# Patient Record
Sex: MALE | Race: WHITE | NOT HISPANIC OR LATINO | Employment: OTHER | URBAN - METROPOLITAN AREA
[De-identification: names, ages, dates, MRNs, and addresses within clinical notes are randomized per-mention and may not be internally consistent; named-entity substitution may affect disease eponyms.]

---

## 2017-01-04 ENCOUNTER — MEDICAL CORRESPONDENCE (OUTPATIENT)
Dept: HEALTH INFORMATION MANAGEMENT | Facility: CLINIC | Age: 62
End: 2017-01-04

## 2017-01-05 ENCOUNTER — TELEPHONE (OUTPATIENT)
Dept: FAMILY MEDICINE | Facility: CLINIC | Age: 62
End: 2017-01-05

## 2017-01-05 NOTE — TELEPHONE ENCOUNTER
Patient has switched insurances for 2017 and his test strips now require a PA for the brand and qty the patient receives.     Ins: [employer] Part D  ID: [id number]  Qty: 900 Day Supply is 90  #: [phone]    Lorna Castellanos CPhT  [facility]  Pharmacy  [phone]

## 2017-01-06 NOTE — TELEPHONE ENCOUNTER
PA for Yomaira Contour Test Strips completed at [employer] and faxed to [employer] - will await response    Questionnaire submitted. PA Case [id number] Status: Pending review.

## 2017-01-10 NOTE — TELEPHONE ENCOUNTER
[facility] approved coverage for diabetic supplies under Pat B benefit for 730 days.  Pharmacy notified.

## 2017-01-17 NOTE — TELEPHONE ENCOUNTER
Eb has problems with hypoglycemia and to help prevent this he is testing his blood glucose values 8-10 times daily.  He does not always feel his lows so he tests his blood sugars more often to assess for this.  Jackeline Carmona RD, CDE

## 2017-01-21 ENCOUNTER — TELEPHONE (OUTPATIENT)
Dept: FAMILY MEDICINE | Facility: CLINIC | Age: 62
End: 2017-01-21

## 2017-01-21 DIAGNOSIS — E10.59 TYPE 1 DIABETES MELLITUS WITH VASCULAR DISEASE (H): Primary | ICD-10-CM

## 2017-01-21 NOTE — Clinical Note
[facility]  [address]  [facility] [address]  Phone: [phone]    January 30, 2017    Eb Eisenberg  [address] [place] [address]      Member ID #[id number]      Case # [id number]      Expedited Appeal Request:      I am writing to expedite an appeal request for Contour Next Strips.  Eb Eisenberg is a brittle diabetic who frequently will have blood sugars less than 70 and has in the past had severe hypoglycemia.  He also has several blood sugars that are over 200 each week and needs to be able to test his blood sugars 8-10 times daily and adjust his insulin pump accordingly.  The Contour Next Strips are the test strips used for the meter that talks to his pump.  If he is unaware of the low blood sugars and is unable to respond accordingly, this could lead to hypoglycemic coma and death.      Please see attached documentation.          Sincerely,          Jackeline Rachel MD

## 2017-01-21 NOTE — TELEPHONE ENCOUNTER
"[employer] requires a prior auth for the karri contour next test strips. Per the previous pa telephone encounter we tried billing the part b but it says it has to go through humana. humana covers accu-chek and nipro products but he has to have the karri contour next test strips because that's the only machine that \"talks\" to his insulin pump. The rejection says that it needs a prior auth    humana part d  ID# [id number]  [phone]    Thanks!    Gayathri Lott, [facility] [place]  [phone]    "

## 2017-01-24 NOTE — TELEPHONE ENCOUNTER
According to [employer] there is already an authorization on file for this patient and this request.  PA not required   (???)

## 2017-01-25 NOTE — TELEPHONE ENCOUNTER
UPDATE: Provider will need to call for a quantity limit override. [phone].  Thanks,   Angelica Appiah  Certified Pharmacy Technician  [facility]  [phone]

## 2017-01-26 NOTE — TELEPHONE ENCOUNTER
I attempted to call but only receive an automated system.  Their office closes at 6 pm as well.    Please call insurance [phone] to request a quantity override for patient Yomaira Contour Next Test Strips.  Patient is a Type 1 diabetic who is testing In Vitro route 8-10 times daily.    ID number [id number]    Thank you.

## 2017-01-26 NOTE — TELEPHONE ENCOUNTER
RN spoke with Mandy.  This requires PA quantity exceeds recommendation.    Will route to Aide.    Vero MTZ RN

## 2017-01-27 NOTE — TELEPHONE ENCOUNTER
So attempting to run test strips through part B, they limit a patient to test only three times a day, unless noted in medical chart as to why patient is testing 8-10 times per day. Please update documentation in epic or call us back at [phone]. We can only dispense up to their limits at this time.  Thanks,   Angelica Appiah  Certified Pharmacy Technician  [facility]  [phone]

## 2017-01-27 NOTE — TELEPHONE ENCOUNTER
I do not see any Humana insurance info for this patient. All I see is BCBS info...    I spoke to patient, he needs Yomaira contour next strips to use with his insulin pump.    He gave me this number for [facility] Provider services: [phone].     Monica Gottlieb RN

## 2017-01-27 NOTE — TELEPHONE ENCOUNTER
Called [facility]h[employer].  Call initiated at 1159am on 1/27/2017    Talked with Diana at 1205.     Requesting 10 boxes for 3 month supply.  She tells me there is already an existing prior authorization in process.    Denied on part D but approved on medicare part B benefits.     Medicare part B should pay.         Referred me to the Medication Intake Team  @1221pm    Still on hold at 1226    Again told this was approved under Medicare Part B benefit and it is approved from 1/7/2017 - 1/7/2019  Reference #[id number]      Will send it again to the pharmacy.    Jackeline Rachel M.D.  1229pm

## 2017-01-27 NOTE — TELEPHONE ENCOUNTER
I have not received the fax to complete.  Care team to look for this and if unable to find -please call and use reference number below to have them fax us a new prior authorization form.     Jackeline Rachel M.D.

## 2017-01-27 NOTE — TELEPHONE ENCOUNTER
Verified coverage with our Medicare office. Patient has part B coverage with [employer]. Person at [employer] this morning mis informed you of the coverage. Spent 35 minutes on the phone with [employer] clarifying and having them fax for a new prior authorization for the quantity of test strips. New reference #[id number]. Please complete form and fax back.  Sorry for the trouble.  Thanks,   Angelica Appiah  Certified Pharmacy Technician  [facility]  [phone]

## 2017-01-27 NOTE — TELEPHONE ENCOUNTER
I updated his 12/5/2016 visit to include reasoning behind his high testing frequency.  Is this adequate?  Let me know if I need to do more.    Jackeline Rachel M.D.

## 2017-01-27 NOTE — TELEPHONE ENCOUNTER
"I'm sorry I have tried to complete a \"maximum dispensing limit\" request form and I received the same response, \"Authorization already on file for this request.\"  "

## 2017-01-30 NOTE — TELEPHONE ENCOUNTER
1/30/2017 956 call initiated    Live person answered 1000.      Standard appeal is 7 days.  Expedited appeal is 72 hours.      Maxx is the [employer] service rep I talked to.    Jackeline Rachel M.D.      1008 initiated a call for an expedite appeal at [phone]    Gracy is who I talked.      1012 placed on hold so she could find the denial information to process the expedited appeal.    1017 back on the phone  Answered all questions.        Will receive call back within 24 hours.    Ended call at 1025    Case #[id number]

## 2017-02-02 NOTE — TELEPHONE ENCOUNTER
I received a fax that the test strips are covered for one year under [employer] part B plan.      Ref#  [id number]    Please make sure pharmacy in [place] is aware.    Jackeline Rachel M.D.

## 2017-03-08 ENCOUNTER — OFFICE VISIT (OUTPATIENT)
Dept: FAMILY MEDICINE | Facility: CLINIC | Age: 62
End: 2017-03-08
Payer: COMMERCIAL

## 2017-03-08 VITALS
WEIGHT: 185 LBS | HEIGHT: 69 IN | RESPIRATION RATE: 18 BRPM | SYSTOLIC BLOOD PRESSURE: 139 MMHG | BODY MASS INDEX: 27.4 KG/M2 | DIASTOLIC BLOOD PRESSURE: 86 MMHG | HEART RATE: 68 BPM

## 2017-03-08 DIAGNOSIS — F32.2 MAJOR DEPRESSIVE DISORDER, SINGLE EPISODE, SEVERE WITHOUT PSYCHOTIC FEATURES (H): ICD-10-CM

## 2017-03-08 DIAGNOSIS — E78.5 HYPERLIPIDEMIA LDL GOAL <70: ICD-10-CM

## 2017-03-08 DIAGNOSIS — F11.90 CHRONIC NARCOTIC USE: ICD-10-CM

## 2017-03-08 DIAGNOSIS — J30.2 SEASONAL ALLERGIC RHINITIS, UNSPECIFIED ALLERGIC RHINITIS TRIGGER: ICD-10-CM

## 2017-03-08 DIAGNOSIS — M48.02 CERVICAL STENOSIS OF SPINE: ICD-10-CM

## 2017-03-08 DIAGNOSIS — E10.42 DIABETIC POLYNEUROPATHY ASSOCIATED WITH TYPE 1 DIABETES MELLITUS (H): ICD-10-CM

## 2017-03-08 DIAGNOSIS — E10.59 TYPE 1 DIABETES MELLITUS WITH VASCULAR DISEASE (H): Primary | ICD-10-CM

## 2017-03-08 DIAGNOSIS — F43.21 GRIEF REACTION: ICD-10-CM

## 2017-03-08 LAB
CREAT UR-MCNC: 80 MG/DL
HBA1C MFR BLD: 7.9 % (ref 4.3–6)
MICROALBUMIN UR-MCNC: 5 MG/L
MICROALBUMIN/CREAT UR: 6.54 MG/G CR (ref 0–17)

## 2017-03-08 PROCEDURE — 82043 UR ALBUMIN QUANTITATIVE: CPT | Performed by: FAMILY MEDICINE

## 2017-03-08 PROCEDURE — 83036 HEMOGLOBIN GLYCOSYLATED A1C: CPT | Performed by: FAMILY MEDICINE

## 2017-03-08 PROCEDURE — 36415 COLL VENOUS BLD VENIPUNCTURE: CPT | Performed by: FAMILY MEDICINE

## 2017-03-08 PROCEDURE — 99214 OFFICE O/P EST MOD 30 MIN: CPT | Performed by: FAMILY MEDICINE

## 2017-03-08 RX ORDER — HYDROCODONE BITARTRATE AND ACETAMINOPHEN 10; 325 MG/1; MG/1
1 TABLET ORAL EVERY 6 HOURS PRN
Qty: 120 TABLET | Refills: 0 | Status: SHIPPED | OUTPATIENT
Start: 2017-05-09 | End: 2017-06-07

## 2017-03-08 RX ORDER — HYDROCODONE BITARTRATE AND ACETAMINOPHEN 10; 325 MG/1; MG/1
1 TABLET ORAL EVERY 6 HOURS PRN
Qty: 120 TABLET | Refills: 0 | Status: SHIPPED | OUTPATIENT
Start: 2017-03-09 | End: 2017-06-12

## 2017-03-08 RX ORDER — HYDROCODONE BITARTRATE AND ACETAMINOPHEN 10; 325 MG/1; MG/1
1 TABLET ORAL EVERY 6 HOURS PRN
Qty: 120 TABLET | Refills: 0 | Status: SHIPPED | OUTPATIENT
Start: 2017-04-09 | End: 2017-06-12

## 2017-03-08 ASSESSMENT — ANXIETY QUESTIONNAIRES
2. NOT BEING ABLE TO STOP OR CONTROL WORRYING: SEVERAL DAYS
6. BECOMING EASILY ANNOYED OR IRRITABLE: NOT AT ALL
5. BEING SO RESTLESS THAT IT IS HARD TO SIT STILL: NOT AT ALL
IF YOU CHECKED OFF ANY PROBLEMS ON THIS QUESTIONNAIRE, HOW DIFFICULT HAVE THESE PROBLEMS MADE IT FOR YOU TO DO YOUR WORK, TAKE CARE OF THINGS AT HOME, OR GET ALONG WITH OTHER PEOPLE: NOT DIFFICULT AT ALL
7. FEELING AFRAID AS IF SOMETHING AWFUL MIGHT HAPPEN: NOT AT ALL
1. FEELING NERVOUS, ANXIOUS, OR ON EDGE: SEVERAL DAYS
3. WORRYING TOO MUCH ABOUT DIFFERENT THINGS: NOT AT ALL
GAD7 TOTAL SCORE: 2

## 2017-03-08 ASSESSMENT — PATIENT HEALTH QUESTIONNAIRE - PHQ9: 5. POOR APPETITE OR OVEREATING: NOT AT ALL

## 2017-03-08 NOTE — NURSING NOTE
"Chief Complaint   Patient presents with     Diabetes     Ent Problem       Initial /86  Pulse 68  Resp 18  Ht 5' 9\" (1.753 m)  Wt 185 lb (83.9 kg)  BMI 27.32 kg/m2 Estimated body mass index is 27.32 kg/(m^2) as calculated from the following:    Height as of this encounter: 5' 9\" (1.753 m).    Weight as of this encounter: 185 lb (83.9 kg).  Medication Reconciliation: complete    "

## 2017-03-08 NOTE — PATIENT INSTRUCTIONS
Thank you for choosing [facility].  You may be receiving a survey in the mail from [facility] regarding your visit today.  Please take a few minutes to complete and return the survey to let us know how we are doing.      Our Clinic hours are:  Mondays    7:20 am - 7 pm  Tues -  Fri  7:20 am - 5 pm    Clinic Phone: [phone]    The clinic lab opens at 7:30 am Mon - Fri and appointments are required.    [facility]. [phone]  Monday-Thursday 8 am - 7pm  Tues/Wed/Fri 8 am - 5:30 pm

## 2017-03-08 NOTE — PROGRESS NOTES
"  SUBJECTIVE:                                                    Eb Eisenberg is a 62 year old male who presents to clinic today for the following health issues:        Diabetes Follow-up      Patient is checking blood sugars: every couple of hours    Diabetic concerns: blood sugar frequently over 200     Symptoms of hypoglycemia (low blood sugar): none     Paresthesias (numbness or burning in feet) or sores: No     Date of last diabetic eye exam: up to date     Hyperlipidemia Follow-Up      Rate your low fat/cholesterol diet?: fair    Taking statin?  No    Other lipid medications/supplements?:  none     Hypertension Follow-up      Outpatient blood pressures are not being checked.    Low Salt Diet: no added salt         Amount of exercise or physical activity: None    Problems taking medications regularly: No    Medication side effects: none  Diet: regular (no restrictions)  Acute Illness   Acute illness concerns: sinus concerns  Onset: 3 months    Fever: no     Chills/Sweats: YES    Headache (location?): no     Sinus Pressure:YES- tender and post-nasal drainage    Conjunctivitis:  YES: watery    Ear Pain: YES: left has pain    Rhinorrhea: YES    Congestion: YES    Sore Throat: no      Cough: YES-productive of clear sputum, productive of yellow sputum    Wheeze: no     Decreased Appetite: YES    Nausea: no     Vomiting: no     Diarrhea:  YES     Dysuria/Freq.: no     Fatigue/Achiness: YES    Sick/Strep Exposure: no      Therapies Tried and outcome: nothing    Ex wife who he still lived with  of ALS.  Moods are okay.  Will have some crying jags.  Was sleeping day and night until this past weekend.   Was exhausted. That seems to have lifted.  Right now feels pretty stable on his medications.        /86  Pulse 68  Resp 18  Ht 5' 9\" (1.753 m)  Wt 185 lb (83.9 kg)  BMI 27.32 kg/m2  EXAM: GENERAL APPEARANCE: Alert, no acute distress  HEENT: unremarkable  RESP: lungs clear to auscultation   CV: normal rate, " regular rhythm, no murmur or gallop  ABDOMEN: soft, no organomegaly, masses or tenderness  PSYCH: mentation appears normal., affect and mood normal    PHQ-9 SCORE 12/5/2016 12/15/2016 3/8/2017   Total Score - - -   Total Score 16 13 7     VIVIANA-7 SCORE 11/19/2015 12/15/2016 3/8/2017   Total Score - - -   Total Score 3 13 2     ASSESSMENT/PLAN:      ICD-10-CM    1. Type 1 diabetes mellitus with vascular disease (H) E10.59 Hemoglobin A1c     Albumin Random Urine Quantitative   2. Major depressive disorder, single episode, severe without psychotic features (H) F32.2    3. Chronic narcotic use F11.90 HYDROcodone-acetaminophen (NORCO)  MG per tablet     HYDROcodone-acetaminophen (NORCO)  MG per tablet     HYDROcodone-acetaminophen (NORCO)  MG per tablet   4. Cervical stenosis of spine M48.02 HYDROcodone-acetaminophen (NORCO)  MG per tablet     HYDROcodone-acetaminophen (NORCO)  MG per tablet     tiZANidine (ZANAFLEX) 4 MG tablet   5. Hyperlipidemia LDL goal <70 E78.5    6. Diabetic polyneuropathy associated with type 1 diabetes mellitus (H) E10.42    7. Grief reaction F43.20    8. Seasonal allergic rhinitis, unspecified allergic rhinitis trigger J30.2      Pain medication refilled.  Overall stable.  Contract on file, sees me every 3 months.     Recommend flonase and over the counter antihistamine for the sinus drainage, I don't feel this is acute sinusitis at this time.     Moods are okay, actually improved on the phq/viviana, encouraged to seek grief group/counseling prn.    Jackeline Rachel M.D.      Patient Instructions         Thank you for choosing [facility].  You may be receiving a survey in the mail from [facility] [employer] regarding your visit today.  Please take a few minutes to complete and return the survey to let us know how we are doing.      Our Clinic hours are:  Mondays    7:20 am - 7 pm  Tues -  Fri  7:20 am - 5 pm    Clinic Phone: [phone]    The clinic lab opens at 7:30 am  Mon - Fri and appointments are required.    [facility]  Ph. [phone]  Monday-Thursday 8 am - 7pm  Tues/Wed/Fri 8 am - 5:30 pm

## 2017-03-08 NOTE — MR AVS SNAPSHOT
After Visit Summary   3/8/2017    Eb Eisenberg    MRN: [id number]           Patient Information     Date Of Birth          1955        Visit Information        Provider Department      3/8/2017 8:20 AM Jackeline Rachel MD [facility]        Today's Diagnoses     Type 1 diabetes mellitus with vascular disease (H)    -  1    Major depressive disorder, single episode, severe without psychotic features (H)        Chronic narcotic use        Cervical stenosis of spine        Hyperlipidemia LDL goal <70        Diabetic polyneuropathy associated with type 1 diabetes mellitus (H)          Care Instructions          Thank you for choosing [facility].  You may be receiving a survey in the mail from [employer] regarding your visit today.  Please take a few minutes to complete and return the survey to let us know how we are doing.      Our Clinic hours are:  Mondays    7:20 am - 7 pm  Tues -  Fri  7:20 am - 5 pm    Clinic Phone: [phone]    The clinic lab opens at 7:30 am Mon - Fri and appointments are required.    [facility] [place]  Ph. [phone]  Monday-Thursday 8 am - 7pm  Tues/Wed/Fri 8 am - 5:30 pm               Follow-ups after your visit        Who to contact     If you have questions or need follow up information about today's clinic visit or your schedule please contact [facility] directly at [phone].  Normal or non-critical lab and imaging results will be communicated to you by MyChart, letter or phone within 4 business days after the clinic has received the results. If you do not hear from us within 7 days, please contact the clinic through MyChart or phone. If you have a critical or abnormal lab result, we will notify you by phone as soon as possible.  Submit refill requests through [employer] or call your pharmacy and they will forward the refill request to us. Please allow 3 business days for your refill to be completed.        "   Additional Information About Your Visit        MyChart Information     [employer] gives you secure access to your electronic health record. If you see a primary care provider, you can also send messages to your care team and make appointments. If you have questions, please call your primary care clinic.  If you do not have a primary care provider, please call [phone] and they will assist you.        Care EveryWhere ID     This is your Care EveryWhere ID. This could be used by other organizations to access your [place] medical records  [id number]        Your Vitals Were     Pulse Respirations Height BMI (Body Mass Index)          68 18 5' 9\" (1.753 m) 27.32 kg/m2         Blood Pressure from Last 3 Encounters:   03/08/17 139/86   12/05/16 132/70   08/29/16 118/67    Weight from Last 3 Encounters:   03/08/17 185 lb (83.9 kg)   12/05/16 188 lb (85.3 kg)   08/29/16 186 lb (84.4 kg)              We Performed the Following     Albumin Random Urine Quantitative     Hemoglobin A1c          Where to get your medicines      [place] [facility] [place] [address]  [address] [facility] [address]     Phone:  [phone]     tiZANidine 4 MG tablet         Some of these will need a paper prescription and others can be bought over the counter.  Ask your nurse if you have questions.     Bring a paper prescription for each of these medications     HYDROcodone-acetaminophen  MG per tablet    HYDROcodone-acetaminophen  MG per tablet    HYDROcodone-acetaminophen  MG per tablet          Primary Care Provider Office Phone # Fax #    Jackeline Rachel [phone]       [facility] [address] [facility] [address]        Thank you!     Thank you for choosing [facility]  for your care. Our goal is always to provide you with excellent care. Hearing back from our patients is one way we " can continue to improve our services. Please take a few minutes to complete the written survey that you may receive in the mail after your visit with us. Thank you!             Your Updated Medication List - Protect others around you: Learn how to safely use, store and throw away your medicines at www.disposemymeds.org.          This list is accurate as of: 3/8/17  8:46 AM.  Always use your most recent med list.                   Brand Name Dispense Instructions for use    aspirin 81 MG chewable tablet     108 tablet    Take 1 tablet (81 mg) by mouth daily       blood glucose monitoring test strip    CEE CONTOUR    10 Box    by In Vitro route 8-10 times daily       buPROPion 150 MG 12 hr tablet    WELLBUTRIN SR    90 tablet    Take 1 tablet (150 mg) by mouth daily       DOCUSATE SODIUM PO      Take 100 mg by mouth every evening       GLUCAGON EMERGENCY KIT 1 MG Kit     1 Kit    use as directed for severe hypoglycemia       * HYDROcodone-acetaminophen  MG per tablet   Start taking on:  3/9/2017    NORCO    120 tablet    Take 1 tablet by mouth every 6 hours as needed for moderate to severe pain       * HYDROcodone-acetaminophen  MG per tablet   Start taking on:  4/9/2017    NORCO    120 tablet    Take 1 tablet by mouth every 6 hours as needed for moderate to severe pain       * HYDROcodone-acetaminophen  MG per tablet   Start taking on:  5/9/2017    NORCO    120 tablet    Take 1 tablet by mouth every 6 hours as needed for moderate to severe pain       insulin lispro 100 UNIT/ML injection    [employer]    3 Month    Take up to 80 uits per day as directed per insulin pump       * insulin pump infusion      Date last updated:  5/11/16 [employer]: BASAL RATES and times: 12   AM (midnight): 1.4 units/hour  5 am: 1.50 units/hour  9   AM: 1.15 units/hour  1 pm: 1.30 units/hour  3   PM: 1.0 units/hour  9    PM: 1.20 units/hour  Basal Pattern A:  12 am: 1.00 10 am: 0.90 12pm:0.50 6 pm: 0.75 9 pm: 1.05   "Pattern B: 12 mid: 0.8 10 am: 0.7 12 noon: 0.3 6 pm: 0.55 9 pm: 0.85 CARB RATIO and times: 12   AM (midnight): 9 12  PM (noon):  9 6    PM:  9 Corection Factor (Sensitivity) and times: 12   AM (midnight): 33 mg/dL BLOOD GLUCOSE TARGET and times: 12   AM (midnight): 110 - 110Active Insulin Time:  4 hours Basal to Bolus Ratio:  Sensor:  No Carelink / Diasend username:   Carelink / [employer] Password:       Insulin Syringe 30G X 1/2\" 0.5 ML Misc     100 each    1 Device as needed.       KETO-DIASTIX Strp     100 strip    1 Stick by In Vitro route See Admin Instructions. Use as needed to monitor for ketones       losartan 50 MG tablet    COZAAR    90 tablet    Take 1 tablet (50 mg) by mouth daily       metoprolol 25 MG 24 hr tablet    TOPROL-XL    90 tablet    Take 1 tablet (25 mg) by mouth daily       multivitamin per tablet     100    1 TABLET ORALLY DAILY       ofloxacin 0.3 % ophthalmic solution    OCUFLOX         OMEGA-3 FISH OIL PO          omeprazole 20 MG tablet     90 tablet    Take 1 tablet (20 mg) by mouth daily Take 30-60 minutes before a meal.       * order for DME      Auto-CPAP: Max 11 cm H2O Min 5 cm H2O Pressure changed in clinic Continuous  Lifetime need and heated humidity.       PARoxetine 20 MG tablet    PAXIL    90 tablet    Take 1 tablet (20 mg) by mouth daily       rosuvastatin 40 MG tablet    CRESTOR    90 tablet    Take 1 tablet (40 mg) by mouth daily       tamsulosin 0.4 MG capsule    FLOMAX    90 capsule    Take 1 capsule (0.4 mg) by mouth daily       tiZANidine 4 MG tablet    ZANAFLEX    90 tablet    Take 1 tablet (4 mg) by mouth 3 times daily as needed       ZINC PO          * Notice:  This list has 5 medication(s) that are the same as other medications prescribed for you. Read the directions carefully, and ask your doctor or other care provider to review them with you.      "

## 2017-03-09 ASSESSMENT — ANXIETY QUESTIONNAIRES: GAD7 TOTAL SCORE: 2

## 2017-03-09 ASSESSMENT — PATIENT HEALTH QUESTIONNAIRE - PHQ9: SUM OF ALL RESPONSES TO PHQ QUESTIONS 1-9: 7

## 2017-06-07 DIAGNOSIS — F11.90 CHRONIC NARCOTIC USE: ICD-10-CM

## 2017-06-07 RX ORDER — HYDROCODONE BITARTRATE AND ACETAMINOPHEN 10; 325 MG/1; MG/1
1 TABLET ORAL EVERY 6 HOURS PRN
Qty: 120 TABLET | Refills: 0 | Status: SHIPPED | OUTPATIENT
Start: 2017-06-07 | End: 2017-06-12

## 2017-06-07 NOTE — TELEPHONE ENCOUNTER
Pharmacy reports:  They do not have a RX  Need order, signed, and also stating ok for early refill    Med cued    Routing to provider.    Sindhu MTZ Rn

## 2017-06-07 NOTE — TELEPHONE ENCOUNTER
Reason for Call:  Other prescription    Detailed comments: Norco - Pt called asking for refill today for June Maddie - He has an appt with Dr. Rachel 06/12, but would like the June refill today, because he is leaving town and also so that he doesn't run out before he sees MD on Monday. Call patient when Rx hard copy has been walked to [facility]  Norco      Last Written Prescription Date:  05/09/17  Last Fill Quantity: 120,   # refills: 0  Last Office Visit with [facility], [facility] or [facility] prescribing provider: 03/08/17  Future Office visit:    Next 5 appointments (look out 90 days)     Jun 12, 2017  6:20 PM CDT   SHORT with Jackeline Rachel MD   [facility] ([facility])    [address] [facility] [address]   [phone]                   Routing refill request to provider for review/approval because:  Drug not on the [facility], [facility] or [facility] refill protocol or controlled substance    Phone Number Patient can be reached at: Home number on file [phone] (home)    Best Time: any    Can we leave a detailed message on this number? YES    Call taken on 6/7/2017 at 11:32 AM by Beverley Randolph

## 2017-06-07 NOTE — TELEPHONE ENCOUNTER
Patient requesting Norco for Myrna to be filled today  He is going out of town 6/9/17coming back on 6/11/17 or 6/12/17  He has enough medication to get him to 6/10/16, he does not want to run out while he is gone  Refill is due to be filled 6/9/17, but he will be out of town  He has appt evening of 6/12/17 with provider  Is it ok for him to have an early refill  Please advise    Routing to provider.    Sindhu MTZ Rn

## 2017-06-12 ENCOUNTER — OFFICE VISIT (OUTPATIENT)
Dept: FAMILY MEDICINE | Facility: CLINIC | Age: 62
End: 2017-06-12
Payer: COMMERCIAL

## 2017-06-12 VITALS
DIASTOLIC BLOOD PRESSURE: 70 MMHG | TEMPERATURE: 98.5 F | WEIGHT: 185 LBS | SYSTOLIC BLOOD PRESSURE: 139 MMHG | HEART RATE: 64 BPM | BODY MASS INDEX: 27.4 KG/M2 | HEIGHT: 69 IN

## 2017-06-12 DIAGNOSIS — M48.02 CERVICAL STENOSIS OF SPINE: ICD-10-CM

## 2017-06-12 DIAGNOSIS — F11.90 CHRONIC NARCOTIC USE: Primary | ICD-10-CM

## 2017-06-12 PROCEDURE — 99214 OFFICE O/P EST MOD 30 MIN: CPT | Performed by: FAMILY MEDICINE

## 2017-06-12 RX ORDER — HYDROCODONE BITARTRATE AND ACETAMINOPHEN 10; 325 MG/1; MG/1
1 TABLET ORAL EVERY 6 HOURS PRN
Qty: 120 TABLET | Refills: 0 | Status: SHIPPED | OUTPATIENT
Start: 2017-08-07 | End: 2017-10-05

## 2017-06-12 RX ORDER — HYDROCODONE BITARTRATE AND ACETAMINOPHEN 10; 325 MG/1; MG/1
1 TABLET ORAL EVERY 6 HOURS PRN
Qty: 120 TABLET | Refills: 0 | Status: SHIPPED | OUTPATIENT
Start: 2017-09-07 | End: 2017-10-05

## 2017-06-12 RX ORDER — HYDROCODONE BITARTRATE AND ACETAMINOPHEN 10; 325 MG/1; MG/1
1 TABLET ORAL EVERY 6 HOURS PRN
Qty: 120 TABLET | Refills: 0 | Status: SHIPPED | OUTPATIENT
Start: 2017-07-07 | End: 2017-10-05

## 2017-06-12 NOTE — PROGRESS NOTES
"  SUBJECTIVE:                                                    Eb Eisenberg is a 62 year old male who presents to clinic today for the following health issues:        Chronic Pain Follow-Up       Type / Location of Pain: right shoulder   Analgesia/pain control:       Recent changes:  same      Overall control: Comfortably manageable  Activity level/function:      Daily activities:  Able to do all daily activities    Work:  Retired   Adverse effects:  No  Adherance    Taking medication as directed?  Yes \"four times a day\"    Participating in other treatments: no -   Risk Factors:    Sleep:  Good    Mood/anxiety:  controlled    Recent family or social stressors:  death in family:  Mother recently      Other aggravating factors: none  PHQ-9 SCORE 2016 12/15/2016 3/8/2017   Total Score - - -   Total Score 16 13 7     VIVIANA-7 SCORE 2015 12/15/2016 3/8/2017   Total Score - - -   Total Score 3 13 2     Encounter-Level CSA:     There are no encounter-level csa.             Amount of exercise or physical activity: None- \"lift motors\"    Problems taking medications regularly: No    Medication side effects: none    Diet: regular (no restrictions)      /70  Pulse 64  Temp 98.5  F (36.9  C) (Tympanic)  Ht 5' 9\" (1.753 m)  Wt 185 lb (83.9 kg)  BMI 27.32 kg/m2  EXAM: GENERAL APPEARANCE: Alert, no acute distress  RESP: lungs clear to auscultation   CV: normal rate, regular rhythm, no murmur or gallop  ABDOMEN: soft, no organomegaly, masses or tenderness    ASSESSMENT/PLAN:      ICD-10-CM    1. Chronic narcotic use F11.90 HYDROcodone-acetaminophen (NORCO)  MG per tablet     HYDROcodone-acetaminophen (NORCO)  MG per tablet     HYDROcodone-acetaminophen (NORCO)  MG per tablet   2. Cervical stenosis of spine M48.02 HYDROcodone-acetaminophen (NORCO)  MG per tablet     HYDROcodone-acetaminophen (NORCO)  MG per tablet     No overuse of medications.  Has a narcotic contract in " place.     Recheck in 3 months.    Jackeline Rachel M.D.

## 2017-06-12 NOTE — NURSING NOTE
"Chief Complaint   Patient presents with     Medication Request       Initial /77  Pulse 64  Temp 98.5  F (36.9  C) (Tympanic)  Ht 5' 9\" (1.753 m)  Wt 185 lb (83.9 kg)  BMI 27.32 kg/m2 Estimated body mass index is 27.32 kg/(m^2) as calculated from the following:    Height as of this encounter: 5' 9\" (1.753 m).    Weight as of this encounter: 185 lb (83.9 kg).  Medication Reconciliation: complete    "

## 2017-06-16 ENCOUNTER — TELEPHONE (OUTPATIENT)
Dept: FAMILY MEDICINE | Facility: CLINIC | Age: 62
End: 2017-06-16

## 2017-06-16 NOTE — TELEPHONE ENCOUNTER
Panel Management Review      Patient has the following on his problem list:     Depression / Dysthymia review  PHQ-9 SCORE 12/5/2016 12/15/2016 3/8/2017   Total Score - - -   Total Score 16 13 7      Patient is due for:  PHQ9    Diabetes    ASA: Passed    Last A1C  Lab Results   Component Value Date    A1C 7.9 03/08/2017    A1C 8.1 12/05/2016    A1C 8.3 08/29/2016    A1C 8.7 03/02/2016    A1C 7.6 10/07/2015     A1C tested: Passed    Last LDL:    Lab Results   Component Value Date    CHOL 152 12/05/2016     Lab Results   Component Value Date    HDL 58 12/05/2016     Lab Results   Component Value Date    LDL 78 12/05/2016     Lab Results   Component Value Date    TRIG 79 12/05/2016     Lab Results   Component Value Date    CHOLHDLRATIO 3.0 07/22/2014     Lab Results   Component Value Date    NHDL 94 12/05/2016       Is the patient on a Statin? YES             Is the patient on Aspirin? YES    Medications     HMG CoA Reductase Inhibitors    rosuvastatin (CRESTOR) 40 MG tablet    Salicylates    aspirin 81 MG chewable tablet          Last three blood pressure readings:  BP Readings from Last 3 Encounters:   06/12/17 139/70   03/08/17 139/86   12/05/16 132/70       Date of last diabetes office visit: 3/8/17     Tobacco History:     History   Smoking Status     Former Smoker     Quit date: 1/1/2001   Smokeless Tobacco     Never Used           Composite cancer screening  Chart review shows that this patient is due/due soon for the following None  Summary:    Patient is due/failing the following:   PHQ9    Action needed:   Patient needs to do PHQ9.    Type of outreach:    Phone, left message for patient to call back.     Questions for provider review:    None                                                                                                                                    Lorna Carlton MA       Chart routed to panel management .

## 2017-06-17 ASSESSMENT — PATIENT HEALTH QUESTIONNAIRE - PHQ9: SUM OF ALL RESPONSES TO PHQ QUESTIONS 1-9: 6

## 2017-07-12 DIAGNOSIS — E10.42 DIABETIC POLYNEUROPATHY ASSOCIATED WITH TYPE 1 DIABETES MELLITUS (H): ICD-10-CM

## 2017-07-12 NOTE — TELEPHONE ENCOUNTER
Pt requests a change to Novolog    Humalog        Last Written Prescription Date: 12/05/16  Last Fill Quantity: 3 mth, # refills: 1  Last Office Visit with G, P or [facility] prescribing provider:  6/12/17        BP Readings from Last 3 Encounters:   06/12/17 139/70   03/08/17 139/86   12/05/16 132/70     Lab Results   Component Value Date    MICROL 5 03/08/2017     Lab Results   Component Value Date    UMALCR 6.54 03/08/2017     Creatinine   Date Value Ref Range Status   12/05/2016 0.90 0.66 - 1.25 mg/dL Final   ]  GFR Estimate   Date Value Ref Range Status   12/05/2016 85 >60 mL/min/1.7m2 Final     Comment:     Non  GFR Calc   11/19/2015 89 >60 mL/min/1.7m2 Final     Comment:     Non  GFR Calc   01/30/2015 >90  Non  GFR Calc   >60 mL/min/1.7m2 Final     GFR Estimate If Black   Date Value Ref Range Status   12/05/2016 >90   GFR Calc   >60 mL/min/1.7m2 Final   11/19/2015 >90   GFR Calc   >60 mL/min/1.7m2 Final   01/30/2015 >90   GFR Calc   >60 mL/min/1.7m2 Final     Lab Results   Component Value Date    CHOL 152 12/05/2016     Lab Results   Component Value Date    HDL 58 12/05/2016     Lab Results   Component Value Date    LDL 78 12/05/2016     Lab Results   Component Value Date    TRIG 79 12/05/2016     Lab Results   Component Value Date    CHOLHDLRATIO 3.0 07/22/2014     Lab Results   Component Value Date    AST 22 08/28/2014     Lab Results   Component Value Date    ALT 37 08/28/2014     Lab Results   Component Value Date    A1C 7.9 03/08/2017    A1C 8.1 12/05/2016    A1C 8.3 08/29/2016    A1C 8.7 03/02/2016    A1C 7.6 10/07/2015     Potassium   Date Value Ref Range Status   12/05/2016 4.3 3.4 - 5.3 mmol/L Final

## 2017-09-25 NOTE — MR AVS SNAPSHOT
"              After Visit Summary   6/12/2017    Eb Eisenberg    MRN: [id number]           Patient Information     Date Of Birth          1955        Visit Information        Provider Department      6/12/2017 6:20 PM Jackeline Rachel MD [facility]        Today's Diagnoses     Chronic narcotic use    -  1    Cervical stenosis of spine           Follow-ups after your visit        Who to contact     If you have questions or need follow up information about today's clinic visit or your schedule please contact [facility] directly at [phone].  Normal or non-critical lab and imaging results will be communicated to you by Semadichart, letter or phone within 4 business days after the clinic has received the results. If you do not hear from us within 7 days, please contact the clinic through BizBragt or phone. If you have a critical or abnormal lab result, we will notify you by phone as soon as possible.  Submit refill requests through Valensum or call your pharmacy and they will forward the refill request to us. Please allow 3 business days for your refill to be completed.          Additional Information About Your Visit        MyChart Information     [employer] gives you secure access to your electronic health record. If you see a primary care provider, you can also send messages to your care team and make appointments. If you have questions, please call your primary care clinic.  If you do not have a primary care provider, please call [phone] and they will assist you.        Care EveryWhere ID     This is your Care EveryWhere ID. This could be used by other organizations to access your [facility] medical records  [id number]        Your Vitals Were     Pulse Temperature Height BMI (Body Mass Index)          64 98.5  F (36.9  C) (Tympanic) 5' 9\" (1.753 m) 27.32 kg/m2         Blood Pressure from Last 3 Encounters:   06/12/17 139/70   03/08/17 139/86   12/05/16 132/70    Weight " from Last 3 Encounters:   06/12/17 185 lb (83.9 kg)   03/08/17 185 lb (83.9 kg)   12/05/16 188 lb (85.3 kg)              We Performed the Following     DEPRESSION ACTION PLAN (DAP)          Where to get your medicines      Some of these will need a paper prescription and others can be bought over the counter.  Ask your nurse if you have questions.     Bring a paper prescription for each of these medications     HYDROcodone-acetaminophen  MG per tablet    HYDROcodone-acetaminophen  MG per tablet    HYDROcodone-acetaminophen  MG per tablet          Primary Care Provider Office Phone # Fax #    Jackeline Rachel [phone]       [facility] [address] [facility] [address]        Thank you!     Thank you for choosing [facility]  for your care. Our goal is always to provide you with excellent care. Hearing back from our patients is one way we can continue to improve our services. Please take a few minutes to complete the written survey that you may receive in the mail after your visit with us. Thank you!             Your Updated Medication List - Protect others around you: Learn how to safely use, store and throw away your medicines at www.disposemymeds.org.          This list is accurate as of: 6/12/17  6:48 PM.  Always use your most recent med list.                   Brand Name Dispense Instructions for use    aspirin 81 MG chewable tablet     108 tablet    Take 1 tablet (81 mg) by mouth daily       blood glucose monitoring test strip    CEE CONTOUR    10 Box    by In Vitro route 8-10 times daily       buPROPion 150 MG 12 hr tablet    WELLBUTRIN SR    90 tablet    Take 1 tablet (150 mg) by mouth daily       DOCUSATE SODIUM PO      Take 100 mg by mouth every evening       GLUCAGON EMERGENCY KIT 1 MG Kit     1 Kit    use as directed for severe hypoglycemia       * HYDROcodone-acetaminophen  MG per tablet   Start taking on:  7/7/2017    NORCO     "120 tablet    Take 1 tablet by mouth every 6 hours as needed for moderate to severe pain       * HYDROcodone-acetaminophen  MG per tablet   Start taking on:  8/7/2017    NORCO    120 tablet    Take 1 tablet by mouth every 6 hours as needed for moderate to severe pain       * HYDROcodone-acetaminophen  MG per tablet   Start taking on:  9/7/2017    NORCO    120 tablet    Take 1 tablet by mouth every 6 hours as needed for moderate to severe pain       insulin lispro 100 UNIT/ML injection    HumaLOG    3 Month    Take up to 80 uits per day as directed per insulin pump       * insulin pump infusion      Date last updated:  5/11/16 QBE: BASAL RATES and times: 12   AM (midnight): 1.4 units/hour  5 am: 1.50 units/hour  9   AM: 1.15 units/hour  1 pm: 1.30 units/hour  3   PM: 1.0 units/hour  9    PM: 1.20 units/hour  Basal Pattern A:  12 am: 1.00 10 am: 0.90 12pm:0.50 6 pm: 0.75 9 pm: 1.05  Pattern B: 12 mid: 0.8 10 am: 0.7 12 noon: 0.3 6 pm: 0.55 9 pm: 0.85 CARB RATIO and times: 12   AM (midnight): 9 12  PM (noon):  9 6    PM:  9 Corection Factor (Sensitivity) and times: 12   AM (midnight): 33 mg/dL BLOOD GLUCOSE TARGET and times: 12   AM (midnight): 110 - 110Active Insulin Time:  4 hours Basal to Bolus Ratio:  Sensor:  No Carelink / Diasend username:   Carelink / Diasend Password:       Insulin Syringe 30G X 1/2\" 0.5 ML Misc     100 each    1 Device as needed.       KETO-DIASTIX Strp     100 strip    1 Stick by In Vitro route See Admin Instructions. Use as needed to monitor for ketones       losartan 50 MG tablet    COZAAR    90 tablet    Take 1 tablet (50 mg) by mouth daily       metoprolol 25 MG 24 hr tablet    TOPROL-XL    90 tablet    Take 1 tablet (25 mg) by mouth daily       multivitamin per tablet     100    1 TABLET ORALLY DAILY       ofloxacin 0.3 % ophthalmic solution    OCUFLOX         OMEGA-3 FISH OIL PO          omeprazole 20 MG tablet     90 tablet    Take 1 tablet (20 mg) by mouth " daily Take 30-60 minutes before a meal.       * order for DME      Auto-CPAP: Max 11 cm H2O Min 5 cm H2O Pressure changed in clinic Continuous  Lifetime need and heated humidity.       PARoxetine 20 MG tablet    PAXIL    90 tablet    Take 1 tablet (20 mg) by mouth daily       rosuvastatin 40 MG tablet    CRESTOR    90 tablet    Take 1 tablet (40 mg) by mouth daily       tamsulosin 0.4 MG capsule    FLOMAX    90 capsule    Take 1 capsule (0.4 mg) by mouth daily       tiZANidine 4 MG tablet    ZANAFLEX    90 tablet    Take 1 tablet (4 mg) by mouth 3 times daily as needed       ZINC PO          * Notice:  This list has 5 medication(s) that are the same as other medications prescribed for you. Read the directions carefully, and ask your doctor or other care provider to review them with you.       AROLDO Paniagua in holding

## 2017-10-05 ENCOUNTER — OFFICE VISIT (OUTPATIENT)
Dept: FAMILY MEDICINE | Facility: CLINIC | Age: 62
End: 2017-10-05
Payer: COMMERCIAL

## 2017-10-05 VITALS
WEIGHT: 182 LBS | HEART RATE: 68 BPM | SYSTOLIC BLOOD PRESSURE: 136 MMHG | RESPIRATION RATE: 18 BRPM | BODY MASS INDEX: 26.96 KG/M2 | DIASTOLIC BLOOD PRESSURE: 74 MMHG | HEIGHT: 69 IN

## 2017-10-05 DIAGNOSIS — F11.90 CHRONIC NARCOTIC USE: ICD-10-CM

## 2017-10-05 DIAGNOSIS — E10.59 TYPE 1 DIABETES MELLITUS WITH VASCULAR DISEASE (H): Primary | ICD-10-CM

## 2017-10-05 DIAGNOSIS — Z23 NEED FOR PROPHYLACTIC VACCINATION AND INOCULATION AGAINST INFLUENZA: ICD-10-CM

## 2017-10-05 DIAGNOSIS — M48.02 CERVICAL STENOSIS OF SPINE: ICD-10-CM

## 2017-10-05 DIAGNOSIS — N40.1 BENIGN NON-NODULAR PROSTATIC HYPERPLASIA WITH LOWER URINARY TRACT SYMPTOMS: ICD-10-CM

## 2017-10-05 DIAGNOSIS — F32.2 MAJOR DEPRESSIVE DISORDER, SINGLE EPISODE, SEVERE WITHOUT PSYCHOTIC FEATURES (H): ICD-10-CM

## 2017-10-05 DIAGNOSIS — I51.9 LEFT VENTRICULAR DYSFUNCTION: ICD-10-CM

## 2017-10-05 LAB
BASOPHILS # BLD AUTO: 0.1 10E9/L (ref 0–0.2)
BASOPHILS NFR BLD AUTO: 0.8 %
DIFFERENTIAL METHOD BLD: NORMAL
EOSINOPHIL # BLD AUTO: 0.2 10E9/L (ref 0–0.7)
EOSINOPHIL NFR BLD AUTO: 1.9 %
ERYTHROCYTE [DISTWIDTH] IN BLOOD BY AUTOMATED COUNT: 12.7 % (ref 10–15)
HBA1C MFR BLD: 8.4 % (ref 4.3–6)
HCT VFR BLD AUTO: 41.6 % (ref 40–53)
HGB BLD-MCNC: 14.1 G/DL (ref 13.3–17.7)
LYMPHOCYTES # BLD AUTO: 2.2 10E9/L (ref 0.8–5.3)
LYMPHOCYTES NFR BLD AUTO: 28 %
MCH RBC QN AUTO: 30.4 PG (ref 26.5–33)
MCHC RBC AUTO-ENTMCNC: 33.9 G/DL (ref 31.5–36.5)
MCV RBC AUTO: 90 FL (ref 78–100)
MONOCYTES # BLD AUTO: 0.9 10E9/L (ref 0–1.3)
MONOCYTES NFR BLD AUTO: 11.1 %
NEUTROPHILS # BLD AUTO: 4.5 10E9/L (ref 1.6–8.3)
NEUTROPHILS NFR BLD AUTO: 58.2 %
PLATELET # BLD AUTO: 265 10E9/L (ref 150–450)
RBC # BLD AUTO: 4.64 10E12/L (ref 4.4–5.9)
WBC # BLD AUTO: 7.8 10E9/L (ref 4–11)

## 2017-10-05 PROCEDURE — 90471 IMMUNIZATION ADMIN: CPT | Performed by: FAMILY MEDICINE

## 2017-10-05 PROCEDURE — 36415 COLL VENOUS BLD VENIPUNCTURE: CPT | Performed by: FAMILY MEDICINE

## 2017-10-05 PROCEDURE — 90686 IIV4 VACC NO PRSV 0.5 ML IM: CPT | Performed by: FAMILY MEDICINE

## 2017-10-05 PROCEDURE — 85025 COMPLETE CBC W/AUTO DIFF WBC: CPT | Performed by: FAMILY MEDICINE

## 2017-10-05 PROCEDURE — 80048 BASIC METABOLIC PNL TOTAL CA: CPT | Performed by: FAMILY MEDICINE

## 2017-10-05 PROCEDURE — 99214 OFFICE O/P EST MOD 30 MIN: CPT | Mod: 25 | Performed by: FAMILY MEDICINE

## 2017-10-05 PROCEDURE — 83036 HEMOGLOBIN GLYCOSYLATED A1C: CPT | Performed by: FAMILY MEDICINE

## 2017-10-05 PROCEDURE — 80061 LIPID PANEL: CPT | Performed by: FAMILY MEDICINE

## 2017-10-05 PROCEDURE — 84460 ALANINE AMINO (ALT) (SGPT): CPT | Performed by: FAMILY MEDICINE

## 2017-10-05 RX ORDER — LOSARTAN POTASSIUM 50 MG/1
50 TABLET ORAL DAILY
Qty: 90 TABLET | Refills: 3 | Status: SHIPPED | OUTPATIENT
Start: 2017-10-05 | End: 2018-10-10

## 2017-10-05 RX ORDER — HYDROCODONE BITARTRATE AND ACETAMINOPHEN 10; 325 MG/1; MG/1
1 TABLET ORAL EVERY 6 HOURS PRN
Qty: 120 TABLET | Refills: 0 | Status: SHIPPED | OUTPATIENT
Start: 2017-11-06 | End: 2017-12-15

## 2017-10-05 RX ORDER — TAMSULOSIN HYDROCHLORIDE 0.4 MG/1
0.4 CAPSULE ORAL DAILY
Qty: 90 CAPSULE | Refills: 3 | Status: SHIPPED | OUTPATIENT
Start: 2017-10-05 | End: 2018-07-25

## 2017-10-05 RX ORDER — HYDROCODONE BITARTRATE AND ACETAMINOPHEN 10; 325 MG/1; MG/1
1 TABLET ORAL EVERY 6 HOURS PRN
Qty: 120 TABLET | Refills: 0 | Status: SHIPPED | OUTPATIENT
Start: 2017-12-06 | End: 2017-12-15

## 2017-10-05 RX ORDER — BUPROPION HYDROCHLORIDE 150 MG/1
150 TABLET, EXTENDED RELEASE ORAL DAILY
Qty: 90 TABLET | Refills: 3 | Status: SHIPPED | OUTPATIENT
Start: 2017-10-05 | End: 2018-10-10

## 2017-10-05 RX ORDER — METOPROLOL SUCCINATE 25 MG/1
25 TABLET, EXTENDED RELEASE ORAL DAILY
Qty: 90 TABLET | Refills: 3 | Status: SHIPPED | OUTPATIENT
Start: 2017-10-05 | End: 2018-10-10

## 2017-10-05 RX ORDER — HYDROCODONE BITARTRATE AND ACETAMINOPHEN 10; 325 MG/1; MG/1
1 TABLET ORAL EVERY 6 HOURS PRN
Qty: 120 TABLET | Refills: 0 | Status: SHIPPED | OUTPATIENT
Start: 2017-10-06 | End: 2017-12-15

## 2017-10-05 RX ORDER — PAROXETINE 20 MG/1
20 TABLET, FILM COATED ORAL DAILY
Qty: 90 TABLET | Refills: 3 | Status: SHIPPED | OUTPATIENT
Start: 2017-10-05 | End: 2018-07-25

## 2017-10-05 ASSESSMENT — ANXIETY QUESTIONNAIRES
6. BECOMING EASILY ANNOYED OR IRRITABLE: NOT AT ALL
2. NOT BEING ABLE TO STOP OR CONTROL WORRYING: NOT AT ALL
IF YOU CHECKED OFF ANY PROBLEMS ON THIS QUESTIONNAIRE, HOW DIFFICULT HAVE THESE PROBLEMS MADE IT FOR YOU TO DO YOUR WORK, TAKE CARE OF THINGS AT HOME, OR GET ALONG WITH OTHER PEOPLE: NOT DIFFICULT AT ALL
7. FEELING AFRAID AS IF SOMETHING AWFUL MIGHT HAPPEN: NOT AT ALL
5. BEING SO RESTLESS THAT IT IS HARD TO SIT STILL: NOT AT ALL
1. FEELING NERVOUS, ANXIOUS, OR ON EDGE: NOT AT ALL
3. WORRYING TOO MUCH ABOUT DIFFERENT THINGS: NOT AT ALL
GAD7 TOTAL SCORE: 0

## 2017-10-05 ASSESSMENT — PATIENT HEALTH QUESTIONNAIRE - PHQ9
5. POOR APPETITE OR OVEREATING: NOT AT ALL
SUM OF ALL RESPONSES TO PHQ QUESTIONS 1-9: 4

## 2017-10-05 NOTE — PROGRESS NOTES
Injectable Influenza Immunization Documentation    1.  Is the person to be vaccinated sick today?   No    2. Does the person to be vaccinated have an allergy to a component   of the vaccine?   No    3. Has the person to be vaccinated ever had a serious reaction   to influenza vaccine in the past?   No    4. Has the person to be vaccinated ever had Guillain-Barré syndrome?   No    Form completed by Lorna Carlton MA      SUBJECTIVE:   Eb Eisenberg is a 62 year old male who presents to clinic today for the following health issues:      Diabetes Follow-up    Patient is checking blood sugars: 8 times daily.    Blood sugar testing frequency justification: Frequent hypoglycemia and Risk of hypoglycemia with medication(s)          Diabetic concerns:   blood sugar frequently under 200, very seldom gets hypoglycemic     Symptoms of hypoglycemia (low blood sugar): shaky, weak     Paresthesias (numbness or burning in feet) or sores: No     Date of last diabetic eye exam: next Thursday has appt    Hyperlipidemia Follow-Up      Rate your low fat/cholesterol diet?: good    Taking statin?  Yes, no muscle aches from statin    Other lipid medications/supplements?:  Fish oil/Omega 3,  without side effects    Hypertension Follow-up      Outpatient blood pressures are not being checked.  Low Salt Diet: no added salt      Amount of exercise or physical activity: None    Problems taking medications regularly: No    Medication side effects: none  Diet: regular (no restrictions)      Chronic Pain Follow-Up       Type / Location of Pain: cervical spine stenosis  Analgesia/pain control:       Recent changes:  same      Overall control: Tolerable with discomfort  Activity level/function:      Daily activities:  Able to do light housework, cooking    Work:  not applicable  Adverse effects:  No  Adherance    Taking medication as directed?  Yes    Participating in other treatments: not applicable  Risk Factors:    Sleep:  Fair     "Mood/anxiety:  controlled    Recent family or social stressors:  none noted    Other aggravating factors: none  PHQ-9 SCORE 12/15/2016 3/8/2017 6/16/2017   Total Score - - -   Total Score 13 7 6     VIVIANA-7 SCORE 11/19/2015 12/15/2016 3/8/2017   Total Score - - -   Total Score 3 13 2     Encounter-Level CSA:     There are no encounter-level csa.          /74  Pulse 68  Resp 18  Ht 5' 9\" (1.753 m)  Wt 182 lb (82.6 kg)  BMI 26.88 kg/m2  EXAM: GENERAL APPEARANCE: Alert, no acute distress  RESP: lungs clear to auscultation   CV: normal rate, regular rhythm, no murmur or gallop  ABDOMEN: soft, no organomegaly, masses or tenderness    Results for orders placed or performed in visit on 10/05/17   Hemoglobin A1c   Result Value Ref Range    Hemoglobin A1C 8.4 (H) 4.3 - 6.0 %   CBC with platelets differential   Result Value Ref Range    WBC 7.8 4.0 - 11.0 10e9/L    RBC Count 4.64 4.4 - 5.9 10e12/L    Hemoglobin 14.1 13.3 - 17.7 g/dL    Hematocrit 41.6 40.0 - 53.0 %    MCV 90 78 - 100 fl    MCH 30.4 26.5 - 33.0 pg    MCHC 33.9 31.5 - 36.5 g/dL    RDW 12.7 10.0 - 15.0 %    Platelet Count 265 150 - 450 10e9/L    Diff Method Automated Method     % Neutrophils 58.2 %    % Lymphocytes 28.0 %    % Monocytes 11.1 %    % Eosinophils 1.9 %    % Basophils 0.8 %    Absolute Neutrophil 4.5 1.6 - 8.3 10e9/L    Absolute Lymphocytes 2.2 0.8 - 5.3 10e9/L    Absolute Monocytes 0.9 0.0 - 1.3 10e9/L    Absolute Eosinophils 0.2 0.0 - 0.7 10e9/L    Absolute Basophils 0.1 0.0 - 0.2 10e9/L     ASSESSMENT/PLAN:      ICD-10-CM    1. Type 1 diabetes mellitus with vascular disease E10.59 Hemoglobin A1c     metoprolol (TOPROL-XL) 25 MG 24 hr tablet     ALT     CBC with platelets differential     Lipid panel reflex to direct LDL     Basic metabolic panel     insulin aspart (NOVOLOG PEN) 100 UNIT/ML injection   2. Chronic narcotic use F11.90 HYDROcodone-acetaminophen (NORCO)  MG per tablet     HYDROcodone-acetaminophen (NORCO)  MG per " tablet     HYDROcodone-acetaminophen (NORCO)  MG per tablet   3. Cervical stenosis of spine M48.02 HYDROcodone-acetaminophen (NORCO)  MG per tablet     HYDROcodone-acetaminophen (NORCO)  MG per tablet     tiZANidine (ZANAFLEX) 4 MG tablet   4. Left ventricular dysfunction I51.9 losartan (COZAAR) 50 MG tablet   5. Benign non-nodular prostatic hyperplasia with lower urinary tract symptoms N40.1 tamsulosin (FLOMAX) 0.4 MG capsule   6. Major depressive disorder, single episode, severe without psychotic features (H) F32.2 buPROPion (WELLBUTRIN SR) 150 MG 12 hr tablet     PARoxetine (PAXIL) 20 MG tablet   7. Need for prophylactic vaccination and inoculation against influenza Z23 FLU VAC, SPLIT VIRUS IM > 3 YO (QUADRIVALENT) [99683]     Vaccine Administration, Initial [29468]     Needed to change to Novolog due to cost and reimbursement for humalog. This was done.    Medications refilled.     Patients diabetes control is fair for his type I diabetes that has been longstanding.  We find that if his HgbA1c runs in the 7s, he tends to have a lot of hypoglycemia.    Jackeline Rachel M.D.      Patient Instructions         Thank you for choosing [facility].  You may be receiving a survey in the mail from [facility] regarding your visit today.  Please take a few minutes to complete and return the survey to let us know how we are doing.      Our Clinic hours are:  Mondays    7:20 am - 7 pm  Tues -  Fri  7:20 am - 5 pm    Clinic Phone: [phone]    The clinic lab opens at 7:30 am Mon - Fri and appointments are required.    [facility]. [phone]  Monday-Thursday 8 am - 7pm  Tues/Wed/Fri 8 am - 5:30 pm

## 2017-10-05 NOTE — NURSING NOTE
"Chief Complaint   Patient presents with     Diabetes       Initial /79  Pulse 68  Resp 18  Ht 5' 9\" (1.753 m)  Wt 182 lb (82.6 kg)  BMI 26.88 kg/m2 Estimated body mass index is 26.88 kg/(m^2) as calculated from the following:    Height as of this encounter: 5' 9\" (1.753 m).    Weight as of this encounter: 182 lb (82.6 kg).  Medication Reconciliation: complete    "

## 2017-10-05 NOTE — PATIENT INSTRUCTIONS
Thank you for choosing [facility].  You may be receiving a survey in the mail from [facility] regarding your visit today.  Please take a few minutes to complete and return the survey to let us know how we are doing.      Our Clinic hours are:  Mondays    7:20 am - 7 pm  Tues -  Fri  7:20 am - 5 pm    Clinic Phone: [phone]    The clinic lab opens at 7:30 am Mon - Fri and appointments are required.    [facility]. [phone]  Monday-Thursday 8 am - 7pm  Tues/Wed/Fri 8 am - 5:30 pm

## 2017-10-05 NOTE — MR AVS SNAPSHOT
After Visit Summary   10/5/2017    Eb Eisenberg    MRN: [id number]           Patient Information     Date Of Birth          1955        Visit Information        Provider Department      10/5/2017 2:00 PM Jackeline Rachel MD [facility] [place] [facility]        Today's Diagnoses     Type 1 diabetes mellitus with vascular disease    -  1    Chronic narcotic use        Cervical stenosis of spine        Left ventricular dysfunction        Benign non-nodular prostatic hyperplasia with lower urinary tract symptoms        Major depressive disorder, single episode, severe without psychotic features (H)        Need for prophylactic vaccination and inoculation against influenza          Care Instructions          Thank you for choosing [facility].  You may be receiving a survey in the mail from [employer] regarding your visit today.  Please take a few minutes to complete and return the survey to let us know how we are doing.      Our Clinic hours are:  Mondays    7:20 am - 7 pm  Tues -  Fri  7:20 am - 5 pm    Clinic Phone: [phone]    The clinic lab opens at 7:30 am Mon - Fri and appointments are required.    [facility]  Ph. [phone]  Monday-Thursday 8 am - 7pm  Tues/Wed/Fri 8 am - 5:30 pm                 Follow-ups after your visit        Who to contact     If you have questions or need follow up information about today's clinic visit or your schedule please contact [facility] directly at [phone].  Normal or non-critical lab and imaging results will be communicated to you by MyChart, letter or phone within 4 business days after the clinic has received the results. If you do not hear from us within 7 days, please contact the clinic through MyChart or phone. If you have a critical or abnormal lab result, we will notify you by phone as soon as possible.  Submit refill requests through [employer] or call your pharmacy and they will forward the refill  "request to us. Please allow 3 business days for your refill to be completed.          Additional Information About Your Visit        [facility] Information     [employer] gives you secure access to your electronic health record. If you see a primary care provider, you can also send messages to your care team and make appointments. If you have questions, please call your primary care clinic.  If you do not have a primary care provider, please call [phone] and they will assist you.        Care EveryWhere ID     This is your Care EveryWhere ID. This could be used by other organizations to access your [place] medical records  [id number]        Your Vitals Were     Pulse Respirations Height BMI (Body Mass Index)          68 18 5' 9\" (1.753 m) 26.88 kg/m2         Blood Pressure from Last 3 Encounters:   10/05/17 136/74   06/12/17 139/70   03/08/17 139/86    Weight from Last 3 Encounters:   10/05/17 182 lb (82.6 kg)   06/12/17 185 lb (83.9 kg)   03/08/17 185 lb (83.9 kg)              We Performed the Following     ALT     Basic metabolic panel     CBC with platelets differential     FLU VAC, SPLIT VIRUS IM > 3 YO (QUADRIVALENT) [77731]     Hemoglobin A1c     Lipid panel reflex to direct LDL     Vaccine Administration, Initial [34490]          Today's Medication Changes          These changes are accurate as of: 10/5/17  2:29 PM.  If you have any questions, ask your nurse or doctor.               Start taking these medicines.        Dose/Directions    insulin aspart 100 UNIT/ML injection   Commonly known as:  NovoLOG PEN   Used for:  Type 1 diabetes mellitus with vascular disease (H)   Started by:  Jackeline Rachel MD        Up to 80 units/day with insulin pump   Quantity:  9 mL   Refills:  11         Stop taking these medicines if you haven't already. Please contact your care team if you have questions.     insulin lispro 100 UNIT/ML injection   Commonly known as:  HumaLOG PEN   Stopped by:  Jackeline Rachel MD           "      Where to get your medicines      These medications were sent to [facility] - [place], [place] - [address]  [address] [place] [facility] [address]     Phone:  [phone]     buPROPion 150 MG 12 hr tablet    insulin aspart 100 UNIT/ML injection    losartan 50 MG tablet    metoprolol 25 MG 24 hr tablet    PARoxetine 20 MG tablet    tamsulosin 0.4 MG capsule    tiZANidine 4 MG tablet         Some of these will need a paper prescription and others can be bought over the counter.  Ask your nurse if you have questions.     Bring a paper prescription for each of these medications     HYDROcodone-acetaminophen  MG per tablet    HYDROcodone-acetaminophen  MG per tablet    HYDROcodone-acetaminophen  MG per tablet                Primary Care Provider Office Phone # Fax #    Jackeline Rachel [phone] [address] [facility] [address]        Equal Access to Services     [facility]: Hadii kole kent hadasho Soomaali, waaxda luqadaha, qaybta kaalmada adeegyada, geremias mesa . So [facility] [phone].    ATENCIÓN: Si habla español, tiene a white disposición servicios gratuitos de asistencia lingüística. Candace al [phone].    We comply with applicable federal civil rights laws and [place] laws. We do not discriminate on the basis of race, color, national origin, age, disability, sex, sexual orientation, or gender identity.            Thank you!     Thank you for choosing [facility]  for your care. Our goal is always to provide you with excellent care. Hearing back from our patients is one way we can continue to improve our services. Please take a few minutes to complete the written survey that you may receive in the mail after your visit with us. Thank you!             Your Updated Medication List - Protect others around you: Learn how to safely use, store and throw away your medicines at  www.disposemymeds.org.          This list is accurate as of: 10/5/17  2:29 PM.  Always use your most recent med list.                   Brand Name Dispense Instructions for use Diagnosis    aspirin 81 MG chewable tablet     108 tablet    Take 1 tablet (81 mg) by mouth daily    Syncope       blood glucose monitoring test strip    CEE CONTOUR    10 Box    by In Vitro route 8-10 times daily    Type 1 diabetes mellitus with vascular disease (H)       buPROPion 150 MG 12 hr tablet    WELLBUTRIN SR    90 tablet    Take 1 tablet (150 mg) by mouth daily    Major depressive disorder, single episode, severe without psychotic features (H)       DOCUSATE SODIUM PO      Take 100 mg by mouth every evening        GLUCAGON EMERGENCY KIT 1 MG Kit     1 Kit    use as directed for severe hypoglycemia    Type I (juvenile type) diabetes mellitus without mention of complication, not stated as uncontrolled       * HYDROcodone-acetaminophen  MG per tablet   Start taking on:  10/6/2017    NORCO    120 tablet    Take 1 tablet by mouth every 6 hours as needed for moderate to severe pain    Chronic narcotic use, Cervical stenosis of spine       * HYDROcodone-acetaminophen  MG per tablet   Start taking on:  11/6/2017    NORCO    120 tablet    Take 1 tablet by mouth every 6 hours as needed for moderate to severe pain    Chronic narcotic use, Cervical stenosis of spine       * HYDROcodone-acetaminophen  MG per tablet   Start taking on:  12/6/2017    NORCO    120 tablet    Take 1 tablet by mouth every 6 hours as needed for moderate to severe pain    Chronic narcotic use       insulin aspart 100 UNIT/ML injection    NovoLOG PEN    9 mL    Up to 80 units/day with insulin pump    Type 1 diabetes mellitus with vascular disease (H)       * insulin pump infusion      Date last updated:  5/11/16 Sellaround: BASAL RATES and times: 12   AM (midnight): 1.4 units/hour  5 am: 1.50 units/hour  9   AM: 1.15 units/hour  1 pm: 1.30  "units/hour  3   PM: 1.0 units/hour  9    PM: 1.20 units/hour  Basal Pattern A:  12 am: 1.00 10 am: 0.90 12pm:0.50 6 pm: 0.75 9 pm: 1.05  Pattern B: 12 mid: 0.8 10 am: 0.7 12 noon: 0.3 6 pm: 0.55 9 pm: 0.85 CARB RATIO and times: 12   AM (midnight): 9 12  PM (noon):  9 6    PM:  9 Corection Factor (Sensitivity) and times: 12   AM (midnight): 33 mg/dL BLOOD GLUCOSE TARGET and times: 12   AM (midnight): 110 - 110Active Insulin Time:  4 hours Basal to Bolus Ratio:  Sensor:  No Carelink / Diasend username:   Carelink / Diasend Password:    Type 1 diabetes, HbA1c goal < 8% (H)       Insulin Syringe 30G X 1/2\" 0.5 ML Misc     100 each    1 Device as needed.    Type I (juvenile type) diabetes mellitus without mention of complication, not stated as uncontrolled       KETO-DIASTIX Strp     100 strip    1 Stick by In Vitro route See Admin Instructions. Use as needed to monitor for ketones    Type I (juvenile type) diabetes mellitus without mention of complication, not stated as uncontrolled, Type 1 diabetes, HbA1c goal < 8% (H)       losartan 50 MG tablet    COZAAR    90 tablet    Take 1 tablet (50 mg) by mouth daily    Left ventricular dysfunction       metoprolol 25 MG 24 hr tablet    TOPROL-XL    90 tablet    Take 1 tablet (25 mg) by mouth daily    Type 1 diabetes mellitus with vascular disease (H)       multivitamin per tablet     100    1 TABLET ORALLY DAILY        OMEGA-3 FISH OIL PO           omeprazole 20 MG tablet     90 tablet    Take 1 tablet (20 mg) by mouth daily Take 30-60 minutes before a meal.    Gastroesophageal reflux disease without esophagitis       * order for DME      Auto-CPAP: Max 11 cm H2O Min 5 cm H2O Pressure changed in clinic Continuous  Lifetime need and heated humidity.    LYNNETTE (obstructive sleep apnea)       PARoxetine 20 MG tablet    PAXIL    90 tablet    Take 1 tablet (20 mg) by mouth daily    Major depressive disorder, single episode, severe without psychotic features (H)       rosuvastatin 40 " MG tablet    CRESTOR    90 tablet    Take 1 tablet (40 mg) by mouth daily    Hyperlipidemia LDL goal <70       tamsulosin 0.4 MG capsule    FLOMAX    90 capsule    Take 1 capsule (0.4 mg) by mouth daily    Benign non-nodular prostatic hyperplasia with lower urinary tract symptoms       tiZANidine 4 MG tablet    ZANAFLEX    90 tablet    Take 1 tablet (4 mg) by mouth 3 times daily as needed    Cervical stenosis of spine       ZINC PO           * Notice:  This list has 5 medication(s) that are the same as other medications prescribed for you. Read the directions carefully, and ask your doctor or other care provider to review them with you.

## 2017-10-06 LAB
ALT SERPL W P-5'-P-CCNC: 44 U/L (ref 0–70)
ANION GAP SERPL CALCULATED.3IONS-SCNC: 8 MMOL/L (ref 3–14)
BUN SERPL-MCNC: 12 MG/DL (ref 7–30)
CALCIUM SERPL-MCNC: 8.7 MG/DL (ref 8.5–10.1)
CHLORIDE SERPL-SCNC: 108 MMOL/L (ref 94–109)
CHOLEST SERPL-MCNC: 142 MG/DL
CO2 SERPL-SCNC: 25 MMOL/L (ref 20–32)
CREAT SERPL-MCNC: 0.91 MG/DL (ref 0.66–1.25)
GFR SERPL CREATININE-BSD FRML MDRD: 84 ML/MIN/1.7M2
GLUCOSE SERPL-MCNC: 204 MG/DL (ref 70–99)
HDLC SERPL-MCNC: 53 MG/DL
LDLC SERPL CALC-MCNC: 49 MG/DL
NONHDLC SERPL-MCNC: 89 MG/DL
POTASSIUM SERPL-SCNC: 4.1 MMOL/L (ref 3.4–5.3)
SODIUM SERPL-SCNC: 141 MMOL/L (ref 133–144)
TRIGL SERPL-MCNC: 201 MG/DL

## 2017-10-06 ASSESSMENT — ANXIETY QUESTIONNAIRES: GAD7 TOTAL SCORE: 0

## 2017-10-06 NOTE — PROGRESS NOTES
Please notify pt of normal/acceptable results.  We already discussed the HgbA1c.         Jackeline Rachel M.D.

## 2017-10-09 ENCOUNTER — TELEPHONE (OUTPATIENT)
Dept: FAMILY MEDICINE | Facility: CLINIC | Age: 62
End: 2017-10-09

## 2017-10-09 DIAGNOSIS — E10.59 TYPE 1 DIABETES MELLITUS WITH VASCULAR DISEASE (H): ICD-10-CM

## 2017-10-09 NOTE — TELEPHONE ENCOUNTER
Reason for Call:  Medication or medication refill:    Do you use a [facility]?  Name of the pharmacy and phone number for the current request:  [facility]    Name of the medication requested: Novolog    Last Written Prescription Date: 10/5/2017  Last Fill Quantity: 9 ml, # refills: 11  Last Office Visit with FMMICK, JONATHON or [facility] prescribing provider:  10/5/2017        BP Readings from Last 3 Encounters:   10/05/17 136/74   06/12/17 139/70   03/08/17 139/86     Lab Results   Component Value Date    MICROL 5 03/08/2017     Lab Results   Component Value Date    UMALCR 6.54 03/08/2017     Creatinine   Date Value Ref Range Status   10/05/2017 0.91 0.66 - 1.25 mg/dL Final   ]  GFR Estimate   Date Value Ref Range Status   10/05/2017 84 >60 mL/min/1.7m2 Final     Comment:     Non  GFR Calc   12/05/2016 85 >60 mL/min/1.7m2 Final     Comment:     Non  GFR Calc   11/19/2015 89 >60 mL/min/1.7m2 Final     Comment:     Non  GFR Calc     GFR Estimate If Black   Date Value Ref Range Status   10/05/2017 >90 >60 mL/min/1.7m2 Final     Comment:      GFR Calc   12/05/2016 >90   GFR Calc   >60 mL/min/1.7m2 Final   11/19/2015 >90   GFR Calc   >60 mL/min/1.7m2 Final     Lab Results   Component Value Date    CHOL 142 10/05/2017     Lab Results   Component Value Date    HDL 53 10/05/2017     Lab Results   Component Value Date    LDL 49 10/05/2017     Lab Results   Component Value Date    TRIG 201 10/05/2017     Lab Results   Component Value Date    CHOLHDLRATIO 3.0 07/22/2014     Lab Results   Component Value Date    AST 22 08/28/2014     Lab Results   Component Value Date    ALT 44 10/05/2017     Lab Results   Component Value Date    A1C 8.4 10/05/2017    A1C 7.9 03/08/2017    A1C 8.1 12/05/2016    A1C 8.3 08/29/2016    A1C 8.7 03/02/2016     Potassium   Date Value Ref Range Status   10/05/2017 4.1 3.4 - 5.3 mmol/L Final     Per pharmacy: Rx  written for Novolog flexpen.  Please clarify dosage form and quantitiy.  Typically vials are used in insulin pumps.    Can we leave a detailed message on this number? YES    Phone number patient can be reached at: Home number on file [phone] (home)    Best Time: any    Call taken on 10/9/2017 at 11:13 AM by Zena Henriquez

## 2017-10-10 ENCOUNTER — TELEPHONE (OUTPATIENT)
Dept: FAMILY MEDICINE | Facility: CLINIC | Age: 62
End: 2017-10-10

## 2017-10-10 DIAGNOSIS — E10.59 TYPE 1 DIABETES MELLITUS WITH VASCULAR DISEASE (H): Primary | ICD-10-CM

## 2017-10-10 NOTE — TELEPHONE ENCOUNTER
Patient does use the insulin pump.  Patient reports the pump takes vials.  Medication pended.  Is this order ok?    Thank you  Zoie MARVIN RN

## 2017-10-10 NOTE — TELEPHONE ENCOUNTER
Reason for Call:  Medication or medication refill:    Do you use a [facility]?  Name of the pharmacy and phone number for the current request:  [facility]    Name of the medication requested: Novolog    Other request: Rx for Novolog. Please clarify if we should dispense vials or flexpens or pen fills, and we will dispence a 90 day supply, unless otherwise specified.  Written for flexpen, directions for use with insulin pump.    Can we leave a detailed message on this number? Not Applicable    Phone number patient can be reached at:     Best Time: any    Call taken on 10/10/2017 at 2:29 PM by Zena Henriquez

## 2017-10-12 ENCOUNTER — TRANSFERRED RECORDS (OUTPATIENT)
Dept: HEALTH INFORMATION MANAGEMENT | Facility: CLINIC | Age: 62
End: 2017-10-12

## 2017-12-15 ENCOUNTER — OFFICE VISIT (OUTPATIENT)
Dept: FAMILY MEDICINE | Facility: CLINIC | Age: 62
End: 2017-12-15
Payer: COMMERCIAL

## 2017-12-15 VITALS
RESPIRATION RATE: 18 BRPM | SYSTOLIC BLOOD PRESSURE: 148 MMHG | HEIGHT: 69 IN | DIASTOLIC BLOOD PRESSURE: 91 MMHG | HEART RATE: 77 BPM | BODY MASS INDEX: 27.55 KG/M2 | WEIGHT: 186 LBS

## 2017-12-15 DIAGNOSIS — F11.90 CHRONIC NARCOTIC USE: Primary | ICD-10-CM

## 2017-12-15 DIAGNOSIS — M48.02 CERVICAL STENOSIS OF SPINE: ICD-10-CM

## 2017-12-15 DIAGNOSIS — E10.59 TYPE 1 DIABETES MELLITUS WITH VASCULAR DISEASE (H): ICD-10-CM

## 2017-12-15 PROCEDURE — 99214 OFFICE O/P EST MOD 30 MIN: CPT | Performed by: FAMILY MEDICINE

## 2017-12-15 RX ORDER — HYDROCODONE BITARTRATE AND ACETAMINOPHEN 10; 325 MG/1; MG/1
1 TABLET ORAL EVERY 6 HOURS PRN
Qty: 120 TABLET | Refills: 0 | Status: SHIPPED | OUTPATIENT
Start: 2018-02-06 | End: 2018-04-05

## 2017-12-15 RX ORDER — HYDROCODONE BITARTRATE AND ACETAMINOPHEN 10; 325 MG/1; MG/1
1 TABLET ORAL EVERY 6 HOURS PRN
Qty: 120 TABLET | Refills: 0 | Status: SHIPPED | OUTPATIENT
Start: 2018-03-06 | End: 2018-04-05

## 2017-12-15 RX ORDER — HYDROCODONE BITARTRATE AND ACETAMINOPHEN 10; 325 MG/1; MG/1
1 TABLET ORAL EVERY 6 HOURS PRN
Qty: 120 TABLET | Refills: 0 | Status: SHIPPED | OUTPATIENT
Start: 2018-02-06 | End: 2017-12-15

## 2017-12-15 RX ORDER — HYDROCODONE BITARTRATE AND ACETAMINOPHEN 10; 325 MG/1; MG/1
1 TABLET ORAL EVERY 6 HOURS PRN
Qty: 120 TABLET | Refills: 0 | Status: SHIPPED | OUTPATIENT
Start: 2018-03-06 | End: 2017-12-15

## 2017-12-15 RX ORDER — HYDROCODONE BITARTRATE AND ACETAMINOPHEN 10; 325 MG/1; MG/1
1 TABLET ORAL EVERY 6 HOURS PRN
Qty: 120 TABLET | Refills: 0 | Status: SHIPPED | OUTPATIENT
Start: 2018-01-06 | End: 2018-04-05

## 2017-12-15 RX ORDER — HYDROCODONE BITARTRATE AND ACETAMINOPHEN 10; 325 MG/1; MG/1
1 TABLET ORAL EVERY 6 HOURS PRN
Qty: 120 TABLET | Refills: 0 | Status: SHIPPED | OUTPATIENT
Start: 2018-01-06 | End: 2017-12-15

## 2017-12-15 NOTE — PATIENT INSTRUCTIONS
Thank you for choosing [facility].  You may be receiving a survey in the mail from [facility] regarding your visit today.  Please take a few minutes to complete and return the survey to let us know how we are doing.      Our Clinic hours are:  Mondays    7:20 am - 7 pm  Tues -  Fri  7:20 am - 5 pm    Clinic Phone: [phone]    The clinic lab opens at 7:30 am Mon - Fri and appointments are required.    [facility]. [phone]  Monday-Thursday 8 am - 7pm  Tues/Wed/Fri 8 am - 5:30 pm

## 2017-12-15 NOTE — PROGRESS NOTES
"  SUBJECTIVE:   Eb Eisenberg is a 62 year old male who presents to clinic today for the following health issues:      Chief Complaint   Patient presents with     Forms     [place] dept of public safety for driving.     Refill Request     Pain medication. Patient rates pain 8/10 in the morning and gets better through out the day.        SUBJECTIVE:  Eb Eisenberg, a 62 year old male scheduled an appointment to discuss the following issues:     Chronic narcotic use  Cervical stenosis of spine  Type 1 diabetes mellitus with vascular disease (H)    Chronic Pain Follow-Up       Type / Location of Pain: neck/back  Analgesia/pain control:       Recent changes:  same      Overall control: Tolerable with discomfort  Activity level/function:      Daily activities:  Unable to perform most daily activities - chores, hobbies, social activities, driving    Work:  not applicable  Adverse effects:  No  Adherance    Taking medication as directed?  Yes    Participating in other treatments: not applicable  Risk Factors:    Sleep:  Fair    Mood/anxiety:  controlled    Recent family or social stressors:  none noted    Other aggravating factors: none  PHQ-9 SCORE 3/8/2017 6/16/2017 10/5/2017   Total Score - - -   Total Score 7 6 4     VIVIANA-7 SCORE 12/15/2016 3/8/2017 10/5/2017   Total Score - - -   Total Score 13 2 0     Encounter-Level CSA:     There are no encounter-level csa.            Medical, social, surgical, and family histories reviewed.    ROS:  5 point ROS negative except as noted above in HPI, including Gen., Resp., CV, GI &  system review.    OBJECTIVE:  BP (!) 148/91  Pulse 77  Resp 18  Ht 5' 9\" (1.753 m)  Wt 186 lb (84.4 kg)  BMI 27.47 kg/m2  EXAM:  GENERAL APPEARANCE: Alert, no acute distress  RESP: lungs clear to auscultation   CV: normal rate, regular rhythm, no murmur or gallop  ABDOMEN: soft, no organomegaly, masses or tenderness  Psych: stable    ASSESSMENT/PLAN:    (E10.59) Type 1 diabetes mellitus with vascular " disease (H)  (primary encounter diagnosis)  Comment:    Plan: driving form completed    (F11.90) Chronic narcotic use  Comment:    Plan: HYDROcodone-acetaminophen (NORCO)  MG per        tablet, HYDROcodone-acetaminophen (NORCO)          MG per tablet, HYDROcodone-acetaminophen        (NORCO)  MG per tablet             (M48.02) Cervical stenosis of spine  Comment:    Plan: HYDROcodone-acetaminophen (NORCO)  MG per        tablet, HYDROcodone-acetaminophen (NORCO)          MG per tablet                 Patient Instructions         Thank you for choosing [facility].  You may be receiving a survey in the mail from [employer] regarding your visit today.  Please take a few minutes to complete and return the survey to let us know how we are doing.      Our Clinic hours are:  Mondays    7:20 am - 7 pm  Tues -  Fri  7:20 am - 5 pm    Clinic Phone: [phone]    The clinic lab opens at 7:30 am Mon - Fri and appointments are required.    [facility]. [phone]  Monday-Thursday 8 am - 7pm  Tues/Wed/Fri 8 am - 5:30 pm

## 2017-12-15 NOTE — MR AVS SNAPSHOT
After Visit Summary   12/15/2017    Eb Eisenberg    MRN: [id number]           Patient Information     Date Of Birth          1955        Visit Information        Provider Department      12/15/2017 9:40 AM Jackeline Rachel MD [facility]        Today's Diagnoses     Chronic narcotic use    -  1    Cervical stenosis of spine        Type 1 diabetes mellitus with vascular disease (H)          Care Instructions          Thank you for choosing [facility].  You may be receiving a survey in the mail from [facility] regarding your visit today.  Please take a few minutes to complete and return the survey to let us know how we are doing.      Our Clinic hours are:  Mondays    7:20 am - 7 pm  Tues -  Fri  7:20 am - 5 pm    Clinic Phone: [phone]    The clinic lab opens at 7:30 am Mon - Fri and appointments are required.    [facility]  Ph. [phone]  Monday-Thursday 8 am - 7pm  Tues/Wed/Fri 8 am - 5:30 pm                 Follow-ups after your visit        Who to contact     If you have questions or need follow up information about today's clinic visit or your schedule please contact [facility] directly at [phone].  Normal or non-critical lab and imaging results will be communicated to you by MyChart, letter or phone within 4 business days after the clinic has received the results. If you do not hear from us within 7 days, please contact the clinic through [employer] or phone. If you have a critical or abnormal lab result, we will notify you by phone as soon as possible.  Submit refill requests through [employer] or call your pharmacy and they will forward the refill request to us. Please allow 3 business days for your refill to be completed.          Additional Information About Your Visit        [facility] Information     [employer] gives you secure access to your electronic health record. If you see a primary care provider, you can also send  "messages to your care team and make appointments. If you have questions, please call your primary care clinic.  If you do not have a primary care provider, please call [phone] and they will assist you.        Care EveryWhere ID     This is your Care EveryWhere ID. This could be used by other organizations to access your [place] medical records  [id number]        Your Vitals Were     Pulse Respirations Height BMI (Body Mass Index)          77 18 5' 9\" (1.753 m) 27.47 kg/m2         Blood Pressure from Last 3 Encounters:   12/15/17 (!) 148/91   10/05/17 136/74   06/12/17 139/70    Weight from Last 3 Encounters:   12/15/17 186 lb (84.4 kg)   10/05/17 182 lb (82.6 kg)   06/12/17 185 lb (83.9 kg)              Today, you had the following     No orders found for display         Today's Medication Changes          These changes are accurate as of: 12/15/17 11:08 AM.  If you have any questions, ask your nurse or doctor.               Start taking these medicines.        Dose/Directions    * HYDROcodone-acetaminophen  MG per tablet   Commonly known as:  NORCO   Used for:  Chronic narcotic use   Started by:  Jackeline Rachel MD        Dose:  1 tablet   Start taking on:  1/6/2018   Take 1 tablet by mouth every 6 hours as needed for moderate to severe pain   Quantity:  120 tablet   Refills:  0       * HYDROcodone-acetaminophen  MG per tablet   Commonly known as:  NORCO   Used for:  Chronic narcotic use, Cervical stenosis of spine   Started by:  Jackeline Rachel MD        Dose:  1 tablet   Start taking on:  2/6/2018   Take 1 tablet by mouth every 6 hours as needed for moderate to severe pain   Quantity:  120 tablet   Refills:  0       * HYDROcodone-acetaminophen  MG per tablet   Commonly known as:  NORCO   Used for:  Chronic narcotic use, Cervical stenosis of spine   Started by:  Jackeline Rachel MD        Dose:  1 tablet   Start taking on:  3/6/2018   Take 1 tablet by mouth every 6 hours as needed for " moderate to severe pain   Quantity:  120 tablet   Refills:  0       * Notice:  This list has 3 medication(s) that are the same as other medications prescribed for you. Read the directions carefully, and ask your doctor or other care provider to review them with you.         Where to get your medicines      Some of these will need a paper prescription and others can be bought over the counter.  Ask your nurse if you have questions.     Bring a paper prescription for each of these medications     HYDROcodone-acetaminophen  MG per tablet    HYDROcodone-acetaminophen  MG per tablet    HYDROcodone-acetaminophen  MG per tablet                Primary Care Provider Office Phone # Fax #    Jackeline Rachel [phone] [address] [facility] [address]        Equal Access to Services     AILEEN FITZPATRICK : Hadii kole cardonao Soobey, waaxda luqadaha, qaybta kaalmada adeegyada, geremias leija. So [facility] [phone].    ATENCIÓN: Si habla español, tiene a white disposición servicios gratuitos de asistencia lingüística. Llame al [phone].    We comply with applicable federal civil rights laws and [place] laws. We do not discriminate on the basis of race, color, national origin, age, disability, sex, sexual orientation, or gender identity.            Thank you!     Thank you for choosing [facility]  for your care. Our goal is always to provide you with excellent care. Hearing back from our patients is one way we can continue to improve our services. Please take a few minutes to complete the written survey that you may receive in the mail after your visit with us. Thank you!             Your Updated Medication List - Protect others around you: Learn how to safely use, store and throw away your medicines at www.disposemymeds.org.          This list is accurate as of: 12/15/17 11:08 AM.  Always use your most recent med list.                    Brand Name Dispense Instructions for use Diagnosis    aspirin 81 MG chewable tablet     108 tablet    Take 1 tablet (81 mg) by mouth daily    Syncope       blood glucose monitoring test strip    CEE CONTOUR    10 Box    by In Vitro route 8-10 times daily    Type 1 diabetes mellitus with vascular disease (H)       buPROPion 150 MG 12 hr tablet    WELLBUTRIN SR    90 tablet    Take 1 tablet (150 mg) by mouth daily    Major depressive disorder, single episode, severe without psychotic features (H)       DOCUSATE SODIUM PO      Take 100 mg by mouth every evening        GLUCAGON EMERGENCY KIT 1 MG Kit     1 Kit    use as directed for severe hypoglycemia    Type I (juvenile type) diabetes mellitus without mention of complication, not stated as uncontrolled       * HYDROcodone-acetaminophen  MG per tablet   Start taking on:  1/6/2018    NORCO    120 tablet    Take 1 tablet by mouth every 6 hours as needed for moderate to severe pain    Chronic narcotic use       * HYDROcodone-acetaminophen  MG per tablet   Start taking on:  2/6/2018    NORCO    120 tablet    Take 1 tablet by mouth every 6 hours as needed for moderate to severe pain    Chronic narcotic use, Cervical stenosis of spine       * HYDROcodone-acetaminophen  MG per tablet   Start taking on:  3/6/2018    NORCO    120 tablet    Take 1 tablet by mouth every 6 hours as needed for moderate to severe pain    Chronic narcotic use, Cervical stenosis of spine       insulin aspart 100 UNITS/ML injection    NovoLOG    30 mL    As directed per insulin pump    Type 1 diabetes mellitus with vascular disease (H)       * insulin pump infusion      Date last updated:  5/11/16 [employer]: BASAL RATES and times: 12   AM (midnight): 1.4 units/hour  5 am: 1.50 units/hour  9   AM: 1.15 units/hour  1 pm: 1.30 units/hour  3   PM: 1.0 units/hour  9    PM: 1.20 units/hour  Basal Pattern A:  12 am: 1.00 10 am: 0.90 12pm:0.50 6 pm: 0.75 9 pm: 1.05  Pattern B: 12  "mid: 0.8 10 am: 0.7 12 noon: 0.3 6 pm: 0.55 9 pm: 0.85 CARB RATIO and times: 12   AM (midnight): 9 12  PM (noon):  9 6    PM:  9 Corection Factor (Sensitivity) and times: 12   AM (midnight): 33 mg/dL BLOOD GLUCOSE TARGET and times: 12   AM (midnight): 110 - 110Active Insulin Time:  4 hours Basal to Bolus Ratio:  Sensor:  No Carelink / Diasend username:   Carelink / Diasend Password:    Type 1 diabetes, HbA1c goal < 8% (H)       Insulin Syringe 30G X 1/2\" 0.5 ML Misc     100 each    1 Device as needed.    Type I (juvenile type) diabetes mellitus without mention of complication, not stated as uncontrolled       KETO-DIASTIX Strp     100 strip    1 Stick by In Vitro route See Admin Instructions. Use as needed to monitor for ketones    Type I (juvenile type) diabetes mellitus without mention of complication, not stated as uncontrolled, Type 1 diabetes, HbA1c goal < 8% (H)       losartan 50 MG tablet    COZAAR    90 tablet    Take 1 tablet (50 mg) by mouth daily    Left ventricular dysfunction       metoprolol 25 MG 24 hr tablet    TOPROL-XL    90 tablet    Take 1 tablet (25 mg) by mouth daily    Type 1 diabetes mellitus with vascular disease (H)       multivitamin per tablet     100    1 TABLET ORALLY DAILY        OMEGA-3 FISH OIL PO           omeprazole 20 MG tablet     90 tablet    Take 1 tablet (20 mg) by mouth daily Take 30-60 minutes before a meal.    Gastroesophageal reflux disease without esophagitis       * order for DME      Auto-CPAP: Max 11 cm H2O Min 5 cm H2O Pressure changed in clinic Continuous  Lifetime need and heated humidity.    LYNNETTE (obstructive sleep apnea)       PARoxetine 20 MG tablet    PAXIL    90 tablet    Take 1 tablet (20 mg) by mouth daily    Major depressive disorder, single episode, severe without psychotic features (H)       rosuvastatin 40 MG tablet    CRESTOR    90 tablet    Take 1 tablet (40 mg) by mouth daily    Hyperlipidemia LDL goal <70       tamsulosin 0.4 MG capsule    FLOMAX    90 " capsule    Take 1 capsule (0.4 mg) by mouth daily    Benign non-nodular prostatic hyperplasia with lower urinary tract symptoms       tiZANidine 4 MG tablet    ZANAFLEX    90 tablet    Take 1 tablet (4 mg) by mouth 3 times daily as needed    Cervical stenosis of spine       ZINC PO           * Notice:  This list has 5 medication(s) that are the same as other medications prescribed for you. Read the directions carefully, and ask your doctor or other care provider to review them with you.

## 2017-12-15 NOTE — NURSING NOTE
"Chief Complaint   Patient presents with     Forms     [place] dept of public safety for driving.     Refill Request     Pain medication. Patient rates pain 8/10 in the morning and gets better through out the day.        Initial BP (!) 148/92  Pulse 77  Resp 18  Ht 5' 9\" (1.753 m)  Wt 186 lb (84.4 kg)  BMI 27.47 kg/m2 Estimated body mass index is 27.47 kg/(m^2) as calculated from the following:    Height as of this encounter: 5' 9\" (1.753 m).    Weight as of this encounter: 186 lb (84.4 kg).  Medication Reconciliation: complete    "

## 2017-12-22 DIAGNOSIS — E78.5 HYPERLIPIDEMIA LDL GOAL <70: ICD-10-CM

## 2017-12-22 RX ORDER — ROSUVASTATIN CALCIUM 40 MG/1
TABLET, COATED ORAL
Qty: 90 TABLET | Refills: 2 | Status: SHIPPED | OUTPATIENT
Start: 2017-12-22 | End: 2018-10-10

## 2018-01-05 ENCOUNTER — MEDICAL CORRESPONDENCE (OUTPATIENT)
Dept: HEALTH INFORMATION MANAGEMENT | Facility: CLINIC | Age: 63
End: 2018-01-05

## 2018-04-05 ENCOUNTER — OFFICE VISIT (OUTPATIENT)
Dept: FAMILY MEDICINE | Facility: CLINIC | Age: 63
End: 2018-04-05
Payer: COMMERCIAL

## 2018-04-05 VITALS
RESPIRATION RATE: 18 BRPM | TEMPERATURE: 97.8 F | BODY MASS INDEX: 28.44 KG/M2 | DIASTOLIC BLOOD PRESSURE: 81 MMHG | HEART RATE: 75 BPM | SYSTOLIC BLOOD PRESSURE: 138 MMHG | WEIGHT: 192 LBS | HEIGHT: 69 IN

## 2018-04-05 DIAGNOSIS — E10.59 TYPE 1 DIABETES MELLITUS WITH VASCULAR DISEASE (H): ICD-10-CM

## 2018-04-05 DIAGNOSIS — F11.90 CHRONIC NARCOTIC USE: Primary | ICD-10-CM

## 2018-04-05 DIAGNOSIS — M48.02 CERVICAL STENOSIS OF SPINE: ICD-10-CM

## 2018-04-05 DIAGNOSIS — L82.1 SEBORRHEIC KERATOSES: ICD-10-CM

## 2018-04-05 LAB
CREAT UR-MCNC: 39 MG/DL
HBA1C MFR BLD: 7.7 % (ref 0–6.4)
MICROALBUMIN UR-MCNC: 9 MG/L
MICROALBUMIN/CREAT UR: 21.79 MG/G CR (ref 0–17)

## 2018-04-05 PROCEDURE — 83036 HEMOGLOBIN GLYCOSYLATED A1C: CPT | Performed by: FAMILY MEDICINE

## 2018-04-05 PROCEDURE — 82043 UR ALBUMIN QUANTITATIVE: CPT | Performed by: FAMILY MEDICINE

## 2018-04-05 PROCEDURE — 36415 COLL VENOUS BLD VENIPUNCTURE: CPT | Performed by: FAMILY MEDICINE

## 2018-04-05 PROCEDURE — 99214 OFFICE O/P EST MOD 30 MIN: CPT | Performed by: FAMILY MEDICINE

## 2018-04-05 RX ORDER — HYDROCODONE BITARTRATE AND ACETAMINOPHEN 10; 325 MG/1; MG/1
1 TABLET ORAL EVERY 6 HOURS PRN
Qty: 120 TABLET | Refills: 0 | Status: SHIPPED | OUTPATIENT
Start: 2018-04-06 | End: 2018-06-22

## 2018-04-05 RX ORDER — HYDROCODONE BITARTRATE AND ACETAMINOPHEN 10; 325 MG/1; MG/1
1 TABLET ORAL EVERY 6 HOURS PRN
Qty: 120 TABLET | Refills: 0 | Status: SHIPPED | OUTPATIENT
Start: 2018-05-07 | End: 2018-06-22

## 2018-04-05 RX ORDER — HYDROCODONE BITARTRATE AND ACETAMINOPHEN 10; 325 MG/1; MG/1
1 TABLET ORAL EVERY 6 HOURS PRN
Qty: 120 TABLET | Refills: 0 | Status: SHIPPED | OUTPATIENT
Start: 2018-06-06 | End: 2018-06-22

## 2018-04-05 ASSESSMENT — PAIN SCALES - GENERAL: PAINLEVEL: MODERATE PAIN (5)

## 2018-04-05 NOTE — NURSING NOTE
"Chief Complaint   Patient presents with     Medication Request     vicodin X 3 months     Derm Problem     Spot on left arm and back. Patient has noticed the one on back about the size of pinky tip. Patient also notices it becoming dry.        Initial /81  Pulse 75  Temp 97.8  F (36.6  C) (Tympanic)  Resp 18  Ht 5' 9\" (1.753 m)  Wt 192 lb (87.1 kg)  BMI 28.35 kg/m2 Estimated body mass index is 28.35 kg/(m^2) as calculated from the following:    Height as of this encounter: 5' 9\" (1.753 m).    Weight as of this encounter: 192 lb (87.1 kg).  Medication Reconciliation: complete    "

## 2018-04-05 NOTE — PATIENT INSTRUCTIONS
Thank you for choosing [facility].  You may be receiving a survey in the mail from [facility] regarding your visit today.  Please take a few minutes to complete and return the survey to let us know how we are doing.      Our Clinic hours are:  Mondays    7:20 am - 7 pm  Tues -  Fri  7:20 am - 5 pm    Clinic Phone: [phone]    The clinic lab opens at 7:30 am Mon - Fri and appointments are required.    [facility]. [phone]  Monday-Thursday 8 am - 7pm  Tues/Wed/Fri 8 am - 5:30 pm

## 2018-04-05 NOTE — PROGRESS NOTES
"  SUBJECTIVE:   Eb Eisenberg is a 63 year old male who presents to clinic today for the following health issues:      Chief Complaint   Patient presents with     Medication Request     vicodin X 3 months     Derm Problem     Spot on left arm and back. Patient has noticed the one on back about the size of pinky tip. Patient also notices it becoming dry.          Chronic Pain Follow-Up       Type / Location of Pain: back, shoulders  Analgesia/pain control:       Recent changes:  worse      Overall control: Tolerable with discomfort  Activity level/function:      Daily activities:  Unable to perform most daily activities - chores, hobbies, social activities, driving    Work:  not applicable  Adverse effects:  No  Adherance    Taking medication as directed?  Yes    Participating in other treatments: not applicable  Risk Factors:    Sleep:  Fair    Mood/anxiety:  controlled    Recent family or social stressors:  none noted    Other aggravating factors: none  PHQ-9 SCORE 6/16/2017 10/5/2017 4/5/2018   Total Score - - -   Total Score 6 4 3     VIVIANA-7 SCORE 12/15/2016 3/8/2017 10/5/2017   Total Score - - -   Total Score 13 2 0     Encounter-Level CSA:     There are no encounter-level csa.          Problem list and histories reviewed & adjusted, as indicated.  Additional history: as documented     /81  Pulse 75  Temp 97.8  F (36.6  C) (Tympanic)  Resp 18  Ht 5' 9\" (1.753 m)  Wt 192 lb (87.1 kg)  BMI 28.35 kg/m2  EXAM: GENERAL APPEARANCE: Alert, no acute distress  RESP: lungs clear to auscultation   CV: normal rate, regular rhythm, no murmur or gallop  ABDOMEN: soft, no organomegaly, masses or tenderness  SKIN: keratoses - seborrheic    Results for orders placed or performed in visit on 04/05/18   Hemoglobin A1c   Result Value Ref Range    Hemoglobin A1C 7.7 (H) 0 - 6.4 %         ASSESSMENT/PLAN:      ICD-10-CM    1. Cervical stenosis of spine M48.02 HYDROcodone-acetaminophen (NORCO)  MG per tablet     " HYDROcodone-acetaminophen (NORCO)  MG per tablet   2. Chronic narcotic use F11.90 HYDROcodone-acetaminophen (NORCO)  MG per tablet     HYDROcodone-acetaminophen (NORCO)  MG per tablet     HYDROcodone-acetaminophen (NORCO)  MG per tablet   3. Type 1 diabetes mellitus with vascular disease (H) E10.59 Albumin Random Urine Quantitative with Creat Ratio     Hemoglobin A1c       Patient Instructions         Thank you for choosing [facility].  You may be receiving a survey in the mail from [employer] regarding your visit today.  Please take a few minutes to complete and return the survey to let us know how we are doing.      Our Clinic hours are:  Mondays    7:20 am - 7 pm  Tues -  Fri  7:20 am - 5 pm    Clinic Phone: [phone]    The clinic lab opens at 7:30 am Mon - Fri and appointments are required.    [facility]  Ph. [phone]  Monday-Thursday 8 am - 7pm  Tues/Wed/Fri 8 am - 5:30 pm

## 2018-04-06 ASSESSMENT — PATIENT HEALTH QUESTIONNAIRE - PHQ9: SUM OF ALL RESPONSES TO PHQ QUESTIONS 1-9: 3

## 2018-04-06 NOTE — PROGRESS NOTES
Notified via MyChart: Mildly elevated urinary protein. Maintaining optimal blood sugar levels will help improve this.

## 2018-04-17 ENCOUNTER — TRANSFERRED RECORDS (OUTPATIENT)
Dept: HEALTH INFORMATION MANAGEMENT | Facility: CLINIC | Age: 63
End: 2018-04-17

## 2018-06-22 ENCOUNTER — OFFICE VISIT (OUTPATIENT)
Dept: FAMILY MEDICINE | Facility: CLINIC | Age: 63
End: 2018-06-22
Payer: COMMERCIAL

## 2018-06-22 VITALS
HEIGHT: 69 IN | RESPIRATION RATE: 18 BRPM | WEIGHT: 186 LBS | SYSTOLIC BLOOD PRESSURE: 122 MMHG | DIASTOLIC BLOOD PRESSURE: 78 MMHG | BODY MASS INDEX: 27.55 KG/M2 | TEMPERATURE: 97.8 F | HEART RATE: 99 BPM

## 2018-06-22 DIAGNOSIS — F11.90 CHRONIC NARCOTIC USE: ICD-10-CM

## 2018-06-22 DIAGNOSIS — R53.83 FATIGUE, UNSPECIFIED TYPE: Primary | ICD-10-CM

## 2018-06-22 DIAGNOSIS — R40.0 DAYTIME SLEEPINESS: ICD-10-CM

## 2018-06-22 DIAGNOSIS — M48.02 CERVICAL STENOSIS OF SPINE: ICD-10-CM

## 2018-06-22 DIAGNOSIS — G47.33 OSA (OBSTRUCTIVE SLEEP APNEA): ICD-10-CM

## 2018-06-22 LAB
ANION GAP SERPL CALCULATED.3IONS-SCNC: 7 MMOL/L (ref 3–14)
BASOPHILS # BLD AUTO: 0.1 10E9/L (ref 0–0.2)
BASOPHILS NFR BLD AUTO: 0.9 %
BUN SERPL-MCNC: 10 MG/DL (ref 7–30)
CALCIUM SERPL-MCNC: 8.4 MG/DL (ref 8.5–10.1)
CHLORIDE SERPL-SCNC: 104 MMOL/L (ref 94–109)
CO2 SERPL-SCNC: 27 MMOL/L (ref 20–32)
CREAT SERPL-MCNC: 1.01 MG/DL (ref 0.66–1.25)
DIFFERENTIAL METHOD BLD: NORMAL
EOSINOPHIL # BLD AUTO: 0.1 10E9/L (ref 0–0.7)
EOSINOPHIL NFR BLD AUTO: 1.4 %
ERYTHROCYTE [DISTWIDTH] IN BLOOD BY AUTOMATED COUNT: 12.8 % (ref 10–15)
GFR SERPL CREATININE-BSD FRML MDRD: 74 ML/MIN/1.7M2
GLUCOSE SERPL-MCNC: 266 MG/DL (ref 70–99)
HCT VFR BLD AUTO: 43.6 % (ref 40–53)
HGB BLD-MCNC: 14.7 G/DL (ref 13.3–17.7)
LYMPHOCYTES # BLD AUTO: 1.6 10E9/L (ref 0.8–5.3)
LYMPHOCYTES NFR BLD AUTO: 19.9 %
MCH RBC QN AUTO: 30.2 PG (ref 26.5–33)
MCHC RBC AUTO-ENTMCNC: 33.7 G/DL (ref 31.5–36.5)
MCV RBC AUTO: 90 FL (ref 78–100)
MONOCYTES # BLD AUTO: 0.6 10E9/L (ref 0–1.3)
MONOCYTES NFR BLD AUTO: 7 %
NEUTROPHILS # BLD AUTO: 5.7 10E9/L (ref 1.6–8.3)
NEUTROPHILS NFR BLD AUTO: 70.8 %
PLATELET # BLD AUTO: 268 10E9/L (ref 150–450)
POTASSIUM SERPL-SCNC: 4 MMOL/L (ref 3.4–5.3)
RBC # BLD AUTO: 4.87 10E12/L (ref 4.4–5.9)
SODIUM SERPL-SCNC: 138 MMOL/L (ref 133–144)
TSH SERPL DL<=0.005 MIU/L-ACNC: 1.19 MU/L (ref 0.4–4)
WBC # BLD AUTO: 8 10E9/L (ref 4–11)

## 2018-06-22 PROCEDURE — 99214 OFFICE O/P EST MOD 30 MIN: CPT | Performed by: FAMILY MEDICINE

## 2018-06-22 PROCEDURE — 36415 COLL VENOUS BLD VENIPUNCTURE: CPT | Performed by: FAMILY MEDICINE

## 2018-06-22 PROCEDURE — 86618 LYME DISEASE ANTIBODY: CPT | Performed by: FAMILY MEDICINE

## 2018-06-22 PROCEDURE — 85025 COMPLETE CBC W/AUTO DIFF WBC: CPT | Performed by: FAMILY MEDICINE

## 2018-06-22 PROCEDURE — 84443 ASSAY THYROID STIM HORMONE: CPT | Performed by: FAMILY MEDICINE

## 2018-06-22 PROCEDURE — 82306 VITAMIN D 25 HYDROXY: CPT | Performed by: FAMILY MEDICINE

## 2018-06-22 PROCEDURE — 80048 BASIC METABOLIC PNL TOTAL CA: CPT | Performed by: FAMILY MEDICINE

## 2018-06-22 RX ORDER — HYDROCODONE BITARTRATE AND ACETAMINOPHEN 10; 325 MG/1; MG/1
1 TABLET ORAL EVERY 6 HOURS PRN
Qty: 120 TABLET | Refills: 0 | Status: SHIPPED | OUTPATIENT
Start: 2018-08-06 | End: 2018-08-28

## 2018-06-22 RX ORDER — HYDROCODONE BITARTRATE AND ACETAMINOPHEN 10; 325 MG/1; MG/1
1 TABLET ORAL EVERY 6 HOURS PRN
Qty: 120 TABLET | Refills: 0 | Status: SHIPPED | OUTPATIENT
Start: 2018-07-06 | End: 2018-09-21

## 2018-06-22 RX ORDER — HYDROCODONE BITARTRATE AND ACETAMINOPHEN 10; 325 MG/1; MG/1
1 TABLET ORAL EVERY 6 HOURS PRN
Qty: 120 TABLET | Refills: 0 | Status: SHIPPED | OUTPATIENT
Start: 2018-09-06 | End: 2018-10-10

## 2018-06-22 ASSESSMENT — PAIN SCALES - GENERAL: PAINLEVEL: MODERATE PAIN (5)

## 2018-06-22 NOTE — MR AVS SNAPSHOT
After Visit Summary   6/22/2018    Eb Eisenberg    MRN: [id number]           Patient Information     Date Of Birth          1955        Visit Information        Provider Department      6/22/2018 1:40 PM Jackeline Rachel MD [facility]        Today's Diagnoses     Fatigue, unspecified type    -  1    Chronic narcotic use        Cervical stenosis of spine        LYNNETTE (obstructive sleep apnea)        Daytime sleepiness          Care Instructions          Thank you for choosing [facility].  You may be receiving a survey in the mail from [facility] regarding your visit today.  Please take a few minutes to complete and return the survey to let us know how we are doing.      Our Clinic hours are:  Mondays    7:20 am - 7 pm  Tues -  Fri  7:20 am - 5 pm    Clinic Phone: [phone]    The clinic lab opens at 7:30 am Mon - Fri and appointments are required.    [facility]. [phone]  Monday  8 am - 7pm  Tues - Fri 8 am - 5:30 pm                 Follow-ups after your visit        Additional Services     SLEEP EVALUATION & MANAGEMENT REFERRAL - [facility]  [phone] (Age 2 and up)       Please be aware that coverage of these services is subject to the terms and limitations of your health insurance plan.  Call member services at your health plan with any benefit or coverage questions.      Please bring the following to your appointment:    >>   List of current medications   >>   This referral request   >>   Any documents/labs given to you for this referral                      Future tests that were ordered for you today     Open Future Orders        Priority Expected Expires Ordered    SLEEP EVALUATION & MANAGEMENT REFERRAL - [facility]  [phone] (Age 2 and up) Routine  6/22/2019 6/22/2018            Who to contact     If you have questions or need follow up information about today's clinic  "visit or your schedule please contact [facility] directly at [phone].  Normal or non-critical lab and imaging results will be communicated to you by MyChart, letter or phone within 4 business days after the clinic has received the results. If you do not hear from us within 7 days, please contact the clinic through [employer] or phone. If you have a critical or abnormal lab result, we will notify you by phone as soon as possible.  Submit refill requests through "[employer] or call your pharmacy and they will forward the refill request to us. Please allow 3 business days for your refill to be completed.          Additional Information About Your Visit        [employer] Information     "[employer] gives you secure access to your electronic health record. If you see a primary care provider, you can also send messages to your care team and make appointments. If you have questions, please call your primary care clinic.  If you do not have a primary care provider, please call [phone] and they will assist you.        Care EveryWhere ID     This is your Care EveryWhere ID. This could be used by other organizations to access your [place] medical records  [id number]        Your Vitals Were     Pulse Temperature Respirations Height BMI (Body Mass Index)       99 97.8  F (36.6  C) (Tympanic) 18 5' 9\" (1.753 m) 27.47 kg/m2        Blood Pressure from Last 3 Encounters:   06/22/18 122/78   04/05/18 138/81   12/15/17 (!) 148/91    Weight from Last 3 Encounters:   06/22/18 186 lb (84.4 kg)   04/05/18 192 lb (87.1 kg)   12/15/17 186 lb (84.4 kg)              We Performed the Following     Basic metabolic panel     CBC with platelets differential     DEPRESSION ACTION PLAN (DAP)     Lyme Disease Merary with reflex to WB Serum     TSH with free T4 reflex     Vitamin D Deficiency          Today's Medication Changes          These changes are accurate as of 6/22/18  2:06 PM.  If you have any questions, ask your nurse or " doctor.               These medicines have changed or have updated prescriptions.        Dose/Directions    * HYDROcodone-acetaminophen  MG per tablet   Commonly known as:  NORCO   This may have changed:  These instructions start on 7/6/2018. If you are unsure what to do until then, ask your doctor or other care provider.   Used for:  Chronic narcotic use   Changed by:  Jackeline Rachel MD        Dose:  1 tablet   Start taking on:  7/6/2018   Take 1 tablet by mouth every 6 hours as needed for moderate to severe pain   Quantity:  120 tablet   Refills:  0       * HYDROcodone-acetaminophen  MG per tablet   Commonly known as:  NORCO   This may have changed:  These instructions start on 8/6/2018. If you are unsure what to do until then, ask your doctor or other care provider.   Used for:  Chronic narcotic use, Cervical stenosis of spine   Changed by:  Jackeline Rachel MD        Dose:  1 tablet   Start taking on:  8/6/2018   Take 1 tablet by mouth every 6 hours as needed for moderate to severe pain   Quantity:  120 tablet   Refills:  0       * HYDROcodone-acetaminophen  MG per tablet   Commonly known as:  NORCO   This may have changed:  These instructions start on 9/6/2018. If you are unsure what to do until then, ask your doctor or other care provider.   Used for:  Chronic narcotic use, Cervical stenosis of spine   Changed by:  Jackeline Rachel MD        Dose:  1 tablet   Start taking on:  9/6/2018   Take 1 tablet by mouth every 6 hours as needed for moderate to severe pain   Quantity:  120 tablet   Refills:  0       * Notice:  This list has 3 medication(s) that are the same as other medications prescribed for you. Read the directions carefully, and ask your doctor or other care provider to review them with you.         Where to get your medicines      Some of these will need a paper prescription and others can be bought over the counter.  Ask your nurse if you have questions.     Bring a paper  prescription for each of these medications     HYDROcodone-acetaminophen  MG per tablet    HYDROcodone-acetaminophen  MG per tablet    HYDROcodone-acetaminophen  MG per tablet               Information about OPIOIDS     PRESCRIPTION OPIOIDS: WHAT YOU NEED TO KNOW   We gave you an opioid (narcotic) pain medicine. It is important to manage your pain, but opioids are not always the best choice. You should first try all the other options your care team gave you. Take this medicine for as short a time (and as few doses) as possible.     These medicines have risks:    DO NOT drive when on new or higher doses of pain medicine. These medicines can affect your alertness and reaction times, and you could be arrested for driving under the influence (DUI). If you need to use opioids long-term, talk to your care team about driving.    DO NOT operate heave machinery    DO NOT do any other dangerous activities while taking these medicines.     DO NOT drink any alcohol while taking these medicines.      If the opioid prescribed includes acetaminophen, DO NOT take with any other medicines that contain acetaminophen. Read all labels carefully. Look for the word  acetaminophen  or  Tylenol.  Ask your pharmacist if you have questions or are unsure.    You can get addicted to pain medicines, especially if you have a history of addiction (chemical, alcohol or substance dependence). Talk to your care team about ways to reduce this risk.    Store your pills in a secure place, locked if possible. We will not replace any lost or stolen medicine. If you don t finish your medicine, please throw away (dispose) as directed by your pharmacist. The [place] Pollution Control Agency has more information about safe disposal: https://www.pca.state.[place]/living-green/managing-unwanted-medications.     All opioids tend to cause constipation. Drink plenty of water and eat foods that have a lot of fiber, such as fruits, vegetables,  prune juice, apple juice and high-fiber cereal. Take a laxative (Miralax, milk of magnesia, Colace, Senna) if you don t move your bowels at least every other day.          Primary Care Provider Office Phone # Fax #    Jackeline Rachel [phone] [id number]  [facility] [address]        Equal Access to Services     [facility] AMARI : Hadii aad ku hadasho Soomaali, waaxda luqadaha, qaybta kaalmada adeegyada, waxay idiin hayaan adeeg kharash la'aan ah. So [facility] [phone].    ATENCIÓN: Si habla español, tiene a white disposición servicios gratuitos de asistencia lingüística. Llame al [phone].    We comply with applicable federal civil rights laws and [place] laws. We do not discriminate on the basis of race, color, national origin, age, disability, sex, sexual orientation, or gender identity.            Thank you!     Thank you for choosing [facility]  for your care. Our goal is always to provide you with excellent care. Hearing back from our patients is one way we can continue to improve our services. Please take a few minutes to complete the written survey that you may receive in the mail after your visit with us. Thank you!             Your Updated Medication List - Protect others around you: Learn how to safely use, store and throw away your medicines at www.disposemymeds.org.          This list is accurate as of 6/22/18  2:06 PM.  Always use your most recent med list.                   Brand Name Dispense Instructions for use Diagnosis    aspirin 81 MG chewable tablet     108 tablet    Take 1 tablet (81 mg) by mouth daily    Syncope       blood glucose monitoring test strip    CEE CONTOUR    10 Box    by In Vitro route 8-10 times daily    Type 1 diabetes mellitus with vascular disease (H)       buPROPion 150 MG 12 hr tablet    WELLBUTRIN SR    90 tablet    Take 1 tablet (150 mg) by mouth daily    Major depressive disorder, single episode, severe without psychotic  "features (H)       DOCUSATE SODIUM PO      Take 100 mg by mouth every evening        GLUCAGON EMERGENCY KIT 1 MG Kit     1 Kit    use as directed for severe hypoglycemia    Type I (juvenile type) diabetes mellitus without mention of complication, not stated as uncontrolled       * HYDROcodone-acetaminophen  MG per tablet   Start taking on:  7/6/2018    NORCO    120 tablet    Take 1 tablet by mouth every 6 hours as needed for moderate to severe pain    Chronic narcotic use       * HYDROcodone-acetaminophen  MG per tablet   Start taking on:  8/6/2018    NORCO    120 tablet    Take 1 tablet by mouth every 6 hours as needed for moderate to severe pain    Chronic narcotic use, Cervical stenosis of spine       * HYDROcodone-acetaminophen  MG per tablet   Start taking on:  9/6/2018    NORCO    120 tablet    Take 1 tablet by mouth every 6 hours as needed for moderate to severe pain    Chronic narcotic use, Cervical stenosis of spine       insulin aspart 100 UNITS/ML injection    NovoLOG    30 mL    As directed per insulin pump    Type 1 diabetes mellitus with vascular disease (H)       * insulin pump infusion      Date last updated:  5/11/16 MyFit: BASAL RATES and times: 12   AM (midnight): 1.4 units/hour  5 am: 1.50 units/hour  9   AM: 1.15 units/hour  1 pm: 1.30 units/hour  3   PM: 1.0 units/hour  9    PM: 1.20 units/hour  Basal Pattern A:  12 am: 1.00 10 am: 0.90 12pm:0.50 6 pm: 0.75 9 pm: 1.05  Pattern B: 12 mid: 0.8 10 am: 0.7 12 noon: 0.3 6 pm: 0.55 9 pm: 0.85 CARB RATIO and times: 12   AM (midnight): 9 12  PM (noon):  9 6    PM:  9 Corection Factor (Sensitivity) and times: 12   AM (midnight): 33 mg/dL BLOOD GLUCOSE TARGET and times: 12   AM (midnight): 110 - 110Active Insulin Time:  4 hours Basal to Bolus Ratio:  Sensor:  No Carelink / Diasend username:   Carelink / Diasend Password:    Type 1 diabetes, HbA1c goal < 8% (H)       Insulin Syringe 30G X 1/2\" 0.5 ML Misc     100 each    1 " Device as needed.    Type I (juvenile type) diabetes mellitus without mention of complication, not stated as uncontrolled       KETO-DIASTIX Strp     100 strip    1 Stick by In Vitro route See Admin Instructions. Use as needed to monitor for ketones    Type I (juvenile type) diabetes mellitus without mention of complication, not stated as uncontrolled, Type 1 diabetes, HbA1c goal < 8% (H)       losartan 50 MG tablet    COZAAR    90 tablet    Take 1 tablet (50 mg) by mouth daily    Left ventricular dysfunction       metoprolol succinate 25 MG 24 hr tablet    TOPROL-XL    90 tablet    Take 1 tablet (25 mg) by mouth daily    Type 1 diabetes mellitus with vascular disease (H)       multivitamin per tablet     100    1 TABLET ORALLY DAILY        OMEGA-3 FISH OIL PO           omeprazole 20 MG tablet     90 tablet    Take 1 tablet (20 mg) by mouth daily Take 30-60 minutes before a meal.    Gastroesophageal reflux disease without esophagitis       * order for DME      Auto-CPAP: Max 11 cm H2O Min 5 cm H2O Pressure changed in clinic Continuous  Lifetime need and heated humidity.    LYNNETTE (obstructive sleep apnea)       PARoxetine 20 MG tablet    PAXIL    90 tablet    Take 1 tablet (20 mg) by mouth daily    Major depressive disorder, single episode, severe without psychotic features (H)       rosuvastatin 40 MG tablet    CRESTOR    90 tablet    TAKE 1 TABLET EVERY DAY    Hyperlipidemia LDL goal <70       tamsulosin 0.4 MG capsule    FLOMAX    90 capsule    Take 1 capsule (0.4 mg) by mouth daily    Benign non-nodular prostatic hyperplasia with lower urinary tract symptoms       tiZANidine 4 MG tablet    ZANAFLEX    90 tablet    Take 1 tablet (4 mg) by mouth 3 times daily as needed    Cervical stenosis of spine       ZINC PO           * Notice:  This list has 5 medication(s) that are the same as other medications prescribed for you. Read the directions carefully, and ask your doctor or other care provider to review them with you.

## 2018-06-22 NOTE — PROGRESS NOTES
"  SUBJECTIVE:   Eb Eisenberg is a 63 year old male who presents to clinic today for the following health issues:      Medication Followup of Vicodin    Taking Medication as prescribed: yes    Side Effects:  None    Medication Helping Symptoms:  yes     Also reports feeling very tired.  He's no longer using his CPAP, and doesn't really know why.  Stopped when he was sleeping on the couch a lot during the end of his late wife's illness.     Not sleeping well overnight, tired all day, sleeping on the couch at times shortly after he's gotten up for the day.    /78  Pulse 99  Temp 97.8  F (36.6  C) (Tympanic)  Resp 18  Ht 5' 9\" (1.753 m)  Wt 186 lb (84.4 kg)  BMI 27.47 kg/m2  EXAM: GENERAL APPEARANCE: Alert, no acute distress  RESP: lungs clear to auscultation   CV: normal rate, regular rhythm, no murmur or gallop  ABDOMEN: soft, no organomegaly, masses or tenderness  LYMPHATICS: No cervical, supraclavicular or inguinal adenopathy  SKIN: no suspicious lesions or rashes  PSYCH: mentation appears normal., affect and mood normal    Results for orders placed or performed in visit on 04/05/18   Albumin Random Urine Quantitative with Creat Ratio   Result Value Ref Range    Creatinine Urine 39 mg/dL    Albumin Urine mg/L 9 mg/L    Albumin Urine mg/g Cr 21.79 (H) 0 - 17 mg/g Cr   Hemoglobin A1c   Result Value Ref Range    Hemoglobin A1C 7.7 (H) 0 - 6.4 %     ASSESSMENT/PLAN:      ICD-10-CM    1. Fatigue, unspecified type R53.83 CBC with platelets differential     Lyme Disease Merary with reflex to WB Serum     TSH with free T4 reflex     Basic metabolic panel     Vitamin D Deficiency   2. Chronic narcotic use F11.90 HYDROcodone-acetaminophen (NORCO)  MG per tablet     HYDROcodone-acetaminophen (NORCO)  MG per tablet     HYDROcodone-acetaminophen (NORCO)  MG per tablet   3. Cervical stenosis of spine M48.02 HYDROcodone-acetaminophen (NORCO)  MG per tablet     HYDROcodone-acetaminophen (NORCO) "  MG per tablet   4. LYNNETTE (obstructive sleep apnea) G47.33 SLEEP EVALUATION & MANAGEMENT REFERRAL - [facility]  [phone] (Age 2 and up)   5. Daytime sleepiness R40.0 SLEEP EVALUATION & MANAGEMENT REFERRAL - [facility]  [phone] (Age 2 and up)     Restart using the CPAP, if not improving daytime fatigue, would get him back into sleep clinic.    Refilled narcotics.    Jackeline Rachel M.D.      Patient Instructions         Thank you for choosing [facility].  You may be receiving a survey in the mail from [employer] regarding your visit today.  Please take a few minutes to complete and return the survey to let us know how we are doing.      Our Clinic hours are:  Mondays    7:20 am - 7 pm  Tues -  Fri  7:20 am - 5 pm    Clinic Phone: [phone]    The clinic lab opens at 7:30 am Mon - Fri and appointments are required.    [facility] [place]  Ph. [phone]  Monday  8 am - 7pm  Tues - Fri 8 am - 5:30 pm

## 2018-06-22 NOTE — PATIENT INSTRUCTIONS
Thank you for choosing [facility].  You may be receiving a survey in the mail from [facility] regarding your visit today.  Please take a few minutes to complete and return the survey to let us know how we are doing.      Our Clinic hours are:  Mondays    7:20 am - 7 pm  Tues - Fri  7:20 am - 5 pm    Clinic Phone: [phone]    The clinic lab opens at 7:30 am Mon - Fri and appointments are required.    [facility]. [phone]  Monday  8 am - 7pm  Tues - Fri 8 am - 5:30 pm

## 2018-06-23 LAB — DEPRECATED CALCIDIOL+CALCIFEROL SERPL-MC: 26 UG/L (ref 20–75)

## 2018-06-25 LAB — B BURGDOR IGG+IGM SER QL: 0.03 (ref 0–0.89)

## 2018-06-26 NOTE — PROGRESS NOTES
[place],  The Lyme disease screen was negative vitamin D level was adequate.  The only abnormality on the rest of your labs was an elevated blood sugar, consistent with your diabetes      Gil Perez MD

## 2018-07-25 ENCOUNTER — OFFICE VISIT (OUTPATIENT)
Dept: FAMILY MEDICINE | Facility: CLINIC | Age: 63
End: 2018-07-25
Payer: COMMERCIAL

## 2018-07-25 VITALS
RESPIRATION RATE: 18 BRPM | HEART RATE: 87 BPM | DIASTOLIC BLOOD PRESSURE: 62 MMHG | HEIGHT: 69 IN | TEMPERATURE: 97.5 F | WEIGHT: 183 LBS | BODY MASS INDEX: 27.11 KG/M2 | SYSTOLIC BLOOD PRESSURE: 124 MMHG | OXYGEN SATURATION: 96 %

## 2018-07-25 DIAGNOSIS — N40.1 BENIGN NON-NODULAR PROSTATIC HYPERPLASIA WITH LOWER URINARY TRACT SYMPTOMS: ICD-10-CM

## 2018-07-25 DIAGNOSIS — F32.2 MAJOR DEPRESSIVE DISORDER, SINGLE EPISODE, SEVERE WITHOUT PSYCHOTIC FEATURES (H): ICD-10-CM

## 2018-07-25 DIAGNOSIS — E10.59 TYPE 1 DIABETES MELLITUS WITH VASCULAR DISEASE (H): ICD-10-CM

## 2018-07-25 LAB — HBA1C MFR BLD: 7.2 % (ref 0–5.6)

## 2018-07-25 PROCEDURE — 99214 OFFICE O/P EST MOD 30 MIN: CPT | Performed by: FAMILY MEDICINE

## 2018-07-25 PROCEDURE — 36415 COLL VENOUS BLD VENIPUNCTURE: CPT | Performed by: FAMILY MEDICINE

## 2018-07-25 PROCEDURE — 83036 HEMOGLOBIN GLYCOSYLATED A1C: CPT | Performed by: FAMILY MEDICINE

## 2018-07-25 RX ORDER — PAROXETINE 20 MG/1
20 TABLET, FILM COATED ORAL DAILY
Qty: 90 TABLET | Refills: 3 | Status: SHIPPED | OUTPATIENT
Start: 2018-07-25 | End: 2019-01-25 | Stop reason: ALTCHOICE

## 2018-07-25 RX ORDER — TAMSULOSIN HYDROCHLORIDE 0.4 MG/1
0.4 CAPSULE ORAL DAILY
Qty: 90 CAPSULE | Refills: 3 | Status: SHIPPED | OUTPATIENT
Start: 2018-07-25 | End: 2019-09-19

## 2018-07-25 ASSESSMENT — PAIN SCALES - GENERAL: PAINLEVEL: MILD PAIN (2)

## 2018-07-25 NOTE — PROGRESS NOTES
"  SUBJECTIVE:   Eb Eisenberg is a 63 year old male who presents to clinic today for the following health issues:      Diabetes Follow-up    Patient is checking blood sugars: 8-10 times daily.     Blood sugar testing frequency justification: Risk of hypoglycemia with medication(s)  Results are as follows:       am - 88       noon - varies       supper - varies       bedtime - varies         postprandial - varies    Diabetic concerns: blood sugar frequently over 200 and low blood sugar several less than 70 in the past few weeks     Symptoms of hypoglycemia (low blood sugar): confusion     Paresthesias (numbness or burning in feet) or sores: No     Date of last diabetic eye exam: up to date    Patient brings with him blood sugars over the past 30 days - checks on average 6-8 times daily but occasionally more if he's having extreme highs or lows.  He was told he needed to send in those values to the insurance company along with the order for diabetic test strips for the contour next test strips.     [employer]    Hyperlipidemia Follow-Up      Rate your low fat/cholesterol diet?: good    Taking statin?  Yes, no muscle aches from statin    Other lipid medications/supplements?:  none    Hypertension Follow-up      Outpatient blood pressures are not being checked.    Low Salt Diet: no added salt    BP Readings from Last 2 Encounters:   06/22/18 122/78   04/05/18 138/81     Hemoglobin A1C (%)   Date Value   04/05/2018 7.7 (H)   10/05/2017 8.4 (H)     LDL Cholesterol Calculated (mg/dL)   Date Value   10/05/2017 49   12/05/2016 78       Amount of exercise or physical activity: starting to be more active    Problems taking medications regularly: No    Medication side effects: none    Diet: regular (no restrictions)    /62  Pulse 87  Temp 97.5  F (36.4  C) (Tympanic)  Resp 18  Ht 5' 9\" (1.753 m)  Wt 183 lb (83 kg)  SpO2 96%  BMI 27.02 kg/m2  EXAM: GENERAL APPEARANCE: Alert, no acute " distress  RESP: lungs clear to auscultation   CV: normal rate, regular rhythm, no murmur or gallop  ABDOMEN: soft, no organomegaly, masses or tenderness  PSYCH: mentation appears normal., affect and mood normal    ASSESSMENT/PLAN:      ICD-10-CM    1. Type 1 diabetes mellitus with vascular disease (H) E10.59 blood glucose monitoring (CEE CONTOUR) test strip     Hemoglobin A1c     blood glucose monitoring (CONTOUR NEXT TEST) test strip   2. Major depressive disorder, single episode, severe without psychotic features (H) F32.2 PARoxetine (PAXIL) 20 MG tablet   3. Benign non-nodular prostatic hyperplasia with lower urinary tract symptoms N40.1 tamsulosin (FLOMAX) 0.4 MG capsule     HgbA1c is good at 7.2%.    Continue all current treatments.    Jackeline Rachel M.D.      Patient Instructions         Thank you for choosing [facility].  You may be receiving a survey in the mail from [employer] regarding your visit today.  Please take a few minutes to complete and return the survey to let us know how we are doing.      Our Clinic hours are:  Mondays    7:20 am - 7 pm  Tues -  Fri  7:20 am - 5 pm    Clinic Phone: [phone]    The clinic lab opens at 7:30 am Mon - Fri and appointments are required.    [facility]. [phone]  Monday  8 am - 7pm  Tues - Fri 8 am - 5:30 pm

## 2018-07-25 NOTE — MR AVS SNAPSHOT
After Visit Summary   7/25/2018    Eb Eisenberg    MRN: [id number]           Patient Information     Date Of Birth          1955        Visit Information        Provider Department      7/25/2018 3:20 PM Jackeline Rachel MD [facility]        Today's Diagnoses     Type 1 diabetes mellitus with vascular disease (H)        Major depressive disorder, single episode, severe without psychotic features (H)        Benign non-nodular prostatic hyperplasia with lower urinary tract symptoms          Care Instructions          Thank you for choosing [facility].  You may be receiving a survey in the mail from [employer] regarding your visit today.  Please take a few minutes to complete and return the survey to let us know how we are doing.      Our Clinic hours are:  Mondays    7:20 am - 7 pm  Tues -  Fri  7:20 am - 5 pm    Clinic Phone: [phone]    The clinic lab opens at 7:30 am Mon - Fri and appointments are required.    [facility] [place]  Ph. [phone]  Monday  8 am - 7pm  Tues - Fri 8 am - 5:30 pm                 Follow-ups after your visit        Who to contact     If you have questions or need follow up information about today's clinic visit or your schedule please contact [facility] directly at [phone].  Normal or non-critical lab and imaging results will be communicated to you by Zenos[employer], letter or phone within 4 business days after the clinic has received the results. If you do not hear from us within 7 days, please contact the clinic through Zenosshart or phone. If you have a critical or abnormal lab result, we will notify you by phone as soon as possible.  Submit refill requests through [employer] or call your pharmacy and they will forward the refill request to us. Please allow 3 business days for your refill to be completed.          Additional Information About Your Visit        [employer] Information     [employer] gives you secure  "access to your electronic health record. If you see a primary care provider, you can also send messages to your care team and make appointments. If you have questions, please call your primary care clinic.  If you do not have a primary care provider, please call [phone] and they will assist you.        Care EveryWhere ID     This is your Care EveryWhere ID. This could be used by other organizations to access your [place] medical records  [id number]        Your Vitals Were     Pulse Temperature Respirations Height Pulse Oximetry BMI (Body Mass Index)    87 97.5  F (36.4  C) (Tympanic) 18 5' 9\" (1.753 m) 96% 27.02 kg/m2       Blood Pressure from Last 3 Encounters:   07/25/18 124/62   06/22/18 122/78   04/05/18 138/81    Weight from Last 3 Encounters:   07/25/18 183 lb (83 kg)   06/22/18 186 lb (84.4 kg)   04/05/18 192 lb (87.1 kg)              We Performed the Following     Hemoglobin A1c          Today's Medication Changes          These changes are accurate as of 7/25/18  3:45 PM.  If you have any questions, ask your nurse or doctor.               These medicines have changed or have updated prescriptions.        Dose/Directions    * blood glucose monitoring test strip   Commonly known as:  CEE CONTOUR   This may have changed:  Another medication with the same name was added. Make sure you understand how and when to take each.   Used for:  Type 1 diabetes mellitus with vascular disease (H)   Changed by:  Jackeline Rachel MD        by In Vitro route 8-10 times daily   Quantity:  10 Box   Refills:  3       * blood glucose monitoring test strip   Commonly known as:  CONTOUR NEXT TEST   This may have changed:  You were already taking a medication with the same name, and this prescription was added. Make sure you understand how and when to take each.   Used for:  Type 1 diabetes mellitus with vascular disease (H)   Changed by:  Jackeline Rachel MD        Use to test blood sugar 8 times daily.  Patient tests " frequently due to insulin pump and marked excursions from normal blood sugars.   Quantity:  800 strip   Refills:  6       * Notice:  This list has 2 medication(s) that are the same as other medications prescribed for you. Read the directions carefully, and ask your doctor or other care provider to review them with you.         Where to get your medicines      These medications were sent to [facility] - [place], [place] - [address]  [address], [place] [facility] [address]     Phone:  [phone]     blood glucose monitoring test strip    PARoxetine 20 MG tablet    tamsulosin 0.4 MG capsule         Some of these will need a paper prescription and others can be bought over the counter.  Ask your nurse if you have questions.     Bring a paper prescription for each of these medications     blood glucose monitoring test strip                Primary Care Provider Office Phone # Fax #    Jackeline Rachel [phone] [address] [address] [address]        Equal Access to Services     AILEEN FITZPATRICK AH: Hadii kole kent hadasho Soomaali, waaxda luqadaha, qaybta kaalmada adeegyada, waxay kasiin haychristophern ally mesa . So [facility] [phone].    ATENCIÓN: Si habla español, tiene a white disposición servicios gratuitos de asistencia lingüística. Llame al [phone].    We comply with applicable federal civil rights laws and [place] laws. We do not discriminate on the basis of race, color, national origin, age, disability, sex, sexual orientation, or gender identity.            Thank you!     Thank you for choosing [facility]  for your care. Our goal is always to provide you with excellent care. Hearing back from our patients is one way we can continue to improve our services. Please take a few minutes to complete the written survey that you may receive in the mail after your visit with us. Thank you!             Your Updated Medication List - Protect  others around you: Learn how to safely use, store and throw away your medicines at www.disposemymeds.org.          This list is accurate as of 7/25/18  3:45 PM.  Always use your most recent med list.                   Brand Name Dispense Instructions for use Diagnosis    aspirin 81 MG chewable tablet     108 tablet    Take 1 tablet (81 mg) by mouth daily    Syncope       * blood glucose monitoring test strip    CEE CONTOUR    10 Box    by In Vitro route 8-10 times daily    Type 1 diabetes mellitus with vascular disease (H)       * blood glucose monitoring test strip    CONTOUR NEXT TEST    800 strip    Use to test blood sugar 8 times daily.  Patient tests frequently due to insulin pump and marked excursions from normal blood sugars.    Type 1 diabetes mellitus with vascular disease (H)       buPROPion 150 MG 12 hr tablet    WELLBUTRIN SR    90 tablet    Take 1 tablet (150 mg) by mouth daily    Major depressive disorder, single episode, severe without psychotic features (H)       DOCUSATE SODIUM PO      Take 100 mg by mouth every evening        GLUCAGON EMERGENCY KIT 1 MG Kit     1 Kit    use as directed for severe hypoglycemia    Type I (juvenile type) diabetes mellitus without mention of complication, not stated as uncontrolled       * HYDROcodone-acetaminophen  MG per tablet    NORCO    120 tablet    Take 1 tablet by mouth every 6 hours as needed for moderate to severe pain    Chronic narcotic use       * HYDROcodone-acetaminophen  MG per tablet   Start taking on:  8/6/2018    NORCO    120 tablet    Take 1 tablet by mouth every 6 hours as needed for moderate to severe pain    Chronic narcotic use, Cervical stenosis of spine       * HYDROcodone-acetaminophen  MG per tablet   Start taking on:  9/6/2018    NORCO    120 tablet    Take 1 tablet by mouth every 6 hours as needed for moderate to severe pain    Chronic narcotic use, Cervical stenosis of spine       insulin aspart 100 UNITS/ML injection  "   NovoLOG    30 mL    As directed per insulin pump    Type 1 diabetes mellitus with vascular disease (H)       * insulin pump infusion      Date last updated:  5/11/16 Medtronic Minimed: BASAL RATES and times: 12   AM (midnight): 1.4 units/hour  5 am: 1.50 units/hour  9   AM: 1.15 units/hour  1 pm: 1.30 units/hour  3   PM: 1.0 units/hour  9    PM: 1.20 units/hour  Basal Pattern A:  12 am: 1.00 10 am: 0.90 12pm:0.50 6 pm: 0.75 9 pm: 1.05  Pattern B: 12 mid: 0.8 10 am: 0.7 12 noon: 0.3 6 pm: 0.55 9 pm: 0.85 CARB RATIO and times: 12   AM (midnight): 9 12  PM (noon):  9 6    PM:  9 Corection Factor (Sensitivity) and times: 12   AM (midnight): 33 mg/dL BLOOD GLUCOSE TARGET and times: 12   AM (midnight): 110 - 110Active Insulin Time:  4 hours Basal to Bolus Ratio:  Sensor:  No Carelink / Diasend username:   Carelink / Diasend Password:    Type 1 diabetes, HbA1c goal < 8% (H)       Insulin Syringe 30G X 1/2\" 0.5 ML Misc     100 each    1 Device as needed.    Type I (juvenile type) diabetes mellitus without mention of complication, not stated as uncontrolled       KETO-DIASTIX Strp     100 strip    1 Stick by In Vitro route See Admin Instructions. Use as needed to monitor for ketones    Type I (juvenile type) diabetes mellitus without mention of complication, not stated as uncontrolled, Type 1 diabetes, HbA1c goal < 8% (H)       losartan 50 MG tablet    COZAAR    90 tablet    Take 1 tablet (50 mg) by mouth daily    Left ventricular dysfunction       metoprolol succinate 25 MG 24 hr tablet    TOPROL-XL    90 tablet    Take 1 tablet (25 mg) by mouth daily    Type 1 diabetes mellitus with vascular disease (H)       multivitamin per tablet     100    1 TABLET ORALLY DAILY        OMEGA-3 FISH OIL PO           omeprazole 20 MG tablet     90 tablet    Take 1 tablet (20 mg) by mouth daily Take 30-60 minutes before a meal.    Gastroesophageal reflux disease without esophagitis       * order for DME      Auto-CPAP: Max 11 cm H2O Min " 5 cm H2O Pressure changed in clinic Continuous  Lifetime need and heated humidity.    LYNNETTE (obstructive sleep apnea)       PARoxetine 20 MG tablet    PAXIL    90 tablet    Take 1 tablet (20 mg) by mouth daily    Major depressive disorder, single episode, severe without psychotic features (H)       rosuvastatin 40 MG tablet    CRESTOR    90 tablet    TAKE 1 TABLET EVERY DAY    Hyperlipidemia LDL goal <70       tamsulosin 0.4 MG capsule    FLOMAX    90 capsule    Take 1 capsule (0.4 mg) by mouth daily    Benign non-nodular prostatic hyperplasia with lower urinary tract symptoms       ZINC PO           * Notice:  This list has 7 medication(s) that are the same as other medications prescribed for you. Read the directions carefully, and ask your doctor or other care provider to review them with you.

## 2018-07-25 NOTE — PATIENT INSTRUCTIONS
Thank you for choosing [facility].  You may be receiving a survey in the mail from [facility] regarding your visit today.  Please take a few minutes to complete and return the survey to let us know how we are doing.      Our Clinic hours are:  Mondays    7:20 am - 7 pm  Tues - Fri  7:20 am - 5 pm    Clinic Phone: [phone]    The clinic lab opens at 7:30 am Mon - Fri and appointments are required.    [facility]. [phone]  Monday  8 am - 7pm  Tues - Fri 8 am - 5:30 pm

## 2018-08-14 ENCOUNTER — OFFICE VISIT (OUTPATIENT)
Dept: FAMILY MEDICINE | Facility: CLINIC | Age: 63
End: 2018-08-14
Payer: COMMERCIAL

## 2018-08-14 ENCOUNTER — RADIANT APPOINTMENT (OUTPATIENT)
Dept: GENERAL RADIOLOGY | Facility: CLINIC | Age: 63
End: 2018-08-14
Attending: NURSE PRACTITIONER
Payer: COMMERCIAL

## 2018-08-14 VITALS
WEIGHT: 182 LBS | OXYGEN SATURATION: 97 % | HEIGHT: 69 IN | TEMPERATURE: 97.7 F | RESPIRATION RATE: 16 BRPM | HEART RATE: 79 BPM | BODY MASS INDEX: 26.96 KG/M2 | DIASTOLIC BLOOD PRESSURE: 62 MMHG | SYSTOLIC BLOOD PRESSURE: 130 MMHG

## 2018-08-14 DIAGNOSIS — S69.92XA WRIST INJURY, LEFT, INITIAL ENCOUNTER: ICD-10-CM

## 2018-08-14 DIAGNOSIS — S62.102A FRACTURE OF WRIST, LEFT, CLOSED, INITIAL ENCOUNTER: Primary | ICD-10-CM

## 2018-08-14 PROCEDURE — 73110 X-RAY EXAM OF WRIST: CPT | Mod: LT

## 2018-08-14 PROCEDURE — 99214 OFFICE O/P EST MOD 30 MIN: CPT | Performed by: NURSE PRACTITIONER

## 2018-08-14 ASSESSMENT — PAIN SCALES - GENERAL: PAINLEVEL: WORST PAIN (10)

## 2018-08-14 NOTE — MR AVS SNAPSHOT
After Visit Summary   8/14/2018    Eb Eisenberg    MRN: [id number]           Patient Information     Date Of Birth          1955        Visit Information        Provider Department      8/14/2018 1:40 PM Deborah Spears APRN CNP [facility]        Today's Diagnoses     Fracture of wrist, left, closed, initial encounter    -  1    Wrist injury, left, initial encounter          Care Instructions    Wear splint at all times, orthopedics should be calling you within 24 hours.   Elevate, ice and take your available pain medications.  Follow up if symptoms persist or worsen and as needed.        Thank you for choosing [facility].  You may be receiving a survey in the mail from [employer] regarding your visit today.  Please take a few minutes to complete and return the survey to let us know how we are doing.      Our Clinic hours are:  Mondays    7:20 am - 7 pm  Tues - Fri  7:20 am - 5 pm    Clinic Phone: [phone]    The clinic lab opens at 7:30 am Mon - Fri and appointments are required.    [facility] [place]  Ph. [phone]  Monday  8 am - 7pm  Tues - Fri 8 am - 5:30 pm                 Follow-ups after your visit        Additional Services     ORTHO  REFERRAL       [facility] is referring you to the Orthopedic  Services at [facility].       The  Representative will assist you in the coordination of your Orthopedic and Musculoskeletal Care as prescribed by your physician.    The  Representative will call you within 1 business day to help schedule your appointment, or you may contact the  Representative at:    All areas ~ [phone]     Type of Referral : Non Surgical       Timeframe requested: 1 - 2 days    Coverage of these services is subject to the terms and limitations of your health insurance plan.  Please call member services at your health plan with any  "benefit or coverage questions.      If X-rays, CT or MRI's have been performed, please contact the facility where they were done to arrange for , prior to your scheduled appointment.  Please bring this referral request to your appointment and present it to your specialist.                  Follow-up notes from your care team     Return if symptoms worsen or fail to improve.      Who to contact     If you have questions or need follow up information about today's clinic visit or your schedule please contact [facility] directly at [phone].  Normal or non-critical lab and imaging results will be communicated to you by [employer], letter or phone within 4 business days after the clinic has received the results. If you do not hear from us within 7 days, please contact the clinic through [employer] or phone. If you have a critical or abnormal lab result, we will notify you by phone as soon as possible.  Submit refill requests through [employer] or call your pharmacy and they will forward the refill request to us. Please allow 3 business days for your refill to be completed.          Additional Information About Your Visit        [employer] Information     [employer] gives you secure access to your electronic health record. If you see a primary care provider, you can also send messages to your care team and make appointments. If you have questions, please call your primary care clinic.  If you do not have a primary care provider, please call [phone] and they will assist you.        Care EveryWhere ID     This is your Care EveryWhere ID. This could be used by other organizations to access your [place] medical records  [id number]        Your Vitals Were     Pulse Temperature Respirations Height Pulse Oximetry BMI (Body Mass Index)    79 97.7  F (36.5  C) (Oral) 16 5' 9\" (1.753 m) 97% 26.88 kg/m2       Blood Pressure from Last 3 Encounters:   08/14/18 130/62   07/25/18 124/62   06/22/18 122/78    " Weight from Last 3 Encounters:   08/14/18 182 lb (82.6 kg)   07/25/18 183 lb (83 kg)   06/22/18 186 lb (84.4 kg)              We Performed the Following     ORTHO  REFERRAL          Today's Medication Changes          These changes are accurate as of 8/14/18  2:12 PM.  If you have any questions, ask your nurse or doctor.               These medicines have changed or have updated prescriptions.        Dose/Directions    omeprazole 20 MG tablet   This may have changed:    - when to take this  - reasons to take this  - additional instructions   Used for:  Gastroesophageal reflux disease without esophagitis        Dose:  20 mg   Take 1 tablet (20 mg) by mouth daily Take 30-60 minutes before a meal.   Quantity:  90 tablet   Refills:  3                Primary Care Provider Office Phone # Fax #    Jackeline Rachel [phone] [address] [address] [address]        Equal Access to Services     [facility]: Hadii kole pederson Soobey, waaxda luqchadd, qaybta kaalmada ron, geremias mesa . So [facility] [phone].    ATENCIÓN: Si habla español, tiene a white disposición servicios gratuitos de asistencia lingüística. Javierame al [phone].    We comply with applicable federal civil rights laws and [place] laws. We do not discriminate on the basis of race, color, national origin, age, disability, sex, sexual orientation, or gender identity.            Thank you!     Thank you for choosing [facility]  for your care. Our goal is always to provide you with excellent care. Hearing back from our patients is one way we can continue to improve our services. Please take a few minutes to complete the written survey that you may receive in the mail after your visit with us. Thank you!             Your Updated Medication List - Protect others around you: Learn how to safely use, store and throw away your medicines at www.disposemymeds.org.           This list is accurate as of 8/14/18  2:12 PM.  Always use your most recent med list.                   Brand Name Dispense Instructions for use Diagnosis    aspirin 81 MG chewable tablet     108 tablet    Take 1 tablet (81 mg) by mouth daily    Syncope       * blood glucose monitoring test strip    CEE CONTOUR    10 Box    by In Vitro route 8-10 times daily    Type 1 diabetes mellitus with vascular disease (H)       * blood glucose monitoring test strip    CONTOUR NEXT TEST    800 strip    Use to test blood sugar 8 times daily.  Patient tests frequently due to insulin pump and marked excursions from normal blood sugars.    Type 1 diabetes mellitus with vascular disease (H)       buPROPion 150 MG 12 hr tablet    WELLBUTRIN SR    90 tablet    Take 1 tablet (150 mg) by mouth daily    Major depressive disorder, single episode, severe without psychotic features (H)       DOCUSATE SODIUM PO      Take 100 mg by mouth every evening        GLUCAGON EMERGENCY KIT 1 MG Kit     1 Kit    use as directed for severe hypoglycemia    Type I (juvenile type) diabetes mellitus without mention of complication, not stated as uncontrolled       * HYDROcodone-acetaminophen  MG per tablet    NORCO    120 tablet    Take 1 tablet by mouth every 6 hours as needed for moderate to severe pain    Chronic narcotic use       * HYDROcodone-acetaminophen  MG per tablet    NORCO    120 tablet    Take 1 tablet by mouth every 6 hours as needed for moderate to severe pain    Chronic narcotic use, Cervical stenosis of spine       * HYDROcodone-acetaminophen  MG per tablet   Start taking on:  9/6/2018    NORCO    120 tablet    Take 1 tablet by mouth every 6 hours as needed for moderate to severe pain    Chronic narcotic use, Cervical stenosis of spine       insulin aspart 100 UNITS/ML injection    NovoLOG    30 mL    As directed per insulin pump    Type 1 diabetes mellitus with vascular disease (H)       * insulin pump infusion       "Date last updated:  5/11/16 Medtronic Minimed: BASAL RATES and times: 12   AM (midnight): 1.4 units/hour  5 am: 1.50 units/hour  9   AM: 1.15 units/hour  1 pm: 1.30 units/hour  3   PM: 1.0 units/hour  9    PM: 1.20 units/hour  Basal Pattern A:  12 am: 1.00 10 am: 0.90 12pm:0.50 6 pm: 0.75 9 pm: 1.05  Pattern B: 12 mid: 0.8 10 am: 0.7 12 noon: 0.3 6 pm: 0.55 9 pm: 0.85 CARB RATIO and times: 12   AM (midnight): 9 12  PM (noon):  9 6    PM:  9 Corection Factor (Sensitivity) and times: 12   AM (midnight): 33 mg/dL BLOOD GLUCOSE TARGET and times: 12   AM (midnight): 110 - 110Active Insulin Time:  4 hours Basal to Bolus Ratio:  Sensor:  No Carelink / Diasend username:   Carelink / Diasend Password:    Type 1 diabetes, HbA1c goal < 8% (H)       Insulin Syringe 30G X 1/2\" 0.5 ML Misc     100 each    1 Device as needed.    Type I (juvenile type) diabetes mellitus without mention of complication, not stated as uncontrolled       KETO-DIASTIX Strp     100 strip    1 Stick by In Vitro route See Admin Instructions. Use as needed to monitor for ketones    Type I (juvenile type) diabetes mellitus without mention of complication, not stated as uncontrolled, Type 1 diabetes, HbA1c goal < 8% (H)       losartan 50 MG tablet    COZAAR    90 tablet    Take 1 tablet (50 mg) by mouth daily    Left ventricular dysfunction       metoprolol succinate 25 MG 24 hr tablet    TOPROL-XL    90 tablet    Take 1 tablet (25 mg) by mouth daily    Type 1 diabetes mellitus with vascular disease (H)       multivitamin per tablet     100    1 TABLET ORALLY DAILY        OMEGA-3 FISH OIL PO           omeprazole 20 MG tablet     90 tablet    Take 1 tablet (20 mg) by mouth daily Take 30-60 minutes before a meal.    Gastroesophageal reflux disease without esophagitis       * order for DME      Auto-CPAP: Max 11 cm H2O Min 5 cm H2O Pressure changed in clinic Continuous  Lifetime need and heated humidity.    LYNNETTE (obstructive sleep apnea)       PARoxetine 20 MG " tablet    PAXIL    90 tablet    Take 1 tablet (20 mg) by mouth daily    Major depressive disorder, single episode, severe without psychotic features (H)       rosuvastatin 40 MG tablet    CRESTOR    90 tablet    TAKE 1 TABLET EVERY DAY    Hyperlipidemia LDL goal <70       tamsulosin 0.4 MG capsule    FLOMAX    90 capsule    Take 1 capsule (0.4 mg) by mouth daily    Benign non-nodular prostatic hyperplasia with lower urinary tract symptoms       ZINC PO           * Notice:  This list has 7 medication(s) that are the same as other medications prescribed for you. Read the directions carefully, and ask your doctor or other care provider to review them with you.

## 2018-08-14 NOTE — PATIENT INSTRUCTIONS
Wear splint at all times, orthopedics should be calling you within 24 hours.   Elevate, ice and take your available pain medications.  Follow up if symptoms persist or worsen and as needed.        Thank you for choosing [facility].  You may be receiving a survey in the mail from Desirae Guaman regarding your visit today.  Please take a few minutes to complete and return the survey to let us know how we are doing.      Our Clinic hours are:  Mondays    7:20 am - 7 pm  Tues -  Fri  7:20 am - 5 pm    Clinic Phone: [phone]    The clinic lab opens at 7:30 am Mon - Fri and appointments are required.    [facility] [place]  Ph. [phone]  Monday  8 am - 7pm  Tues - Fri 8 am - 5:30 pm

## 2018-08-14 NOTE — PROGRESS NOTES
SUBJECTIVE:   Eb Eisenberg is a 63 year old male who presents to clinic today for the following health issues:      Pt is here for a left wrist injury that he got on Sunday from a a fall.        Problem list and histories reviewed & adjusted, as indicated.  Additional history: states he fell onto parking lot after mis step while putting pontoon on trailer 2 days ago.   He has a bruise on right posterior leg. His worst complaint is pain, swelling in left forearm/wrist.  He has pain pills available for chronic pain issues, states they aren't working very well.     Patient Active Problem List   Diagnosis     Type 1 diabetes mellitus with vascular disease (H)     Peyronie's disease     Accident due to exposure to weather conditions     Impotence of organic origin     Major depressive disorder, single episode, severe (H)     Mild cognitive impairment     CAD (coronary artery disease)     Diabetic polyneuropathy associated with type 1 diabetes mellitus (H)     Biceps tendon tear     Cervical stenosis of spine     Left ventricular dysfunction     Hyperlipidemia LDL goal <70     Type 1 diabetes mellitus with diabetic retinopathy, macular edema presence unspecified, with unspecified retinopathy severity     Restless legs syndrome     LYNNETTE (obstructive sleep apnea)     Advance Care Planning     Unspecified hyperplasia of prostate with urinary obstruction and other lower urinary tract symptoms (LUTS)     Erectile dysfunction     Chronic narcotic use     GI bleed     Anemia due to blood loss, acute     Health Care Home     Esophageal reflux     Senile nuclear cataract     Non-ischemic cardiomyopathy (H)     Latex sensitivity     Past Surgical History:   Procedure Laterality Date     BACK SURGERY       COLONOSCOPY  4/8/2014    Procedure: COLONOSCOPY;  Colonoscopy  ;  Surgeon: Jesus Beltrán MD;  Location: [facility]     ESOPHAGOSCOPY, GASTROSCOPY, DUODENOSCOPY (EGD), COMBINED  4/7/2014    Procedure: COMBINED ESOPHAGOSCOPY,  GASTROSCOPY, DUODENOSCOPY (EGD);  Gastroscopy  ;  Surgeon: Jesus Beltrán MD;  Location: [facility]     ORTHOPEDIC SURGERY       PHACOEMULSIFICATION WITH STANDARD INTRAOCULAR LENS IMPLANT Right 5/26/2016    Procedure: PHACOEMULSIFICATION WITH STANDARD INTRAOCULAR LENS IMPLANT;  Surgeon: Anthony Pugh MD;  Location: [facility]     PHACOEMULSIFICATION WITH STANDARD INTRAOCULAR LENS IMPLANT Left 7/14/2016    Procedure: PHACOEMULSIFICATION WITH STANDARD INTRAOCULAR LENS IMPLANT;  Surgeon: Anthony Pugh MD;  Location: [facility]     SURGICAL HISTORY OF -   2004    carpal tunnel surgery, bilateral     SURGICAL HISTORY OF -   1/5/2010    Laminectomy/Discectomy Cervical/fusion     VASECTOMY  1990       Social History   Substance Use Topics     Smoking status: Former Smoker     Quit date: 1/1/2001     Smokeless tobacco: Never Used     Alcohol use No      Comment: quit in early '80s     Family History   Problem Relation Age of Onset     Diabetes Father      Cancer Brother      ear and face     Diabetes Brother      Parkinsonism Brother          Current Outpatient Prescriptions   Medication Sig Dispense Refill     aspirin 81 MG chewable tablet Take 1 tablet (81 mg) by mouth daily 108 tablet 3     blood glucose monitoring (CEE CONTOUR) test strip by In Vitro route 8-10 times daily 10 Box 3     blood glucose monitoring (CONTOUR NEXT TEST) test strip Use to test blood sugar 8 times daily.  Patient tests frequently due to insulin pump and marked excursions from normal blood sugars. 800 strip 6     buPROPion (WELLBUTRIN SR) 150 MG 12 hr tablet Take 1 tablet (150 mg) by mouth daily 90 tablet 3     GLUCAGON EMERGENCY KIT 1 MG IJ KIT use as directed for severe hypoglycemia 1 Kit 3     [START ON 9/6/2018] HYDROcodone-acetaminophen (NORCO)  MG per tablet Take 1 tablet by mouth every 6 hours as needed for moderate to severe pain 120 tablet 0     HYDROcodone-acetaminophen (NORCO)  MG per tablet Take 1 tablet by mouth every  "6 hours as needed for moderate to severe pain 120 tablet 0     HYDROcodone-acetaminophen (NORCO)  MG per tablet Take 1 tablet by mouth every 6 hours as needed for moderate to severe pain 120 tablet 0     insulin aspart (NOVOLOG) 100 UNITS/ML injection As directed per insulin pump 30 mL 11     INSULIN PUMP - OUTPATIENT Date last updated:  5/11/16  Medtronic Minimed:  BASAL RATES and times:  12   AM (midnight): 1.4 units/hour   5 am: 1.50 units/hour   9   AM: 1.15 units/hour   1 pm: 1.30 units/hour   3   PM: 1.0 units/hour   9    PM: 1.20 units/hour   Basal Pattern A:   12 am: 1.00  10 am: 0.90  12pm:0.50  6 pm: 0.75  9 pm: 1.05   Pattern B:  12 mid: 0.8  10 am: 0.7  12 noon: 0.3  6 pm: 0.55  9 pm: 0.85  CARB RATIO and times:  12   AM (midnight): 9  12  PM (noon):  9  6    PM:  9  Corection Factor (Sensitivity) and times:  12   AM (midnight): 33 mg/dL  BLOOD GLUCOSE TARGET and times:  12   AM (midnight): 110 - 110Active Insulin Time:  4 hours  Basal to Bolus Ratio:   Sensor:  No  Carelink / Diasend username:    Carelink / Diasend Password:       Insulin Syringe 30G X 1/2\" 0.5 ML MISC 1 Device as needed. 100 each 12     losartan (COZAAR) 50 MG tablet Take 1 tablet (50 mg) by mouth daily 90 tablet 3     metoprolol (TOPROL-XL) 25 MG 24 hr tablet Take 1 tablet (25 mg) by mouth daily 90 tablet 3     Multiple Vitamins-Minerals (ZINC PO)        MULTIVITAMINS OR TABS 1 TABLET ORALLY DAILY 100 3     Omega-3 Fatty Acids (OMEGA-3 FISH OIL PO)        omeprazole 20 MG tablet Take 1 tablet (20 mg) by mouth daily Take 30-60 minutes before a meal. (Patient taking differently: Take 20 mg by mouth daily as needed Take 30-60 minutes before a meal.) 90 tablet 3     PARoxetine (PAXIL) 20 MG tablet Take 1 tablet (20 mg) by mouth daily 90 tablet 3     rosuvastatin (CRESTOR) 40 MG tablet TAKE 1 TABLET EVERY DAY 90 tablet 2     tamsulosin (FLOMAX) 0.4 MG capsule Take 1 capsule (0.4 mg) by mouth daily 90 capsule 3     Urine " "Glucose-Ketones Test (KETO-DIASTIX) STRP 1 Stick by In Vitro route See Admin Instructions. Use as needed to monitor for ketones 100 strip prn     DOCUSATE SODIUM PO Take 100 mg by mouth every evening       ORDER FOR DME Auto-CPAP:  Max 11 cm H2O  Min 5 cm H2O  Pressure changed in clinic  Continuous    Lifetime need and heated humidity.    (Patient not taking: Reported on 8/14/2018)       Allergies   Allergen Reactions     Latex Difficulty breathing     Difficulty breathing when using a latex welding mask     BP Readings from Last 3 Encounters:   08/14/18 130/62   07/25/18 124/62   06/22/18 122/78    Wt Readings from Last 3 Encounters:   08/14/18 182 lb (82.6 kg)   07/25/18 183 lb (83 kg)   06/22/18 186 lb (84.4 kg)                    Reviewed and updated as needed this visit by clinical staff  Allergies  Meds       Reviewed and updated as needed this visit by Provider          ROS: 10 point ROS neg other than the symptoms noted above in the HPI.    OBJECTIVE:     /62 (BP Location: Right arm, Patient Position: Chair, Cuff Size: Adult Regular)  Pulse 79  Temp 97.7  F (36.5  C) (Oral)  Resp 16  Ht 5' 9\" (1.753 m)  Wt 182 lb (82.6 kg)  SpO2 97%  BMI 26.88 kg/m2  Body mass index is 26.88 kg/(m^2).  GENERAL: healthy, alert and no distress  NECK: no asymmetry  RESP: lungs clear   CV: regular rate and rhythm  MS: left forearm with global swelling, mild bruising, no erythema, unable to rotate wrist due to pain, good distal CMS to all fingers      IMPRESSION: Comminuted intra-articular distal radius fracture. No  dislocation. Mild osteoarthritic changes. Moderate vascular  calcifications.     VAUGHN AHUMADA MD    ASSESSMENT/PLAN:             1. Fracture of wrist, left, closed, initial encounter    - ORTHO  REFERRAL    2. Wrist injury, left, initial encounter    - XR Wrist Left G/E 3 Views; Future    See Patient Instructions  Patient Instructions   Wear splint at all times, orthopedics should be calling " you within 24 hours.   Elevate, ice and take your available pain medications.  Follow up if symptoms persist or worsen and as needed.        Thank you for choosing [facility].  You may be receiving a survey in the mail from [employer] regarding your visit today.  Please take a few minutes to complete and return the survey to let us know how we are doing.      Our Clinic hours are:  Mondays    7:20 am - 7 pm  Tues -  Fri  7:20 am - 5 pm    Clinic Phone: [phone]    The clinic lab opens at 7:30 am Mon - Fri and appointments are required.    [facility]  Ph. [phone]  Monday  8 am - 7pm  Tues - Fri 8 am - 5:30 pm             PRERNA España CNP  [facility] [place]

## 2018-08-20 ENCOUNTER — TRANSFERRED RECORDS (OUTPATIENT)
Dept: HEALTH INFORMATION MANAGEMENT | Facility: CLINIC | Age: 63
End: 2018-08-20

## 2018-08-28 ENCOUNTER — TELEPHONE (OUTPATIENT)
Dept: FAMILY MEDICINE | Facility: CLINIC | Age: 63
End: 2018-08-28

## 2018-08-28 DIAGNOSIS — F11.90 CHRONIC NARCOTIC USE: ICD-10-CM

## 2018-08-28 DIAGNOSIS — M48.02 CERVICAL STENOSIS OF SPINE: ICD-10-CM

## 2018-08-29 RX ORDER — HYDROCODONE BITARTRATE AND ACETAMINOPHEN 10; 325 MG/1; MG/1
1 TABLET ORAL EVERY 6 HOURS PRN
Qty: 20 TABLET | Refills: 0 | Status: SHIPPED | OUTPATIENT
Start: 2018-08-29 | End: 2018-10-10

## 2018-08-29 NOTE — TELEPHONE ENCOUNTER
Hand-delivered Rx hard copy to the [facility] - Patient notified.

## 2018-09-18 ENCOUNTER — TRANSFERRED RECORDS (OUTPATIENT)
Dept: HEALTH INFORMATION MANAGEMENT | Facility: CLINIC | Age: 63
End: 2018-09-18

## 2018-09-21 ENCOUNTER — TELEPHONE (OUTPATIENT)
Dept: FAMILY MEDICINE | Facility: CLINIC | Age: 63
End: 2018-09-21

## 2018-09-21 DIAGNOSIS — F11.90 CHRONIC NARCOTIC USE: ICD-10-CM

## 2018-09-21 DIAGNOSIS — M48.02 CERVICAL STENOSIS OF SPINE: ICD-10-CM

## 2018-09-21 RX ORDER — HYDROCODONE BITARTRATE AND ACETAMINOPHEN 10; 325 MG/1; MG/1
1 TABLET ORAL EVERY 6 HOURS PRN
Qty: 120 TABLET | Refills: 0 | Status: SHIPPED | OUTPATIENT
Start: 2018-10-05 | End: 2018-10-10

## 2018-09-21 RX ORDER — HYDROCODONE BITARTRATE AND ACETAMINOPHEN 10; 325 MG/1; MG/1
1 TABLET ORAL EVERY 6 HOURS PRN
Qty: 20 TABLET | Refills: 0 | Status: CANCELLED | OUTPATIENT
Start: 2018-09-21

## 2018-09-21 NOTE — TELEPHONE ENCOUNTER
Does need the appointment, but I did refill x 1 month, partial fill not necessary.    Jackeline Rachel M.D.

## 2018-09-21 NOTE — TELEPHONE ENCOUNTER
Pt has appt 10/10/18 however needs a refill. Can he get enough to get through until appointment or a month. He needs it dated for the 5th because pharmacy will be closed.

## 2018-10-10 ENCOUNTER — OFFICE VISIT (OUTPATIENT)
Dept: FAMILY MEDICINE | Facility: CLINIC | Age: 63
End: 2018-10-10
Payer: COMMERCIAL

## 2018-10-10 VITALS
BODY MASS INDEX: 28.14 KG/M2 | TEMPERATURE: 98.3 F | HEIGHT: 69 IN | SYSTOLIC BLOOD PRESSURE: 126 MMHG | DIASTOLIC BLOOD PRESSURE: 70 MMHG | RESPIRATION RATE: 18 BRPM | OXYGEN SATURATION: 95 % | HEART RATE: 83 BPM | WEIGHT: 190 LBS

## 2018-10-10 DIAGNOSIS — I51.9 LEFT VENTRICULAR DYSFUNCTION: ICD-10-CM

## 2018-10-10 DIAGNOSIS — E10.59 TYPE 1 DIABETES MELLITUS WITH VASCULAR DISEASE (H): Primary | ICD-10-CM

## 2018-10-10 DIAGNOSIS — F11.90 CHRONIC NARCOTIC USE: ICD-10-CM

## 2018-10-10 DIAGNOSIS — M48.02 CERVICAL STENOSIS OF SPINE: ICD-10-CM

## 2018-10-10 DIAGNOSIS — E78.5 HYPERLIPIDEMIA LDL GOAL <70: ICD-10-CM

## 2018-10-10 DIAGNOSIS — F32.2 MAJOR DEPRESSIVE DISORDER, SINGLE EPISODE, SEVERE WITHOUT PSYCHOTIC FEATURES (H): ICD-10-CM

## 2018-10-10 DIAGNOSIS — Z23 NEED FOR PROPHYLACTIC VACCINATION AND INOCULATION AGAINST INFLUENZA: ICD-10-CM

## 2018-10-10 DIAGNOSIS — K21.9 GASTROESOPHAGEAL REFLUX DISEASE WITHOUT ESOPHAGITIS: ICD-10-CM

## 2018-10-10 LAB
ALT SERPL W P-5'-P-CCNC: 36 U/L (ref 0–70)
CHOLEST SERPL-MCNC: 162 MG/DL
HDLC SERPL-MCNC: 51 MG/DL
LDLC SERPL CALC-MCNC: 89 MG/DL
NONHDLC SERPL-MCNC: 111 MG/DL
TRIGL SERPL-MCNC: 109 MG/DL

## 2018-10-10 PROCEDURE — 80061 LIPID PANEL: CPT | Performed by: FAMILY MEDICINE

## 2018-10-10 PROCEDURE — 99214 OFFICE O/P EST MOD 30 MIN: CPT | Mod: 25 | Performed by: FAMILY MEDICINE

## 2018-10-10 PROCEDURE — G0008 ADMIN INFLUENZA VIRUS VAC: HCPCS | Performed by: FAMILY MEDICINE

## 2018-10-10 PROCEDURE — 36415 COLL VENOUS BLD VENIPUNCTURE: CPT | Performed by: FAMILY MEDICINE

## 2018-10-10 PROCEDURE — 90686 IIV4 VACC NO PRSV 0.5 ML IM: CPT | Performed by: FAMILY MEDICINE

## 2018-10-10 PROCEDURE — 84460 ALANINE AMINO (ALT) (SGPT): CPT | Performed by: FAMILY MEDICINE

## 2018-10-10 RX ORDER — BUPROPION HYDROCHLORIDE 150 MG/1
150 TABLET, EXTENDED RELEASE ORAL DAILY
Qty: 90 TABLET | Refills: 3 | Status: SHIPPED | OUTPATIENT
Start: 2018-10-10 | End: 2019-05-15

## 2018-10-10 RX ORDER — HYDROCODONE BITARTRATE AND ACETAMINOPHEN 10; 325 MG/1; MG/1
1 TABLET ORAL EVERY 6 HOURS PRN
Qty: 120 TABLET | Refills: 0 | Status: SHIPPED | OUTPATIENT
Start: 2018-11-05 | End: 2019-01-25

## 2018-10-10 RX ORDER — HYDROCODONE BITARTRATE AND ACETAMINOPHEN 10; 325 MG/1; MG/1
1 TABLET ORAL EVERY 6 HOURS PRN
Qty: 120 TABLET | Refills: 0 | Status: SHIPPED | OUTPATIENT
Start: 2019-01-04 | End: 2019-01-25

## 2018-10-10 RX ORDER — LOSARTAN POTASSIUM 50 MG/1
50 TABLET ORAL DAILY
Qty: 90 TABLET | Refills: 3 | Status: SHIPPED | OUTPATIENT
Start: 2018-10-10 | End: 2019-09-19

## 2018-10-10 RX ORDER — HYDROCODONE BITARTRATE AND ACETAMINOPHEN 10; 325 MG/1; MG/1
1 TABLET ORAL EVERY 6 HOURS PRN
Qty: 20 TABLET | Refills: 0 | Status: SHIPPED | OUTPATIENT
Start: 2018-12-05 | End: 2018-12-05

## 2018-10-10 RX ORDER — METOPROLOL SUCCINATE 25 MG/1
25 TABLET, EXTENDED RELEASE ORAL DAILY
Qty: 90 TABLET | Refills: 3 | Status: SHIPPED | OUTPATIENT
Start: 2018-10-10 | End: 2019-09-19

## 2018-10-10 RX ORDER — NICOTINE POLACRILEX 4 MG/1
20 GUM, CHEWING ORAL DAILY
Qty: 90 TABLET | Refills: 3 | Status: SHIPPED | OUTPATIENT
Start: 2018-10-10 | End: 2020-06-18

## 2018-10-10 RX ORDER — ROSUVASTATIN CALCIUM 40 MG/1
TABLET, COATED ORAL
Qty: 90 TABLET | Refills: 3 | Status: SHIPPED | OUTPATIENT
Start: 2018-10-10 | End: 2019-09-19

## 2018-10-10 ASSESSMENT — ANXIETY QUESTIONNAIRES
3. WORRYING TOO MUCH ABOUT DIFFERENT THINGS: NOT AT ALL
5. BEING SO RESTLESS THAT IT IS HARD TO SIT STILL: NOT AT ALL
6. BECOMING EASILY ANNOYED OR IRRITABLE: NOT AT ALL
7. FEELING AFRAID AS IF SOMETHING AWFUL MIGHT HAPPEN: NOT AT ALL
IF YOU CHECKED OFF ANY PROBLEMS ON THIS QUESTIONNAIRE, HOW DIFFICULT HAVE THESE PROBLEMS MADE IT FOR YOU TO DO YOUR WORK, TAKE CARE OF THINGS AT HOME, OR GET ALONG WITH OTHER PEOPLE: NOT DIFFICULT AT ALL
1. FEELING NERVOUS, ANXIOUS, OR ON EDGE: NOT AT ALL
2. NOT BEING ABLE TO STOP OR CONTROL WORRYING: NOT AT ALL
GAD7 TOTAL SCORE: 0

## 2018-10-10 ASSESSMENT — PATIENT HEALTH QUESTIONNAIRE - PHQ9: 5. POOR APPETITE OR OVEREATING: NOT AT ALL

## 2018-10-10 ASSESSMENT — PAIN SCALES - GENERAL: PAINLEVEL: SEVERE PAIN (6)

## 2018-10-10 NOTE — PROGRESS NOTES
SUBJECTIVE:   Eb Eisenberg is a 63 year old male who presents to clinic today for the following health issues:    Chief Complaint   Patient presents with     Flu Shot     Medication Request     vicodin     Diabetes         Diabetes Follow-up    Patient is checking blood sugars: 8-10 times daily.     Blood sugar testing frequency justification: Risk of hypoglycemia with medication(s)  Results are as follows:     varies Blood sugar    Diabetic concerns: blood sugar frequently over 200 and low blood sugar several less than 70 in the past few weeks     Symptoms of hypoglycemia (low blood sugar): none     Paresthesias (numbness or burning in feet) or sores: No     Date of last diabetic eye exam: up to date    Diabetes Management Resources    Hyperlipidemia Follow-Up      Rate your low fat/cholesterol diet?: fair    Taking statin?  Yes, no muscle aches from statin    Other lipid medications/supplements?:  Fish oil/Omega 3,  without side effects    Hypertension Follow-up      Outpatient blood pressures are not being checked.    Low Salt Diet: no added salt    BP Readings from Last 2 Encounters:   10/10/18 126/70   08/14/18 130/62     Hemoglobin A1C (%)   Date Value   07/25/2018 7.2 (H)   04/05/2018 7.7 (H)     LDL Cholesterol Calculated (mg/dL)   Date Value   10/05/2017 49   12/05/2016 78       Amount of exercise or physical activity: yardwork    Problems taking medications regularly: No    Medication side effects: none    Diet: regular (no restrictions)        Chronic Pain Follow-Up       Type / Location of Pain: spine - cervical stenosis  Analgesia/pain control:       Recent changes:  same      Overall control: Tolerable with discomfort  Activity level/function:      Daily activities:  Can do most things most days, with some rest    Work:  not applicable  Adverse effects:  No  Adherance    Taking medication as directed?  Yes    Participating in other treatments: yes  Risk Factors:    Sleep:  Fair    Mood/anxiety:   "controlled    Recent family or social stressors:  Family  Just moved into his house - see above    Other aggravating factors: none       PHQ-9 SCORE 10/5/2017 4/5/2018 10/10/2018   Total Score - - -   Total Score 4 3 3     VIVIANA-7 SCORE 3/8/2017 10/5/2017 10/10/2018   Total Score - - -   Total Score 2 0 0     Encounter-Level CSA:     There are no encounter-level csa.          Trying to modify his house to accommodate his son/wife and two kids  Fixing the attic to move his bed up there  This is supposed to be a short term plan      /70  Pulse 83  Temp 98.3  F (36.8  C) (Tympanic)  Resp 18  Ht 5' 9\" (1.753 m)  Wt 190 lb (86.2 kg)  SpO2 95%  BMI 28.06 kg/m2  EXAM: GENERAL APPEARANCE: Alert, no acute distress  HENT: Ears and TMs normal, oral mucosa and posterior oropharynx normal  RESP: lungs clear to auscultation   CV: normal rate, regular rhythm, no murmur or gallop  ABDOMEN: soft, no organomegaly, masses or tenderness  PSYCH: mentation appears normal., affect and mood normal    ASSESSMENT/PLAN:      ICD-10-CM    1. Type 1 diabetes mellitus with vascular disease (H) E10.59 insulin aspart (NOVOLOG) 100 UNITS/ML injection     DIABETES EDUCATOR REFERRAL   2. Chronic narcotic use F11.90 HYDROcodone-acetaminophen (NORCO)  MG per tablet     HYDROcodone-acetaminophen (NORCO)  MG per tablet     HYDROcodone-acetaminophen (NORCO)  MG per tablet   3. Cervical stenosis of spine M48.02 HYDROcodone-acetaminophen (NORCO)  MG per tablet     HYDROcodone-acetaminophen (NORCO)  MG per tablet   4. Major depressive disorder, single episode, severe without psychotic features (H) F32.2 buPROPion (WELLBUTRIN SR) 150 MG 12 hr tablet   5. Left ventricular dysfunction I51.9 losartan (COZAAR) 50 MG tablet   6. Hyperlipidemia LDL goal <70 E78.5 rosuvastatin (CRESTOR) 40 MG tablet     Lipid panel reflex to direct LDL Non-fasting     ALT   7. Gastroesophageal reflux disease without esophagitis K21.9 " omeprazole 20 MG tablet   8. Need for prophylactic vaccination and inoculation against influenza Z23 FLU VACCINE, SPLIT VIRUS, IM (QUADRIVALENT) [14807]- >3 YRS     Vaccine Administration, Initial [75425]       Patient Instructions         Thank you for choosing [facility].  You may be receiving a survey in the mail from [facility] regarding your visit today.  Please take a few minutes to complete and return the survey to let us know how we are doing.      Our Clinic hours are:  Mondays    7:20 am - 7 pm  Tues -  Fri  7:20 am - 5 pm    Clinic Phone: [phone]    The clinic lab opens at 7:30 am Mon - Fri and appointments are required.    [facility]. [phone]  Monday  8 am - 7pm  Tues - Fri 8 am - 5:30 pm                     Injectable Influenza Immunization Documentation    1.  Is the person to be vaccinated sick today?   No    2. Does the person to be vaccinated have an allergy to a component   of the vaccine?   No  Egg Allergy Algorithm Link    3. Has the person to be vaccinated ever had a serious reaction   to influenza vaccine in the past?   No    4. Has the person to be vaccinated ever had Guillain-Barré syndrome?   No    Form completed by Lorna Carlton MA

## 2018-10-10 NOTE — MR AVS SNAPSHOT
After Visit Summary   10/10/2018    Eb Eisenberg    MRN: [id number]           Patient Information     Date Of Birth          1955        Visit Information        Provider Department      10/10/2018 8:20 AM Jackeline Rachel MD [facility]        Today's Diagnoses     Need for prophylactic vaccination and inoculation against influenza    -  1    Chronic narcotic use        Cervical stenosis of spine        Major depressive disorder, single episode, severe without psychotic features (H)        Type 1 diabetes mellitus with vascular disease (H)        Left ventricular dysfunction        Type 1 diabetes mellitus with vascular disease        Hyperlipidemia LDL goal <70        Gastroesophageal reflux disease without esophagitis          Care Instructions          Thank you for choosing [facility].  You may be receiving a survey in the mail from [employer] regarding your visit today.  Please take a few minutes to complete and return the survey to let us know how we are doing.      Our Clinic hours are:  Mondays    7:20 am - 7 pm  Tues - Fri  7:20 am - 5 pm    Clinic Phone: [phone]    The clinic lab opens at 7:30 am Mon - Fri and appointments are required.    [facility]. [phone]  Monday  8 am - 7pm  Tues - Fri 8 am - 5:30 pm                 Follow-ups after your visit        Additional Services     DIABETES EDUCATOR REFERRAL       DIABETES SELF MANAGEMENT TRAINING (DSMT)      Your provider has referred you to Diabetes Education: FMG: Diabetes Education - All [facility] [phone]   [email or web address]     If an urgent visit is needed or A1C is above 12, Care Team to call the Diabetes  Education Team at [phone] or send an In Basket message to the Diabetes Education Pool (P DIAB ED-PATIENT CARE).    A  will call you to make your appointment. If it has been more than 3 business  days since your referral was placed, please call the above phone number to schedule.    Type of training and number of hours: Previous Diagnosis: Follow-up DSMT - 2 hours.    Diabetes Type: Type 1   Medicare covers: 10 hours of initial DSMT in 12 month period from the time of first visit, plus 2 hours of follow-up DSMT annually, and additional hours as requested for insulin training.         Diabetes Co-Morbidities: dyslipidemia, hypertension and peripheral vascular disease               A1C Goal:  <8.0       A1C is: Lab Results       Component                Value               Date                       A1C                      7.2                 07/25/2018              MEDICAL NUTRITION THERAPY (MNT) for Diabetes    Medical Nutrition Therapy with a Registered Dietitian can be provided in coordination with Diabetes Self-Management Training to assist in achieving optimal diabetes management.    MNT Type and Hours: Do not initiate MNT at this time.                       Medicare will cover: 3 hours initial MNT in 12 month period after first visit, plus 2 hours of follow-up MNT annually        Diabetes Education Topics: Insulin Pump Therapy: Current Pump User and Continuous Glucose Monitor: Personal CGM?    Special Educational Needs Requiring Individual DSMT: None      Please be aware that coverage of these services is subject to the terms and limitations of your health insurance plan.  Call member services at your health plan to determine Diabetes Self-Management Training (Codes  and ) and Medical Nutrition Therapy (Codes 81046 and 95778) benefits and ask which blood glucose monitor brands are covered by your plan.  Please bring the following with you to your appointment:    (1)  List of current medications   (2)  List of Blood Glucose Monitor brands that are covered by your insurance plan  (3)  Blood Glucose Monitor and log book  (4)   Food records for the 3 days prior to your visit    The Certified  "Diabetes Educator may make diabetes medication adjustments per the CDE Protocol and Collaborative Practice Agreement.                  Follow-up notes from your care team     Return in about 3 months (around 1/10/2019) for pain and diabetes recheck.      Who to contact     If you have questions or need follow up information about today's clinic visit or your schedule please contact [facility] directly at [phone].  Normal or non-critical lab and imaging results will be communicated to you by [employer], letter or phone within 4 business days after the clinic has received the results. If you do not hear from us within 7 days, please contact the clinic through [employer] or phone. If you have a critical or abnormal lab result, we will notify you by phone as soon as possible.  Submit refill requests through [employer] or call your pharmacy and they will forward the refill request to us. Please allow 3 business days for your refill to be completed.          Additional Information About Your Visit        [employer] Thumb Information     [employer] gives you secure access to your electronic health record. If you see a primary care provider, you can also send messages to your care team and make appointments. If you have questions, please call your primary care clinic.  If you do not have a primary care provider, please call [phone] and they will assist you.        Care EveryWhere ID     This is your Care EveryWhere ID. This could be used by other organizations to access your [place] medical records  [id number]        Your Vitals Were     Pulse Temperature Respirations Height Pulse Oximetry BMI (Body Mass Index)    83 98.3  F (36.8  C) (Tympanic) 18 5' 9\" (1.753 m) 95% 28.06 kg/m2       Blood Pressure from Last 3 Encounters:   10/10/18 126/70   08/14/18 130/62   07/25/18 124/62    Weight from Last 3 Encounters:   10/10/18 190 lb (86.2 kg)   08/14/18 182 lb (82.6 kg)   07/25/18 183 lb (83 kg)              We " Performed the Following     ALT     DIABETES EDUCATOR REFERRAL     FLU VACCINE, SPLIT VIRUS, IM (QUADRIVALENT) [77589]- >3 YRS     Lipid panel reflex to direct LDL Non-fasting     Vaccine Administration, Initial [31633]          Today's Medication Changes          These changes are accurate as of 10/10/18  8:48 AM.  If you have any questions, ask your nurse or doctor.               These medicines have changed or have updated prescriptions.        Dose/Directions    * GLUCAGON EMERGENCY KIT 1 MG Kit   This may have changed:  Another medication with the same name was added. Make sure you understand how and when to take each.   Used for:  Type I (juvenile type) diabetes mellitus without mention of complication, not stated as uncontrolled   Changed by:  Jackeline Rachel MD        use as directed for severe hypoglycemia   Quantity:  1 Kit   Refills:  3       * glucagon 1 MG kit   Commonly known as:  GLUCAGON EMERGENCY   This may have changed:  You were already taking a medication with the same name, and this prescription was added. Make sure you understand how and when to take each.   Used for:  Type 1 diabetes mellitus with vascular disease (H)   Changed by:  Jackeline Rachel MD        Dose:  1 mg   Inject 1 mg into the muscle once for 1 dose   Quantity:  1 mg   Refills:  1       * HYDROcodone-acetaminophen  MG per tablet   Commonly known as:  NORCO   This may have changed:  These instructions start on 11/5/2018. If you are unsure what to do until then, ask your doctor or other care provider.   Used for:  Chronic narcotic use, Cervical stenosis of spine   Changed by:  Jackeline Rachel MD        Dose:  1 tablet   Start taking on:  11/5/2018   Take 1 tablet by mouth every 6 hours as needed for moderate to severe pain   Quantity:  120 tablet   Refills:  0       * HYDROcodone-acetaminophen  MG per tablet   Commonly known as:  NORCO   This may have changed:  These instructions start on 12/5/2018. If you are  unsure what to do until then, ask your doctor or other care provider.   Used for:  Chronic narcotic use, Cervical stenosis of spine   Changed by:  Jackeline Rachel MD        Dose:  1 tablet   Start taking on:  12/5/2018   Take 1 tablet by mouth every 6 hours as needed for moderate to severe pain   Quantity:  20 tablet   Refills:  0       * HYDROcodone-acetaminophen  MG per tablet   Commonly known as:  NORCO   This may have changed:  These instructions start on 1/4/2019. If you are unsure what to do until then, ask your doctor or other care provider.   Used for:  Chronic narcotic use   Changed by:  Jackeline Rachel MD        Dose:  1 tablet   Start taking on:  1/4/2019   Take 1 tablet by mouth every 6 hours as needed for moderate to severe pain   Quantity:  120 tablet   Refills:  0       omeprazole 20 MG tablet   This may have changed:    - when to take this  - reasons to take this  - additional instructions   Used for:  Gastroesophageal reflux disease without esophagitis        Dose:  20 mg   Take 1 tablet (20 mg) by mouth daily Take 30-60 minutes before a meal.   Quantity:  90 tablet   Refills:  3       * Notice:  This list has 5 medication(s) that are the same as other medications prescribed for you. Read the directions carefully, and ask your doctor or other care provider to review them with you.         Where to get your medicines      These medications were sent to [facility] - [place], [place] - [address]  [address] [address]     Phone:  [phone]     buPROPion 150 MG 12 hr tablet    glucagon 1 MG kit    insulin aspart 100 UNITS/ML injection    losartan 50 MG tablet    metoprolol succinate 25 MG 24 hr tablet    omeprazole 20 MG tablet    rosuvastatin 40 MG tablet         Some of these will need a paper prescription and others can be bought over the counter.  Ask your nurse if you have questions.     Bring a paper prescription for each of these medications      HYDROcodone-acetaminophen  MG per tablet    HYDROcodone-acetaminophen  MG per tablet    HYDROcodone-acetaminophen  MG per tablet               Information about OPIOIDS     PRESCRIPTION OPIOIDS: WHAT YOU NEED TO KNOW   We gave you an opioid (narcotic) pain medicine. It is important to manage your pain, but opioids are not always the best choice. You should first try all the other options your care team gave you. Take this medicine for as short a time (and as few doses) as possible.    Some activities can increase your pain, such as bandage changes or therapy sessions. It may help to take your pain medicine 30 to 60 minutes before these activities. Reduce your stress by getting enough sleep, working on hobbies you enjoy and practicing relaxation or meditation. Talk to your care team about ways to manage your pain beyond prescription opioids.    These medicines have risks:    DO NOT drive when on new or higher doses of pain medicine. These medicines can affect your alertness and reaction times, and you could be arrested for driving under the influence (DUI). If you need to use opioids long-term, talk to your care team about driving.    DO NOT operate heavy machinery    DO NOT do any other dangerous activities while taking these medicines.    DO NOT drink any alcohol while taking these medicines.     If the opioid prescribed includes acetaminophen, DO NOT take with any other medicines that contain acetaminophen. Read all labels carefully. Look for the word  acetaminophen  or  Tylenol.  Ask your pharmacist if you have questions or are unsure.    You can get addicted to pain medicines, especially if you have a history of addiction (chemical, alcohol or substance dependence). Talk to your care team about ways to reduce this risk.    All opioids tend to cause constipation. Drink plenty of water and eat foods that have a lot of fiber, such as fruits, vegetables, prune juice, apple juice and high-fiber  cereal. Take a laxative (Miralax, milk of magnesia, Colace, Senna) if you don t move your bowels at least every other day. Other side effects include upset stomach, sleepiness, dizziness, throwing up, tolerance (needing more of the medicine to have the same effect), physical dependence and slowed breathing.    Store your pills in a secure place, locked if possible. We will not replace any lost or stolen medicine. If you don t finish your medicine, please throw away (dispose) as directed by your pharmacist. The [place] Pollution Control Agency has more information about safe disposal: [email or web address]         Primary Care Provider Office Phone # Fax #    Jackeline Rachel [phone] [address] [facility] [address]        Equal Access to Services     AILEEN FITZPATRICK : Hadii aad ku hadasho Soobey, waaxda luqadaha, qaybta kaalmada allyyakyleigh, geremias mesa . So [facility] [phone].    ATENCIÓN: Si habla español, tiene a white disposición servicios gratuitos de asistencia lingüística. Llame al [phone].    We comply with applicable federal civil rights laws and [place] laws. We do not discriminate on the basis of race, color, national origin, age, disability, sex, sexual orientation, or gender identity.            Thank you!     Thank you for choosing [facility]  for your care. Our goal is always to provide you with excellent care. Hearing back from our patients is one way we can continue to improve our services. Please take a few minutes to complete the written survey that you may receive in the mail after your visit with us. Thank you!             Your Updated Medication List - Protect others around you: Learn how to safely use, store and throw away your medicines at www.disposemymeds.org.          This list is accurate as of 10/10/18  8:48 AM.  Always use your most recent med list.                    Brand Name Dispense Instructions for use Diagnosis    aspirin 81 MG chewable tablet     108 tablet    Take 1 tablet (81 mg) by mouth daily    Syncope       * blood glucose monitoring test strip    CEE CONTOUR    10 Box    by In Vitro route 8-10 times daily    Type 1 diabetes mellitus with vascular disease (H)       * blood glucose monitoring test strip    CONTOUR NEXT TEST    800 strip    Use to test blood sugar 8 times daily.  Patient tests frequently due to insulin pump and marked excursions from normal blood sugars.    Type 1 diabetes mellitus with vascular disease (H)       buPROPion 150 MG 12 hr tablet    WELLBUTRIN SR    90 tablet    Take 1 tablet (150 mg) by mouth daily    Major depressive disorder, single episode, severe without psychotic features (H)       DOCUSATE SODIUM PO      Take 100 mg by mouth every evening        * GLUCAGON EMERGENCY KIT 1 MG Kit     1 Kit    use as directed for severe hypoglycemia    Type I (juvenile type) diabetes mellitus without mention of complication, not stated as uncontrolled       * glucagon 1 MG kit    GLUCAGON EMERGENCY    1 mg    Inject 1 mg into the muscle once for 1 dose    Type 1 diabetes mellitus with vascular disease (H)       * HYDROcodone-acetaminophen  MG per tablet   Start taking on:  11/5/2018    NORCO    120 tablet    Take 1 tablet by mouth every 6 hours as needed for moderate to severe pain    Chronic narcotic use, Cervical stenosis of spine       * HYDROcodone-acetaminophen  MG per tablet   Start taking on:  12/5/2018    NORCO    20 tablet    Take 1 tablet by mouth every 6 hours as needed for moderate to severe pain    Chronic narcotic use, Cervical stenosis of spine       * HYDROcodone-acetaminophen  MG per tablet   Start taking on:  1/4/2019    NORCO    120 tablet    Take 1 tablet by mouth every 6 hours as needed for moderate to severe pain    Chronic narcotic use       insulin aspart 100 UNITS/ML injection    NovoLOG    30  "mL    As directed per insulin pump    Type 1 diabetes mellitus with vascular disease (H)       * insulin pump infusion      Date last updated:  5/11/16 [employer] Minimed: BASAL RATES and times: 12   AM (midnight): 1.4 units/hour  5 am: 1.50 units/hour  9   AM: 1.15 units/hour  1 pm: 1.30 units/hour  3   PM: 1.0 units/hour  9    PM: 1.20 units/hour  Basal Pattern A:  12 am: 1.00 10 am: 0.90 12pm:0.50 6 pm: 0.75 9 pm: 1.05  Pattern B: 12 mid: 0.8 10 am: 0.7 12 noon: 0.3 6 pm: 0.55 9 pm: 0.85 CARB RATIO and times: 12   AM (midnight): 9 12  PM (noon):  9 6    PM:  9 Corection Factor (Sensitivity) and times: 12   AM (midnight): 33 mg/dL BLOOD GLUCOSE TARGET and times: 12   AM (midnight): 110 - 110Active Insulin Time:  4 hours Basal to Bolus Ratio:  Sensor:  No Carelink / Diasend username:   Carelink / Diasend Password:    Type 1 diabetes, HbA1c goal < 8% (H)       Insulin Syringe 30G X 1/2\" 0.5 ML Misc     100 each    1 Device as needed.    Type I (juvenile type) diabetes mellitus without mention of complication, not stated as uncontrolled       KETO-DIASTIX Strp     100 strip    1 Stick by In Vitro route See Admin Instructions. Use as needed to monitor for ketones    Type I (juvenile type) diabetes mellitus without mention of complication, not stated as uncontrolled, Type 1 diabetes, HbA1c goal < 8% (H)       losartan 50 MG tablet    COZAAR    90 tablet    Take 1 tablet (50 mg) by mouth daily    Left ventricular dysfunction       metoprolol succinate 25 MG 24 hr tablet    TOPROL-XL    90 tablet    Take 1 tablet (25 mg) by mouth daily    Type 1 diabetes mellitus with vascular disease (H)       multivitamin per tablet     100    1 TABLET ORALLY DAILY        OMEGA-3 FISH OIL PO           omeprazole 20 MG tablet     90 tablet    Take 1 tablet (20 mg) by mouth daily Take 30-60 minutes before a meal.    Gastroesophageal reflux disease without esophagitis       * order for DME      Auto-CPAP: Max 11 cm H2O Min 5 cm H2O " Pressure changed in clinic Continuous  Lifetime need and heated humidity.    LYNNETTE (obstructive sleep apnea)       PARoxetine 20 MG tablet    PAXIL    90 tablet    Take 1 tablet (20 mg) by mouth daily    Major depressive disorder, single episode, severe without psychotic features (H)       rosuvastatin 40 MG tablet    CRESTOR    90 tablet    TAKE 1 TABLET EVERY DAY    Hyperlipidemia LDL goal <70       tamsulosin 0.4 MG capsule    FLOMAX    90 capsule    Take 1 capsule (0.4 mg) by mouth daily    Benign non-nodular prostatic hyperplasia with lower urinary tract symptoms       ZINC PO           * Notice:  This list has 9 medication(s) that are the same as other medications prescribed for you. Read the directions carefully, and ask your doctor or other care provider to review them with you.

## 2018-10-10 NOTE — PROGRESS NOTES
Eb,    All of the labs were normal or acceptable.    Please contact my office if you have questions.    Jackeline Rachel M.D.

## 2018-10-10 NOTE — PATIENT INSTRUCTIONS
Thank you for choosing [facility].  You may be receiving a survey in the mail from [facility] regarding your visit today.  Please take a few minutes to complete and return the survey to let us know how we are doing.      Our Clinic hours are:  Mondays    7:20 am - 7 pm  Tues - Fri  7:20 am - 5 pm    Clinic Phone: [phone]    The clinic lab opens at 7:30 am Mon - Fri and appointments are required.    [facility]. [phone]  Monday  8 am - 7pm  Tues - Fri 8 am - 5:30 pm

## 2018-10-11 ASSESSMENT — ANXIETY QUESTIONNAIRES: GAD7 TOTAL SCORE: 0

## 2018-10-11 ASSESSMENT — PATIENT HEALTH QUESTIONNAIRE - PHQ9: SUM OF ALL RESPONSES TO PHQ QUESTIONS 1-9: 3

## 2018-11-20 ENCOUNTER — OFFICE VISIT (OUTPATIENT)
Dept: FAMILY MEDICINE | Facility: CLINIC | Age: 63
End: 2018-11-20
Payer: COMMERCIAL

## 2018-11-20 VITALS
HEART RATE: 83 BPM | DIASTOLIC BLOOD PRESSURE: 63 MMHG | HEIGHT: 69 IN | SYSTOLIC BLOOD PRESSURE: 107 MMHG | TEMPERATURE: 97.2 F | OXYGEN SATURATION: 97 % | RESPIRATION RATE: 14 BRPM | WEIGHT: 183 LBS | BODY MASS INDEX: 27.11 KG/M2

## 2018-11-20 DIAGNOSIS — J20.9 ACUTE BRONCHITIS, UNSPECIFIED ORGANISM: Primary | ICD-10-CM

## 2018-11-20 PROCEDURE — 99213 OFFICE O/P EST LOW 20 MIN: CPT | Performed by: NURSE PRACTITIONER

## 2018-11-20 RX ORDER — AZITHROMYCIN 250 MG/1
TABLET, FILM COATED ORAL
Qty: 6 TABLET | Refills: 0 | Status: SHIPPED | OUTPATIENT
Start: 2018-11-20 | End: 2019-01-25

## 2018-11-20 ASSESSMENT — PAIN SCALES - GENERAL: PAINLEVEL: NO PAIN (0)

## 2018-11-20 NOTE — PATIENT INSTRUCTIONS
Complete full course of antibiotics even if you start to feel better.    Increase your fluids and rest and eat a well balanced diet.    Tylenol or Ibuprofen if able to take for fevers and discomfort. Do not exceed 4 grams of Tylenol in a 24 hour period. Take Ibuprofen with food.   Follow up in 3-5 days if not improving or return sooner if worsening or fail to improve as anticipated.

## 2018-11-20 NOTE — PROGRESS NOTES
SUBJECTIVE:   Eb Eisenberg is a 63 year old male who presents to clinic today for the following health issues:      Acute Illness   Acute illness concerns: cough and wheezing  Onset: 2-3 weeks    Fever: no    Chills/Sweats: no    Headache (location?): no    Sinus Pressure:no    Conjunctivitis:  no    Ear Pain: no    Rhinorrhea: no     Congestion: no     Sore Throat: no     Cough: YES-productive of yellow sputum, with shortness of breath    Wheeze: YES    Decreased Appetite: YES    Nausea: no    Vomiting: no    Diarrhea:  no    Dysuria/Freq.: no    Fatigue/Achiness: YES    Sick/Strep Exposure: YES- daughter in law and grand kids had coughs.     Therapies Tried and outcome: vitamin C, immune C, airborne not helping      Problem list and histories reviewed & adjusted, as indicated.  Further history obtained, clarified or corrected by provider:  Ongoing over the past 2-3 weeks, patient has been having cough which is productive of yellow secretions.  Reports that the cough is worse in the morning.  No fever.  Occasional shortness of breath.  He attributes this to a low ejection fraction.  Patient's son, daughter-in-law and grandchildren live with him and they have been ill.      Patient Active Problem List   Diagnosis     Type 1 diabetes mellitus with vascular disease (H)     Peyronie's disease     Accident due to exposure to weather conditions     Impotence of organic origin     Major depressive disorder, single episode, severe (H)     Mild cognitive impairment     CAD (coronary artery disease)     Diabetic polyneuropathy associated with type 1 diabetes mellitus (H)     Biceps tendon tear     Cervical stenosis of spine     Left ventricular dysfunction     Hyperlipidemia LDL goal <70     Type 1 diabetes mellitus with diabetic retinopathy, macular edema presence unspecified, with unspecified retinopathy severity     Restless legs syndrome     LYNNETTE (obstructive sleep apnea)     Advance Care Planning     Unspecified  hyperplasia of prostate with urinary obstruction and other lower urinary tract symptoms (LUTS)     Erectile dysfunction     Chronic narcotic use     GI bleed     Anemia due to blood loss, acute     Health Care Home     Esophageal reflux     Senile nuclear cataract     Non-ischemic cardiomyopathy (H)     Latex sensitivity     Past Surgical History:   Procedure Laterality Date     BACK SURGERY       COLONOSCOPY  4/8/2014    Procedure: COLONOSCOPY;  Colonoscopy  ;  Surgeon: Jesus Beltrán MD;  Location: [facility]     ESOPHAGOSCOPY, GASTROSCOPY, DUODENOSCOPY (EGD), COMBINED  4/7/2014    Procedure: COMBINED ESOPHAGOSCOPY, GASTROSCOPY, DUODENOSCOPY (EGD);  Gastroscopy  ;  Surgeon: Jesus Beltrán MD;  Location: [facility]     ORTHOPEDIC SURGERY       PHACOEMULSIFICATION WITH STANDARD INTRAOCULAR LENS IMPLANT Right 5/26/2016    Procedure: PHACOEMULSIFICATION WITH STANDARD INTRAOCULAR LENS IMPLANT;  Surgeon: Anthony Pugh MD;  Location: [facility]     PHACOEMULSIFICATION WITH STANDARD INTRAOCULAR LENS IMPLANT Left 7/14/2016    Procedure: PHACOEMULSIFICATION WITH STANDARD INTRAOCULAR LENS IMPLANT;  Surgeon: Anthony Pugh MD;  Location: [facility]     SURGICAL HISTORY OF -   2004    carpal tunnel surgery, bilateral     SURGICAL HISTORY OF -   1/5/2010    Laminectomy/Discectomy Cervical/fusion     VASECTOMY  1990       Social History   Substance Use Topics     Smoking status: Former Smoker     Quit date: 1/1/2001     Smokeless tobacco: Never Used     Alcohol use No      Comment: quit in early '80s     Family History   Problem Relation Age of Onset     Diabetes Father      Cancer Brother      ear and face     Diabetes Brother      Parkinsonism Brother            Reviewed and updated as needed this visit by clinical staff  Tobacco  Allergies  Meds  Problems  Med Hx  Surg Hx  Fam Hx  Soc Hx        Reviewed and updated as needed this visit by Provider  Allergies  Meds  Problems         ROS:  Constitutional, HEENT,  "cardiovascular, pulmonary, gi and gu systems are negative, except as otherwise noted.    OBJECTIVE:     /63 (BP Location: Right arm, Patient Position: Chair, Cuff Size: Adult Large)  Pulse 83  Temp 97.2  F (36.2  C) (Tympanic)  Resp 14  Ht 5' 9\" (1.753 m)  Wt 183 lb (83 kg)  SpO2 97%  BMI 27.02 kg/m2  Body mass index is 27.02 kg/(m^2).  GENERAL: healthy, alert and no distress  EYES: Eyes grossly normal to inspection and conjunctivae and sclerae normal  NECK: no adenopathy, no asymmetry, masses, or scars and thyroid normal to palpation  RESP: lungs clear to auscultation - no rales, rhonchi or wheezes  CV: regular rate and rhythm, normal S1 S2, no S3 or S4, no murmur, click or rub, no peripheral edema and peripheral pulses strong  MS: no gross musculoskeletal defects noted, no edema    Diagnostic Test Results:  none     ASSESSMENT/PLAN:     ASSESSMENT:  1. Acute bronchitis, unspecified organism        PLAN:  Orders Placed This Encounter     azithromycin (ZITHROMAX) 250 MG tablet       Patient Instructions   Complete full course of antibiotics even if you start to feel better.    Increase your fluids and rest and eat a well balanced diet.    Tylenol or Ibuprofen if able to take for fevers and discomfort. Do not exceed 4 grams of Tylenol in a 24 hour period. Take Ibuprofen with food.   Follow up in 3-5 days if not improving or return sooner if worsening or fail to improve as anticipated.      Patient agrees with plan of care as outlined. Call or return to the clinic with any worsening of symptoms or no resolution. Medication side effects reviewed.      Christina Rodney, NP, APRN [facility]  "

## 2018-11-20 NOTE — MR AVS SNAPSHOT
After Visit Summary   11/20/2018    Eb Eisenberg    MRN: [id number]           Patient Information     Date Of Birth          1955        Visit Information        Provider Department      11/20/2018 9:00 AM Christina Rodney APRN CNP [facility]        Today's Diagnoses     Acute bronchitis, unspecified organism    -  1      Care Instructions    Complete full course of antibiotics even if you start to feel better.    Increase your fluids and rest and eat a well balanced diet.    Tylenol or Ibuprofen if able to take for fevers and discomfort. Do not exceed 4 grams of Tylenol in a 24 hour period. Take Ibuprofen with food.   Follow up in 3-5 days if not improving or return sooner if worsening or fail to improve as anticipated.              Follow-ups after your visit        Follow-up notes from your care team     Return in about 5 days (around 11/25/2018), or if symptoms worsen or fail to improve.      Who to contact     If you have questions or need follow up information about today's clinic visit or your schedule please contact [facility] directly at [phone].  Normal or non-critical lab and imaging results will be communicated to you by GateGuruhart, letter or phone within 4 business days after the clinic has received the results. If you do not hear from us within 7 days, please contact the clinic through Trendslidet or phone. If you have a critical or abnormal lab result, we will notify you by phone as soon as possible.  Submit refill requests through [employer] User [employer]faces or call your pharmacy and they will forward the refill request to us. Please allow 3 business days for your refill to be completed.          Additional Information About Your Visit        MyChart Information     [employer] gives you secure access to your electronic health record. If you see a primary care provider, you can also send messages to your care team and make appointments. If you have questions,  "please call your primary care clinic.  If you do not have a primary care provider, please call [phone] and they will assist you.        Care EveryWhere ID     This is your Care EveryWhere ID. This could be used by other organizations to access your [facility] medical records  [id number]        Your Vitals Were     Pulse Temperature Respirations Height Pulse Oximetry BMI (Body Mass Index)    83 97.2  F (36.2  C) (Tympanic) 14 5' 9\" (1.753 m) 97% 27.02 kg/m2       Blood Pressure from Last 3 Encounters:   11/20/18 107/63   10/10/18 126/70   08/14/18 130/62    Weight from Last 3 Encounters:   11/20/18 183 lb (83 kg)   10/10/18 190 lb (86.2 kg)   08/14/18 182 lb (82.6 kg)              Today, you had the following     No orders found for display         Today's Medication Changes          These changes are accurate as of 11/20/18  9:42 AM.  If you have any questions, ask your nurse or doctor.               Start taking these medicines.        Dose/Directions    azithromycin 250 MG tablet   Commonly known as:  ZITHROMAX   Used for:  Acute bronchitis, unspecified organism   Started by:  Christina Rodney APRN CNP        Take 500 mg on the first day, then 250 mg daily for 4 days   Quantity:  6 tablet   Refills:  0            Where to get your medicines      These medications were sent to [facility] [address]  [address] [facility] [address]     Phone:  [phone]     azithromycin 250 MG tablet                Primary Care Provider Office Phone # Fax #    Jackeline Rachel [phone] [address] [facility] [address]        Equal Access to Services     NAYE FITZPATRICK AH: Jimmy Carson, sven olivas, latesha kaalgeremias fuentes. So [facility] [phone].    ATENCIÓN: Si habla español, tiene a white disposición servicios gratuitos de asistencia lingüística. Llame al " [phone].    We comply with applicable federal civil rights laws and [place] laws. We do not discriminate on the basis of race, color, national origin, age, disability, sex, sexual orientation, or gender identity.            Thank you!     Thank you for choosing [facility]  for your care. Our goal is always to provide you with excellent care. Hearing back from our patients is one way we can continue to improve our services. Please take a few minutes to complete the written survey that you may receive in the mail after your visit with us. Thank you!             Your Updated Medication List - Protect others around you: Learn how to safely use, store and throw away your medicines at www.disposemymeds.org.          This list is accurate as of 11/20/18  9:42 AM.  Always use your most recent med list.                   Brand Name Dispense Instructions for use Diagnosis    aspirin 81 MG chewable tablet     108 tablet    Take 1 tablet (81 mg) by mouth daily    Syncope       azithromycin 250 MG tablet    ZITHROMAX    6 tablet    Take 500 mg on the first day, then 250 mg daily for 4 days    Acute bronchitis, unspecified organism       * blood glucose monitoring test strip    CEE CONTOUR    10 Box    by In Vitro route 8-10 times daily    Type 1 diabetes mellitus with vascular disease (H)       * blood glucose monitoring test strip    CONTOUR NEXT TEST    800 strip    Use to test blood sugar 8 times daily.  Patient tests frequently due to insulin pump and marked excursions from normal blood sugars.    Type 1 diabetes mellitus with vascular disease (H)       buPROPion 150 MG 12 hr tablet    WELLBUTRIN SR    90 tablet    Take 1 tablet (150 mg) by mouth daily    Major depressive disorder, single episode, severe without psychotic features (H)       DOCUSATE SODIUM PO      Take 100 mg by mouth every evening        GLUCAGON EMERGENCY KIT 1 MG Kit     1 Kit    use as directed for severe hypoglycemia    Type  "I (juvenile type) diabetes mellitus without mention of complication, not stated as uncontrolled       * HYDROcodone-acetaminophen  MG per tablet    NORCO    120 tablet    Take 1 tablet by mouth every 6 hours as needed for moderate to severe pain    Chronic narcotic use, Cervical stenosis of spine       * HYDROcodone-acetaminophen  MG per tablet   Start taking on:  12/5/2018    NORCO    20 tablet    Take 1 tablet by mouth every 6 hours as needed for moderate to severe pain    Chronic narcotic use, Cervical stenosis of spine       * HYDROcodone-acetaminophen  MG per tablet   Start taking on:  1/4/2019    NORCO    120 tablet    Take 1 tablet by mouth every 6 hours as needed for moderate to severe pain    Chronic narcotic use       insulin aspart 100 UNITS/ML injection    NovoLOG    30 mL    As directed per insulin pump    Type 1 diabetes mellitus with vascular disease (H)       * insulin pump infusion      Date last updated:  5/11/16 Med[employer] Minimed: BASAL RATES and times: 12   AM (midnight): 1.4 units/hour  5 am: 1.50 units/hour  9   AM: 1.15 units/hour  1 pm: 1.30 units/hour  3   PM: 1.0 units/hour  9    PM: 1.20 units/hour  Basal Pattern A:  12 am: 1.00 10 am: 0.90 12pm:0.50 6 pm: 0.75 9 pm: 1.05  Pattern B: 12 mid: 0.8 10 am: 0.7 12 noon: 0.3 6 pm: 0.55 9 pm: 0.85 CARB RATIO and times: 12   AM (midnight): 9 12  PM (noon):  9 6    PM:  9 Corection Factor (Sensitivity) and times: 12   AM (midnight): 33 mg/dL BLOOD GLUCOSE TARGET and times: 12   AM (midnight): 110 - 110Active Insulin Time:  4 hours Basal to Bolus Ratio:  Sensor:  No Carelink / Diasend username:   Carelink / Diasend Password:    Type 1 diabetes, HbA1c goal < 8% (H)       Insulin Syringe 30G X 1/2\" 0.5 ML Misc     100 each    1 Device as needed.    Type I (juvenile type) diabetes mellitus without mention of complication, not stated as uncontrolled       KETO-DIASTIX Strp     100 strip    1 Stick by In Vitro route See Admin " Instructions. Use as needed to monitor for ketones    Type I (juvenile type) diabetes mellitus without mention of complication, not stated as uncontrolled, Type 1 diabetes, HbA1c goal < 8% (H)       losartan 50 MG tablet    COZAAR    90 tablet    Take 1 tablet (50 mg) by mouth daily    Left ventricular dysfunction       metoprolol succinate 25 MG 24 hr tablet    TOPROL-XL    90 tablet    Take 1 tablet (25 mg) by mouth daily        multivitamin per tablet     100    1 TABLET ORALLY DAILY        OMEGA-3 FISH OIL PO           omeprazole 20 MG tablet     90 tablet    Take 1 tablet (20 mg) by mouth daily Take 30-60 minutes before a meal.    Gastroesophageal reflux disease without esophagitis       * order for DME      Auto-CPAP: Max 11 cm H2O Min 5 cm H2O Pressure changed in clinic Continuous  Lifetime need and heated humidity.    LYNNETTE (obstructive sleep apnea)       PARoxetine 20 MG tablet    PAXIL    90 tablet    Take 1 tablet (20 mg) by mouth daily    Major depressive disorder, single episode, severe without psychotic features (H)       rosuvastatin 40 MG tablet    CRESTOR    90 tablet    TAKE 1 TABLET EVERY DAY    Hyperlipidemia LDL goal <70       tamsulosin 0.4 MG capsule    FLOMAX    90 capsule    Take 1 capsule (0.4 mg) by mouth daily    Benign non-nodular prostatic hyperplasia with lower urinary tract symptoms       ZINC PO           * Notice:  This list has 7 medication(s) that are the same as other medications prescribed for you. Read the directions carefully, and ask your doctor or other care provider to review them with you.

## 2018-12-05 ENCOUNTER — TELEPHONE (OUTPATIENT)
Dept: PEDIATRICS | Facility: CLINIC | Age: 63
End: 2018-12-05

## 2018-12-05 ENCOUNTER — TELEPHONE (OUTPATIENT)
Dept: FAMILY MEDICINE | Facility: CLINIC | Age: 63
End: 2018-12-05

## 2018-12-05 DIAGNOSIS — F11.90 CHRONIC NARCOTIC USE: ICD-10-CM

## 2018-12-05 DIAGNOSIS — M48.02 CERVICAL STENOSIS OF SPINE: ICD-10-CM

## 2018-12-05 RX ORDER — HYDROCODONE BITARTRATE AND ACETAMINOPHEN 10; 325 MG/1; MG/1
1 TABLET ORAL EVERY 6 HOURS PRN
Qty: 100 TABLET | Refills: 0 | Status: SHIPPED | OUTPATIENT
Start: 2018-12-05 | End: 2019-01-25

## 2018-12-05 NOTE — TELEPHONE ENCOUNTER
Hard copy Rx for #100 tabs of Norco walked to [facility] - Pt advised.

## 2018-12-05 NOTE — TELEPHONE ENCOUNTER
Reason for Call:  Prescription Issue    Detailed comments: Pt called stating that he went to [facility] today and he's asking why his [place] Rx was only for #20 tabs?  Looks like November and January Norco Rx were for #120 tabs and December was for just #20?  Queued up an extra Rx for December for #100 tabs, to make up the difference and routed to MD to advise on please.      Phone Number Patient can be reached at: Home number on file [phone] (home)    Best Time: any    Can we leave a detailed message on this number? YES    Call taken on 12/5/2018 at 1:05 PM by Beverley Randolph

## 2018-12-05 NOTE — TELEPHONE ENCOUNTER
This was likely just an error, I apologize that we didn't catch it before.    Jackeline Rachel M.D.

## 2018-12-19 ENCOUNTER — MEDICAL CORRESPONDENCE (OUTPATIENT)
Dept: HEALTH INFORMATION MANAGEMENT | Facility: CLINIC | Age: 63
End: 2018-12-19

## 2019-01-04 ENCOUNTER — TELEPHONE (OUTPATIENT)
Dept: FAMILY MEDICINE | Facility: CLINIC | Age: 64
End: 2019-01-04

## 2019-01-04 NOTE — TELEPHONE ENCOUNTER
Per fax received from [facility] [employer] is not covered by patient's Insurance Company  Dr. Rachel - Please choose:  1.  Change medication that is not covered to a different medication and send new prescription to patient's pharmacy?  2.  Patient will need to pay for the non-covered medication out-of-pocket?   3.  Try for Prior Authorization with Insurance Company to get medication covered?        P.A. Phone #:[phone]       P.A.  ID#:  [id number]    [facility]

## 2019-01-25 ENCOUNTER — OFFICE VISIT (OUTPATIENT)
Dept: FAMILY MEDICINE | Facility: CLINIC | Age: 64
End: 2019-01-25
Payer: COMMERCIAL

## 2019-01-25 VITALS
WEIGHT: 186.2 LBS | SYSTOLIC BLOOD PRESSURE: 116 MMHG | HEART RATE: 72 BPM | TEMPERATURE: 96 F | HEIGHT: 69 IN | DIASTOLIC BLOOD PRESSURE: 62 MMHG | RESPIRATION RATE: 16 BRPM | BODY MASS INDEX: 27.58 KG/M2

## 2019-01-25 DIAGNOSIS — E10.59 TYPE 1 DIABETES MELLITUS WITH VASCULAR DISEASE (H): ICD-10-CM

## 2019-01-25 DIAGNOSIS — E10.319 TYPE 1 DIABETES MELLITUS WITH RETINOPATHY, MACULAR EDEMA PRESENCE UNSPECIFIED, UNSPECIFIED LATERALITY, UNSPECIFIED RETINOPATHY SEVERITY (H): ICD-10-CM

## 2019-01-25 DIAGNOSIS — F11.90 CHRONIC NARCOTIC USE: ICD-10-CM

## 2019-01-25 DIAGNOSIS — M48.02 CERVICAL STENOSIS OF SPINE: ICD-10-CM

## 2019-01-25 DIAGNOSIS — E10.42 DIABETIC POLYNEUROPATHY ASSOCIATED WITH TYPE 1 DIABETES MELLITUS (H): Primary | ICD-10-CM

## 2019-01-25 DIAGNOSIS — E78.5 HYPERLIPIDEMIA LDL GOAL <70: ICD-10-CM

## 2019-01-25 DIAGNOSIS — F32.2 MAJOR DEPRESSIVE DISORDER, SINGLE EPISODE, SEVERE (H): ICD-10-CM

## 2019-01-25 LAB
ANION GAP SERPL CALCULATED.3IONS-SCNC: 4 MMOL/L (ref 3–14)
BUN SERPL-MCNC: 18 MG/DL (ref 7–30)
CALCIUM SERPL-MCNC: 8.8 MG/DL (ref 8.5–10.1)
CHLORIDE SERPL-SCNC: 103 MMOL/L (ref 94–109)
CO2 SERPL-SCNC: 31 MMOL/L (ref 20–32)
CREAT SERPL-MCNC: 0.97 MG/DL (ref 0.66–1.25)
GFR SERPL CREATININE-BSD FRML MDRD: 82 ML/MIN/{1.73_M2}
GLUCOSE SERPL-MCNC: 104 MG/DL (ref 70–99)
HBA1C MFR BLD: 7.9 % (ref 0–5.6)
POTASSIUM SERPL-SCNC: 3.7 MMOL/L (ref 3.4–5.3)
SODIUM SERPL-SCNC: 138 MMOL/L (ref 133–144)

## 2019-01-25 PROCEDURE — 99214 OFFICE O/P EST MOD 30 MIN: CPT | Performed by: FAMILY MEDICINE

## 2019-01-25 PROCEDURE — 80048 BASIC METABOLIC PNL TOTAL CA: CPT | Performed by: FAMILY MEDICINE

## 2019-01-25 PROCEDURE — 83036 HEMOGLOBIN GLYCOSYLATED A1C: CPT | Performed by: FAMILY MEDICINE

## 2019-01-25 PROCEDURE — 36415 COLL VENOUS BLD VENIPUNCTURE: CPT | Performed by: FAMILY MEDICINE

## 2019-01-25 RX ORDER — FLUOXETINE 40 MG/1
40 CAPSULE ORAL DAILY
Qty: 90 CAPSULE | Refills: 1 | Status: SHIPPED | OUTPATIENT
Start: 2019-01-25 | End: 2019-09-19

## 2019-01-25 RX ORDER — HYDROCODONE BITARTRATE AND ACETAMINOPHEN 10; 325 MG/1; MG/1
1 TABLET ORAL EVERY 6 HOURS PRN
Qty: 120 TABLET | Refills: 0 | Status: SHIPPED | OUTPATIENT
Start: 2019-03-04 | End: 2019-04-29

## 2019-01-25 RX ORDER — CYCLOBENZAPRINE HCL 10 MG
10 TABLET ORAL 3 TIMES DAILY PRN
Qty: 90 TABLET | Refills: 1 | Status: SHIPPED | OUTPATIENT
Start: 2019-01-25 | End: 2019-02-04

## 2019-01-25 RX ORDER — HYDROCODONE BITARTRATE AND ACETAMINOPHEN 10; 325 MG/1; MG/1
1 TABLET ORAL EVERY 6 HOURS PRN
Qty: 120 TABLET | Refills: 0 | Status: SHIPPED | OUTPATIENT
Start: 2019-04-02 | End: 2019-05-15

## 2019-01-25 RX ORDER — HYDROCODONE BITARTRATE AND ACETAMINOPHEN 10; 325 MG/1; MG/1
1 TABLET ORAL EVERY 6 HOURS PRN
Qty: 120 TABLET | Refills: 0 | Status: SHIPPED | OUTPATIENT
Start: 2019-02-01 | End: 2019-05-15

## 2019-01-25 ASSESSMENT — PATIENT HEALTH QUESTIONNAIRE - PHQ9: SUM OF ALL RESPONSES TO PHQ QUESTIONS 1-9: 9

## 2019-01-25 ASSESSMENT — MIFFLIN-ST. JEOR: SCORE: 1619.98

## 2019-01-25 NOTE — PATIENT INSTRUCTIONS
Stop the paxil    Start fluoxetine 40 mg daily    Our Clinic hours are:  Mondays    7:20 am - 7 pm  Tues -  Fri  7:20 am - 5 pm    Clinic Phone: [phone]    The clinic lab opens at 7:30 am Mon - Fri and appointments are required.    [facility]. [phone]  Monday  8 am - 7pm  Tues - Fri 8 am - 5:30 pm

## 2019-01-25 NOTE — PROGRESS NOTES
"  SUBJECTIVE:   Eb Eisenberg is a 64 year old male who presents to clinic today for the following health issues:      Diabetes Follow-up    Patient is checking blood sugars: 8-10 times daily.    Blood sugar testing frequency justification: Adjustment of medication(s)  Results are as follows:         DEPENDS ON HOW HE IS DOING OR FEELING, ALL OVER THE BOARD    Diabetic concerns: None     Symptoms of hypoglycemia (low blood sugar): shaky, dizzy, weak, lethargy     Paresthesias (numbness or burning in feet) or sores: No     Date of last diabetic eye exam: feb 7th getting it done    Diabetes Management Resources    Hyperlipidemia Follow-Up      Rate your low fat/cholesterol diet?: good    Taking statin?  Yes, no muscle aches from statin    Other lipid medications/supplements?:  Fish oil/Omega 3, dose  without side effects    Hypertension Follow-up      Outpatient blood pressures are not being checked.    Low Salt Diet: low salt    BP Readings from Last 2 Encounters:   01/25/19 116/62   11/20/18 107/63     Hemoglobin A1C (%)   Date Value   07/25/2018 7.2 (H)   04/05/2018 7.7 (H)     LDL Cholesterol Calculated (mg/dL)   Date Value   10/10/2018 89   10/05/2017 49       Amount of exercise or physical activity: 6-7 days/week for an average of 15-30 minutes    Problems taking medications regularly: No    Medication side effects: none    Diet: regular (no restrictions)        Depression and Anxiety Follow-Up    Status since last visit: Worsened - feels like he can't get off the couch some days, no motivation.  Has been on paxil for many, many years, and the wellbutrin.  At one point someone tried to adjust a dose and it \"didn't go well\" but he's had no change in prescriptions for quite some time.     Other associated symptoms:None    Complicating factors:     Significant life event: No     Current substance abuse: None    PHQ 4/5/2018 10/10/2018 1/25/2019   PHQ-9 Total Score 3 3 9   Q9: Suicide Ideation Not at all Not at all " "Not at all     VIVIANA-7 SCORE 3/8/2017 10/5/2017 10/10/2018   Total Score - - -   Total Score 2 0 0     In the past two weeks have you had thoughts of suicide or self-harm?  No.    Do you have concerns about your personal safety or the safety of others?   No  PHQ-9  English  PHQ-9   Any Language  VIVIANA-7  Suicide Assessment Five-step Evaluation and Treatment (SAFE-T)         /62 (Cuff Size: Adult Large)   Pulse 72   Temp 96  F (35.6  C) (Tympanic)   Resp 16   Ht 1.753 m (5' 9\")   Wt 84.5 kg (186 lb 3.2 oz)   BMI 27.50 kg/m    EXAM: GENERAL APPEARANCE: Alert, no acute distress  RESP: lungs clear to auscultation   CV: normal rate, regular rhythm, no murmur or gallop  ABDOMEN: soft, no organomegaly, masses or tenderness  PSYCH: mentation appears normal., affect flat    Results for orders placed or performed in visit on 01/25/19   Hemoglobin A1c   Result Value Ref Range    Hemoglobin A1C 7.9 (H) 0 - 5.6 %         ASSESSMENT/PLAN:      ICD-10-CM    1. Diabetic polyneuropathy associated with type 1 diabetes mellitus (H) E10.42 HYDROcodone-acetaminophen (NORCO)  MG per tablet     HYDROcodone-acetaminophen (NORCO)  MG per tablet     HYDROcodone-acetaminophen (NORCO)  MG per tablet     Hemoglobin A1c     Basic metabolic panel   2. Chronic narcotic use F11.90 HYDROcodone-acetaminophen (NORCO)  MG per tablet     HYDROcodone-acetaminophen (NORCO)  MG per tablet     HYDROcodone-acetaminophen (NORCO)  MG per tablet   3. Cervical stenosis of spine M48.02 cyclobenzaprine (FLEXERIL) 10 MG tablet     HYDROcodone-acetaminophen (NORCO)  MG per tablet     HYDROcodone-acetaminophen (NORCO)  MG per tablet     HYDROcodone-acetaminophen (NORCO)  MG per tablet   4. Major depressive disorder, single episode, severe (H) F32.2 FLUoxetine (PROZAC) 40 MG capsule   5. Type 1 diabetes mellitus with retinopathy, macular edema presence unspecified, unspecified laterality, unspecified " retinopathy severity (H) E10.319    6. Type 1 diabetes mellitus with vascular disease (H) E10.59    7. Hyperlipidemia LDL goal <70 E78.5      As a brittle type 1 diabetic, his control is fairly good.  Continue using the pump as he has been.    I suspect that his body needs a change from the paxil.  Will stop the paxil and start fluoxetine which may be a bit more activating for him.      His chronic norco was refilled. He has never had a problem with overuse or early fills.  He finds it helps with the chronic arthritis pain, pain from the diabetic neuropathy, etc.    Three month prescription given.    Jackeline Rachel M.D.      Patient Instructions   Stop the paxil    Start fluoxetine 40 mg daily    Our Clinic hours are:  Mondays    7:20 am - 7 pm  Tues -  Fri  7:20 am - 5 pm    Clinic Phone: [phone]    The clinic lab opens at 7:30 am Mon - Fri and appointments are required.    [facility]. [phone]  Monday  8 am - 7pm  Tues - Fri 8 am - 5:30 pm

## 2019-02-07 ENCOUNTER — TRANSFERRED RECORDS (OUTPATIENT)
Dept: HEALTH INFORMATION MANAGEMENT | Facility: CLINIC | Age: 64
End: 2019-02-07

## 2019-04-16 ENCOUNTER — TELEPHONE (OUTPATIENT)
Dept: FAMILY MEDICINE | Facility: CLINIC | Age: 64
End: 2019-04-16

## 2019-04-16 DIAGNOSIS — M48.02 CERVICAL STENOSIS OF SPINE: ICD-10-CM

## 2019-04-16 DIAGNOSIS — F11.90 CHRONIC NARCOTIC USE: ICD-10-CM

## 2019-04-16 DIAGNOSIS — E10.42 DIABETIC POLYNEUROPATHY ASSOCIATED WITH TYPE 1 DIABETES MELLITUS (H): ICD-10-CM

## 2019-04-16 RX ORDER — HYDROCODONE BITARTRATE AND ACETAMINOPHEN 10; 325 MG/1; MG/1
1 TABLET ORAL EVERY 6 HOURS PRN
Qty: 120 TABLET | Refills: 0 | Status: CANCELLED | OUTPATIENT
Start: 2019-04-16

## 2019-04-16 NOTE — TELEPHONE ENCOUNTER
Patient states he will run out before his OV he has scheduled. Was advised to call closer to when he is needing the fill. Davina SON RN

## 2019-04-16 NOTE — TELEPHONE ENCOUNTER
Left message for patient to call back at [phone]    Patient picked up April Norco already, confirmed with pharmacy.   Davina SON RN

## 2019-04-16 NOTE — TELEPHONE ENCOUNTER
Reason for Call:  Medication or medication refill:    Do you use a [facility]?  Name of the pharmacy and phone number for the current request:  [facility] [phone]    Name of the medication requested:   Norco  Last Written Prescription Date:  3/4/19  Last Fill Quantity: 120,  # refills: 0   Last office visit: 1/25/2019 with prescribing provider:  Wilson   Future Office Visit:   Next 5 appointments (look out 90 days)    May 15, 2019  1:40 PM CDT  SHORT with Jackeline Rachel MD  [facility] ([facility]) [address] [facility] [address]  [phone]           Can we leave a detailed message on this number? YES    Phone number patient can be reached at: Home number on file [phone] (home)    Best Time: any    Call taken on 4/16/2019 at 10:19 AM by Yasmin Parmar

## 2019-04-29 DIAGNOSIS — E10.42 DIABETIC POLYNEUROPATHY ASSOCIATED WITH TYPE 1 DIABETES MELLITUS (H): ICD-10-CM

## 2019-04-29 DIAGNOSIS — M48.02 CERVICAL STENOSIS OF SPINE: ICD-10-CM

## 2019-04-29 DIAGNOSIS — F11.90 CHRONIC NARCOTIC USE: ICD-10-CM

## 2019-04-29 RX ORDER — HYDROCODONE BITARTRATE AND ACETAMINOPHEN 10; 325 MG/1; MG/1
1 TABLET ORAL EVERY 6 HOURS PRN
Qty: 120 TABLET | Refills: 0 | Status: SHIPPED | OUTPATIENT
Start: 2019-04-29 | End: 2019-05-15

## 2019-04-29 NOTE — TELEPHONE ENCOUNTER
Routing refill request to provider for review/approval because:  Drug not on the [facility] refill protocol     Thank you    Zoie MARVIN RN

## 2019-04-29 NOTE — TELEPHONE ENCOUNTER
Rx signed and walked to our pharmacy at [facility]. Patient notified.  Leticia Prasad  Clinic Station  [facility]

## 2019-04-29 NOTE — TELEPHONE ENCOUNTER
Last Written Prescription Date:  Norco 4/2/2019  Last Fill Quantity: 120,  # refills: 0   Last office visit: 1/25/2019 with prescribing provider:  BRIAN Rachel   Future Office Visit:   Next 5 appointments (look out 90 days)    May 15, 2019  1:40 PM CDT  SHORT with Jackeline Rachel MD  [facility] ([facility] [address] [facility] [id number] [phone]         Patient will run out, prior to Visit.  Leticia Prasad  Clinic Station [place] Flex

## 2019-05-15 ENCOUNTER — OFFICE VISIT (OUTPATIENT)
Dept: FAMILY MEDICINE | Facility: CLINIC | Age: 64
End: 2019-05-15
Payer: COMMERCIAL

## 2019-05-15 VITALS
RESPIRATION RATE: 20 BRPM | WEIGHT: 193.4 LBS | SYSTOLIC BLOOD PRESSURE: 112 MMHG | HEART RATE: 102 BPM | OXYGEN SATURATION: 93 % | DIASTOLIC BLOOD PRESSURE: 54 MMHG | BODY MASS INDEX: 28.56 KG/M2 | TEMPERATURE: 98.2 F

## 2019-05-15 DIAGNOSIS — F11.90 CHRONIC NARCOTIC USE: ICD-10-CM

## 2019-05-15 DIAGNOSIS — F32.2 MAJOR DEPRESSIVE DISORDER, SINGLE EPISODE, SEVERE (H): Primary | ICD-10-CM

## 2019-05-15 DIAGNOSIS — E10.42 DIABETIC POLYNEUROPATHY ASSOCIATED WITH TYPE 1 DIABETES MELLITUS (H): ICD-10-CM

## 2019-05-15 DIAGNOSIS — M48.02 CERVICAL STENOSIS OF SPINE: ICD-10-CM

## 2019-05-15 DIAGNOSIS — E10.59 TYPE 1 DIABETES MELLITUS WITH VASCULAR DISEASE (H): ICD-10-CM

## 2019-05-15 PROCEDURE — 99214 OFFICE O/P EST MOD 30 MIN: CPT | Performed by: FAMILY MEDICINE

## 2019-05-15 RX ORDER — HYDROCODONE BITARTRATE AND ACETAMINOPHEN 10; 325 MG/1; MG/1
1 TABLET ORAL EVERY 6 HOURS PRN
Qty: 120 TABLET | Refills: 0 | Status: SHIPPED | OUTPATIENT
Start: 2019-05-29 | End: 2019-09-19

## 2019-05-15 RX ORDER — HYDROCODONE BITARTRATE AND ACETAMINOPHEN 10; 325 MG/1; MG/1
1 TABLET ORAL EVERY 6 HOURS PRN
Qty: 120 TABLET | Refills: 0 | Status: SHIPPED | OUTPATIENT
Start: 2019-07-28 | End: 2019-07-03

## 2019-05-15 RX ORDER — HYDROCODONE BITARTRATE AND ACETAMINOPHEN 10; 325 MG/1; MG/1
1 TABLET ORAL EVERY 6 HOURS PRN
Qty: 120 TABLET | Refills: 0 | Status: SHIPPED | OUTPATIENT
Start: 2019-06-28 | End: 2019-09-19

## 2019-05-15 ASSESSMENT — PAIN SCALES - GENERAL: PAINLEVEL: SEVERE PAIN (6)

## 2019-05-15 NOTE — PROGRESS NOTES
"  SUBJECTIVE:   Eb Eisenberg is a 64 year old male who presents to clinic today for the following health issues:      HPI  Diabetes Follow-up      Patient is checking blood sugars: 8-10 times a day    Diabetic concerns: None and other - pump     Symptoms of hypoglycemia (low blood sugar): shaky, dizzy, weak, confusion     Paresthesias (numbness or burning in feet) or sores: No     Date of last diabetic eye exam: last spring    Very brittle diabetic with wide fluctuations.  We talked previously about him doing a CGM or a new pump and having to \"test\" less frequently.  He is more frustrated by high blood sugars than by lows because he has a hard time getting the highs down to a normal range.     Diabetes Management Resources    Hyperlipidemia Follow-Up      Rate your low fat/cholesterol diet?: good    Taking statin?  Yes, no muscle aches from statin    Other lipid medications/supplements?:  none    Hypertension Follow-up      Outpatient blood pressures are not being checked.    Low Salt Diet: no added salt    BP Readings from Last 2 Encounters:   01/25/19 116/62   11/20/18 107/63     Hemoglobin A1C (%)   Date Value   01/25/2019 7.9 (H)   07/25/2018 7.2 (H)     LDL Cholesterol Calculated (mg/dL)   Date Value   10/10/2018 89   10/05/2017 49     Additional history: as documented    Reviewed and updated as needed this visit by clinical staff         Reviewed and updated as needed this visit by Provider          /54 (Cuff Size: Adult Large)   Pulse 102   Temp 98.2  F (36.8  C) (Tympanic)   Resp 20   Wt 87.7 kg (193 lb 6.4 oz)   SpO2 93%   BMI 28.56 kg/m    EXAM: GENERAL APPEARANCE: Alert, no acute distress  RESP: lungs clear to auscultation   CV: normal rate, regular rhythm, no murmur or gallop  ABDOMEN: soft, no organomegaly, masses or tenderness    ASSESSMENT/PLAN:      ICD-10-CM    1. Major depressive disorder, single episode, severe (H) F32.2 FLUoxetine (PROZAC) 20 MG capsule   2. Chronic narcotic use " F11.90 HYDROcodone-acetaminophen (NORCO)  MG per tablet     HYDROcodone-acetaminophen (NORCO)  MG per tablet     HYDROcodone-acetaminophen (NORCO)  MG per tablet   3. Cervical stenosis of spine M48.02 HYDROcodone-acetaminophen (NORCO)  MG per tablet     HYDROcodone-acetaminophen (NORCO)  MG per tablet     HYDROcodone-acetaminophen (NORCO)  MG per tablet   4. Diabetic polyneuropathy associated with type 1 diabetes mellitus (H) E10.42 HYDROcodone-acetaminophen (NORCO)  MG per tablet     HYDROcodone-acetaminophen (NORCO)  MG per tablet     HYDROcodone-acetaminophen (NORCO)  MG per tablet   5. Type 1 diabetes mellitus with vascular disease (H) E10.59 insulin aspart (NOVOLOG VIAL) 100 UNITS/ML vial     Recommend he see diabetic ed to consider CGM or a pump that talks to a meter.  This would improve his overall control and avoid the 8-10 tests a day he's doing currently.    Patient Instructions     Increase fluoxetine to 60 mg    Jackeline Rachel M.D.      Our Clinic hours are:  Mondays    7:20 am - 7 pm  Tues -  Fri  7:20 am - 5 pm    Clinic Phone: [phone]    The clinic lab opens at 7:30 am Mon - Fri and appointments are required.    [facility]. [phone]  Monday  8 am - 7pm  Tues - Fri 8 am - 5:30 pm

## 2019-05-15 NOTE — PATIENT INSTRUCTIONS
Increase fluoxetine to 60 mg      Our Clinic hours are:  Mondays    7:20 am - 7 pm  Tues -  Fri  7:20 am - 5 pm    Clinic Phone: [phone]    The clinic lab opens at 7:30 am Mon - Fri and appointments are required.    [facility]. [phone]  Monday  8 am - 7pm  Tues - Fri 8 am - 5:30 pm

## 2019-05-22 ENCOUNTER — ALLIED HEALTH/NURSE VISIT (OUTPATIENT)
Dept: EDUCATION SERVICES | Facility: CLINIC | Age: 64
End: 2019-05-22
Payer: COMMERCIAL

## 2019-05-22 DIAGNOSIS — E10.59 TYPE 1 DIABETES MELLITUS WITH VASCULAR DISEASE (H): Primary | ICD-10-CM

## 2019-05-22 DIAGNOSIS — E11.9 DIABETES MELLITUS WITHOUT COMPLICATION (H): ICD-10-CM

## 2019-05-22 PROCEDURE — G0108 DIAB MANAGE TRN  PER INDIV: HCPCS

## 2019-05-22 RX ORDER — PROCHLORPERAZINE 25 MG/1
1 SUPPOSITORY RECTAL CONTINUOUS
Qty: 1 EACH | Refills: 3 | Status: SHIPPED | OUTPATIENT
Start: 2019-05-22 | End: 2019-05-22

## 2019-05-22 RX ORDER — PROCHLORPERAZINE 25 MG/1
1 SUPPOSITORY RECTAL CONTINUOUS
Qty: 1 DEVICE | Refills: 0 | Status: SHIPPED | OUTPATIENT
Start: 2019-05-22 | End: 2019-05-22

## 2019-05-22 RX ORDER — PROCHLORPERAZINE 25 MG/1
1 SUPPOSITORY RECTAL CONTINUOUS
Qty: 1 DEVICE | Refills: 0 | Status: SHIPPED | OUTPATIENT
Start: 2019-05-22 | End: 2019-12-31

## 2019-05-22 RX ORDER — PROCHLORPERAZINE 25 MG/1
1 SUPPOSITORY RECTAL CONTINUOUS
Qty: 1 EACH | Refills: 3 | Status: SHIPPED | OUTPATIENT
Start: 2019-05-22 | End: 2019-12-31

## 2019-05-22 RX ORDER — PROCHLORPERAZINE 25 MG/1
1 SUPPOSITORY RECTAL CONTINUOUS
Qty: 3 EACH | Refills: 11 | Status: SHIPPED | OUTPATIENT
Start: 2019-05-22 | End: 2019-12-31

## 2019-05-22 RX ORDER — PROCHLORPERAZINE 25 MG/1
1 SUPPOSITORY RECTAL CONTINUOUS
Qty: 3 EACH | Refills: 11 | Status: SHIPPED | OUTPATIENT
Start: 2019-05-22 | End: 2019-05-22

## 2019-05-22 NOTE — PROGRESS NOTES
"Diabetes Self-Management Education & Support    Diabetes Education Self Management & Training    SUBJECTIVE/OBJECTIVE:  Presents for: Individual review  Accompanied by: Self  Diabetes education in the past 24mo: No  Focus of Visit: Monitoring, Taking Medication, Problem Solving  Diabetes type: Type 1  Date of diagnosis: 1980  Disease course: Worsening  Diabetes management related comments/concerns: difficult management of diabetes with pump  Transportation concerns: No  Other concerns:: None  Cultural Influences/Ethnic Background:  American      Diabetes Symptoms & Complications  Blurred vision: No  Fatigue: Yes  Neuropathy: No  Foot ulcerations: No  Polydipsia: No  Polyphagia: No  Polyuria: No  Visual change: No  Weakness: No  Weight loss: No  Recent Infection(s): No  Symptom course: Stable  Weight trend: Stable  Autonomic neuropathy: No  CVA: No  Heart disease: Yes  Nephropathy: No  Peripheral neuropathy: No  Peripheral Vascular Disease: Yes  Retinopathy: No    Patient Problem List and Family Medical History reviewed for relevant medical history, current medical status, and diabetes risk factors.    Vitals:  There were no vitals taken for this visit.  Estimated body mass index is 28.56 kg/m  as calculated from the following:    Height as of 1/25/19: 1.753 m (5' 9\").    Weight as of 5/15/19: 87.7 kg (193 lb 6.4 oz).   Last 3 BP:   BP Readings from Last 3 Encounters:   05/15/19 112/54   01/25/19 116/62   11/20/18 107/63       History   Smoking Status     Former Smoker     Quit date: 1/1/2001   Smokeless Tobacco     Never Used       Labs:  Lab Results   Component Value Date    A1C 7.9 01/25/2019     Lab Results   Component Value Date     01/25/2019     Lab Results   Component Value Date    LDL 89 10/10/2018     HDL Cholesterol   Date Value Ref Range Status   10/10/2018 51 >39 mg/dL Final   ]  GFR Estimate   Date Value Ref Range Status   01/25/2019 82 >60 mL/min/[1.73_m2] Final     Comment:     Non  " American GFR Calc  Starting 2018, serum creatinine based estimated GFR (eGFR) will be   calculated using the Chronic Kidney Disease Epidemiology Collaboration   (CKD-EPI) equation.       GFR Estimate If Black   Date Value Ref Range Status   2019 >90 >60 mL/min/[1.73_m2] Final     Comment:      GFR Calc  Starting 2018, serum creatinine based estimated GFR (eGFR) will be   calculated using the Chronic Kidney Disease Epidemiology Collaboration   (CKD-EPI) equation.       Lab Results   Component Value Date    CR 0.97 2019     No results found for: MICROALBUMIN    Healthy Eating  Healthy Eating Assessed Today: Yes  Cultural/Spiritism diet restrictions?: No  Patient on a regular basis: Eats 3 meals a day, Counts carbohydrates  Meal planning: Carbohydrate counting  Meals include: Breakfast, Lunch, Dinner, Snacks  Breakfast: cheerios,   Lunch: pizza,   Dinner: egg bake or sandwich  Snacks: snack bar, pop tart  Beverages: Water, Diet soda, Other  Has patient met with a dietitian in the past?: No    Being Active  Being Active Assessed Today: Yes  Exercise:: Currently not exercising  Barrier to exercise: Other    Monitoring  Monitoring Assessed Today: Yes  Home Glucose (Sugar) Monitorin+ times per day  Low Glucose Range (mg/dL): <70  High Glucose Range (mg/dL): >200  Overall Range (mg/dL): 140-180    UNABLE TO DOWNLOAD IN THIS CLINIC    Taking Medications  Diabetes Medication(s)     Diabetic Other       glucagon (GLUCAGON EMERGENCY) 1 MG kit    Inject 1 mg into the muscle once for 1 dose     GLUCAGON EMERGENCY KIT 1 MG IJ KIT    use as directed for severe hypoglycemia     Patient not taking:  No sig reported    Insulin       insulin lispro (HUMALOG) 100 UNIT/ML vial    To use in insulin pump, TTD 75 units     insulin aspart (NOVOLOG VIAL) 100 UNITS/ML vial    Uses about 70 units/day.  As directed per insulin pump     INSULIN PUMP - OUTPATIENT    Date last updated:  16  [employer]  Minimed:  BASAL RATES and times:  12   AM (midnight): 1.4 units/hour   5 am: 1.50 units/hour   9   AM: 1.15 units/hour   1 pm: 1.30 units/hour   3   PM: 1.0 units/hour   9    PM: 1.20 units/hour   Basal Pattern A:   12 am: 1.00  10 am: 0.90  12pm:0.50  6 pm: 0.75  9 pm: 1.05   Pattern B:  12 mid: 0.8  10 am: 0.7  12 noon: 0.3  6 pm: 0.55  9 pm: 0.85  CARB RATIO and times:  12   AM (midnight): 9  12  PM (noon):  9  6    PM:  9  Corection Factor (Sensitivity) and times:  12   AM (midnight): 33 mg/dL  BLOOD GLUCOSE TARGET and times:  12   AM (midnight): 110 - 110Active Insulin Time:  4 hours  Basal to Bolus Ratio:   Sensor:  No  Carelink / Diasend username:    Carelink / Diasend Password:          Taking Medication Assessed Today: Yes  Current Treatments: Insulin Pump  Dose schedule: pre-breakfast, pre-lunch, pre-dinner  Given by: Patient  Problems taking diabetes medications regularly?: No  Diabetes medication side effects?: No  Treatment Compliance: All of the time    Problem Solving  Problem Solving Assessed Today: Yes  Hypoglycemia Frequency: Rarely  Hypoglycemia Treatment: Other food(has been using ensure, will teach rule of 15/15)  Patient carries a carbohydrate source: Yes  Medical alert: Yes    Hypoglycemia symptoms  Confusion: Yes         Reducing Risks  Reducing Risks Assessed Today: Yes  Diabetes Risks: Age over 45 years, Family History  CAD Risks: Diabetes Mellitus, Family history, Male sex, Sedentary lifestyle  Has dilated eye exam at least once a year?: Yes  Sees dentist every 6 months?: No  Sees podiatrist (foot doctor)?: No    Healthy Coping  Healthy Coping Assessed Today: Yes  Emotional response to diabetes: Ready to learn  Stage of change: PREPARATION (Decided to change - considering how)  Difficulty affording diabetes management supplies?: No  Patient Activation Measure Survey Score:  BENEDICTO Score (Last Two) 2/17/2011   BENEDICTO Raw Score 42   Activation Score 66   BENEDICTO Level 3       ASSESSMENT:  Eb darling  today for application for Dexcom . Awaiting to see if covered.  He is due for an upgrade 8/22/2019 for new pump.  His pump settings are set and seem to be doing well.  He is quite depressed and does not have any want to get up and do something.  Will continue to help with diabetes    Goals Addressed as of 5/22/2019 at 10:57 AM       Being Active (pt-stated)     Added 5/22/19 by Christina Espinosa, RN     My Goal: I will come up with a activity goal    What I need to meet my goal: 1. I will get out and walk 10 minutes twice per day.   2. Have the [employer] invite you to a walk    I plan to meet my goal by this date: 1 month          Medication 1 (pt-stated)     Added 5/22/19 by Christina Espinosa, RN     My Goal: I will give my bolus 15 minutes before I eat.      What I need to meet my goal: 1. Before every meal bolus 15 minutes before eating    I plan to meet my goal by this date: 1 month            Patient's most recent   Lab Results   Component Value Date    A1C 7.9 01/25/2019    is not meeting goal of <7.0    INTERVENTION:   Diabetes knowledge and skills assessment:     Patient is knowledgeable in diabetes management concepts related to: Healthy Eating, Being Active, Monitoring and Taking Medication    Patient needs further education on the following diabetes management concepts: Healthy Eating, Being Active, Monitoring and Taking Medication    Based on learning assessment above, most appropriate setting for further diabetes education would be: Individual setting.    Education provided today on:  AADE Self-Care Behaviors:  Diabetes Pathophysiology  Healthy Eating: carbohydrate counting, consistency in amount, composition, and timing of food intake, weight reduction, heart healthy diet, eating out, portion control, plate planning method and label reading  Being Active: relationship to blood glucose and describe appropriate activity program  Monitoring: purpose, proper technique, log and interpret results, individual blood  glucose targets and frequency of monitoring  Taking Medication: action of prescribed medication, side effects of prescribed medications and when to take medications  Healthy Coping: recognize feelings about diagnosis, benefits of making appropriate lifestyle changes, utilize support systems, methods for coping with stress and when to seek professional counseling    Opportunities for ongoing education and support in diabetes-self management were discussed.    Pt verbalized understanding of concepts discussed and recommendations provided today.       Education Materials Provided:  Kat Understanding Diabetes Booklet, Carbohydrate Counting and My Plate Planner    PLAN:  See Patient Instructions for co-developed, patient-stated behavior change goals.  AVS printed and provided to patient today. See Follow-Up section for recommended follow-up.    Nessa Espinosa RN/FLOR [place] Diabetes Educator    Time Spent: 60 minutes  Encounter Type: Individual    Any diabetes medication dose changes were made via the CDE Protocol and Collaborative Practice Agreement with the patient's primary care provider. A copy of this encounter was shared with the provider.

## 2019-05-22 NOTE — PATIENT INSTRUCTIONS
Diabetes Support Resources:       Bring blood glucose meter and logbook with you to all doctor and follow-up appointments.    Diabetes Education Telephone Visit Follow-up:    We realize your time is valuable and your health is important! We offer a convenient Telephone Visit follow up! It s a quick way to check in for a medication dose adjustment without having to come back to clinic as soon.    Telephone Visits are often covered by insurance. Please check with your insurance plan to see if this type of visit is covered. If not, the cost is less expensive than an office visit:      Up to 10 minutes (Code 80661): $30    11-20 minutes (Code 83002): $59    More than 20 minutes (Code 51811): $85    Talk with your Diabetes Educator if you want to learn more.      [facility]:  For Your Diabetes Education and Nutrition Appointments Call:  [phone]   For Diabetes Education or Nutrition Related Questions:   Phone: [phone]  E-mail: [email or web address]  Fax: [phone]   If you need a medication refill please contact your pharmacy. Please allow 3 business days for your refills to be completed.

## 2019-05-24 DIAGNOSIS — E10.59 TYPE 1 DIABETES MELLITUS WITH VASCULAR DISEASE (H): Primary | ICD-10-CM

## 2019-06-05 ENCOUNTER — ALLIED HEALTH/NURSE VISIT (OUTPATIENT)
Dept: EDUCATION SERVICES | Facility: CLINIC | Age: 64
End: 2019-06-05
Payer: COMMERCIAL

## 2019-06-05 DIAGNOSIS — E11.9 DIABETES MELLITUS WITHOUT COMPLICATION (H): Primary | ICD-10-CM

## 2019-06-05 PROCEDURE — G0108 DIAB MANAGE TRN  PER INDIV: HCPCS

## 2019-06-05 NOTE — PROGRESS NOTES
LibrePro Continuous Glucose Monitor Interpretation     Patient History:   1. Type of Diabetes: Type 1 diabetes  2. Duration of diabetes or year of diagnosis:   3. Current treatment regimen (include all diabetes medications, dose & dosing frequency/timing): no, Injectable Medications: NovoLog Insulin pump Problems taking diabetes medications regularly? No   *Abbreviated insulin dose documentation key: Insulin Trade Name (vgbjvrtfv-jocgg-zvjuuf-bedtime) - i.e. Humalog 5-5-5-0 (Humalog 5 units at breakfast, 5 units at lunch, and 5 units at dinner).  4. Most Recent A1c Result:    Lab Results   Component Value Date    A1C 7.9 2019     5. Indication/s for LibrePro study: Difficult to manage hypoglycemia and/or hyperglycemia, despite multiple adjustments in self-monitoring of blood glucose and diabetes medication administration, Suspected nocturnal hypoglycemia, Hypoglycemia unawareness and Unexplained fluctuations in glucose values.    Statistics:   1. Sensor worn for 14 days.  2. Glucose excursions:   Percent in target is: 62%  Percent above target is: 22%  Percent below target is: 16%  3. Estimated average glucose: 135 mg/dL  Noted that Eb was in the wrong Pattern for his activity and had many lows, he is now in Standard low and needs some adjustment for basal rates and ICR and sensitivity.                        Data evaluation:   1. Sensor modal day evaluation shows the followin. Consistent day-to-day patterns noted: pattern of daytime hypoglycemia  2. pattern of daytime hyperglycemia  3. pattern of nocturnal hypoglycemia.  4. Low glucose events: 25 for total of 139  5.   6.             From May 22- was in the wrong Pattern set into his pump, for mild exercise, getting too much insulin  Patient's Logbook shows the following:   Carbohydrate counting is: accurate  Medication and/or insulin dosing is: inaccurate       Assessment and Plan:  Insulin to Carb Ratio recommended: 1 unit: 7.5 grams carb was  9.0  Insulin Correction Factor recommended: 1 unit(s) insulin lowers BG 30  Mg/dL was 33  Basal rates in Standard,   MN=1.20           1000=0.9  Noon=0.30  1800=0.55  2100=1.05  Changes:  MN=1.20  0600=0.95  Noon=0.50  1800=0.75  2100=1.05  Changes were made into his pump. Follow up with changes for July 3 @ 9:00 am    Will make another follow up appointment to see when he gets his Dexcom       Nessa Espinosa RN/FLOR [facility] Diabetes Educator    Time Spent: 30 minutes  Any diabetes medication dose changes were made via the CDE Protocol and Collaborative Practice Agreement with the patient's primary care provider. A copy of this encounter was shared with the provider.

## 2019-06-05 NOTE — LETTER
6/5/2019         RE: Eb Eisenberg  [address] [place] [address]        Dear Dr. Rachel,    Please see scanned continuous glucose monitoring (CGM) reports, interpretation and recommendations. As a provider, you can bill for a non face-to-face interpretation of the sensor report. If you feel it is appropriate, please create a 'Documentation Only' encounter noting the interpretation and your recommended plan and bill for this glucose sensor interpretation using code [id number].      Thank you for referring your patient, Eb Eisenberg, to the [facility]. Please see a copy of my visit note below.    LibrePro Continuous Glucose Monitor Interpretation     Patient History:   1. Type of Diabetes: Type 1 diabetes  2. Duration of diabetes or year of diagnosis:   3. Current treatment regimen (include all diabetes medications, dose & dosing frequency/timing): no, Injectable Medications: NovoLog Insulin pump Problems taking diabetes medications regularly? No   *Abbreviated insulin dose documentation key: Insulin Trade Name (nfxufoogi-yaifp-nqfxar-bedtime) - i.e. Humalog 5-5-5-0 (Humalog 5 units at breakfast, 5 units at lunch, and 5 units at dinner).  4. Most Recent A1c Result:    Lab Results   Component Value Date    A1C 7.9 01/25/2019     5. Indication/s for LibrePro study: Difficult to manage hypoglycemia and/or hyperglycemia, despite multiple adjustments in self-monitoring of blood glucose and diabetes medication administration, Suspected nocturnal hypoglycemia, Hypoglycemia unawareness and Unexplained fluctuations in glucose values.    Statistics:   1. Sensor worn for 14 days.  2. Glucose excursions:   Percent in target is: 62%  Percent above target is: 22%  Percent below target is: 16%  3. Estimated average glucose: 135 mg/dL  Noted that Eb was in the wrong Pattern for his activity and had many lows, he is now in Standard low and needs some adjustment for basal rates and ICR and  sensitivity.                        Data evaluation:   1. Sensor modal day evaluation shows the followin. Consistent day-to-day patterns noted: pattern of daytime hypoglycemia  2. pattern of daytime hyperglycemia  3. pattern of nocturnal hypoglycemia.  4. Low glucose events: 25 for total of 139  5.   6.             From May 22- was in the wrong Pattern set into his pump, for mild exercise, getting too much insulin  Patient's Logbook shows the following:   Carbohydrate counting is: accurate  Medication and/or insulin dosing is: inaccurate       Assessment and Plan:  Insulin to Carb Ratio recommended: 1 unit: 7.5 grams carb was 9.0  Insulin Correction Factor recommended: 1 unit(s) insulin lowers BG 30  Mg/dL was 33  Basal rates in Standard,   MN=1.20           1000=0.9  Noon=0.30  1800=0.55  2100=1.05  Changes:  MN=1.20  0600=0.95  Noon=0.50  1800=0.75  2100=1.05  Changes were made into his pump.  Will make another follow up appointment to see when he gets his Dexcom       Nessa Espinosa RN/FLOR  [place] Diabetes Educator    Time Spent: 30 minutes  Any diabetes medication dose changes were made via the CDE Protocol and Collaborative Practice Agreement with the patient's primary care provider. A copy of this encounter was shared with the provider.

## 2019-07-03 ENCOUNTER — ALLIED HEALTH/NURSE VISIT (OUTPATIENT)
Dept: EDUCATION SERVICES | Facility: CLINIC | Age: 64
End: 2019-07-03
Payer: COMMERCIAL

## 2019-07-03 DIAGNOSIS — F11.90 CHRONIC NARCOTIC USE: ICD-10-CM

## 2019-07-03 DIAGNOSIS — E10.42 DIABETIC POLYNEUROPATHY ASSOCIATED WITH TYPE 1 DIABETES MELLITUS (H): ICD-10-CM

## 2019-07-03 DIAGNOSIS — E11.9 DIABETES MELLITUS WITHOUT COMPLICATION (H): Primary | ICD-10-CM

## 2019-07-03 DIAGNOSIS — M48.02 CERVICAL STENOSIS OF SPINE: ICD-10-CM

## 2019-07-03 PROCEDURE — G0108 DIAB MANAGE TRN  PER INDIV: HCPCS

## 2019-07-03 RX ORDER — HYDROCODONE BITARTRATE AND ACETAMINOPHEN 10; 325 MG/1; MG/1
1 TABLET ORAL EVERY 6 HOURS PRN
Qty: 120 TABLET | Refills: 0 | Status: SHIPPED | OUTPATIENT
Start: 2019-07-26 | End: 2019-09-19

## 2019-07-03 NOTE — TELEPHONE ENCOUNTER
RX was picked up from providers box and walked to [facility] by staff.     Pharmacy will notify patient.       Lorna Carlton MA

## 2019-07-03 NOTE — PATIENT INSTRUCTIONS
Diabetes Support Resources:       Bring blood glucose meter and logbook with you to all doctor and follow-up appointments.    Diabetes Education Telephone Visit Follow-up:    We realize your time is valuable and your health is important! We offer a convenient Telephone Visit follow up! It s a quick way to check in for a medication dose adjustment without having to come back to clinic as soon.    Telephone Visits are often covered by insurance. Please check with your insurance plan to see if this type of visit is covered. If not, the cost is less expensive than an office visit:      Up to 10 minutes (Code 50548): $30    11-20 minutes (Code 54732): $59    More than 20 minutes (Code 96265): $85    Talk with your Diabetes Educator if you want to learn more.      [facility]:  For Your Diabetes Education and Nutrition Appointments Call:  [phone]   For Diabetes Education or Nutrition Related Questions:   Phone: [phone]  E-mail: [email or web address]  Fax: [phone]   If you need a medication refill please contact your pharmacy. Please allow 3 business days for your refills to be completed.

## 2019-07-03 NOTE — PROGRESS NOTES
Diabetes Self-Management Education & Support      Diabetes Self-Management Education & Support - Insulin Pump Review    SUBJECTIVE/OBJECTIVE     Cultural Influences/Ethnic Background:  American        Patient seen today for Insulin Pump Review:    Insulin Pump Information                                           Insulin Pump Review  Patient is in Pattern A most of the times  Changes to be made  Basal rate @ 1800 from 0.850 to 0.950  ICR change from 7.5 to 6.5  Sensitivity change from 30 to 27                Taking Medications  Diabetes Medication(s)     Diabetic Other       glucagon (GLUCAGON EMERGENCY) 1 MG kit    Inject 1 mg into the muscle once for 1 dose     GLUCAGON EMERGENCY KIT 1 MG IJ KIT    use as directed for severe hypoglycemia     Patient not taking:  No sig reported    Insulin       insulin aspart (NOVOLOG VIAL) 100 UNITS/ML vial    Uses about 70 units/day.  As directed per insulin pump     insulin lispro (HUMALOG) 100 UNIT/ML vial    To use in insulin pump, TTD 75 units     INSULIN PUMP - OUTPATIENT    Date last updated:  5/11/16  [employer] Minimed:  BASAL RATES and times:  12   AM (midnight): 1.4 units/hour   5 am: 1.50 units/hour   9   AM: 1.15 units/hour   1 pm: 1.30 units/hour   3   PM: 1.0 units/hour   9    PM: 1.20 units/hour   Basal Pattern A:   12 am: 1.00  10 am: 0.90  12pm:0.50  6 pm: 0.75  9 pm: 1.05   Pattern B:  12 mid: 0.8  10 am: 0.7  12 noon: 0.3  6 pm: 0.55  9 pm: 0.85  CARB RATIO and times:  12   AM (midnight): 9  12  PM (noon):  9  6    PM:  9  Corection Factor (Sensitivity) and times:  12   AM (midnight): 33 mg/dL  BLOOD GLUCOSE TARGET and times:  12   AM (midnight): 110 - 110Active Insulin Time:  4 hours  Basal to Bolus Ratio:   Sensor:  No  Carelink / Diasend username:    Carelink / Diasend Password:                  Patient Activation Measure Survey Score:  BENEDICTO Score (Last Two) 2/17/2011   BENEDICTO Raw Score 42   Activation Score 66   BENEDICTO Level 3         ASSESSMENT  [place] is here to  set up Dexcom G5.  He inserted and is now running, in 2 hrs will receive data , setting have been placed. He is trying to get the vasyl on his Android phone, in the meantime, encouraged him to wear his reader at ALL times.          Opportunities for ongoing education and support in diabetes-self management were discussed.    Pt verbalized understanding of concepts discussed and recommendations provided today.       Education Materials Provided:  Kat Understanding Diabetes Booklet, Safe Disposal Options for Needles & Syringes, BG Log Sheet and No new materials provided today  Due for upgrade in Aug.  Will await until Sept to get him upgraded to the 630G    PLAN  See Patient Instructions for co-developed, patient-stated behavior change goals.  AVS printed and provided to patient today. See Follow-Up section for recommended follow-up.    Nessa Espinosa RN/FL[facility] Diabetes Educator    Time Spent: 60 minutes  Encounter Type: Individual    Any diabetes medication dose changes were made via the CDE Protocol and Collaborative Practice Agreement with the patient's primary care provider. A copy of this encounter was shared with the provider.

## 2019-07-24 ENCOUNTER — ALLIED HEALTH/NURSE VISIT (OUTPATIENT)
Dept: EDUCATION SERVICES | Facility: CLINIC | Age: 64
End: 2019-07-24
Payer: COMMERCIAL

## 2019-07-24 DIAGNOSIS — E11.9 DIABETES MELLITUS WITHOUT COMPLICATION (H): Primary | ICD-10-CM

## 2019-07-24 PROCEDURE — G0108 DIAB MANAGE TRN  PER INDIV: HCPCS

## 2019-07-24 NOTE — PROGRESS NOTES
"Diabetes Self-Management Education & Support    Diabetes Education Self Management & Training    SUBJECTIVE/OBJECTIVE:  Presents for: Individual review  Accompanied by: Self  Diabetes education in the past 24mo: No  Focus of Visit: Monitoring, Taking Medication, Problem Solving  Diabetes type: Type 1  Date of diagnosis: 1980  Disease course: Improving  Diabetes management related comments/concerns: difficult management of diabetes with pump  Transportation concerns: No  Other concerns:: None  Cultural Influences/Ethnic Background:  American      Diabetes Symptoms & Complications  Blurred vision: No  Fatigue: Yes  Neuropathy: No  Foot ulcerations: No  Polydipsia: No  Polyphagia: No  Polyuria: No  Visual change: No  Weakness: No  Weight loss: No  Recent Infection(s): No  Symptom course: Stable  Weight trend: Stable  Autonomic neuropathy: No  CVA: No  Heart disease: Yes  Nephropathy: No  Peripheral neuropathy: No  Peripheral Vascular Disease: Yes  Retinopathy: No    Patient Problem List and Family Medical History reviewed for relevant medical history, current medical status, and diabetes risk factors.    Vitals:  There were no vitals taken for this visit.  Estimated body mass index is 28.56 kg/m  as calculated from the following:    Height as of 1/25/19: 1.753 m (5' 9\").    Weight as of 5/15/19: 87.7 kg (193 lb 6.4 oz).   Last 3 BP:   BP Readings from Last 3 Encounters:   05/15/19 112/54   01/25/19 116/62   11/20/18 107/63       History   Smoking Status     Former Smoker     Quit date: 1/1/2001   Smokeless Tobacco     Never Used       Labs:  Lab Results   Component Value Date    A1C 7.9 01/25/2019     Lab Results   Component Value Date     01/25/2019     Lab Results   Component Value Date    LDL 89 10/10/2018     HDL Cholesterol   Date Value Ref Range Status   10/10/2018 51 >39 mg/dL Final   ]  GFR Estimate   Date Value Ref Range Status   01/25/2019 82 >60 mL/min/[1.73_m2] Final     Comment:     Non  " American GFR Calc  Starting 2018, serum creatinine based estimated GFR (eGFR) will be   calculated using the Chronic Kidney Disease Epidemiology Collaboration   (CKD-EPI) equation.       GFR Estimate If Black   Date Value Ref Range Status   2019 >90 >60 mL/min/[1.73_m2] Final     Comment:      GFR Calc  Starting 2018, serum creatinine based estimated GFR (eGFR) will be   calculated using the Chronic Kidney Disease Epidemiology Collaboration   (CKD-EPI) equation.       Lab Results   Component Value Date    CR 0.97 2019     No results found for: MICROALBUMIN    Healthy Eating  Healthy Eating Assessed Today: Yes  Cultural/[place] diet restrictions?: No  Patient on a regular basis: Skips meals regularly  Breakfast: bowl of cheerios or PB sandwich  Lunch: sloppy joes or goolash  Dinner: same or pizza  Beverages: Water  Has patient met with a dietitian in the past?: No    Being Active  Being Active Assessed Today: Yes  Exercise:: Currently not exercising    Monitoring  Monitoring Assessed Today: Yes  Did patient bring glucose meter to appointment? : Yes  Blood Glucose Meter: [employer]  Home Glucose (Sugar) Monitorin+ times per day(uses Dexcom now)  Blood glucose trend: Fluctuating dramtically  Low Glucose Range (mg/dL): <70  High Glucose Range (mg/dL): >200  Overall Range (mg/dL): 180-200                                                  Taking Medications  Diabetes Medication(s)     Diabetic Other       glucagon (GLUCAGON EMERGENCY) 1 MG kit    Inject 1 mg into the muscle once for 1 dose     GLUCAGON EMERGENCY KIT 1 MG IJ KIT    use as directed for severe hypoglycemia     Patient not taking:  No sig reported    Insulin       insulin aspart (NOVOLOG VIAL) 100 UNITS/ML vial    Uses about 70 units/day.  As directed per insulin pump     insulin lispro (HUMALOG) 100 UNIT/ML vial    To use in insulin pump, TTD 75 units     INSULIN PUMP - OUTPATIENT    Date last updated:   5/11/16  [employer] Minimed:  BASAL RATES and times:  12   AM (midnight): 1.4 units/hour   5 am: 1.50 units/hour   9   AM: 1.15 units/hour   1 pm: 1.30 units/hour   3   PM: 1.0 units/hour   9    PM: 1.20 units/hour   Basal Pattern A:   12 am: 1.00  10 am: 0.90  12pm:0.50  6 pm: 0.75  9 pm: 1.05   Pattern B:  12 mid: 0.8  10 am: 0.7  12 noon: 0.3  6 pm: 0.55  9 pm: 0.85  CARB RATIO and times:  12   AM (midnight): 9  12  PM (noon):  9  6    PM:  9  Corection Factor (Sensitivity) and times:  12   AM (midnight): 33 mg/dL  BLOOD GLUCOSE TARGET and times:  12   AM (midnight): 110 - 110Active Insulin Time:  4 hours  Basal to Bolus Ratio:   Sensor:  No  Carelink / Diasend username:    Carelink / Diasend Password:        Made changes on his Pattern only     Basal rate:  MN =0.9  1000 =0.950  12:00 =0.725  6:00 pm = 0.950  9:00 pm = 0.750    He is using this setting most of the times    Taking Medication Assessed Today: Yes  Current Treatments: Insulin Pump  Given by: Patient  Injection/Infusion sites: Abdomen  Problems taking diabetes medications regularly?: No  Diabetes medication side effects?: No  Treatment Compliance: All of the time    Problem Solving  Problem Solving Assessed Today: Yes  Hypoglycemia Frequency: Daily  Hypoglycemia Treatment: Juice, Other food  Patient carries a carbohydrate source: Yes    Hypoglycemia symptoms  Sweats: Yes         Reducing Risks       Healthy Coping  Stage of change: ACTION (Actively working towards change)  Difficulty affording diabetes management supplies?: No  Patient Activation Measure Survey Score:  BENEDICTO Score (Last Two) 2/17/2011   BENEDICTO Raw Score 42   Activation Score 66   BENEDICTO Level 3       ASSESSMENT:  Eb is due to upgrade his pump 8/23 and working with [employer] to get this going, he will then follow up with me to learn how to use the new pump        Patient's most recent   Lab Results   Component Value Date    A1C 7.9 01/25/2019    is not meeting goal of <7.0    INTERVENTION:    Diabetes knowledge and skills assessment:     Patient is knowledgeable in diabetes management concepts related to: Healthy Eating, Being Active, Monitoring and Taking Medication    Patient needs further education on the following diabetes management concepts: Monitoring and Taking Medication    Based on learning assessment above, most appropriate setting for further diabetes education would be: Individual setting.    Education provided today on:  AADE Self-Care Behaviors:  Monitoring: purpose, proper technique, log and interpret results, individual blood glucose targets and frequency of monitoring  Taking Medication: action of prescribed medication, drawing up, administering and storing injectable diabetes medications, proper site selection and rotation for injections, side effects of prescribed medications and when to take medications    Opportunities for ongoing education and support in diabetes-self management were discussed.    Pt verbalized understanding of concepts discussed and recommendations provided today.       Education Materials Provided:  No new materials provided today    PLAN:  See Patient Instructions for co-developed, patient-stated behavior change goals.  AVS printed and provided to patient today. See Follow-Up section for recommended follow-up.    Nessa Espinosa RN/FL[facility] Diabetes Educator    Time Spent: 60 minutes  Encounter Type: Individual    Any diabetes medication dose changes were made via the [facility] Protocol and Collaborative Practice Agreement with the patient's primary care provider. A copy of this encounter was shared with the provider.

## 2019-07-24 NOTE — PATIENT INSTRUCTIONS
Diabetes Support Resources:  Will call once I hear from [employer] for upgrade, and we will set appointment to get you started.       Bring blood glucose meter and logbook with you to all doctor and follow-up appointments.    Diabetes Education Telephone Visit Follow-up:    We realize your time is valuable and your health is important! We offer a convenient Telephone Visit follow up! It s a quick way to check in for a medication dose adjustment without having to come back to clinic as soon.    Telephone Visits are often covered by insurance. Please check with your insurance plan to see if this type of visit is covered. If not, the cost is less expensive than an office visit:      Up to 10 minutes (Code 25260): $30    11-20 minutes (Code 76361): $59    More than 20 minutes (Code 23601): $85    Talk with your Diabetes Educator if you want to learn more.      [facility]:  For Your Diabetes Education and Nutrition Appointments Call:  [phone]   For Diabetes Education or Nutrition Related Questions:   Phone: [phone]  E-mail: [email or web address]  Fax: [phone]   If you need a medication refill please contact your pharmacy. Please allow 3 business days for your refills to be completed.

## 2019-09-05 ENCOUNTER — TELEPHONE (OUTPATIENT)
Dept: FAMILY MEDICINE | Facility: CLINIC | Age: 64
End: 2019-09-05

## 2019-09-05 NOTE — TELEPHONE ENCOUNTER
[facility] medical, [employer] Doc Insulin Pump sent to PCP.     Asha Poole  CSS Float     Should have been sent to Forms Bin. Placing it there now.  Leticia Prasad  Clinic Station  Flex

## 2019-09-06 ENCOUNTER — MEDICAL CORRESPONDENCE (OUTPATIENT)
Dept: HEALTH INFORMATION MANAGEMENT | Facility: CLINIC | Age: 64
End: 2019-09-06

## 2019-09-09 NOTE — TELEPHONE ENCOUNTER
Document and notes faxed to [facility] [phone].  Leticia Santiagope  Clinic Station  Flex

## 2019-09-19 ENCOUNTER — OFFICE VISIT (OUTPATIENT)
Dept: FAMILY MEDICINE | Facility: CLINIC | Age: 64
End: 2019-09-19
Payer: COMMERCIAL

## 2019-09-19 VITALS
WEIGHT: 178 LBS | BODY MASS INDEX: 26.36 KG/M2 | RESPIRATION RATE: 18 BRPM | DIASTOLIC BLOOD PRESSURE: 64 MMHG | OXYGEN SATURATION: 95 % | HEIGHT: 69 IN | HEART RATE: 102 BPM | SYSTOLIC BLOOD PRESSURE: 118 MMHG | TEMPERATURE: 98.6 F

## 2019-09-19 DIAGNOSIS — I51.9 LEFT VENTRICULAR DYSFUNCTION: ICD-10-CM

## 2019-09-19 DIAGNOSIS — R68.89 IMPAIRED EXERCISE TOLERANCE: ICD-10-CM

## 2019-09-19 DIAGNOSIS — F11.90 CHRONIC NARCOTIC USE: ICD-10-CM

## 2019-09-19 DIAGNOSIS — E10.59 TYPE 1 DIABETES MELLITUS WITH VASCULAR DISEASE (H): Primary | ICD-10-CM

## 2019-09-19 DIAGNOSIS — F32.2 MAJOR DEPRESSIVE DISORDER, SINGLE EPISODE, SEVERE (H): ICD-10-CM

## 2019-09-19 DIAGNOSIS — Z13.6 SCREENING FOR AAA (ABDOMINAL AORTIC ANEURYSM): ICD-10-CM

## 2019-09-19 DIAGNOSIS — Z23 NEED FOR PROPHYLACTIC VACCINATION AND INOCULATION AGAINST INFLUENZA: ICD-10-CM

## 2019-09-19 DIAGNOSIS — I25.10 CORONARY ARTERY DISEASE INVOLVING NATIVE HEART WITHOUT ANGINA PECTORIS, UNSPECIFIED VESSEL OR LESION TYPE: ICD-10-CM

## 2019-09-19 DIAGNOSIS — M48.02 CERVICAL STENOSIS OF SPINE: ICD-10-CM

## 2019-09-19 DIAGNOSIS — N40.1 BENIGN NON-NODULAR PROSTATIC HYPERPLASIA WITH LOWER URINARY TRACT SYMPTOMS: ICD-10-CM

## 2019-09-19 DIAGNOSIS — E10.42 DIABETIC POLYNEUROPATHY ASSOCIATED WITH TYPE 1 DIABETES MELLITUS (H): ICD-10-CM

## 2019-09-19 DIAGNOSIS — E78.5 HYPERLIPIDEMIA LDL GOAL <70: ICD-10-CM

## 2019-09-19 LAB
ALT SERPL W P-5'-P-CCNC: 50 U/L (ref 0–70)
ANION GAP SERPL CALCULATED.3IONS-SCNC: 6 MMOL/L (ref 3–14)
BUN SERPL-MCNC: 14 MG/DL (ref 7–30)
CALCIUM SERPL-MCNC: 9 MG/DL (ref 8.5–10.1)
CHLORIDE SERPL-SCNC: 103 MMOL/L (ref 94–109)
CHOLEST SERPL-MCNC: 124 MG/DL
CO2 SERPL-SCNC: 25 MMOL/L (ref 20–32)
CREAT SERPL-MCNC: 0.75 MG/DL (ref 0.66–1.25)
GFR SERPL CREATININE-BSD FRML MDRD: >90 ML/MIN/{1.73_M2}
GLUCOSE SERPL-MCNC: 253 MG/DL (ref 70–99)
HBA1C MFR BLD: 8 % (ref 0–5.6)
HDLC SERPL-MCNC: 45 MG/DL
LDLC SERPL CALC-MCNC: 57 MG/DL
NONHDLC SERPL-MCNC: 79 MG/DL
POTASSIUM SERPL-SCNC: 4 MMOL/L (ref 3.4–5.3)
SODIUM SERPL-SCNC: 134 MMOL/L (ref 133–144)
TRIGL SERPL-MCNC: 112 MG/DL

## 2019-09-19 PROCEDURE — 83036 HEMOGLOBIN GLYCOSYLATED A1C: CPT | Performed by: FAMILY MEDICINE

## 2019-09-19 PROCEDURE — 99214 OFFICE O/P EST MOD 30 MIN: CPT | Mod: 25 | Performed by: FAMILY MEDICINE

## 2019-09-19 PROCEDURE — 99207 C FOOT EXAM  NO CHARGE: CPT | Mod: 25 | Performed by: FAMILY MEDICINE

## 2019-09-19 PROCEDURE — 36415 COLL VENOUS BLD VENIPUNCTURE: CPT | Performed by: FAMILY MEDICINE

## 2019-09-19 PROCEDURE — G0008 ADMIN INFLUENZA VIRUS VAC: HCPCS | Performed by: FAMILY MEDICINE

## 2019-09-19 PROCEDURE — 90670 PCV13 VACCINE IM: CPT | Performed by: FAMILY MEDICINE

## 2019-09-19 PROCEDURE — 80061 LIPID PANEL: CPT | Performed by: FAMILY MEDICINE

## 2019-09-19 PROCEDURE — 84460 ALANINE AMINO (ALT) (SGPT): CPT | Performed by: FAMILY MEDICINE

## 2019-09-19 PROCEDURE — G0009 ADMIN PNEUMOCOCCAL VACCINE: HCPCS | Performed by: FAMILY MEDICINE

## 2019-09-19 PROCEDURE — 80048 BASIC METABOLIC PNL TOTAL CA: CPT | Performed by: FAMILY MEDICINE

## 2019-09-19 PROCEDURE — 90662 IIV NO PRSV INCREASED AG IM: CPT | Performed by: FAMILY MEDICINE

## 2019-09-19 PROCEDURE — 99397 PER PM REEVAL EST PAT 65+ YR: CPT | Mod: 25 | Performed by: FAMILY MEDICINE

## 2019-09-19 RX ORDER — LOSARTAN POTASSIUM 50 MG/1
50 TABLET ORAL DAILY
Qty: 90 TABLET | Refills: 3 | Status: SHIPPED | OUTPATIENT
Start: 2019-09-19 | End: 2020-09-10

## 2019-09-19 RX ORDER — HYDROCODONE BITARTRATE AND ACETAMINOPHEN 10; 325 MG/1; MG/1
1 TABLET ORAL EVERY 6 HOURS PRN
Qty: 120 TABLET | Refills: 0 | Status: SHIPPED | OUTPATIENT
Start: 2019-10-19 | End: 2019-11-18

## 2019-09-19 RX ORDER — METOPROLOL SUCCINATE 25 MG/1
25 TABLET, EXTENDED RELEASE ORAL DAILY
Qty: 90 TABLET | Refills: 3 | Status: SHIPPED | OUTPATIENT
Start: 2019-09-19 | End: 2020-09-10

## 2019-09-19 RX ORDER — TAMSULOSIN HYDROCHLORIDE 0.4 MG/1
0.4 CAPSULE ORAL DAILY
Qty: 90 CAPSULE | Refills: 3 | Status: SHIPPED | OUTPATIENT
Start: 2019-09-19 | End: 2020-01-02

## 2019-09-19 RX ORDER — ROSUVASTATIN CALCIUM 40 MG/1
TABLET, COATED ORAL
Qty: 90 TABLET | Refills: 3 | Status: SHIPPED | OUTPATIENT
Start: 2019-09-19 | End: 2020-09-16

## 2019-09-19 RX ORDER — HYDROCODONE BITARTRATE AND ACETAMINOPHEN 10; 325 MG/1; MG/1
1 TABLET ORAL EVERY 6 HOURS PRN
Qty: 120 TABLET | Refills: 0 | Status: SHIPPED | OUTPATIENT
Start: 2019-09-19 | End: 2019-12-19

## 2019-09-19 ASSESSMENT — PATIENT HEALTH QUESTIONNAIRE - PHQ9
5. POOR APPETITE OR OVEREATING: NOT AT ALL
SUM OF ALL RESPONSES TO PHQ QUESTIONS 1-9: 6

## 2019-09-19 ASSESSMENT — ANXIETY QUESTIONNAIRES
1. FEELING NERVOUS, ANXIOUS, OR ON EDGE: NOT AT ALL
5. BEING SO RESTLESS THAT IT IS HARD TO SIT STILL: SEVERAL DAYS
3. WORRYING TOO MUCH ABOUT DIFFERENT THINGS: NOT AT ALL
IF YOU CHECKED OFF ANY PROBLEMS ON THIS QUESTIONNAIRE, HOW DIFFICULT HAVE THESE PROBLEMS MADE IT FOR YOU TO DO YOUR WORK, TAKE CARE OF THINGS AT HOME, OR GET ALONG WITH OTHER PEOPLE: NOT DIFFICULT AT ALL
7. FEELING AFRAID AS IF SOMETHING AWFUL MIGHT HAPPEN: NOT AT ALL
2. NOT BEING ABLE TO STOP OR CONTROL WORRYING: NOT AT ALL
GAD7 TOTAL SCORE: 1
6. BECOMING EASILY ANNOYED OR IRRITABLE: NOT AT ALL

## 2019-09-19 ASSESSMENT — MIFFLIN-ST. JEOR: SCORE: 1582.78

## 2019-09-19 ASSESSMENT — PAIN SCALES - GENERAL: PAINLEVEL: SEVERE PAIN (7)

## 2019-09-19 ASSESSMENT — ACTIVITIES OF DAILY LIVING (ADL): CURRENT_FUNCTION: NO ASSISTANCE NEEDED

## 2019-09-19 NOTE — PATIENT INSTRUCTIONS
Prescription sent for your pain medication.  Fill one today and one in 30 days.  You can call in 2 months for another refill.  See me in 3 months.    Mental Health referral to [facility] in [place] - Psychiatric nurse practitioner    Have Stress testing done in [place] [phone]    Schedule ultrasound of abdominal aorta [phone]    The new Shingrix is supposed to be more effective at preventing shingles.  Even if you had Zostavax, this is recommended. I encourage people to check with their pharmacy (or ours) and have them run it through to check the cost.  It's often better covered through your pharmacy benefit.  It is a two part vaccine series - one now and one in 2-6 months.      Our Clinic hours are:  Mondays    7:20 am - 7 pm  Tues -  Fri  7:20 am - 5 pm    Clinic Phone: [phone]    The clinic lab opens at 7:30 am Mon - Fri and appointments are required.    [facility]  Ph. [phone]  Monday  8 am - 7pm  Tues - Fri 8 am - 5:30 pm

## 2019-09-19 NOTE — PROGRESS NOTES
"SUBJECTIVE:   Eb Eisenberg is a 64 year old male who presents for Preventive Visit.    Chief Complaint   Patient presents with     Physical     Diabetes     Flu Shot     Medication Reconciliation     increased prozac to 4 a day       Are you in the first 12 months of your Medicare coverage?  No    Healthy Habits:    In general, how would you rate your overall health?  Fair    Frequency of exercise:  None    Duration of exercise:  Less than 15 minutes    Do you usually eat at least 4 servings of fruit and vegetables a day, include whole grains    & fiber and avoid regularly eating high fat or \"junk\" foods?  Yes    Taking medications regularly:  Yes    Barriers to taking medications:  None    Medication side effects:  None    Ability to successfully perform activities of daily living:  No assistance needed    Home Safety:  No safety concerns identified    Hearing Impairment:  No hearing concerns    In the past 6 months, have you been bothered by leaking of urine?  No    In general, how would you rate your overall mental or emotional health?  Fair      PHQ-2 Total Score:    Additional concerns today:  No    Do you feel safe in your environment? Yes    Do you have a Health Care Directive? Yes: Advance Directive has been received and scanned.      Fall risk  Fallen 2 or more times in the past year?: No  Any fall with injury in the past year?: No    Cognitive Screening   1) Repeat 3 items (Leader, Season, Table)    2) Clock draw: NORMAL  3) 3 item recall: Recalls 2 objects   Results: NORMAL clock, 1-2 items recalled: COGNITIVE IMPAIRMENT LESS LIKELY    Mini-CogTM Copyright ARSENIO Meza. Licensed by the author for use in [facility]; reprinted with permission (johnny@.[email or web address]). All rights reserved.      Do you have sleep apnea, excessive snoring or daytime drowsiness?: yes    Reviewed and updated as needed this visit by clinical staff  Tobacco  Allergies  Meds  Problems  Med Hx  Surg Hx  Fam Hx  Soc Hx      "     Reviewed and updated as needed this visit by Provider        Social History     Tobacco Use     Smoking status: Former Smoker     Last attempt to quit: 2001     Years since quittin.7     Smokeless tobacco: Never Used   Substance Use Topics     Alcohol use: No     Comment: quit in early '80s     If you drink alcohol do you typically have >3 drinks per day or >7 drinks per week? No    Alcohol Use 2012   Prescreen: >3 drinks/day or >7 drinks/week? The patient does not drink >3 drinks per day nor >7 drinks per week.   No flowsheet data found.        Diabetes Follow-up      How often are you checking your blood sugar? Two times daily    What time of day are you checking your blood sugars (select all that apply)?  Before and after meals    Have you had any blood sugars above 200?  Yes occ    Have you had any blood sugars below 70?  No    What symptoms do you notice when your blood sugar is low?  Shaky, Dizzy and Weak    What concerns do you have today about your diabetes? None     Do you have any of these symptoms? (Select all that apply)  No numbness or tingling in feet.  No redness, sores or blisters on feet.  No complaints of excessive thirst.  No reports of blurry vision.  No significant changes to weight.     Have you had a diabetic eye exam in the last 12 months? Yes- Date of last eye exam: 2019 [facility]    Diabetes Management Resources    Hyperlipidemia Follow-Up      Are you having any of the following symptoms? (Select all that apply)  No complaints of shortness of breath, chest pain or pressure.  No increased sweating or nausea with activity.  No left-sided neck or arm pain.  No complaints of pain in calves when walking 1-2 blocks.    Are you regularly taking any medication or supplement to lower your cholesterol?   Yes- Crestor    Are you having muscle aches or other side effects that you think could be caused by your cholesterol lowering medication?  No    Hypertension Follow-up      Do you  check your blood pressure regularly outside of the clinic? No     Are you following a low salt diet? Yes    Are your blood pressures ever more than 140 on the top number (systolic) OR more   than 90 on the bottom number (diastolic), for example 140/90? No    BP Readings from Last 2 Encounters:   09/19/19 118/64   05/15/19 112/54     Hemoglobin A1C (%)   Date Value   09/19/2019 8.0 (H)   01/25/2019 7.9 (H)     LDL Cholesterol Calculated (mg/dL)   Date Value   10/10/2018 89   10/05/2017 49       Current providers sharing in care for this patient include:   Patient Care Team:  Jackeline Rachel MD as PCP - General  Jackeline Rachel MD as Assigned PCP  Christina Espinosa RN as Diabetes Educator (Diabetes Education)    The following health maintenance items are reviewed in Epic and correct as of today:  Health Maintenance   Topic Date Due     HF ACTION PLAN  1955     URINE DRUG SCREEN  1955     ZOSTER IMMUNIZATION (1 of 2) 01/20/2004     DIABETIC FOOT EXAM  07/08/2017     MEDICARE ANNUAL WELLNESS VISIT  01/20/2019     FALL RISK ASSESSMENT  01/20/2019     PNEUMOCOCCAL IMMUNIZATION 65+ LOW/MEDIUM RISK (1 of 2 - PCV13) 01/20/2019     AORTIC ANEURYSM SCREENING (SYSTEM ASSIGNED)  01/20/2019     MICROALBUMIN  04/05/2019     CBC  06/22/2019     A1C  07/25/2019     BMP  07/25/2019     PHQ-9  07/25/2019     INFLUENZA VACCINE (1) 09/01/2019     ALT  10/10/2019     LIPID  10/10/2019     VIVIANA ASSESSMENT  10/10/2019     EYE EXAM  02/07/2020     TSH W/FREE T4 REFLEX  06/22/2020     DTAP/TDAP/TD IMMUNIZATION (3 - Td) 08/21/2020     ADVANCE CARE PLANNING  07/12/2021     COLONOSCOPY  04/08/2024     HEPATITIS C SCREENING  Completed     HIV SCREENING  Completed     DEPRESSION ACTION PLAN  Completed     IPV IMMUNIZATION  Aged Out     MENINGITIS IMMUNIZATION  Aged Out     Lab work is in process  Labs reviewed in EPIC  Pneumonia Vaccine:Adults age 65+ who received their first dose of Pneumovax (PPSV23) prior to age 65 years: Should be  "given PCV 13 > 1 year after their most recent PPSV23 AND should be given a another dose of PPSV23 > 5 years after their most recent dose of PPSV23    Review of Systems  CONSTITUTIONAL: NEGATIVE for fever, chills, change in weight  INTEGUMENTARY/SKIN: NEGATIVE for worrisome rashes, moles or lesions  EYES: NEGATIVE for vision changes or irritation  ENT/MOUTH: NEGATIVE for ear, mouth and throat problems  RESP:POSITIVE for dyspnea on exertion - feels that this could be due to lack of activity/deconditioning, but does have heart disease  BREAST: NEGATIVE for masses, tenderness or discharge  CV: NEGATIVE for chest pain, palpitations or peripheral edema  GI: NEGATIVE for nausea, abdominal pain, heartburn, or change in bowel habits  : NEGATIVE for frequency, dysuria, or hematuria  MUSCULOSKELETAL:more aches/pain than usual.  Was unable to get his norco refilled due to new laws, did wean himself off over a few weeks and has been completely out for a few weeks  NEURO: NEGATIVE for weakness, dizziness or paresthesias  ENDOCRINE: NEGATIVE for temperature intolerance, skin/hair changes  HEME: NEGATIVE for bleeding problems  PSYCHIATRIC: anhedonia, no motivation to do anything, feels moods are \"blah\" but no real change. On his own went from 60 mg fluoxetine to taking 80mg - I did tell him to back down to 60 mg daily.   He's interested in seeing Psych NP.    OBJECTIVE:   /64   Pulse 102   Temp 98.6  F (37  C) (Tympanic)   Resp 18   Ht 1.753 m (5' 9\")   Wt 80.7 kg (178 lb)   SpO2 95%   BMI 26.29 kg/m   Estimated body mass index is 26.29 kg/m  as calculated from the following:    Height as of this encounter: 1.753 m (5' 9\").    Weight as of this encounter: 80.7 kg (178 lb).  Physical Exam  GENERAL: healthy, alert and no distress  EYES: Eyes grossly normal to inspection, PERRL and conjunctivae and sclerae normal  HENT: ear canals and TM's normal, nose and mouth without ulcers or lesions  NECK: no adenopathy, no " asymmetry, masses, or scars and thyroid normal to palpation  RESP: lungs clear to auscultation - no rales, rhonchi or wheezes  CV: regular rate and rhythm, normal S1 S2, no S3 or S4, no murmur, click or rub, no peripheral edema and peripheral pulses strong  ABDOMEN: soft, nontender, no hepatosplenomegaly, no masses and bowel sounds normal  MS: no gross musculoskeletal defects noted, no edema  SKIN: no suspicious lesions or rashes  NEURO: Normal strength and tone, mentation intact and speech normal  PSYCH: mentation appears normal and affect flat    Diabetic Foot Screen:  Any complaints of increased pain or numbness ? No  Is there a foot ulcer now or a history of foot ulcer? No  Does the foot have an abnormal shape? No  Are the nails thick, too long or ingrown? No  Are there any redness or open areas? No         Sensation Testing done at all points on the diagram with monofilament     Right Foot: Sensation Absent at the following points , 1, 4 and 9  Left Foot: Sensation Absent at the following points , 4, 5 and 9     Risk Category: 1- Loss of protective sensation with normal appearing foot (no weakness, deformity, callous, pre-ulcer or h/o ulceration)  Performed by Jackeline Rachel MD                  Diagnostic Test Results:  Labs reviewed in Epic    ASSESSMENT / PLAN:   1. Type 1 diabetes mellitus with vascular disease (H)  Control is fair at 8%, as a type 1 diabetic, he's fluctuated over the years, but this is pretty average.   Doing well with the dexcom sensor as he has hypoglycemia unawareness   - Hemoglobin A1c  - ALT  - Basic metabolic panel  - Lipid panel reflex to direct LDL Fasting  - blood glucose (CONTOUR NEXT TEST) test strip; Use to test blood sugar 8 times daily.  Patient tests frequently due to insulin pump and marked excursions from normal blood sugars.  Dispense: 800 strip; Refill: 6  - insulin aspart (NOVOLOG VIAL) 100 UNITS/ML vial; Uses about 70 units/day.  As directed per insulin pump   Dispense: 90 mL; Refill: 3  - Albumin Random Urine Quantitative with Creat Ratio; Future  - FOOT EXAM  - Albumin Random Urine Quantitative with Creat Ratio; Future  - OFFICE/OUTPT VISIT,EST,LEVL IV    2. Major depressive disorder, single episode, severe (H)  Continue fluoxetine for now and see Psych NP    - FLUoxetine (PROZAC) 20 MG capsule; Take 3 capsules (60 mg) by mouth daily  Dispense: 360 capsule; Refill: 1  - MENTAL HEALTH REFERRAL  - Adult; Psychiatry and Medication Management; Psychiatry; [facility]: [facility] Psychiatry Service [phone].  Medication management & future refills will be returned to [facility] PCP upon completion of evaluation; We daniela...  - OFFICE/OUTPT VISIT,EST,LEVL IV    3. Chronic narcotic use  Told to call in 2 months for refill and see me in 3 months.  - HYDROcodone-acetaminophen (NORCO)  MG per tablet; Take 1 tablet by mouth every 6 hours as needed for moderate to severe pain  Dispense: 120 tablet; Refill: 0  - HYDROcodone-acetaminophen (NORCO)  MG per tablet; Take 1 tablet by mouth every 6 hours as needed for moderate to severe pain  Dispense: 120 tablet; Refill: 0  - OFFICE/OUTPT VISIT,EST,LEVL IV    4. Diabetic polyneuropathy associated with type 1 diabetes mellitus (H)     - HYDROcodone-acetaminophen (NORCO)  MG per tablet; Take 1 tablet by mouth every 6 hours as needed for moderate to severe pain  Dispense: 120 tablet; Refill: 0  - HYDROcodone-acetaminophen (NORCO)  MG per tablet; Take 1 tablet by mouth every 6 hours as needed for moderate to severe pain  Dispense: 120 tablet; Refill: 0  - metoprolol succinate ER (TOPROL-XL) 25 MG 24 hr tablet; Take 1 tablet (25 mg) by mouth daily  Dispense: 90 tablet; Refill: 3  - OFFICE/OUTPT VISIT,EST,LEVL IV    5. Cervical stenosis of spine     - HYDROcodone-acetaminophen (NORCO)  MG per tablet; Take 1 tablet by mouth every 6 hours as needed for moderate to severe pain  Dispense: 120 tablet; Refill: 0  -  HYDROcodone-acetaminophen (NORCO)  MG per tablet; Take 1 tablet by mouth every 6 hours as needed for moderate to severe pain  Dispense: 120 tablet; Refill: 0    6. Left ventricular dysfunction     - losartan (COZAAR) 50 MG tablet; Take 1 tablet (50 mg) by mouth daily  Dispense: 90 tablet; Refill: 3  - metoprolol succinate ER (TOPROL-XL) 25 MG 24 hr tablet; Take 1 tablet (25 mg) by mouth daily  Dispense: 90 tablet; Refill: 3    7. Hyperlipidemia LDL goal <70     - rosuvastatin (CRESTOR) 40 MG tablet; TAKE 1 TABLET EVERY DAY  Dispense: 90 tablet; Refill: 3    8. Benign non-nodular prostatic hyperplasia with lower urinary tract symptoms     - tamsulosin (FLOMAX) 0.4 MG capsule; Take 1 capsule (0.4 mg) by mouth daily  Dispense: 90 capsule; Refill: 3    9. Screening for AAA (abdominal aortic aneurysm)     - US Abdominal Aorta Imaging; Future    10. Impaired exercise tolerance     - NM Lexiscan stress test; Future  - OFFICE/OUTPT VISIT,EST,LEVL IV    11. Coronary artery disease involving native heart without angina pectoris, unspecified vessel or lesion type   Given abnormal CT angiogram 2009, his type I diabetes, dyspnea on exertion and decreased exercise tolerance, he is at high risk for non-classic anginal symptoms and this being his anginal equivalent.  He needs further testing to rule out occlusive CAD/ischemia.      EKG:  NSR and unchanged from previous, no acute ST/T changes  - NM Lexiscan stress test; Future  - OFFICE/OUTPT VISIT,EST,LEVL IV    12. Need for prophylactic vaccination and inoculation against influenza     - INFLUENZA (HIGH DOSE) 3 VALENT VACCINE [83434]  - Pneumococcal vaccine 13 valent PCV13 IM (Prevnar) [82302]  - ADMIN PNEUMOVAX VACCINE (For MEDICARE Patients ONLY) []    End of Life Planning:  Patient currently has an advanced directive: No.  I have verified the patient's ablity to prepare an advanced directive/make health care decisions.  Literature was provided to assist patient in  "preparing an advanced directive.    COUNSELING:  Reviewed preventive health counseling, as reflected in patient instructions       Regular exercise       Healthy diet/nutrition    Estimated body mass index is 26.29 kg/m  as calculated from the following:    Height as of this encounter: 1.753 m (5' 9\").    Weight as of this encounter: 80.7 kg (178 lb).         reports that he quit smoking about 18 years ago. He has never used smokeless tobacco.      Appropriate preventive services were discussed with this patient, including applicable screening as appropriate for cardiovascular disease, diabetes, osteopenia/osteoporosis, and glaucoma.  As appropriate for age/gender, discussed screening for colorectal cancer, prostate cancer, breast cancer, and cervical cancer. Checklist reviewing preventive services available has been given to the patient.    Reviewed patients plan of care and provided an AVS. The Complex Care Plan (for patients with higher acuity and needing more deliberate coordination of services) for Eb meets the Care Plan requirement. This Care Plan has been established and reviewed with the Patient.      Patient Instructions   Prescription sent for your pain medication.  Fill one today and one in 30 days.  You can call in 2 months for another refill.  See me in 3 months.    Mental Health referral to [facility] in [place] - Psychiatric nurse practitioner    Have Stress testing done in [place] [phone]    Schedule ultrasound of abdominal aorta [phone]    The new Shingrix is supposed to be more effective at preventing shingles.  Even if you had Zostavax, this is recommended. I encourage people to check with their pharmacy (or ours) and have them run it through to check the cost.  It's often better covered through your pharmacy benefit.  It is a two part vaccine series - one now and one in 2-6 months.      Our Clinic hours are:  Mondays    7:20 am - 7 pm  Tues -  Fri  7:20 am - 5 pm    Clinic " Phone: [phone]    The clinic lab opens at 7:30 am Mon - Fri and appointments are required.    [facility] [place]  Ph. [phone]  Monday  8 am - 7pm  Tues - Fri 8 am - 5:30 pm             Counseling Resources:  ATP IV Guidelines  Pooled Cohorts Equation Calculator  Breast Cancer Risk Calculator  FRAX Risk Assessment  ICSI Preventive Guidelines  Dietary Guidelines for Americans, 2010  USDA's MyPlate  ASA Prophylaxis  Lung CA Screening    Jackeline Rachel MD  [facility]    Identified Health Risks:

## 2019-09-20 ENCOUNTER — TELEPHONE (OUTPATIENT)
Dept: FAMILY MEDICINE | Facility: CLINIC | Age: 64
End: 2019-09-20

## 2019-09-20 DIAGNOSIS — R68.89 EXERCISE INTOLERANCE: Primary | ICD-10-CM

## 2019-09-20 DIAGNOSIS — E10.59 TYPE 1 DIABETES MELLITUS WITH VASCULAR DISEASE (H): ICD-10-CM

## 2019-09-20 DIAGNOSIS — I25.10 CORONARY ARTERY DISEASE INVOLVING NATIVE HEART WITHOUT ANGINA PECTORIS, UNSPECIFIED VESSEL OR LESION TYPE: ICD-10-CM

## 2019-09-20 LAB
CREAT UR-MCNC: 57 MG/DL
MICROALBUMIN UR-MCNC: 9 MG/L
MICROALBUMIN/CREAT UR: 15.35 MG/G CR (ref 0–17)

## 2019-09-20 PROCEDURE — 82043 UR ALBUMIN QUANTITATIVE: CPT | Performed by: FAMILY MEDICINE

## 2019-09-20 ASSESSMENT — ANXIETY QUESTIONNAIRES: GAD7 TOTAL SCORE: 1

## 2019-09-20 NOTE — TELEPHONE ENCOUNTER
Needs to have an EKG done before [employer] will okay the stress testing.    EKG has been ordered.    Jackeline Rachel M.D.

## 2019-09-20 NOTE — TELEPHONE ENCOUNTER
Reason for Call:  Other-Prior Authorization    Detailed comments: Want us to call them back. will fax request    Phone Number Patient can be reached at: Other phone number:      Best Time: Any    Can we leave a detailed message on this number? YES    Call taken on 9/20/2019 at 3:01 PM by Leticia Blue

## 2019-09-20 NOTE — TELEPHONE ENCOUNTER
Form on  desk.  After review, does pt need an EKG prior to having the NM Lexiscan stress test?  Advise.  Karina

## 2019-09-23 ENCOUNTER — ALLIED HEALTH/NURSE VISIT (OUTPATIENT)
Dept: FAMILY MEDICINE | Facility: CLINIC | Age: 64
End: 2019-09-23
Payer: COMMERCIAL

## 2019-09-23 DIAGNOSIS — I25.10 CORONARY ARTERY DISEASE INVOLVING NATIVE HEART WITHOUT ANGINA PECTORIS, UNSPECIFIED VESSEL OR LESION TYPE: ICD-10-CM

## 2019-09-23 DIAGNOSIS — R68.89 EXERCISE INTOLERANCE: ICD-10-CM

## 2019-09-23 PROCEDURE — 99207 ZZC NO CHARGE LOS: CPT

## 2019-09-23 PROCEDURE — 93000 ELECTROCARDIOGRAM COMPLETE: CPT

## 2019-09-23 NOTE — PROGRESS NOTES
Chief Complaint   Patient presents with     Heart Problem     here today to have and EKG done for further testing     Had Dr Rachel look at it before I unhooked him.

## 2019-09-24 ENCOUNTER — TELEPHONE (OUTPATIENT)
Dept: FAMILY MEDICINE | Facility: CLINIC | Age: 64
End: 2019-09-24

## 2019-09-24 NOTE — TELEPHONE ENCOUNTER
Pt not needing strips now.  Having new diabetic pump delivered tomorrow.  Ptwill call back with new Pharmacy name and request tomorrow. KpaNikia

## 2019-09-24 NOTE — TELEPHONE ENCOUNTER
Reason for Call:  Prescription Issue    Detailed comments: Per fax from [facility] - Contour Test Strip Rx has been rejected.  Maybe they need a new Rx for generic test strip so that it will be covered by pt's insurance?      Phone Number Patient can be reached at: Home number on file [phone] (home)    Best Time: ?    Can we leave a detailed message on this number? UKN    Call taken on 9/24/2019 at 12:38 PM by Beverley Randolph

## 2019-09-25 ENCOUNTER — HOSPITAL ENCOUNTER (OUTPATIENT)
Dept: ULTRASOUND IMAGING | Facility: CLINIC | Age: 64
Discharge: HOME | End: 2019-09-25
Attending: FAMILY MEDICINE | Admitting: FAMILY MEDICINE
Payer: COMMERCIAL

## 2019-09-25 DIAGNOSIS — Z13.6 SCREENING FOR AAA (ABDOMINAL AORTIC ANEURYSM): ICD-10-CM

## 2019-09-25 PROCEDURE — 76775 US EXAM ABDO BACK WALL LIM: CPT

## 2019-09-30 ENCOUNTER — HOSPITAL ENCOUNTER (OUTPATIENT)
Dept: NUCLEAR MEDICINE | Facility: CLINIC | Age: 64
Setting detail: NUCLEAR MEDICINE
Discharge: HOME | End: 2019-09-30
Attending: FAMILY MEDICINE | Admitting: FAMILY MEDICINE
Payer: COMMERCIAL

## 2019-09-30 DIAGNOSIS — I25.10 CORONARY ARTERY DISEASE INVOLVING NATIVE HEART WITHOUT ANGINA PECTORIS, UNSPECIFIED VESSEL OR LESION TYPE: ICD-10-CM

## 2019-09-30 DIAGNOSIS — R68.89 IMPAIRED EXERCISE TOLERANCE: ICD-10-CM

## 2019-09-30 PROCEDURE — A9502 TC99M TETROFOSMIN: HCPCS | Performed by: INTERNAL MEDICINE

## 2019-09-30 PROCEDURE — 93017 CV STRESS TEST TRACING ONLY: CPT

## 2019-09-30 PROCEDURE — 93018 CV STRESS TEST I&R ONLY: CPT | Performed by: INTERNAL MEDICINE

## 2019-09-30 PROCEDURE — 78452 HT MUSCLE IMAGE SPECT MULT: CPT | Mod: 26 | Performed by: INTERNAL MEDICINE

## 2019-09-30 PROCEDURE — 78452 HT MUSCLE IMAGE SPECT MULT: CPT

## 2019-09-30 PROCEDURE — 25000128 H RX IP 250 OP 636: Performed by: FAMILY MEDICINE

## 2019-09-30 PROCEDURE — 93016 CV STRESS TEST SUPVJ ONLY: CPT | Performed by: INTERNAL MEDICINE

## 2019-09-30 PROCEDURE — 34300033 ZZH RX 343: Performed by: INTERNAL MEDICINE

## 2019-09-30 RX ORDER — REGADENOSON 0.08 MG/ML
0.4 INJECTION, SOLUTION INTRAVENOUS ONCE
Status: COMPLETED | OUTPATIENT
Start: 2019-09-30 | End: 2019-09-30

## 2019-09-30 RX ADMIN — TETROFOSMIN 10.3 MCI.: 1.38 INJECTION, POWDER, LYOPHILIZED, FOR SOLUTION INTRAVENOUS at 13:15

## 2019-09-30 RX ADMIN — REGADENOSON 0.4 MG: 0.08 INJECTION, SOLUTION INTRAVENOUS at 14:05

## 2019-09-30 RX ADMIN — TETROFOSMIN 30.9 MCI.: 1.38 INJECTION, POWDER, LYOPHILIZED, FOR SOLUTION INTRAVENOUS at 14:09

## 2019-10-02 ENCOUNTER — TELEPHONE (OUTPATIENT)
Dept: FAMILY MEDICINE | Facility: CLINIC | Age: 64
End: 2019-10-02

## 2019-10-02 DIAGNOSIS — E10.59 TYPE 1 DIABETES MELLITUS WITH VASCULAR DISEASE (H): Primary | ICD-10-CM

## 2019-10-02 NOTE — TELEPHONE ENCOUNTER
Prescription approved per [facility] Refill Protocol.    Thank you    Zoie MARVIN RN

## 2019-10-02 NOTE — TELEPHONE ENCOUNTER
Reason for Call:  Prescription Issue    Detailed comments: Per fax from [employer] - Contour Test strips are not convered by pt's insurance.  Please resend Rx for generic test strips so that they will be covered.      Phone Number Patient can be reached at: Home number on file [phone] (home)    Best Time: any    Can we leave a detailed message on this number? UKN    Call taken on 10/2/2019 at 10:55 AM by Beverley Randolph

## 2019-10-18 ENCOUNTER — TELEPHONE (OUTPATIENT)
Dept: FAMILY MEDICINE | Facility: CLINIC | Age: 64
End: 2019-10-18

## 2019-10-18 NOTE — TELEPHONE ENCOUNTER
Dr Rachel,     Pt has Norco script written on 9/19 for start date 10/19/19 pt fills at [facility], ok to fill today?

## 2019-11-18 ENCOUNTER — TELEPHONE (OUTPATIENT)
Dept: FAMILY MEDICINE | Facility: CLINIC | Age: 64
End: 2019-11-18

## 2019-11-18 DIAGNOSIS — M48.02 CERVICAL STENOSIS OF SPINE: ICD-10-CM

## 2019-11-18 DIAGNOSIS — F11.90 CHRONIC NARCOTIC USE: ICD-10-CM

## 2019-11-18 DIAGNOSIS — E10.42 DIABETIC POLYNEUROPATHY ASSOCIATED WITH TYPE 1 DIABETES MELLITUS (H): ICD-10-CM

## 2019-11-18 RX ORDER — HYDROCODONE BITARTRATE AND ACETAMINOPHEN 10; 325 MG/1; MG/1
1 TABLET ORAL EVERY 6 HOURS PRN
Qty: 120 TABLET | Refills: 0 | Status: SHIPPED | OUTPATIENT
Start: 2019-11-18 | End: 2019-12-19

## 2019-11-18 NOTE — TELEPHONE ENCOUNTER
[facility] refill.  Last seen 9/19/19.  Last written 10/19/19 #120 + 0 refills.  Appt scheduled for 12/19/19.  Advise.  Eliza

## 2019-11-18 NOTE — TELEPHONE ENCOUNTER
3. Chronic narcotic use  Told to call in 2 months for refill and see me in 3 months.  - HYDROcodone-acetaminophen (NORCO)  MG per tablet; Take 1 tablet by mouth every 6 hours as needed for moderate to severe pain  Dispense: 120 tablet; Refill: 0  - HYDROcodone-acetaminophen (NORCO)  MG per tablet; Take 1 tablet by mouth every 6 hours as needed for moderate to severe pain  Dispense: 120 tablet; Refill: 0  - OFFICE/OUTPT VISIT,EST,LEVL IV    Patient is correct.  Prescription sent to pharmacy.    Jackeline Rachel M.D.

## 2019-11-18 NOTE — TELEPHONE ENCOUNTER
Last Written Prescription Date:  10/19/2019 Norco  Last Fill Quantity: 120,  # refills: 0   Last office visit: 9/23/2019 with prescribing provider:  Wilson   Future Office Visit:        Patient states that Dr. Rachel said he could call for the next refill, and does not need to be seen. Please send to our [facility].  [facility]  Clinic Station  Flex

## 2019-12-19 ENCOUNTER — OFFICE VISIT (OUTPATIENT)
Dept: FAMILY MEDICINE | Facility: CLINIC | Age: 64
End: 2019-12-19
Payer: COMMERCIAL

## 2019-12-19 VITALS
TEMPERATURE: 98 F | RESPIRATION RATE: 16 BRPM | BODY MASS INDEX: 27.2 KG/M2 | OXYGEN SATURATION: 91 % | WEIGHT: 184.2 LBS | HEART RATE: 82 BPM | DIASTOLIC BLOOD PRESSURE: 54 MMHG | SYSTOLIC BLOOD PRESSURE: 104 MMHG

## 2019-12-19 DIAGNOSIS — E10.42 DIABETIC POLYNEUROPATHY ASSOCIATED WITH TYPE 1 DIABETES MELLITUS (H): ICD-10-CM

## 2019-12-19 DIAGNOSIS — M48.02 CERVICAL STENOSIS OF SPINE: ICD-10-CM

## 2019-12-19 DIAGNOSIS — F11.90 CHRONIC NARCOTIC USE: ICD-10-CM

## 2019-12-19 PROCEDURE — 99214 OFFICE O/P EST MOD 30 MIN: CPT | Performed by: FAMILY MEDICINE

## 2019-12-19 RX ORDER — HYDROCODONE BITARTRATE AND ACETAMINOPHEN 10; 325 MG/1; MG/1
1 TABLET ORAL EVERY 6 HOURS PRN
Qty: 120 TABLET | Refills: 0 | Status: SHIPPED | OUTPATIENT
Start: 2019-12-19 | End: 2020-03-16

## 2019-12-19 RX ORDER — HYDROCODONE BITARTRATE AND ACETAMINOPHEN 10; 325 MG/1; MG/1
1 TABLET ORAL EVERY 6 HOURS PRN
Qty: 120 TABLET | Refills: 0 | Status: SHIPPED | OUTPATIENT
Start: 2020-01-17 | End: 2020-01-20

## 2019-12-19 NOTE — PATIENT INSTRUCTIONS
Refilled x 2 months - Norco.  Call in 2 months for the third month refill    See me in 3 months.    - MENTAL HEALTH REFERRAL  - Adult; Psychiatry and Medication Management; Psychiatry; [facility] Psychiatry Service [phone].  Medication management & future refills will be returned to G PCP upon completion of evaluation    Jackeline Rachel M.D.      Our Clinic hours are:  Mondays    7:20 am - 7 pm  Tues -  Fri  7:20 am - 5 pm    Clinic Phone: [phone]    The clinic lab opens at 7:30 am Mon - Fri and appointments are required.    [facility] [place]  Ph. [phone]  Monday  8 am - 7pm  Tues - Fri 8 am - 5:30 pm

## 2019-12-19 NOTE — PROGRESS NOTES
Subjective     Eb Eisenberg is a 64 year old male who presents to clinic today for the following health issues:    HPI   Medication Followup of nORCO    Taking Medication as prescribed: yes    Side Effects:  None    Medication Helping Symptoms:  yes   CONTROLLING symptoms in general.        Reviewed and updated as needed this visit by Provider         Review of Systems   ROS COMP: Constitutional, HEENT, cardiovascular, pulmonary, gi and gu systems are negative, except as otherwise noted.      Objective    /54 (Cuff Size: Adult Regular)   Pulse 82   Temp 98  F (36.7  C) (Tympanic)   Resp 16   Wt 83.6 kg (184 lb 3.2 oz)   SpO2 91%   BMI 27.20 kg/m    Body mass index is 27.2 kg/m .  Physical Exam   GENERAL: healthy, alert and no distress  PSYCH: mentation appears normal, affect normal/bright    Diagnostic Test Results:  Labs reviewed in Epic        Assessment & Plan       ICD-10-CM    1. Chronic narcotic use F11.90 HYDROcodone-acetaminophen (NORCO)  MG per tablet     HYDROcodone-acetaminophen (NORCO)  MG per tablet   2. Diabetic polyneuropathy associated with type 1 diabetes mellitus (H) E10.42 HYDROcodone-acetaminophen (NORCO)  MG per tablet     HYDROcodone-acetaminophen (NORCO)  MG per tablet   3. Cervical stenosis of spine M48.02 HYDROcodone-acetaminophen (NORCO)  MG per tablet     HYDROcodone-acetaminophen (NORCO)  MG per tablet   due for diabetes check in 3 months.           Return in about 3 months (around 3/19/2020).    Jackeline Rachel MD  [facility]

## 2019-12-31 DIAGNOSIS — E10.59 TYPE 1 DIABETES MELLITUS WITH VASCULAR DISEASE (H): ICD-10-CM

## 2019-12-31 RX ORDER — PROCHLORPERAZINE 25 MG/1
1 SUPPOSITORY RECTAL CONTINUOUS
Qty: 1 EACH | Refills: 3 | Status: CANCELLED | OUTPATIENT
Start: 2019-12-31

## 2019-12-31 RX ORDER — PROCHLORPERAZINE 25 MG/1
1 SUPPOSITORY RECTAL CONTINUOUS
Qty: 3 EACH | Refills: 11 | Status: SHIPPED | OUTPATIENT
Start: 2019-12-31 | End: 2020-01-08

## 2019-12-31 RX ORDER — PROCHLORPERAZINE 25 MG/1
1 SUPPOSITORY RECTAL CONTINUOUS
Qty: 1 DEVICE | Refills: 0 | Status: CANCELLED | OUTPATIENT
Start: 2019-12-31

## 2019-12-31 RX ORDER — PROCHLORPERAZINE 25 MG/1
1 SUPPOSITORY RECTAL CONTINUOUS
Qty: 1 EACH | Refills: 3 | Status: SHIPPED | OUTPATIENT
Start: 2019-12-31 | End: 2020-01-08

## 2019-12-31 RX ORDER — PROCHLORPERAZINE 25 MG/1
1 SUPPOSITORY RECTAL CONTINUOUS
Qty: 1 DEVICE | Refills: 0 | Status: SHIPPED | OUTPATIENT
Start: 2019-12-31 | End: 2020-01-08

## 2019-12-31 NOTE — TELEPHONE ENCOUNTER
Requested Prescriptions   Pending Prescriptions Disp Refills     Continuous Blood Gluc  (DEXCOM G6 ) VIOT 1 Device 0     Si Device continuous       There is no refill protocol information for this order        Last Written Prescription Date:  2019  Last Fill Quantity: 1,   # refills: 0  Last Office Visit: 2019 with    Future Office visit:       Routing refill request to provider for review/approval because:  Drug not on the [facility], [facility] or [facility] refill protocol or controlled substance          Continuous Blood Gluc Sensor (DEXCOM G6 SENSOR) MISC 3 each 11     Si Box continuous       There is no refill protocol information for this order        Last Written Prescription Date: 2019  Last Fill Quantity: 3,   # refills: 11  Last Office Visit: 2019 with    Future Office visit:       Routing refill request to provider for review/approval because:  Drug not on the [facility], [facility] or [facility] refill protocol or controlled substance          Continuous Blood Gluc Transmit (DEXCOM G6 TRANSMITTER) MISC 1 each 3     Si Device continuous       There is no refill protocol information for this order        Last Written Prescription Date:  2019  Last Fill Quantity: 1,   # refills: 3  Last Office Visit: 2019 with    Future Office visit:       Routing refill request to provider for review/approval because:  Drug not on the [facility], [facility] or [facility] refill protocol or controlled substance

## 2020-01-01 DIAGNOSIS — N40.1 BENIGN NON-NODULAR PROSTATIC HYPERPLASIA WITH LOWER URINARY TRACT SYMPTOMS: ICD-10-CM

## 2020-01-02 ENCOUNTER — TELEPHONE (OUTPATIENT)
Dept: FAMILY MEDICINE | Facility: CLINIC | Age: 65
End: 2020-01-02

## 2020-01-02 DIAGNOSIS — E10.319 TYPE 1 DIABETES MELLITUS WITH RETINOPATHY, MACULAR EDEMA PRESENCE UNSPECIFIED, UNSPECIFIED LATERALITY, UNSPECIFIED RETINOPATHY SEVERITY (H): ICD-10-CM

## 2020-01-02 DIAGNOSIS — E10.59 TYPE 1 DIABETES MELLITUS WITH VASCULAR DISEASE (H): Primary | ICD-10-CM

## 2020-01-02 RX ORDER — PROCHLORPERAZINE 25 MG/1
1 SUPPOSITORY RECTAL
Qty: 1 EACH | Refills: 3 | Status: SHIPPED | OUTPATIENT
Start: 2020-01-02 | End: 2020-01-08

## 2020-01-02 RX ORDER — TAMSULOSIN HYDROCHLORIDE 0.4 MG/1
CAPSULE ORAL
Qty: 90 CAPSULE | Refills: 2 | Status: SHIPPED | OUTPATIENT
Start: 2020-01-02 | End: 2020-09-15

## 2020-01-02 RX ORDER — PROCHLORPERAZINE 25 MG/1
1 SUPPOSITORY RECTAL CONTINUOUS
Qty: 1 DEVICE | Refills: 0 | Status: SHIPPED | OUTPATIENT
Start: 2020-01-02 | End: 2020-01-08

## 2020-01-02 RX ORDER — PROCHLORPERAZINE 25 MG/1
1 SUPPOSITORY RECTAL
Qty: 3 EACH | Refills: 11 | Status: SHIPPED | OUTPATIENT
Start: 2020-01-02 | End: 2020-01-08

## 2020-01-02 NOTE — TELEPHONE ENCOUNTER
LM that Dexcom and supply orders were sent to Pharmacy.  Pt to check with Pharmacy that they had all the info needed to fill.   Unable to reach Pharmacy(not taking calls) to check if needed more documentation.  KPavelRN

## 2020-01-02 NOTE — TELEPHONE ENCOUNTER
Let patient know that orders for Dexcom and sensors/transmitters was sent directly to [facility] in [place].    Jackeline Rachel M.D.

## 2020-01-02 NOTE — TELEPHONE ENCOUNTER
Reason for Call: Request for an order or referral:    Order or referral being requested: [facility] Medicare is requiring documentation for pt's Dexcom blood sugar system.  Fax placed on MD's desk    Date needed: at your convenience    Has the patient been seen by the PCP for this problem? YES    Additional comments:     Call taken on 1/2/2020 at 8:42 AM by Beverley Randolph

## 2020-01-02 NOTE — TELEPHONE ENCOUNTER
Pharmacy didn't know what info they would need, that info would come to us from Corporate.  LM with Sagar Echeverria,  Rep, to return call as to what they need ie clinic notes,face sheet?   and where to fax this info.  Info is on RN desk,green side.  KpavelRTRACEY

## 2020-01-07 ENCOUNTER — TELEPHONE (OUTPATIENT)
Dept: FAMILY MEDICINE | Facility: CLINIC | Age: 65
End: 2020-01-07

## 2020-01-07 NOTE — TELEPHONE ENCOUNTER
Central Prior Authorization Team   Phone: [phone]      PA Initiation    Medication: DEXCOM G6 TRANSMITTER - INITIATED  Insurance Company: [employer] - Phone [phone] Fax [phone]  Pharmacy Filling the Rx: [id number] [address]  Filling Pharmacy Phone: [phone]  Filling Pharmacy Fax: [phone]  Start Date: 1/7/2020

## 2020-01-07 NOTE — TELEPHONE ENCOUNTER
Central Prior Authorization Team   Phone: [phone]      PA Initiation    Medication: DEXCOM G6 SENSOR - INITIATED  Insurance Company: [employer] - Phone [phone] Fax [phone]  Pharmacy Filling the Rx: [id number] [address]  Filling Pharmacy Phone: [phone]  Filling Pharmacy Fax: [phone]  Start Date: 1/7/2020

## 2020-01-07 NOTE — TELEPHONE ENCOUNTER
Prior Authorization Retail Medication Request    Medication/Dose: Dexcom , transmitter, sensor kit    ICD code (if different than what is on RX):    Previously Tried and Failed:    Rationale:  Patient is on insulin pump    Insurance Name:  [email or web address]  Insurance ID:  [id number]       Pharmacy Information (if different than what is on RX)  Name:    Phone:

## 2020-01-07 NOTE — TELEPHONE ENCOUNTER
Central Prior Authorization Team   Phone: [phone]      PA Initiation    Medication: DEXCOM G6  - INITIATED  Insurance Company: [employer] - Phone [phone] Fax [phone]  Pharmacy Filling the Rx: [facility] [id number] [address] AT [address]  Filling Pharmacy Phone: [phone]  Filling Pharmacy Fax: [phone]  Start Date: 1/7/2020

## 2020-01-08 ENCOUNTER — TELEPHONE (OUTPATIENT)
Dept: FAMILY MEDICINE | Facility: CLINIC | Age: 65
End: 2020-01-08

## 2020-01-08 ENCOUNTER — ALLIED HEALTH/NURSE VISIT (OUTPATIENT)
Dept: EDUCATION SERVICES | Facility: CLINIC | Age: 65
End: 2020-01-08
Payer: COMMERCIAL

## 2020-01-08 DIAGNOSIS — E10.59 TYPE 1 DIABETES MELLITUS WITH VASCULAR DISEASE (H): Primary | ICD-10-CM

## 2020-01-08 DIAGNOSIS — E11.9 DIABETES MELLITUS WITHOUT COMPLICATION (H): ICD-10-CM

## 2020-01-08 PROCEDURE — G0108 DIAB MANAGE TRN  PER INDIV: HCPCS

## 2020-01-08 RX ORDER — PROCHLORPERAZINE 25 MG/1
1 SUPPOSITORY RECTAL CONTINUOUS
Qty: 9 EACH | Refills: 11 | Status: SHIPPED | OUTPATIENT
Start: 2020-01-08 | End: 2020-01-28

## 2020-01-08 RX ORDER — PROCHLORPERAZINE 25 MG/1
1 SUPPOSITORY RECTAL CONTINUOUS
Qty: 1 EACH | Refills: 3 | Status: SHIPPED | OUTPATIENT
Start: 2020-01-08 | End: 2020-01-28

## 2020-01-08 RX ORDER — PROCHLORPERAZINE 25 MG/1
1 SUPPOSITORY RECTAL CONTINUOUS
Qty: 1 DEVICE | Refills: 0 | Status: SHIPPED | OUTPATIENT
Start: 2020-01-08 | End: 2020-01-28

## 2020-01-08 NOTE — PROGRESS NOTES
"Diabetes Self-Management Education & Support    Diabetes Education Self Management & Training    SUBJECTIVE/OBJECTIVE:  Presents for: Individual review  Accompanied by: Self  Diabetes education in the past 24mo: No  Focus of Visit: Monitoring, Taking Medication, Problem Solving  Diabetes type: Type 1  Date of diagnosis: 1981  Disease course: Improving  Diabetes management related comments/concerns: difficult management of diabetes with pump, wants to upgrade his pump, did not prepare for the pump education and did not bring in his supplies, just the pupmump  Transportation concerns: No  Other concerns:: None  Cultural Influences/Ethnic Background:  American      Diabetes Symptoms & Complications  Blurred vision: No  Fatigue: Yes  Neuropathy: No  Foot ulcerations: No  Polydipsia: No  Polyphagia: No  Polyuria: No  Visual change: No  Weakness: No  Weight loss: No  Recent Infection(s): No  Symptom course: Stable  Weight trend: Stable  Autonomic neuropathy: No  CVA: No  Heart disease: Yes  Nephropathy: No  Peripheral neuropathy: No  Peripheral Vascular Disease: Yes  Retinopathy: No    Patient Problem List and Family Medical History reviewed for relevant medical history, current medical status, and diabetes risk factors.    Vitals:  There were no vitals taken for this visit.  Estimated body mass index is 27.2 kg/m  as calculated from the following:    Height as of 9/19/19: 1.753 m (5' 9\").    Weight as of 12/19/19: 83.6 kg (184 lb 3.2 oz).   Last 3 BP:   BP Readings from Last 3 Encounters:   12/19/19 104/54   09/19/19 118/64   05/15/19 112/54       History   Smoking Status     Former Smoker     Quit date: 1/1/2001   Smokeless Tobacco     Never Used       Labs:  Lab Results   Component Value Date    A1C 8.0 09/19/2019     Lab Results   Component Value Date     09/19/2019     Lab Results   Component Value Date    LDL 57 09/19/2019     HDL Cholesterol   Date Value Ref Range Status   09/19/2019 45 >39 mg/dL Final "   ]  GFR Estimate   Date Value Ref Range Status   2019 >90 >60 mL/min/[1.73_m2] Final     Comment:     Non  GFR Calc  Starting 2018, serum creatinine based estimated GFR (eGFR) will be   calculated using the Chronic Kidney Disease Epidemiology Collaboration   (CKD-EPI) equation.       GFR Estimate If Black   Date Value Ref Range Status   2019 >90 >60 mL/min/[1.73_m2] Final     Comment:      GFR Calc  Starting 2018, serum creatinine based estimated GFR (eGFR) will be   calculated using the Chronic Kidney Disease Epidemiology Collaboration   (CKD-EPI) equation.       Lab Results   Component Value Date    CR 0.75 2019     No results found for: MICROALBUMIN    Healthy Eating  Healthy Eating Assessed Today: Yes  Cultural/Lutheran diet restrictions?: No  Breakfast: bowl of cheerios or PB sandwich  Lunch: sloppy joes or goolash  Dinner: same or pizza  Other: does not eat well or follow a plan  Beverages: Water  Has patient met with a dietitian in the past?: No    Being Active  Being Active Assessed Today: Yes  Exercise:: Currently not exercising    Monitoring  Monitoring Assessed Today: Yes  Did patient bring glucose meter to appointment? : Yes  Blood Glucose Meter: GeoOpticst  Home Glucose (Sugar) Monitorin+ times per day(uses Dexcom now)  Blood glucose trend: Fluctuating dramtically  Low Glucose Range (mg/dL): <70  High Glucose Range (mg/dL): >200  Overall Range (mg/dL): >200                                              Taking Medications  Diabetes Medication(s)     Diabetic Other       glucagon (GLUCAGON EMERGENCY) 1 MG kit    Inject 1 mg into the muscle once for 1 dose     GLUCAGON EMERGENCY KIT 1 MG IJ KIT    use as directed for severe hypoglycemia     Patient not taking:  No sig reported    Insulin       insulin aspart (NOVOLOG VIAL) 100 UNITS/ML vial    Uses about 70 units/day.  As directed per insulin pump     INSULIN PUMP - OUTPATIENT    Date last  updated:  5/11/16  Medtronic Minimed:  BASAL RATES and times:  12   AM (midnight): 1.4 units/hour   5 am: 1.50 units/hour   9   AM: 1.15 units/hour   1 pm: 1.30 units/hour   3   PM: 1.0 units/hour   9    PM: 1.20 units/hour   Basal Pattern A:   12 am: 1.00  10 am: 0.90  12pm:0.50  6 pm: 0.75  9 pm: 1.05   Pattern B:  12 mid: 0.8  10 am: 0.7  12 noon: 0.3  6 pm: 0.55  9 pm: 0.85  CARB RATIO and times:  12   AM (midnight): 9  12  PM (noon):  9  6    PM:  9  Corection Factor (Sensitivity) and times:  12   AM (midnight): 33 mg/dL  BLOOD GLUCOSE TARGET and times:  12   AM (midnight): 110 - 110Active Insulin Time:  4 hours  Basal to Bolus Ratio:   Sensor:  No  Carelink / Diasend username:    Carelink / Diasend Password:          Taking Medication Assessed Today: Yes  Current Treatments: Insulin Pump  Given by: Patient  Injection/Infusion sites: Abdomen  Problems taking diabetes medications regularly?: No  Diabetes medication side effects?: No  Treatment Compliance: All of the time    Problem Solving  Problem Solving Assessed Today: Yes  Hypoglycemia Frequency: Daily  Hypoglycemia Treatment: Juice, Other food  Patient carries a carbohydrate source: Yes    Hypoglycemia symptoms  Sweats: Yes         Reducing Risks       Healthy Coping  Stage of change: MAINTENANCE (Working to maintain change, with risk of relapse)  Difficulty affording diabetes management supplies?: No  Patient Activation Measure Survey Score:  BENEDICTO Score (Last Two) 2/17/2011   BENEDICTO Raw Score 42   Activation Score 66   BENEDICTO Level 3       ASSESSMENT:  Eb comes today thinking he was going to switch over to his new pump, he has not read and prepared to work the pump, nor did he bring in new supplies.  He admits he has been self adjusting his basal rates himself.  Does not eat well and is not entering data to his pump, gives insulin without knowing what his BG is.  He is on the G dexcom, trying to upgrade him at this time.  Made another appointment as he needs to  go home and prepare how to navigate the new pump.  His mind seems to wander and wondering if safe to use the pump.  Will work on the education in 3 wks.          Patient's most recent   Lab Results   Component Value Date    A1C 8.0 09/19/2019    is not meeting goal of <7.0    INTERVENTION:   Diabetes knowledge and skills assessment:     Patient is knowledgeable in diabetes management concepts related to:     Patient needs further education on the following diabetes management concepts: Healthy Eating, Being Active, Monitoring, Taking Medication and Problem Solving    Based on learning assessment above, most appropriate setting for further diabetes education would be: Individual setting.    Education provided today on:  AADE Self-Care Behaviors:  Healthy Eating: carbohydrate counting, consistency in amount, composition, and timing of food intake, weight reduction, heart healthy diet, eating out, portion control, plate planning method and label reading  Monitoring: purpose, proper technique, log and interpret results, individual blood glucose targets and frequency of monitoring  Taking Medication: action of prescribed medication, drawing up, administering and storing injectable diabetes medications, proper site selection and rotation for injections, side effects of prescribed medications and when to take medications  Problem Solving: high blood glucose - causes, signs/symptoms, treatment and prevention, low blood glucose - causes, signs/symptoms, treatment and prevention, carrying a carbohydrate source at all times, safe travel, when to call health care provider and medical identification    Opportunities for ongoing education and support in diabetes-self management were discussed.    Pt verbalized understanding of concepts discussed and recommendations provided today.       Education Materials Provided:  No new materials provided today    PLAN:  See Patient Instructions for co-developed, patient-stated behavior  change goals.  AVS printed and provided to patient today. See Follow-Up section for recommended follow-up.    Nessa Espinosa RN/FLOR [facility] Diabetes Educator    Time Spent: 60 minutes  Encounter Type: Individual    Any diabetes medication dose changes were made via the CDE Protocol and Collaborative Practice Agreement with the patient's primary care provider. A copy of this encounter was shared with the provider.

## 2020-01-08 NOTE — TELEPHONE ENCOUNTER
Reason for Call:  Form, our goal is to have forms completed with 72 hours, however, some forms may require a visit or additional information.    Type of letter, form or note:  medical - Form for Diab  for [place] of [place]    Who is the form from?: Patient    Where did the form come from: Patient or family brought in       What clinic location was the form placed at?: [facility]    Where the form was placed: Form's Box/Folder    What number is listed as a contact on the form?: [phone]       Additional comments:     Call taken on 1/8/2020 at 10:54 AM by Beverley Randolph

## 2020-01-08 NOTE — PATIENT INSTRUCTIONS
Diabetes Support Resources:  Read the book on your new pump, learn the buttons,      Bring blood glucose meter and logbook with you to all doctor and follow-up appointments.    Diabetes Education Telephone Visit Follow-up:    We realize your time is valuable and your health is important! We offer a convenient Telephone Visit follow up! It s a quick way to check in for a medication dose adjustment without having to come back to clinic as soon.    Telephone Visits are often covered by insurance. Please check with your insurance plan to see if this type of visit is covered. If not, the cost is less expensive than an office visit:      Up to 10 minutes (Code 50339): $30    11-20 minutes (Code 88087): $59    More than 20 minutes (Code 04921): $85    Talk with your Diabetes Educator if you want to learn more.      [place]:  For Your Diabetes Education and Nutrition Appointments Call:  [phone]   For Diabetes Education or Nutrition Related Questions:   Phone: [phone]  E-mail: [email or web address]  Fax: [phone]   If you need a medication refill please contact your pharmacy. Please allow 3 business days for your refills to be completed.

## 2020-01-09 NOTE — TELEPHONE ENCOUNTER
Central Prior Authorization Team   Phone: [phone]      Contacted [employer] regarding status of PA via CMM - representative, Giovanni, stated Dexcom G6 products are handled through pt's medical benefits.    PA team does not handle medical benefits - this will have to be done at a clinic level.

## 2020-01-09 NOTE — TELEPHONE ENCOUNTER
Central Prior Authorization Team   Phone: [phone]      Contacted [employer] regarding status of PA via CMM - representative, Giovanni, stated Dexcom G6 products are handled through pt's medical benefits.    PA team does not handle medical benefits - this will have to be done at a clinic level.

## 2020-01-09 NOTE — TELEPHONE ENCOUNTER
Central Prior Authorization Team   Phone: [phone]      Contacted [employer] regarding status of PA via CMM - representative, Giovanni, stated Dexcom G6 products are handled through pt's medical benefits.    PA team does not handle medical benefits - this will have to be done at a clinic level.

## 2020-01-20 ENCOUNTER — TELEPHONE (OUTPATIENT)
Dept: FAMILY MEDICINE | Facility: CLINIC | Age: 65
End: 2020-01-20

## 2020-01-20 DIAGNOSIS — F11.90 CHRONIC NARCOTIC USE: ICD-10-CM

## 2020-01-20 DIAGNOSIS — E10.42 DIABETIC POLYNEUROPATHY ASSOCIATED WITH TYPE 1 DIABETES MELLITUS (H): ICD-10-CM

## 2020-01-20 DIAGNOSIS — M48.02 CERVICAL STENOSIS OF SPINE: ICD-10-CM

## 2020-01-20 RX ORDER — HYDROCODONE BITARTRATE AND ACETAMINOPHEN 10; 325 MG/1; MG/1
1 TABLET ORAL EVERY 6 HOURS PRN
Qty: 120 TABLET | Refills: 0 | Status: SHIPPED | OUTPATIENT
Start: 2020-01-20 | End: 2020-02-19

## 2020-01-20 NOTE — PROGRESS NOTES
Covering for Dr. Rachel. Per pharmacy patient is 3 days late picking up Rx- needs a new order. PRERNA Blankenship CNP

## 2020-01-20 NOTE — TELEPHONE ENCOUNTER
Filled out as much of the information that I could on this form and will placed the form in Dr. Rachel's in Box for her to complete    Caprice Munoz, CMA

## 2020-01-20 NOTE — TELEPHONE ENCOUNTER
Form needs completion/signature for  - [facility] Medicare G6 Compliant Presciptions, Certificate of Medical Necessity  Paperwork placed in forms box.    Leticia Rosenthal  Clinic [facility] [place]

## 2020-01-21 NOTE — TELEPHONE ENCOUNTER
Nessa-  I'm completing the medicare G6 certificate of medical necessity and prescriptions.      Does Eb need a new  and transmitter or just new sensors?  I'm assuming the receivers/transmitters last a while.  Is he upgrading to the G6?    Jackeline Rachel M.D.

## 2020-01-23 ENCOUNTER — TELEPHONE (OUTPATIENT)
Dept: FAMILY MEDICINE | Facility: CLINIC | Age: 65
End: 2020-01-23

## 2020-01-23 NOTE — TELEPHONE ENCOUNTER
Form needs completion/signature for  - Medicare G6 Complain Prescriptions Forms Certificate of Medical Necessity  Paperwork placed in forms box.    Leticia [facility] [place]

## 2020-01-28 ENCOUNTER — TELEPHONE (OUTPATIENT)
Dept: FAMILY MEDICINE | Facility: CLINIC | Age: 65
End: 2020-01-28

## 2020-01-28 DIAGNOSIS — E10.59 TYPE 1 DIABETES MELLITUS WITH VASCULAR DISEASE (H): ICD-10-CM

## 2020-01-28 RX ORDER — PROCHLORPERAZINE 25 MG/1
1 SUPPOSITORY RECTAL CONTINUOUS
Qty: 1 DEVICE | Refills: 0 | Status: SHIPPED | OUTPATIENT
Start: 2020-01-28 | End: 2020-03-11

## 2020-01-28 RX ORDER — PROCHLORPERAZINE 25 MG/1
1 SUPPOSITORY RECTAL CONTINUOUS
Qty: 1 EACH | Refills: 1 | Status: SHIPPED | OUTPATIENT
Start: 2020-01-28 | End: 2020-03-11

## 2020-01-28 RX ORDER — PROCHLORPERAZINE 25 MG/1
1 SUPPOSITORY RECTAL CONTINUOUS
Qty: 3 EACH | Refills: 5 | Status: SHIPPED | OUTPATIENT
Start: 2020-01-28 | End: 2020-03-11

## 2020-01-28 NOTE — TELEPHONE ENCOUNTER
Prescription approved per [facility] Refill Protocol.    Thank you    Zoie MARVIN RN

## 2020-01-28 NOTE — TELEPHONE ENCOUNTER
"Need Medicare Part B compliant Rx's for Dexcom G6 /Transmitter/Sensors    The rx for G6  must be for a qty #1, zero refills, and directions \"USE TO READ BLOOD SUGARS PER  INSTRUCTIONS\"    The Rx for the G6 transmitter must be for a Qty #1 and can have 1 additional refill, directions must state \"CHANGE EVERY 3 MONTHS\"    The Rx for the G6 sensors must be qty #3 and can have 5 additional refills.  Directions must state \"CHANGE EVERY 10 DAYS\"    Thank you!  Amanda Lynn [facility]    "

## 2020-01-30 NOTE — TELEPHONE ENCOUNTER
Form completion was put in by another CSS 1/23/20.  Form is:  1.  Not in form's bin  2.  Not on MD's desk  3.  Not on CMA's desk  4.  Not in CSS daily work, in any date, since being put in 1/23/20  Will need to await media/HIMS scanning to see if form was completed.

## 2020-02-17 ENCOUNTER — TELEPHONE (OUTPATIENT)
Dept: EDUCATION SERVICES | Facility: CLINIC | Age: 65
End: 2020-02-17

## 2020-02-17 NOTE — TELEPHONE ENCOUNTER
I needed to call tito back as he was some how scheduled for pump training and was given only 1 hr he needs 2+ hrs.  He rescheduled with me 3/4 @ 3:00    Nessa Espinosa RN/FLOR  [place] Diabetes Educator

## 2020-02-19 DIAGNOSIS — F11.90 CHRONIC NARCOTIC USE: ICD-10-CM

## 2020-02-19 DIAGNOSIS — M48.02 CERVICAL STENOSIS OF SPINE: ICD-10-CM

## 2020-02-19 DIAGNOSIS — E10.42 DIABETIC POLYNEUROPATHY ASSOCIATED WITH TYPE 1 DIABETES MELLITUS (H): ICD-10-CM

## 2020-02-19 RX ORDER — HYDROCODONE BITARTRATE AND ACETAMINOPHEN 10; 325 MG/1; MG/1
1 TABLET ORAL EVERY 6 HOURS PRN
Qty: 120 TABLET | Refills: 0 | Status: SHIPPED | OUTPATIENT
Start: 2020-02-19 | End: 2020-03-16

## 2020-02-19 NOTE — TELEPHONE ENCOUNTER
Last Written Prescription Date:  Norco 1/20/2020  Last Fill Quantity: 120,  # refills: 0   Last office visit: 12/19/2019 with prescribing provider:  Wilson   Future Office Visit:

## 2020-02-19 NOTE — TELEPHONE ENCOUNTER
I called patient, reminded him that he will be due for appointment in March 2020.  I was able to schedule him an appointment for March 16th.    Routing refill request to provider for review/approval because:  Drug not on the [facility] refill protocol     Brittany Howell RN

## 2020-03-04 ENCOUNTER — ALLIED HEALTH/NURSE VISIT (OUTPATIENT)
Dept: EDUCATION SERVICES | Facility: CLINIC | Age: 65
End: 2020-03-04
Payer: COMMERCIAL

## 2020-03-04 DIAGNOSIS — E10.59 TYPE 1 DIABETES MELLITUS WITH VASCULAR DISEASE (H): Primary | ICD-10-CM

## 2020-03-04 PROCEDURE — G0108 DIAB MANAGE TRN  PER INDIV: HCPCS

## 2020-03-04 NOTE — PROGRESS NOTES
Diabetes Self Management Training: Insulin Pump Start    SUBJECTIVE:  Patient presents for an insulin pump start.   Pump Type: Medtronic 670G System  Pump serial number:   Last basal insulin injection: on Paradigm insulin pump  Last bolus insulin injection:  .    OBJECTIVE:  Vitals: There were no vitals taken for this visit.    Blood sugar at beginning of pump start appointment: 279 mg/dL  Blood sugar at the end of pump start appointment: 279 mg/dL      ASSESSMENT / INTERVENTION:  Patient instructed on basic insulin pump topics and insulin pump programming. See pump company pump start checklist scanned and attached to this encounter for details.    Insulin pump was programmed according to the Pump Start Orders signed by MD and attached to this encounter: Basal rate: 0.75 units per hour  Insulin to Carb Ratio: 1 unit covers 6.5 grams carbohydrate  Insulin Sensitivity Factor: 1 unit will lower blood glucose 31 mg/dL  BG Targets: Overnight 100 - 120 mg/dL  Active Insulin Time: 3 hrs    Infusion set: Medtronic Sure-T    Problem Solving: no delivery alarm  high blood sugars and DKA prevention  when to call a healthcare provider  when to call the pump company help line     Education materials provided to patient: Menu Map  Pumping Insulin: Managing unexplained blood glucose over 240   Bolus Wizard Quick Reference Guide  Tips to remember after starting an insulin pump    FOLLOW-UP:  Telephone follow-up scheduled in 1-3 days.    Post-pump start follow-up appointment scheduled on: 3/11@4:00      Nessa Espinosa RN/FLOR [facility] Diabetes Educator    Time Spent: 120  Visit Type: Individual    Scanned documents: Insulin pump initiation settings with MD signature, Insulin pump start checklist.

## 2020-03-11 ENCOUNTER — ALLIED HEALTH/NURSE VISIT (OUTPATIENT)
Dept: EDUCATION SERVICES | Facility: CLINIC | Age: 65
End: 2020-03-11
Payer: COMMERCIAL

## 2020-03-11 DIAGNOSIS — E11.9 DIABETES MELLITUS WITHOUT COMPLICATION (H): Primary | ICD-10-CM

## 2020-03-11 DIAGNOSIS — E10.59 TYPE 1 DIABETES MELLITUS WITH VASCULAR DISEASE (H): ICD-10-CM

## 2020-03-11 PROCEDURE — G0108 DIAB MANAGE TRN  PER INDIV: HCPCS

## 2020-03-11 RX ORDER — PROCHLORPERAZINE 25 MG/1
1 SUPPOSITORY RECTAL CONTINUOUS
Qty: 1 EACH | Refills: 1 | Status: SHIPPED | OUTPATIENT
Start: 2020-03-11 | End: 2020-03-20

## 2020-03-11 RX ORDER — PROCHLORPERAZINE 25 MG/1
1 SUPPOSITORY RECTAL CONTINUOUS
Qty: 1 DEVICE | Refills: 0 | Status: SHIPPED | OUTPATIENT
Start: 2020-03-11 | End: 2020-03-20

## 2020-03-11 RX ORDER — PROCHLORPERAZINE 25 MG/1
1 SUPPOSITORY RECTAL CONTINUOUS
Qty: 3 EACH | Refills: 5 | Status: SHIPPED | OUTPATIENT
Start: 2020-03-11 | End: 2020-03-20

## 2020-03-11 NOTE — PROGRESS NOTES
Eb is here for pump download to check on his BG after 1 wk being on the 630G                      No changes to pump, he will get G5 sensors and start that up and we are still working on the G6 approval    Nessa Espinosa RN/FLOR  [place] Diabetes Educator

## 2020-03-16 ENCOUNTER — VIRTUAL VISIT (OUTPATIENT)
Dept: FAMILY MEDICINE | Facility: CLINIC | Age: 65
End: 2020-03-16
Payer: COMMERCIAL

## 2020-03-16 ENCOUNTER — TELEPHONE (OUTPATIENT)
Dept: FAMILY MEDICINE | Facility: CLINIC | Age: 65
End: 2020-03-16

## 2020-03-16 DIAGNOSIS — F11.90 CHRONIC NARCOTIC USE: ICD-10-CM

## 2020-03-16 DIAGNOSIS — E10.42 DIABETIC POLYNEUROPATHY ASSOCIATED WITH TYPE 1 DIABETES MELLITUS (H): ICD-10-CM

## 2020-03-16 DIAGNOSIS — F32.2 MAJOR DEPRESSIVE DISORDER, SINGLE EPISODE, SEVERE (H): ICD-10-CM

## 2020-03-16 DIAGNOSIS — M48.02 CERVICAL STENOSIS OF SPINE: ICD-10-CM

## 2020-03-16 PROCEDURE — 99441 ZZC PHYSICIAN TELEPHONE EVALUATION 5-10 MIN: CPT | Performed by: FAMILY MEDICINE

## 2020-03-16 RX ORDER — HYDROCODONE BITARTRATE AND ACETAMINOPHEN 10; 325 MG/1; MG/1
1 TABLET ORAL EVERY 6 HOURS PRN
Qty: 120 TABLET | Refills: 0 | Status: SHIPPED | OUTPATIENT
Start: 2020-03-19 | End: 2020-04-16

## 2020-03-16 NOTE — TELEPHONE ENCOUNTER
[facility] note regarding Dexcom Supplies:  Based on this patient's insurance, Dexcom supplies are not covered at a retail pharmacy. They will need to be filled through a DME supplier (e.g. [facility] or [facility]). Please coordinate with the patient to have them sent somewhere else. Thank you

## 2020-03-16 NOTE — PROGRESS NOTES
"Eb Eisenberg is a 65 year old male who is being evaluated via a billable telephone visit.      The patient has been notified of following:     \"This telephone visit will be conducted via a call between you and your physician/provider. We have found that certain health care needs can be provided without the need for a physical exam.  This service lets us provide the care you need with a short phone conversation.  If a prescription is necessary we can send it directly to your pharmacy.  If lab work is needed we can place an order for that and you can then stop by our lab to have the test done at a later time.    If during the course of the call the physician/provider feels a telephone visit is not appropriate, you will not be charged for this service.\"       Eb Eisenberg complains of    Chief Complaint   Patient presents with     Refill Request       I have reviewed and updated the patient's Past Medical History, Social History, Family History and Medication List.    ALLERGIES  Latex    Lorna Carlton MA   (MA signature)    Additional provider notes:       Assessment/Plan:  {visit diagnosis:422295}    I have reviewed the note as documented above.  This accurately captures the substance of my conversation with the patient.  ***    Phone call contact time  Call Started at ***  Call Ended at ***    {signature options:822295}  "

## 2020-03-16 NOTE — PROGRESS NOTES
"Eb Eisenberg is a 65 year old male who is being evaluated via a billable telephone visit.      The patient has been notified of following:     \"This telephone visit will be conducted via a call between you and your physician/provider. We have found that certain health care needs can be provided without the need for a physical exam.  This service lets us provide the care you need with a short phone conversation.  If a prescription is necessary we can send it directly to your pharmacy.  If lab work is needed we can place an order for that and you can then stop by our lab to have the test done at a later time.    If during the course of the call the physician/provider feels a telephone visit is not appropriate, you will not be charged for this service.\"       Eb Eisenberg complains of    Chief Complaint   Patient presents with     Refill Request       I have reviewed and updated the patient's Past Medical History, Social History, Family History and Medication List.    ALLERGIES  Latex    Lorna Carlton MA       Additional provider notes: Joint or Musculoskeletal Pain  Duration of complaint: ongoing - years     Patient uses 120 norco/month to control his pain.    [place]  has been queried and there are no early refills/other providers.    He is overall doing well. Got a new pump for his diabetes and is very pleased with his blood sugars.       This patient was seen virtually during the COVID-19 outbreak in attempts to keep healthy patients out of the clinic (practicing social distancing).  I evaluated them by phone and this note reflects our conversation.        Assessment/Plan:     (F11.90) Chronic narcotic use  Comment:    Plan: HYDROcodone-acetaminophen (NORCO)  MG per        tablet         Refilled x 1 month, okay to call in 30 days for refill. See me in 3 months    (M48.02) Cervical stenosis of spine  Comment: as above  Plan: HYDROcodone-acetaminophen (NORCO)  MG per        tablet         "       I have reviewed the note as documented above.  This accurately captures the substance of my conversation with the patient.       Phone call contact time  Call Started at 908  Call Ended at 915    Jackeline Rachel MD

## 2020-03-20 DIAGNOSIS — E10.59 TYPE 1 DIABETES MELLITUS WITH VASCULAR DISEASE (H): ICD-10-CM

## 2020-03-20 RX ORDER — PROCHLORPERAZINE 25 MG/1
1 SUPPOSITORY RECTAL CONTINUOUS
Qty: 1 EACH | Refills: 1 | Status: SHIPPED | OUTPATIENT
Start: 2020-03-20 | End: 2021-03-19

## 2020-03-20 RX ORDER — PROCHLORPERAZINE 25 MG/1
1 SUPPOSITORY RECTAL CONTINUOUS
Qty: 1 DEVICE | Refills: 0 | Status: SHIPPED | OUTPATIENT
Start: 2020-03-20

## 2020-03-20 RX ORDER — PROCHLORPERAZINE 25 MG/1
1 SUPPOSITORY RECTAL CONTINUOUS
Qty: 3 EACH | Refills: 5 | Status: SHIPPED | OUTPATIENT
Start: 2020-03-20 | End: 2021-03-18

## 2020-03-25 ENCOUNTER — TELEPHONE (OUTPATIENT)
Dept: FAMILY MEDICINE | Facility: CLINIC | Age: 65
End: 2020-03-25

## 2020-03-25 NOTE — TELEPHONE ENCOUNTER
Reason for Call: Request for an order or referral:    Order or referral being requested: Order for CSS medical signed and faxed back - Copy to scanning     Date needed: at your convenience    Has the patient been seen by the PCP for this problem? YES    Additional comments:     Call taken on 3/25/2020 at 11:18 AM by Beverley Randolph

## 2020-03-26 ENCOUNTER — HOSPITAL ENCOUNTER (EMERGENCY)
Facility: CLINIC | Age: 65
Discharge: HOME | End: 2020-03-26
Attending: NURSE PRACTITIONER | Admitting: EMERGENCY MEDICINE
Payer: COMMERCIAL

## 2020-03-26 ENCOUNTER — APPOINTMENT (OUTPATIENT)
Dept: CT IMAGING | Facility: CLINIC | Age: 65
End: 2020-03-26
Attending: EMERGENCY MEDICINE
Payer: COMMERCIAL

## 2020-03-26 VITALS
SYSTOLIC BLOOD PRESSURE: 96 MMHG | OXYGEN SATURATION: 95 % | HEIGHT: 68 IN | RESPIRATION RATE: 12 BRPM | DIASTOLIC BLOOD PRESSURE: 72 MMHG | BODY MASS INDEX: 27.28 KG/M2 | HEART RATE: 71 BPM | WEIGHT: 180 LBS | TEMPERATURE: 97.8 F
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
## 2020-03-26 DIAGNOSIS — S22.31XA CLOSED FRACTURE OF ONE RIB OF RIGHT SIDE, INITIAL ENCOUNTER: ICD-10-CM

## 2020-03-26 DIAGNOSIS — R55 VASOVAGAL SYNCOPE: ICD-10-CM

## 2020-03-26 DIAGNOSIS — S09.90XA CLOSED HEAD INJURY, INITIAL ENCOUNTER: ICD-10-CM

## 2020-03-26 LAB
ALBUMIN SERPL-MCNC: 3.6 G/DL (ref 3.4–5)
ALP SERPL-CCNC: 113 U/L (ref 40–150)
ALT SERPL W P-5'-P-CCNC: 51 U/L (ref 0–70)
ANION GAP SERPL CALCULATED.3IONS-SCNC: 5 MMOL/L (ref 3–14)
AST SERPL W P-5'-P-CCNC: 27 U/L (ref 0–45)
BASOPHILS # BLD AUTO: 0.1 10E9/L (ref 0–0.2)
BASOPHILS NFR BLD AUTO: 0.7 %
BILIRUB SERPL-MCNC: 0.8 MG/DL (ref 0.2–1.3)
BUN SERPL-MCNC: 19 MG/DL (ref 7–30)
CALCIUM SERPL-MCNC: 8.7 MG/DL (ref 8.5–10.1)
CHLORIDE SERPL-SCNC: 102 MMOL/L (ref 94–109)
CO2 SERPL-SCNC: 27 MMOL/L (ref 20–32)
CREAT SERPL-MCNC: 1.07 MG/DL (ref 0.66–1.25)
DIFFERENTIAL METHOD BLD: ABNORMAL
EOSINOPHIL # BLD AUTO: 0.2 10E9/L (ref 0–0.7)
EOSINOPHIL NFR BLD AUTO: 1.1 %
ERYTHROCYTE [DISTWIDTH] IN BLOOD BY AUTOMATED COUNT: 12.7 % (ref 10–15)
GFR SERPL CREATININE-BSD FRML MDRD: 72 ML/MIN/{1.73_M2}
GLUCOSE BLDC GLUCOMTR-MCNC: 223 MG/DL (ref 70–99)
GLUCOSE SERPL-MCNC: 230 MG/DL (ref 70–99)
HCT VFR BLD AUTO: 41.4 % (ref 40–53)
HGB BLD-MCNC: 13.7 G/DL (ref 13.3–17.7)
IMM GRANULOCYTES # BLD: 0.1 10E9/L (ref 0–0.4)
IMM GRANULOCYTES NFR BLD: 0.7 %
LYMPHOCYTES # BLD AUTO: 1.1 10E9/L (ref 0.8–5.3)
LYMPHOCYTES NFR BLD AUTO: 7.7 %
MCH RBC QN AUTO: 30.4 PG (ref 26.5–33)
MCHC RBC AUTO-ENTMCNC: 33.1 G/DL (ref 31.5–36.5)
MCV RBC AUTO: 92 FL (ref 78–100)
MONOCYTES # BLD AUTO: 0.9 10E9/L (ref 0–1.3)
MONOCYTES NFR BLD AUTO: 6.5 %
NEUTROPHILS # BLD AUTO: 11.8 10E9/L (ref 1.6–8.3)
NEUTROPHILS NFR BLD AUTO: 83.3 %
NRBC # BLD AUTO: 0 10*3/UL
NRBC BLD AUTO-RTO: 0 /100
PLATELET # BLD AUTO: 358 10E9/L (ref 150–450)
POTASSIUM SERPL-SCNC: 4.1 MMOL/L (ref 3.4–5.3)
PROT SERPL-MCNC: 7.3 G/DL (ref 6.8–8.8)
RBC # BLD AUTO: 4.51 10E12/L (ref 4.4–5.9)
SODIUM SERPL-SCNC: 134 MMOL/L (ref 133–144)
TROPONIN I SERPL-MCNC: <0.015 UG/L (ref 0–0.04)
WBC # BLD AUTO: 14.2 10E9/L (ref 4–11)

## 2020-03-26 PROCEDURE — 72125 CT NECK SPINE W/O DYE: CPT

## 2020-03-26 PROCEDURE — 96361 HYDRATE IV INFUSION ADD-ON: CPT | Performed by: EMERGENCY MEDICINE

## 2020-03-26 PROCEDURE — 99285 EMERGENCY DEPT VISIT HI MDM: CPT | Mod: Z6 | Performed by: EMERGENCY MEDICINE

## 2020-03-26 PROCEDURE — 93005 ELECTROCARDIOGRAM TRACING: CPT | Performed by: EMERGENCY MEDICINE

## 2020-03-26 PROCEDURE — 80053 COMPREHEN METABOLIC PANEL: CPT | Performed by: EMERGENCY MEDICINE

## 2020-03-26 PROCEDURE — 85025 COMPLETE CBC W/AUTO DIFF WBC: CPT | Performed by: EMERGENCY MEDICINE

## 2020-03-26 PROCEDURE — 25000128 H RX IP 250 OP 636: Performed by: EMERGENCY MEDICINE

## 2020-03-26 PROCEDURE — 25800030 ZZH RX IP 258 OP 636: Performed by: EMERGENCY MEDICINE

## 2020-03-26 PROCEDURE — 71260 CT THORAX DX C+: CPT

## 2020-03-26 PROCEDURE — 96360 HYDRATION IV INFUSION INIT: CPT | Mod: 59 | Performed by: EMERGENCY MEDICINE

## 2020-03-26 PROCEDURE — 25000125 ZZHC RX 250: Performed by: EMERGENCY MEDICINE

## 2020-03-26 PROCEDURE — 70450 CT HEAD/BRAIN W/O DYE: CPT

## 2020-03-26 PROCEDURE — 84484 ASSAY OF TROPONIN QUANT: CPT | Performed by: EMERGENCY MEDICINE

## 2020-03-26 PROCEDURE — 99285 EMERGENCY DEPT VISIT HI MDM: CPT | Mod: 25 | Performed by: EMERGENCY MEDICINE

## 2020-03-26 PROCEDURE — 00000146 ZZHCL STATISTIC GLUCOSE BY METER IP

## 2020-03-26 RX ORDER — IOPAMIDOL 755 MG/ML
80 INJECTION, SOLUTION INTRAVASCULAR ONCE
Status: COMPLETED | OUTPATIENT
Start: 2020-03-26 | End: 2020-03-26

## 2020-03-26 RX ORDER — OXYCODONE AND ACETAMINOPHEN 5; 325 MG/1; MG/1
1-2 TABLET ORAL EVERY 4 HOURS PRN
Qty: 4 TABLET | Refills: 0 | Status: SHIPPED | OUTPATIENT
Start: 2020-03-26 | End: 2020-03-26

## 2020-03-26 RX ADMIN — SODIUM CHLORIDE 500 ML: 9 INJECTION, SOLUTION INTRAVENOUS at 15:21

## 2020-03-26 RX ADMIN — SODIUM CHLORIDE 500 ML: 9 INJECTION, SOLUTION INTRAVENOUS at 12:50

## 2020-03-26 RX ADMIN — SODIUM CHLORIDE 80 ML: 9 INJECTION, SOLUTION INTRAVENOUS at 13:12

## 2020-03-26 RX ADMIN — IOPAMIDOL 80 ML: 755 INJECTION, SOLUTION INTRAVENOUS at 13:12

## 2020-03-26 ASSESSMENT — MIFFLIN-ST. JEOR: SCORE: 1570.97

## 2020-03-26 NOTE — DISCHARGE INSTRUCTIONS
Return if symptoms worsen or new symptoms develop.  Follow-up with primary care physician next available.  Drink plenty of fluids.  Stand up slowly and if feeling dizzy to sit immediately down.  If any headaches visual changes shortness of breath chest pain abdominal pain back pain or focal numbness weakness any extremity occur please return for further evaluation and care.  Take ibuprofen or Tylenol for pain.  Continue pain medication as needed.

## 2020-03-26 NOTE — ED AVS SNAPSHOT
[facility] Emergency Department  [address] [address]  Phone:  [phone]  Fax:  [phone]                                    Eb Eisenberg   MRN: [id number]    Department:  [facility] Emergency Department   Date of Visit:  3/26/2020           After Visit Summary Signature Page    I have received my discharge instructions, and my questions have been answered. I have discussed any challenges I see with this plan with the nurse or doctor.    ..........................................................................................................................................  Patient/Patient Representative Signature      ..........................................................................................................................................  Patient Representative Print Name and Relationship to Patient    ..................................................               ................................................  Date                                   Time    ..........................................................................................................................................  Reviewed by Signature/Title    ...................................................              ..............................................  Date                                               Time          22EPIC Rev 08/18

## 2020-03-28 NOTE — ED PROVIDER NOTES
History     Chief Complaint   Patient presents with     Fall     fell down the stairs this a.m. was dizzy, woke up at the bottom of the stairs. fell through the railing opening.      NAVA Eisenberg is a 65 year old male who presents the emergency department complaining of syncopal event with fall downstairs.  Patient had just gotten out of bed and had stood up and started feeling dizzy immediately feeling lightheaded and fuzzy and fell down a flight of stairs.  Patient was out just for a few seconds and woke up on the ground at the bottom of the stairs.  He is complaining of some mild anterior chest wall pain and some pain in his right forehead region where he has swelling.  He currently denies any significant headache he is not he having any visual changes he denies any jaw pain.  He denies any neck pain.  He is not having any shortness of breath but does complain of some aching his anterior chest with palpation.  He denies any back pain.  He is not having any abdominal pain.  He denies any pelvic pain.  He denies any focal numbness weakness any extremity.  He did not have bowel or bladder dysfunction.  He currently rates his pain a 2 out of 10.  He states he has had similar episodes in the past with standing.  He states he has not been eating or drinking well for no real significant reason.  He has not had any recent fevers or chill.  He has not been traveling and has not been exposed to anybody he is aware of who has Covid.    Allergies:  Allergies   Allergen Reactions     Latex Difficulty breathing     Difficulty breathing when using a latex welding mask       Problem List:    Patient Active Problem List    Diagnosis Date Noted     Hyperlipidemia LDL goal <70 10/31/2010     Priority: High     Type 1 diabetes mellitus with vascular disease (H)      Priority: High     Uses insulin pump       Latex sensitivity 07/08/2016     Priority: Medium     Senile nuclear cataract 05/25/2016     Priority: Medium      "Esophageal reflux 08/13/2015     Priority: Medium     Health Care Home 08/29/2014     Priority: Medium     Status:  Unable to reach  Care Coordinator:  Asha Vogel    See Letters for [facility] Care Plan  Date:  August 29, 2014         Anemia due to blood loss, acute 04/16/2014     Priority: Medium     GI bleed 04/07/2014     Priority: Medium     Chronic narcotic use 06/19/2013     Priority: Medium     Patient is followed by RADHA MCDERMOTT for ongoing prescription of narcotic pain medicine.  Med: hydrocodone/acetaminophen 10/325.   Maximum use per month: 120  Expected duration: lifetime  Narcotic agreement on file: YES  Clinic visit recommended: Q 3 months         Unspecified hyperplasia of prostate with urinary obstruction and other lower urinary tract symptoms (LUTS) 04/13/2012     Priority: Medium     Erectile dysfunction 04/13/2012     Priority: Medium     Advance Care Planning 10/19/2011     Priority: Medium     Advance Care Planning 10-19-11 Patient does not have an Advance/Health Care Directive (HCD), given \"What is Advance Care Planning?\" flyer, accepts referral to Facilitator and requests blank HCD form.Lorna Carlton         Restless legs syndrome 04/07/2011     Priority: Medium     LYNNETTE (obstructive sleep apnea) 04/07/2011     Priority: Medium     AHI 12.9, RDI 52.5, REM RDI 40.7 (mostly laterally), Supine RDI 55.4, desats to 86%.       Type 1 diabetes mellitus with retinopathy (H) 11/11/2010     Priority: Medium     Early retinal changes. Control of BP and glucose is essential. FU every 6 months.       Left ventricular dysfunction 04/22/2010     Priority: Medium     Cervical stenosis of spine 01/06/2010     Priority: Medium     1/5/2010  PROCEDURE:   1.  C5-6 anterior cervical diskectomy and fusion.   2.  Right and left bilateral C6 foraminotomy.   3.  Application of allograft interbody spacer C5-6.   4.  Application of anterior cervical plate-screw construct at C5-6.   5.  C5 partial corpectomy.          " Non-ischemic cardiomyopathy (H) 01/01/2010     Priority: Medium     Diabetic polyneuropathy associated with type 1 diabetes mellitus (H) 11/30/2009     Priority: Medium     Biceps tendon tear 11/30/2009     Priority: Medium     CAD (coronary artery disease) 10/19/2009     Priority: Medium     Found on CT coronary Angiogram 10/6/09  Mild to Moderate CAD noted  No significant luminal obstruction  Aggressive risk factor Modification recommended           Major depressive disorder, single episode, severe (H) 07/27/2009     Priority: Medium     Problem list name updated by automated process. Provider to review       Mild cognitive impairment 07/27/2009     Priority: Medium     Impotence of organic origin 04/14/2008     Priority: Medium     Accident due to exposure to weather conditions 01/04/2008     Priority: Medium     Problem list name updated by automated process. Provider to review       Peyronie's disease 04/03/2006     Priority: Medium        Past Medical History:    Past Medical History:   Diagnosis Date     Carpal tunnel syndrome      Impotence of organic origin      Pure hypercholesterolemia        Past Surgical History:    Past Surgical History:   Procedure Laterality Date     BACK SURGERY       COLONOSCOPY  4/8/2014    Procedure: COLONOSCOPY;  Colonoscopy  ;  Surgeon: Jesus Beltrán MD;  Location: [facility]     ESOPHAGOSCOPY, GASTROSCOPY, DUODENOSCOPY (EGD), COMBINED  4/7/2014    Procedure: COMBINED ESOPHAGOSCOPY, GASTROSCOPY, DUODENOSCOPY (EGD);  Gastroscopy  ;  Surgeon: Jesus Beltrán MD;  Location: [facility]     ORTHOPEDIC SURGERY       PHACOEMULSIFICATION WITH STANDARD INTRAOCULAR LENS IMPLANT Right 5/26/2016    Procedure: PHACOEMULSIFICATION WITH STANDARD INTRAOCULAR LENS IMPLANT;  Surgeon: Anthony Pugh MD;  Location: [facility]     PHACOEMULSIFICATION WITH STANDARD INTRAOCULAR LENS IMPLANT Left 7/14/2016    Procedure: PHACOEMULSIFICATION WITH STANDARD INTRAOCULAR LENS IMPLANT;  Surgeon: Keaton  "Anthony SANDOVAL MD;  Location: [facility]     SURGICAL HISTORY OF -       carpal tunnel surgery, bilateral     SURGICAL HISTORY OF -   2010    Laminectomy/Discectomy Cervical/fusion     VASECTOMY         Family History:    Family History   Problem Relation Age of Onset     Diabetes Father      Cancer Brother         ear and face     Diabetes Brother      Parkinsonism Brother        Social History:  Marital Status:   [2]  Social History     Tobacco Use     Smoking status: Former Smoker     Last attempt to quit: 2001     Years since quittin.2     Smokeless tobacco: Never Used   Substance Use Topics     Alcohol use: No     Comment: quit in early      Drug use: No        Medications:    aspirin 81 MG chewable tablet  blood glucose (CONTOUR NEXT TEST) test strip  blood glucose (NO BRAND SPECIFIED) test strip  Continuous Blood Gluc  (DEXCOM G6 ) VITO  Continuous Blood Gluc Sensor (DEXCOM G6 SENSOR) MISC  Continuous Blood Gluc Transmit (DEXCOM G6 TRANSMITTER) MISC  DOCUSATE SODIUM PO  FLUoxetine (PROZAC) 20 MG capsule  glucagon (GLUCAGON EMERGENCY) 1 MG kit  GLUCAGON EMERGENCY KIT 1 MG IJ KIT  HYDROcodone-acetaminophen (NORCO)  MG per tablet  insulin aspart (NOVOLOG VIAL) 100 UNITS/ML vial  INSULIN PUMP - OUTPATIENT  Insulin Syringe 30G X 1/2\" 0.5 ML MISC  losartan (COZAAR) 50 MG tablet  metoprolol succinate ER (TOPROL-XL) 25 MG 24 hr tablet  Multiple Vitamins-Minerals (ZINC PO)  MULTIVITAMINS OR TABS  omeprazole 20 MG tablet  ORDER FOR DME  rosuvastatin (CRESTOR) 40 MG tablet  tamsulosin (FLOMAX) 0.4 MG capsule  Urine Glucose-Ketones Test (KETO-DIASTIX) STRP          Review of Systems  All systems reviewed and other than pertinent positives and negatives in HPI all other systems are negative.  Physical Exam   BP: 108/69  Pulse: 78  Heart Rate: 70  Temp: 97.8  F (36.6  C)  Resp: 20  Height: 172.7 cm (5' 8\")  Weight: 81.6 kg (180 lb)  SpO2: 96 %      Physical Exam  Vitals signs and " nursing note reviewed.   Constitutional:       General: He is not in acute distress.     Appearance: Normal appearance. He is normal weight. He is not ill-appearing, toxic-appearing or diaphoretic.   HENT:      Head:      Comments: There is swelling and contusion to the right upper lateral frontal forehead region.  No obvious step-off.  No midface instability.  Bite symmetrical jaw nontender to palpation.     Right Ear: Tympanic membrane normal.      Left Ear: Tympanic membrane normal.      Nose: Nose normal.      Mouth/Throat:      Mouth: Mucous membranes are moist.      Pharynx: Oropharynx is clear.   Eyes:      Extraocular Movements: Extraocular movements intact.      Conjunctiva/sclera: Conjunctivae normal.      Pupils: Pupils are equal, round, and reactive to light.   Neck:      Musculoskeletal: Normal range of motion and neck supple. No neck rigidity.      Comments: There is mild left posterior neck tenderness but no midline tenderness.  Patient is able to flex and extend his neck without difficulty.  Cardiovascular:      Rate and Rhythm: Normal rate and regular rhythm.      Pulses: Normal pulses.      Heart sounds: Normal heart sounds. No murmur.   Pulmonary:      Effort: Pulmonary effort is normal.      Breath sounds: Normal breath sounds. No wheezing, rhonchi or rales.      Comments: There is mild tenderness to palpation along the sternum and lateral borders of the sternum bilaterally.  No swelling erythema or crepitance is present.  Abdominal:      General: Abdomen is flat. There is no distension.      Palpations: Abdomen is soft.      Tenderness: There is no abdominal tenderness. There is no right CVA tenderness or left CVA tenderness.   Musculoskeletal: Normal range of motion.         General: No swelling, tenderness, deformity or signs of injury.      Right lower leg: No edema.      Left lower leg: No edema.      Comments: There is no midline back tenderness.  Pulses sensation symmetrical.  There is no  hip tenderness bilaterally.  Abrasion noted to patient's left elbow and left anterior shin.  No significant tenderness in these regions.   Lymphadenopathy:      Cervical: No cervical adenopathy.   Skin:     General: Skin is warm and dry.      Capillary Refill: Capillary refill takes less than 2 seconds.   Neurological:      General: No focal deficit present.      Mental Status: He is alert and oriented to person, place, and time.      Cranial Nerves: No cranial nerve deficit.      Sensory: No sensory deficit.      Motor: No weakness.      Coordination: Coordination normal.      Deep Tendon Reflexes: Reflexes normal.         ED Course        Procedures               EKG Interpretation:      Interpreted by Iftikhar Mcfadden MD  Rhythm: normal sinus   Rate: Normal  Axis: Normal  Ectopy: none  Conduction: Normal with low voltage   ST Segments/ T Waves: Non-specific ST-T wave changes  Q Waves: none  Comparison to prior: Unchanged from 9/23/19    Clinical Impression: Normal sinus rhythm with low voltage and nonspecific ST-T wave abnormality.        Critical Care time:  none  Trauma:  Level of trauma activation: Emergency Department evaluation  CSpine Clearance: CT scan of cervical spine  GCS at arrival: 15  GCS at disposition: unchanged  Patient was discharged home.  Procedures done in the ED: none            Labs Ordered and Resulted from Time of ED Arrival Up to the Time of Departure from the ED   CBC WITH PLATELETS DIFFERENTIAL - Abnormal; Notable for the following components:       Result Value    WBC 14.2 (*)     Absolute Neutrophil 11.8 (*)     All other components within normal limits   COMPREHENSIVE METABOLIC PANEL - Abnormal; Notable for the following components:    Glucose 230 (*)     All other components within normal limits   GLUCOSE BY METER - Abnormal; Notable for the following components:    Glucose 223 (*)     All other components within normal limits   TROPONIN I   GLUCOSE MONITOR NURSING POCT    ORTHOSTATIC BLOOD PRESSURE AND PULSE     Results for orders placed or performed during the hospital encounter of 03/26/20   CT Head w/o Contrast    Narrative    CT SCAN OF THE HEAD WITHOUT CONTRAST   3/26/2020 1:39 PM     HISTORY: Fall.    TECHNIQUE:  Axial images of the head and coronal reformations without  IV contrast material.  Radiation dose for this scan was reduced using  automated exposure control, adjustment of the mA and/or kV according  to patient size, or iterative reconstruction technique.    COMPARISON: 10/14/2010.    FINDINGS: Mild cerebral atrophy is present. The brain parenchyma is  otherwise normal. There is some high density in the distal basilar  artery which appears minimally more pronounced in the 2010  study.  This is probably just due to calcification, especially given that  there is no stroke symptomatology in the clinical history. There is no  evidence for acute hemorrhage, acute infarct, mass effect, or skull  fracture. Visualized paranasal sinuses and mastoid air cells are  clear.      Impression    IMPRESSION: Cerebral atrophy. No evidence for intracranial hemorrhage  or any acute process.    SWATI CLAIRE MD   CT Cervical Spine w/o Contrast    Narrative    CT CERVICAL SPINE WITHOUT CONTRAST March 26, 2020 1:40 PM     HISTORY: Fall down stairs.     TECHNIQUE: Axial images of the cervical spine were obtained without  intravenous contrast. Multiplanar reformations were performed.  Radiation dose for this scan was reduced using automated exposure  control, adjustment of the mA and/or kV according to patient size, or  iterative reconstruction technique.    COMPARISON: Radiographs 1/19/2010.    FINDINGS: There is no evidence of fracture.     Alignment: Normal.      Craniocervical Junction and C1-C2: Moderate degeneration of the medial  atlantoaxial joint.    C2-C3: Mild degeneration left facet joint, otherwise normal.    C3-C4: Severe degenerative disc disease, mild degeneration left  facet  joint, mild spinal canal stenosis and bilateral mild foraminal  stenosis.    C4-C5: Moderate degenerative disc disease, otherwise normal.    C5-C6: Solid anterior spinal fusion with plate, screws and interbody  graft. Acquired fusion of the facet joints. Normal spinal canal, mild  bilateral foraminal stenosis.    C6-C7: Mild degenerative disc disease, otherwise normal.    C7-T1: Normal disc, facet joints, spinal canal and neural foramina.    Paraspinous Soft Tissues: Normal as visualized.      Impression    IMPRESSION:    1. No evidence of acute trauma.  2. Degenerative changes.  3. C5-C6 anterior and posterior spinal fusion.     ROSALINDA FAYE MD   CT Chest w Contrast    Narrative    CT CHEST WITH CONTRAST  3/26/2020 1:40 PM    HISTORY:  Anterior chest wall pain; status post fall down stairs.    TECHNIQUE: Scans obtained from the apices through the diaphragm with  IV contrast. 80 mL Isovue-370 mL injected.  Radiation dose for this  scan was reduced using automated exposure  control, adjustment of the mA and/or kV according to patient size, or  iterative reconstruction technique.    COMPARISON:  CT chest dated 10/14/2010.    FINDINGS:  Multiple calcified granulomata in the lungs are stable  since the prior study, dated 10/14/2010. There is a noncalcified  nodule in the medial aspect of the right upper lobe (image 117 series  5). This is also stable since 10/14/2010. The lungs are otherwise  grossly clear. No infiltrate, effusion, pneumothorax. No new nodule or  mass is seen.    No mediastinal, hilar, or axillary lymphadenopathy is identified.  Visualized portions of the thyroid are unremarkable. There are no  central pulmonary artery filling defects to suggest pulmonary  embolism. The study was not optimized for pulmonary artery evaluation.  Thoracic aorta is of normal caliber and demonstrates no evidence for  dissection, aneurysm or significant atherosclerosis. Visualized  portions of the thyroid are  unremarkable.    There are no aggressive osseous lesions. There are degenerative  changes in the spine. There is subtle irregularity of the anterior  right eighth rib which could represent a very subtle not significantly  displaced fracture but more likely represents normal cortical  undulation. No other evidence for fracture is seen. Certainly no  displaced fracture is identified.      Impression    IMPRESSION:  1. Question subtle nondisplaced fracture of the anterior right eighth  rib versus artifact. No other evidence for rib fracture.  2. Chronic granulomatous disease of the chest is stable since 2010. No  acute cardiopulmonary disease is identified.    JUSTIN CRONIN MD         Medications   0.9% sodium chloride BOLUS (0 mLs Intravenous Stopped 3/26/20 1515)   iopamidol (ISOVUE-370) solution 80 mL (80 mLs Intravenous Given 3/26/20 1312)   sodium chloride 0.9 % bag 500mL for CT scan flush use (80 mLs As instructed Given 3/26/20 1312)   0.9% sodium chloride BOLUS (0 mLs Intravenous Stopped 3/26/20 1547)       Assessments & Plan (with Medical Decision Making) records were reviewed.  Labs were obtained.  CT scan of the head neck and chest were obtained.  Orthostatics were obtained patient had decreased blood pressure with standing did not feel dizzy IV fluids were given to the patient.  EKG was without acute change.  Patient white count elevated at 14.2.  Hemoglobin 13.7 platelet count was 358 there was no left shift.  Comprehensive metabolic panel significant for glucose of 230.  CT scan of his head was without acute intracranial abnormality.  Cervical spine CT revealed a C5-C6 fusion but no acute abnormality with degenerative changes present.  Chest CT revealed a question of a right anterior eighth nondisplaced rib fracture versus artifact no other acute abnormality is noted.  Patient ambulated around the emergency department without difficulty he is not dizzy with ambulation.  I did offer the patient admission  for observation.  This appears to be a vasovagal event.  Patient does not feel he needs to be admitted.  He will continue to hydrate and stand up slowly and if any dizziness sit down immediately.  He will return if his symptoms return.  He is advised of the possible rib fracture will be given a few pain pills.  If any shortness of breath severe headaches neck pain focal numbness weakness any extremity chest pain or other symptoms present he should return for further evaluation and care.  Patient is agree with this plan.     I have reviewed the nursing notes.    I have reviewed the findings, diagnosis, plan and need for follow up with the patient.       Discharge Medication List as of 3/26/2020  3:52 PM          Final diagnoses:   Fall down stairs, initial encounter   Vasovagal syncope - probable   Closed head injury, initial encounter   Closed fracture of one rib of right side, initial encounter - possible       3/26/2020   [facility] EMERGENCY DEPARTMENT     Iftikhar Mcfadden MD  03/28/20 [id number]

## 2020-04-16 DIAGNOSIS — E10.42 DIABETIC POLYNEUROPATHY ASSOCIATED WITH TYPE 1 DIABETES MELLITUS (H): ICD-10-CM

## 2020-04-16 DIAGNOSIS — F11.90 CHRONIC NARCOTIC USE: ICD-10-CM

## 2020-04-16 DIAGNOSIS — M48.02 CERVICAL STENOSIS OF SPINE: ICD-10-CM

## 2020-04-16 RX ORDER — HYDROCODONE BITARTRATE AND ACETAMINOPHEN 10; 325 MG/1; MG/1
1 TABLET ORAL EVERY 6 HOURS PRN
Qty: 120 TABLET | Refills: 0 | Status: SHIPPED | OUTPATIENT
Start: 2020-04-17 | End: 2020-05-18

## 2020-04-16 NOTE — TELEPHONE ENCOUNTER
Routing refill request to provider for review/approval because:  Drug not on the [facility] refill protocol     Brittany Howell RN

## 2020-04-16 NOTE — TELEPHONE ENCOUNTER
Reason for Call:  Medication or medication refill:    Do you use a [facility]?  Name of the pharmacy and phone number for the current request:  [facility] [phone]    Name of the medication requested: [place] - Pt called for refill.  No need to call patient back, unless there are questions or problems.      Norco  Last Written Prescription Date:  3/19/20  Last Fill Quantity: 120,  # refills: 0   Last office visit: 3/16/2020 with prescribing provider:     Future Office Visit:      Other request:     Can we leave a detailed message on this number? YES    Phone number patient can be reached at: Home number on file [phone] (home)    Best Time: any    Call taken on 4/16/2020 at 9:06 AM by Beverley Randolph

## 2020-05-18 DIAGNOSIS — M48.02 CERVICAL STENOSIS OF SPINE: ICD-10-CM

## 2020-05-18 DIAGNOSIS — F11.90 CHRONIC NARCOTIC USE: ICD-10-CM

## 2020-05-18 DIAGNOSIS — E10.42 DIABETIC POLYNEUROPATHY ASSOCIATED WITH TYPE 1 DIABETES MELLITUS (H): ICD-10-CM

## 2020-05-18 RX ORDER — HYDROCODONE BITARTRATE AND ACETAMINOPHEN 10; 325 MG/1; MG/1
1 TABLET ORAL EVERY 6 HOURS PRN
Qty: 120 TABLET | Refills: 0 | Status: SHIPPED | OUTPATIENT
Start: 2020-05-18 | End: 2020-06-18

## 2020-05-18 NOTE — TELEPHONE ENCOUNTER
Routing refill request to provider for review/approval because:  Drug not on the [facility] refill protocol     Last OV 3/16/20  Last RX on  4/17/20 qty. 120    Thank you    Zoie MARVIN RN

## 2020-05-18 NOTE — TELEPHONE ENCOUNTER
Reason for Call:  Medication or medication refill:    Do you use a [facility]?  Name of the pharmacy and phone number for the current request:  [facility] [phone]    Name of the medication requested: [place] - Pt called for refill.  No need to call patient back, unless there are questions or problems.      Norco  Last Written Prescription Date:  4/17/20  Last Fill Quantity: 120,  # refills: 0   Last office visit: 12/19/2019 with prescribing provider:     Future Office Visit:      Other request:     Can we leave a detailed message on this number? YES    Phone number patient can be reached at: Home number on file [phone] (home)    Best Time: any    Call taken on 5/18/2020 at 11:28 AM by Beverley Randolph

## 2020-06-17 ENCOUNTER — TELEPHONE (OUTPATIENT)
Dept: FAMILY MEDICINE | Facility: CLINIC | Age: 65
End: 2020-06-17

## 2020-06-17 DIAGNOSIS — F11.90 CHRONIC NARCOTIC USE: ICD-10-CM

## 2020-06-17 DIAGNOSIS — M48.02 CERVICAL STENOSIS OF SPINE: ICD-10-CM

## 2020-06-17 DIAGNOSIS — E10.42 DIABETIC POLYNEUROPATHY ASSOCIATED WITH TYPE 1 DIABETES MELLITUS (H): ICD-10-CM

## 2020-06-18 ENCOUNTER — VIRTUAL VISIT (OUTPATIENT)
Dept: FAMILY MEDICINE | Facility: CLINIC | Age: 65
End: 2020-06-18
Payer: COMMERCIAL

## 2020-06-18 DIAGNOSIS — M48.02 CERVICAL STENOSIS OF SPINE: ICD-10-CM

## 2020-06-18 DIAGNOSIS — F11.90 CHRONIC NARCOTIC USE: ICD-10-CM

## 2020-06-18 DIAGNOSIS — E10.42 DIABETIC POLYNEUROPATHY ASSOCIATED WITH TYPE 1 DIABETES MELLITUS (H): ICD-10-CM

## 2020-06-18 DIAGNOSIS — E10.59 TYPE 1 DIABETES MELLITUS WITH VASCULAR DISEASE (H): Primary | ICD-10-CM

## 2020-06-18 PROCEDURE — 99214 OFFICE O/P EST MOD 30 MIN: CPT | Mod: 95 | Performed by: FAMILY MEDICINE

## 2020-06-18 RX ORDER — HYDROCODONE BITARTRATE AND ACETAMINOPHEN 10; 325 MG/1; MG/1
1 TABLET ORAL EVERY 6 HOURS PRN
Qty: 120 TABLET | Refills: 0 | Status: SHIPPED | OUTPATIENT
Start: 2020-07-17 | End: 2020-08-17

## 2020-06-18 RX ORDER — HYDROCODONE BITARTRATE AND ACETAMINOPHEN 10; 325 MG/1; MG/1
1 TABLET ORAL EVERY 6 HOURS PRN
Qty: 120 TABLET | Refills: 0 | Status: SHIPPED | OUTPATIENT
Start: 2020-06-18 | End: 2020-07-18

## 2020-06-18 ASSESSMENT — PATIENT HEALTH QUESTIONNAIRE - PHQ9: SUM OF ALL RESPONSES TO PHQ QUESTIONS 1-9: 0

## 2020-06-18 NOTE — PROGRESS NOTES
"Eb Eisenberg is a 65 year old male who is being evaluated via a billable telephone visit.      The patient has been notified of following:     \"This telephone visit will be conducted via a call between you and your physician/provider. We have found that certain health care needs can be provided without the need for a physical exam.  This service lets us provide the care you need with a short phone conversation.  If a prescription is necessary we can send it directly to your pharmacy.  If lab work is needed we can place an order for that and you can then stop by our lab to have the test done at a later time.    Telephone visits are billed at different rates depending on your insurance coverage. During this emergency period, for some insurers they may be billed the same as an in-person visit.  Please reach out to your insurance provider with any questions.    If during the course of the call the physician/provider feels a telephone visit is not appropriate, you will not be charged for this service.\"    Patient has given verbal consent for Telephone visit?  Yes    What phone number would you like to be contacted at? [phone]    How would you like to obtain your AVS? [email or web address]    Subjective     Eb Eisenberg is a 65 year old male who presents via phone visit today for the following health issues:    HPI   Chief Complaint   Patient presents with     Medication Request     Pain medication. Patient rates current pain at a 7/10 located in right shoulder.        Medication Followup of Norco    Taking Medication as prescribed: yes    Side Effects:  None    Medication Helping Symptoms:  yes          Diabetes Follow-up    How often are you checking your blood sugar? Continuous glucose monitor  What time of day are you checking your blood sugars (select all that apply)?  all the time with CGM  Have you had any blood sugars above 200?  Yes    Have you had any blood sugars below 70?  No    What symptoms do you notice when " your blood sugar is low?  Shaky and Weak    What concerns do you have today about your diabetes? None     Do you have any of these symptoms? (Select all that apply)  Numbness in feet    Have you had a diabetic eye exam in the last 12 months?     Due for HgbA1c- will schedule        BP Readings from Last 2 Encounters:   03/26/20 96/72   12/19/19 104/54     Hemoglobin A1C (%)   Date Value   09/19/2019 8.0 (H)   01/25/2019 7.9 (H)     LDL Cholesterol Calculated (mg/dL)   Date Value   09/19/2019 57   10/10/2018 89               Reviewed and updated as needed this visit by Provider         Review of Systems   Constitutional, HEENT, cardiovascular, pulmonary, gi and gu systems are negative, except as otherwise noted.       Objective   Reported vitals:  There were no vitals taken for this visit.   healthy, alert and no distress  PSYCH: Alert and oriented times 3; coherent speech, normal   rate and volume, able to articulate logical thoughts, able   to abstract reason, no tangential thoughts, no hallucinations   or delusions  His affect is normal  RESP: No cough, no audible wheezing, able to talk in full sentences  Remainder of exam unable to be completed due to telephone visits    Diagnostic Test Results:  Labs reviewed in Epic        Assessment/Plan:  1. Chronic narcotic use     - HYDROcodone-acetaminophen (NORCO)  MG per tablet; Take 1 tablet by mouth every 6 hours as needed for moderate to severe pain  Dispense: 120 tablet; Refill: 0  - HYDROcodone-acetaminophen (NORCO)  MG per tablet; Take 1 tablet by mouth every 6 hours as needed for moderate to severe pain  Dispense: 120 tablet; Refill: 0    2. Diabetic polyneuropathy associated with type 1 diabetes mellitus (H)     - HYDROcodone-acetaminophen (NORCO)  MG per tablet; Take 1 tablet by mouth every 6 hours as needed for moderate to severe pain  Dispense: 120 tablet; Refill: 0  - HYDROcodone-acetaminophen (NORCO)  MG per tablet; Take 1 tablet by  mouth every 6 hours as needed for moderate to severe pain  Dispense: 120 tablet; Refill: 0    3. Cervical stenosis of spine     - HYDROcodone-acetaminophen (NORCO)  MG per tablet; Take 1 tablet by mouth every 6 hours as needed for moderate to severe pain  Dispense: 120 tablet; Refill: 0  - HYDROcodone-acetaminophen (NORCO)  MG per tablet; Take 1 tablet by mouth every 6 hours as needed for moderate to severe pain  Dispense: 120 tablet; Refill: 0    4. Type 1 diabetes mellitus with vascular disease (H)     - Hemoglobin A1c; Future    No follow-ups on file.     Recommend in person/F2F visit in 2 months.      Phone call duration:  6 minutes    Jackeline Rachel MD

## 2020-06-18 NOTE — PATIENT INSTRUCTIONS
Our Clinic hours are:  Mondays    7:20 am - 7 pm  Tues -  Fri  7:20 am - 5 pm    Clinic Phone: [phone]    The clinic lab opens at 7:30 am Mon - Fri and appointments are required.    [facility]. [phone]  Monday  8 am - 7pm  Tues - Fri 8 am - 5:30 pm

## 2020-07-22 ENCOUNTER — TELEPHONE (OUTPATIENT)
Dept: FAMILY MEDICINE | Facility: CLINIC | Age: 65
End: 2020-07-22

## 2020-07-22 NOTE — TELEPHONE ENCOUNTER
Reason for Call:  Form, our goal is to have forms completed with 72 hours, however, some forms may require a visit or additional information.    Type of letter, form or note:  medical    Who is the form from?: [facility] (if other please explain)    Where did the form come from: form was faxed in    What clinic location was the form placed at?: [facility]    Where the form was placed: Given to physician - Pt in covering provider box for signature     What number is listed as a contact on the form?: [phone]       Additional comments:     Call taken on 7/22/2020 at 12:17 PM by Beverley Randolph

## 2020-08-17 DIAGNOSIS — M48.02 CERVICAL STENOSIS OF SPINE: ICD-10-CM

## 2020-08-17 DIAGNOSIS — E10.42 DIABETIC POLYNEUROPATHY ASSOCIATED WITH TYPE 1 DIABETES MELLITUS (H): ICD-10-CM

## 2020-08-17 DIAGNOSIS — F11.90 CHRONIC NARCOTIC USE: ICD-10-CM

## 2020-08-17 RX ORDER — HYDROCODONE BITARTRATE AND ACETAMINOPHEN 10; 325 MG/1; MG/1
1 TABLET ORAL EVERY 6 HOURS PRN
Qty: 120 TABLET | Refills: 0 | Status: SHIPPED | OUTPATIENT
Start: 2020-08-17 | End: 2020-09-16

## 2020-08-17 NOTE — TELEPHONE ENCOUNTER
Pt wants refill today please - No need to call patient back, unless there are questions or problems.

## 2020-08-17 NOTE — TELEPHONE ENCOUNTER
Routing refill request to provider for review/approval because:  Drug not on the [facility] refill protocol     Aundrea ATKINSON RN, BSN

## 2020-09-09 DIAGNOSIS — E10.42 DIABETIC POLYNEUROPATHY ASSOCIATED WITH TYPE 1 DIABETES MELLITUS (H): ICD-10-CM

## 2020-09-09 DIAGNOSIS — I51.9 LEFT VENTRICULAR DYSFUNCTION: ICD-10-CM

## 2020-09-10 RX ORDER — METOPROLOL SUCCINATE 25 MG/1
TABLET, EXTENDED RELEASE ORAL
Qty: 90 TABLET | Refills: 0 | Status: SHIPPED | OUTPATIENT
Start: 2020-09-10 | End: 2020-09-16

## 2020-09-10 RX ORDER — LOSARTAN POTASSIUM 50 MG/1
TABLET ORAL
Qty: 90 TABLET | Refills: 0 | Status: SHIPPED | OUTPATIENT
Start: 2020-09-10 | End: 2020-09-16

## 2020-09-10 NOTE — TELEPHONE ENCOUNTER
"Requested Prescriptions   Pending Prescriptions Disp Refills     metoprolol succinate ER (TOPROL-XL) 25 MG 24 hr tablet [Pharmacy Med Name: METOPROLOL SUCCINATE ER 25 MG Tablet Extended Release 24 Hour] 90 tablet 3     Sig: TAKE 1 TABLET EVERY DAY       Beta-Blockers Protocol Passed - 9/9/2020  5:07 PM        Passed - Blood pressure under 140/90 in past 12 months     BP Readings from Last 3 Encounters:   03/26/20 96/72   12/19/19 104/54   09/19/19 118/64                 Passed - Patient is age 6 or older        Passed - Recent (12 mo) or future (30 days) visit within the authorizing provider's specialty     Patient has had an office visit with the authorizing provider or a provider within the authorizing providers department within the previous 12 mos or has a future within next 30 days. See \"Patient Info\" tab in inbasket, or \"Choose Columns\" in Meds & Orders section of the refill encounter.              Passed - Medication is active on med list           losartan (COZAAR) 50 MG tablet [Pharmacy Med Name: LOSARTAN POTASSIUM 50 MG Tablet] 90 tablet 3     Sig: TAKE 1 TABLET EVERY DAY       Angiotensin-II Receptors Passed - 9/9/2020  5:07 PM        Passed - Last blood pressure under 140/90 in past 12 months     BP Readings from Last 3 Encounters:   03/26/20 96/72   12/19/19 104/54   09/19/19 118/64                 Passed - Recent (12 mo) or future (30 days) visit within the authorizing provider's specialty     Patient has had an office visit with the authorizing provider or a provider within the authorizing providers department within the previous 12 mos or has a future within next 30 days. See \"Patient Info\" tab in inbasket, or \"Choose Columns\" in Meds & Orders section of the refill encounter.              Passed - Medication is active on med list        Passed - Patient is age 18 or older        Passed - Normal serum creatinine on file in past 12 months     Recent Labs   Lab Test 03/26/20  1232   CR 1.07       Ok to " refill medication if creatinine is low          Passed - Normal serum potassium on file in past 12 months     Recent Labs   Lab Test 03/26/20  1232   POTASSIUM 4.1

## 2020-09-14 DIAGNOSIS — N40.1 BENIGN NON-NODULAR PROSTATIC HYPERPLASIA WITH LOWER URINARY TRACT SYMPTOMS: ICD-10-CM

## 2020-09-15 RX ORDER — TAMSULOSIN HYDROCHLORIDE 0.4 MG/1
CAPSULE ORAL
Qty: 90 CAPSULE | Refills: 0 | Status: SHIPPED | OUTPATIENT
Start: 2020-09-15 | End: 2020-09-16

## 2020-09-16 ENCOUNTER — OFFICE VISIT (OUTPATIENT)
Dept: FAMILY MEDICINE | Facility: CLINIC | Age: 65
End: 2020-09-16
Payer: COMMERCIAL

## 2020-09-16 VITALS
HEART RATE: 82 BPM | SYSTOLIC BLOOD PRESSURE: 92 MMHG | BODY MASS INDEX: 25.62 KG/M2 | DIASTOLIC BLOOD PRESSURE: 62 MMHG | RESPIRATION RATE: 16 BRPM | OXYGEN SATURATION: 95 % | HEIGHT: 69 IN | WEIGHT: 173 LBS | TEMPERATURE: 98.1 F

## 2020-09-16 DIAGNOSIS — D48.5 NEOPLASM OF UNCERTAIN BEHAVIOR OF SKIN: ICD-10-CM

## 2020-09-16 DIAGNOSIS — E10.59 TYPE 1 DIABETES MELLITUS WITH VASCULAR DISEASE (H): Primary | ICD-10-CM

## 2020-09-16 DIAGNOSIS — E78.5 HYPERLIPIDEMIA LDL GOAL <70: ICD-10-CM

## 2020-09-16 DIAGNOSIS — M48.02 CERVICAL STENOSIS OF SPINE: ICD-10-CM

## 2020-09-16 DIAGNOSIS — Z23 NEED FOR PROPHYLACTIC VACCINATION AND INOCULATION AGAINST INFLUENZA: ICD-10-CM

## 2020-09-16 DIAGNOSIS — N40.1 BENIGN NON-NODULAR PROSTATIC HYPERPLASIA WITH LOWER URINARY TRACT SYMPTOMS: ICD-10-CM

## 2020-09-16 DIAGNOSIS — E10.42 DIABETIC POLYNEUROPATHY ASSOCIATED WITH TYPE 1 DIABETES MELLITUS (H): ICD-10-CM

## 2020-09-16 DIAGNOSIS — I51.9 LEFT VENTRICULAR DYSFUNCTION: ICD-10-CM

## 2020-09-16 DIAGNOSIS — F11.90 CHRONIC NARCOTIC USE: ICD-10-CM

## 2020-09-16 LAB
ANION GAP SERPL CALCULATED.3IONS-SCNC: 5 MMOL/L (ref 3–14)
BUN SERPL-MCNC: 17 MG/DL (ref 7–30)
CALCIUM SERPL-MCNC: 8.6 MG/DL (ref 8.5–10.1)
CHLORIDE SERPL-SCNC: 106 MMOL/L (ref 94–109)
CHOLEST SERPL-MCNC: 134 MG/DL
CO2 SERPL-SCNC: 27 MMOL/L (ref 20–32)
CREAT SERPL-MCNC: 1.04 MG/DL (ref 0.66–1.25)
GFR SERPL CREATININE-BSD FRML MDRD: 74 ML/MIN/{1.73_M2}
GLUCOSE SERPL-MCNC: 208 MG/DL (ref 70–99)
HBA1C MFR BLD: 7.2 % (ref 0–5.6)
HDLC SERPL-MCNC: 53 MG/DL
LDLC SERPL CALC-MCNC: 63 MG/DL
NONHDLC SERPL-MCNC: 81 MG/DL
POTASSIUM SERPL-SCNC: 4 MMOL/L (ref 3.4–5.3)
SODIUM SERPL-SCNC: 138 MMOL/L (ref 133–144)
TRIGL SERPL-MCNC: 91 MG/DL

## 2020-09-16 PROCEDURE — 83036 HEMOGLOBIN GLYCOSYLATED A1C: CPT | Performed by: FAMILY MEDICINE

## 2020-09-16 PROCEDURE — 80061 LIPID PANEL: CPT | Performed by: FAMILY MEDICINE

## 2020-09-16 PROCEDURE — 80048 BASIC METABOLIC PNL TOTAL CA: CPT | Performed by: FAMILY MEDICINE

## 2020-09-16 PROCEDURE — G0008 ADMIN INFLUENZA VIRUS VAC: HCPCS | Performed by: FAMILY MEDICINE

## 2020-09-16 PROCEDURE — 99214 OFFICE O/P EST MOD 30 MIN: CPT | Mod: 25 | Performed by: FAMILY MEDICINE

## 2020-09-16 PROCEDURE — 36415 COLL VENOUS BLD VENIPUNCTURE: CPT | Performed by: FAMILY MEDICINE

## 2020-09-16 PROCEDURE — 90662 IIV NO PRSV INCREASED AG IM: CPT | Performed by: FAMILY MEDICINE

## 2020-09-16 RX ORDER — HYDROCODONE BITARTRATE AND ACETAMINOPHEN 10; 325 MG/1; MG/1
1 TABLET ORAL EVERY 6 HOURS PRN
Qty: 120 TABLET | Refills: 0 | Status: SHIPPED | OUTPATIENT
Start: 2020-09-16 | End: 2020-10-16

## 2020-09-16 RX ORDER — TAMSULOSIN HYDROCHLORIDE 0.4 MG/1
CAPSULE ORAL
Qty: 90 CAPSULE | Refills: 3 | Status: SHIPPED | OUTPATIENT
Start: 2020-09-16 | End: 2021-01-27

## 2020-09-16 RX ORDER — METOPROLOL SUCCINATE 25 MG/1
TABLET, EXTENDED RELEASE ORAL
Qty: 90 TABLET | Refills: 3 | Status: SHIPPED | OUTPATIENT
Start: 2020-09-16 | End: 2020-12-08

## 2020-09-16 RX ORDER — LOSARTAN POTASSIUM 50 MG/1
TABLET ORAL
Qty: 90 TABLET | Refills: 3 | Status: SHIPPED | OUTPATIENT
Start: 2020-09-16 | End: 2020-12-03

## 2020-09-16 RX ORDER — ROSUVASTATIN CALCIUM 40 MG/1
TABLET, COATED ORAL
Qty: 90 TABLET | Refills: 3 | Status: SHIPPED | OUTPATIENT
Start: 2020-09-16 | End: 2021-01-27

## 2020-09-16 ASSESSMENT — ANXIETY QUESTIONNAIRES
2. NOT BEING ABLE TO STOP OR CONTROL WORRYING: NOT AT ALL
1. FEELING NERVOUS, ANXIOUS, OR ON EDGE: SEVERAL DAYS
7. FEELING AFRAID AS IF SOMETHING AWFUL MIGHT HAPPEN: SEVERAL DAYS
6. BECOMING EASILY ANNOYED OR IRRITABLE: SEVERAL DAYS
5. BEING SO RESTLESS THAT IT IS HARD TO SIT STILL: NOT AT ALL
3. WORRYING TOO MUCH ABOUT DIFFERENT THINGS: NOT AT ALL
GAD7 TOTAL SCORE: 3

## 2020-09-16 ASSESSMENT — PAIN SCALES - GENERAL: PAINLEVEL: MODERATE PAIN (5)

## 2020-09-16 ASSESSMENT — MIFFLIN-ST. JEOR: SCORE: 1555.1

## 2020-09-16 ASSESSMENT — PATIENT HEALTH QUESTIONNAIRE - PHQ9
5. POOR APPETITE OR OVEREATING: NOT AT ALL
SUM OF ALL RESPONSES TO PHQ QUESTIONS 1-9: 0

## 2020-09-16 NOTE — PROGRESS NOTES
Subjective     Eb Eisenberg is a 65 year old male who presents to clinic today for the following health issues:    HPI   Chief Complaint   Patient presents with     Diabetes     Flu Shot           Diabetes Follow-up    How often are you checking your blood sugar? Continuous glucose monitor  What time of day are you checking your blood sugars (select all that apply)?  Before and after meals and At bedtime  Have you had any blood sugars above 200?  Yes   Have you had any blood sugars below 70?  Yes     What symptoms do you notice when your blood sugar is low?  Shaky and Weak    What concerns do you have today about your diabetes? None     Do you have any of these symptoms? (Select all that apply)  No numbness or tingling in feet.  No redness, sores or blisters on feet.  No complaints of excessive thirst.  No reports of blurry vision.  No significant changes to weight.    Have you had a diabetic eye exam in the last 12 months? No           Hyperlipidemia Follow-Up      Are you regularly taking any medication or supplement to lower your cholesterol?   Yes- crestor    Are you having muscle aches or other side effects that you think could be caused by your cholesterol lowering medication?  No    Hypertension Follow-up      Do you check your blood pressure regularly outside of the clinic? No     Are you following a low salt diet? Yes    Are your blood pressures ever more than 140 on the top number (systolic) OR more   than 90 on the bottom number (diastolic), for example 140/90? No    BP Readings from Last 2 Encounters:   09/16/20 92/62   03/26/20 96/72     Hemoglobin A1C (%)   Date Value   09/16/2020 7.2 (H)   09/19/2019 8.0 (H)     LDL Cholesterol Calculated (mg/dL)   Date Value   09/19/2019 57   10/10/2018 89         How many servings of fruits and vegetables do you eat daily?  0-1    On average, how many sweetened beverages do you drink each day (Examples: soda, juice, sweet tea, etc.  Do NOT count diet or  "artificially sweetened beverages)?   0    How many days per week do you exercise enough to make your heart beat faster? 7    How many minutes a day do you exercise enough to make your heart beat faster? 10 - 19    How many days per week do you miss taking your medication? 0      Review of Systems   Constitutional, HEENT, cardiovascular, pulmonary, gi and gu systems are negative, except as otherwise noted.      Objective    BP 92/62   Pulse 82   Temp 98.1  F (36.7  C) (Tympanic)   Resp 16   Ht 1.753 m (5' 9\")   Wt 78.5 kg (173 lb)   SpO2 95%   BMI 25.55 kg/m    Body mass index is 25.55 kg/m .  Physical Exam   GENERAL: healthy, alert and no distress  NECK: no adenopathy, no asymmetry, masses, or scars and thyroid normal to palpation  RESP: lungs clear to auscultation - no rales, rhonchi or wheezes  CV: regular rate and rhythm, normal S1 S2, no S3 or S4, no murmur, click or rub, no peripheral edema and peripheral pulses strong  ABDOMEN: soft, nontender, no hepatosplenomegaly, no masses and bowel sounds normal  MS: no gross musculoskeletal defects noted, no edema  SKIN: right of nose there is a 3 -4 mm papule with rolled edges    Results for orders placed or performed in visit on 09/16/20 (from the past 24 hour(s))   Hemoglobin A1c   Result Value Ref Range    Hemoglobin A1C 7.2 (H) 0 - 5.6 %           Assessment & Plan     Type 1 diabetes mellitus with vascular disease (H)  Well controlled  Attributes better control to the CGM which he has found very useful.   - Hemoglobin A1c  - Basic metabolic panel  - Lipid panel reflex to direct LDL Fasting  - Albumin Random Urine Quantitative with Creat Ratio    Chronic narcotic use  Stable, no concern for overuse/abuse  - HYDROcodone-acetaminophen (NORCO)  MG per tablet; Take 1 tablet by mouth every 6 hours as needed for moderate to severe pain    Diabetic polyneuropathy associated with type 1 diabetes mellitus (H)     - HYDROcodone-acetaminophen (NORCO)  MG per " "tablet; Take 1 tablet by mouth every 6 hours as needed for moderate to severe pain  - metoprolol succinate ER (TOPROL-XL) 25 MG 24 hr tablet; TAKE 1 TABLET EVERY DAY    Cervical stenosis of spine     - HYDROcodone-acetaminophen (NORCO)  MG per tablet; Take 1 tablet by mouth every 6 hours as needed for moderate to severe pain    Hyperlipidemia LDL goal <70   refilled  - rosuvastatin (CRESTOR) 40 MG tablet; TAKE 1 TABLET EVERY DAY    Need for prophylactic vaccination and inoculation against influenza     - FLUZONE HIGH DOSE 65+  [95544]  - ADMIN INFLUENZA (For MEDICARE Patients ONLY) []    Benign non-nodular prostatic hyperplasia with lower urinary tract symptoms  Symptoms well managed  - tamsulosin (FLOMAX) 0.4 MG capsule; TAKE 1 CAPSULE EVERY DAY    Left ventricular dysfunction  Stable, EF 62% 1 year ago on nuclear stress  - metoprolol succinate ER (TOPROL-XL) 25 MG 24 hr tablet; TAKE 1 TABLET EVERY DAY  - losartan (COZAAR) 50 MG tablet; TAKE 1 TABLET EVERY DAY    Neoplasm of uncertain behavior of skin  Concern for basal cell carcinoma - refer to derm  - DERMATOLOGY ADULT REFERRAL; Future     BMI:   Estimated body mass index is 25.55 kg/m  as calculated from the following:    Height as of this encounter: 1.753 m (5' 9\").    Weight as of this encounter: 78.5 kg (173 lb).               No follow-ups on file.    Jackeline Rachel MD  [facility]    "

## 2020-09-16 NOTE — PATIENT INSTRUCTIONS
Our Clinic hours are:  Mondays    7:20 am - 7 pm  Tues -  Fri  7:20 am - 5 pm    Clinic Phone: [phone]    The clinic lab opens at 7:30 am Mon - Fri and appointments are required.    [facility]. [phone]  Monday  8 am - 7pm  Tues - Fri 8 am - 5:30 pm

## 2020-09-17 ASSESSMENT — ANXIETY QUESTIONNAIRES: GAD7 TOTAL SCORE: 3

## 2020-10-05 ENCOUNTER — TRANSFERRED RECORDS (OUTPATIENT)
Dept: HEALTH INFORMATION MANAGEMENT | Facility: CLINIC | Age: 65
End: 2020-10-05

## 2020-10-05 LAB — RETINOPATHY: NEGATIVE

## 2020-10-16 ENCOUNTER — TELEPHONE (OUTPATIENT)
Dept: FAMILY MEDICINE | Facility: CLINIC | Age: 65
End: 2020-10-16

## 2020-10-16 DIAGNOSIS — M48.02 CERVICAL STENOSIS OF SPINE: ICD-10-CM

## 2020-10-16 DIAGNOSIS — E10.42 DIABETIC POLYNEUROPATHY ASSOCIATED WITH TYPE 1 DIABETES MELLITUS (H): ICD-10-CM

## 2020-10-16 DIAGNOSIS — F11.90 CHRONIC NARCOTIC USE: ICD-10-CM

## 2020-10-16 RX ORDER — HYDROCODONE BITARTRATE AND ACETAMINOPHEN 10; 325 MG/1; MG/1
1 TABLET ORAL EVERY 6 HOURS PRN
Qty: 120 TABLET | Refills: 0 | Status: SHIPPED | OUTPATIENT
Start: 2020-10-16 | End: 2020-11-12

## 2020-10-16 NOTE — TELEPHONE ENCOUNTER
Patient is calling will not have enough thru the weekend. Can we expedite today?  Leticia Rosenthal  Clinic Station

## 2020-11-12 DIAGNOSIS — E10.42 DIABETIC POLYNEUROPATHY ASSOCIATED WITH TYPE 1 DIABETES MELLITUS (H): ICD-10-CM

## 2020-11-12 DIAGNOSIS — M48.02 CERVICAL STENOSIS OF SPINE: ICD-10-CM

## 2020-11-12 DIAGNOSIS — F11.90 CHRONIC NARCOTIC USE: ICD-10-CM

## 2020-11-12 RX ORDER — HYDROCODONE BITARTRATE AND ACETAMINOPHEN 10; 325 MG/1; MG/1
1 TABLET ORAL EVERY 6 HOURS PRN
Qty: 120 TABLET | Refills: 0 | Status: SHIPPED | OUTPATIENT
Start: 2020-11-12 | End: 2020-12-14

## 2020-11-12 NOTE — TELEPHONE ENCOUNTER
Requested Prescriptions   Pending Prescriptions Disp Refills     HYDROcodone-acetaminophen (NORCO)  MG per tablet 120 tablet 0     Sig: Take 1 tablet by mouth every 6 hours as needed for moderate to severe pain       There is no refill protocol information for this order

## 2020-12-03 ENCOUNTER — TELEPHONE (OUTPATIENT)
Dept: FAMILY MEDICINE | Facility: CLINIC | Age: 65
End: 2020-12-03

## 2020-12-03 DIAGNOSIS — E10.59 TYPE 1 DIABETES MELLITUS WITH VASCULAR DISEASE (H): ICD-10-CM

## 2020-12-03 DIAGNOSIS — I51.9 LEFT VENTRICULAR DYSFUNCTION: ICD-10-CM

## 2020-12-03 RX ORDER — LOSARTAN POTASSIUM 50 MG/1
TABLET ORAL
Qty: 90 TABLET | Refills: 2 | Status: SHIPPED | OUTPATIENT
Start: 2020-12-03 | End: 2021-01-27

## 2020-12-03 NOTE — TELEPHONE ENCOUNTER
Please transfer the following RX's to [facility] mail delivery:   losartan (COZAAR) 50 MG tablet  metoprolol succinate ER (TOPROL-XL) 25 MG 24 hr tablet  insulin aspart (NOVOLOG VIAL) 100 UNITS/ML vial

## 2020-12-08 ENCOUNTER — TELEPHONE (OUTPATIENT)
Dept: FAMILY MEDICINE | Facility: CLINIC | Age: 65
End: 2020-12-08

## 2020-12-08 DIAGNOSIS — I51.9 LEFT VENTRICULAR DYSFUNCTION: ICD-10-CM

## 2020-12-08 DIAGNOSIS — E10.42 DIABETIC POLYNEUROPATHY ASSOCIATED WITH TYPE 1 DIABETES MELLITUS (H): ICD-10-CM

## 2020-12-08 RX ORDER — METOPROLOL SUCCINATE 25 MG/1
TABLET, EXTENDED RELEASE ORAL
Qty: 90 TABLET | Refills: 2 | Status: SHIPPED | OUTPATIENT
Start: 2020-12-08 | End: 2021-01-27

## 2020-12-08 NOTE — TELEPHONE ENCOUNTER
Please transfer metoprolol succinate ER (TOPROL-XL) 25 MG 24 hr tablet Rx to [facility] Mail delivery

## 2020-12-14 ENCOUNTER — OFFICE VISIT (OUTPATIENT)
Dept: FAMILY MEDICINE | Facility: CLINIC | Age: 65
End: 2020-12-14
Payer: COMMERCIAL

## 2020-12-14 VITALS
OXYGEN SATURATION: 97 % | RESPIRATION RATE: 16 BRPM | HEART RATE: 73 BPM | TEMPERATURE: 98.3 F | BODY MASS INDEX: 27.25 KG/M2 | HEIGHT: 69 IN | DIASTOLIC BLOOD PRESSURE: 62 MMHG | WEIGHT: 184 LBS | SYSTOLIC BLOOD PRESSURE: 100 MMHG

## 2020-12-14 DIAGNOSIS — E10.42 DIABETIC POLYNEUROPATHY ASSOCIATED WITH TYPE 1 DIABETES MELLITUS (H): ICD-10-CM

## 2020-12-14 DIAGNOSIS — M48.02 CERVICAL STENOSIS OF SPINE: ICD-10-CM

## 2020-12-14 DIAGNOSIS — F11.90 CHRONIC NARCOTIC USE: ICD-10-CM

## 2020-12-14 DIAGNOSIS — Z23 NEED FOR VACCINATION: ICD-10-CM

## 2020-12-14 DIAGNOSIS — E10.59 TYPE 1 DIABETES MELLITUS WITH VASCULAR DISEASE (H): Primary | ICD-10-CM

## 2020-12-14 LAB — HBA1C MFR BLD: 7.4 % (ref 0–5.6)

## 2020-12-14 PROCEDURE — 99214 OFFICE O/P EST MOD 30 MIN: CPT | Mod: 25 | Performed by: FAMILY MEDICINE

## 2020-12-14 PROCEDURE — 90714 TD VACC NO PRESV 7 YRS+ IM: CPT | Performed by: FAMILY MEDICINE

## 2020-12-14 PROCEDURE — 83036 HEMOGLOBIN GLYCOSYLATED A1C: CPT | Performed by: FAMILY MEDICINE

## 2020-12-14 PROCEDURE — 90471 IMMUNIZATION ADMIN: CPT | Performed by: FAMILY MEDICINE

## 2020-12-14 PROCEDURE — 36415 COLL VENOUS BLD VENIPUNCTURE: CPT | Performed by: FAMILY MEDICINE

## 2020-12-14 RX ORDER — BIOTIN 5 MG
TABLET ORAL
COMMUNITY

## 2020-12-14 RX ORDER — HYDROCODONE BITARTRATE AND ACETAMINOPHEN 10; 325 MG/1; MG/1
1 TABLET ORAL EVERY 6 HOURS PRN
Qty: 120 TABLET | Refills: 0 | Status: SHIPPED | OUTPATIENT
Start: 2020-12-14 | End: 2021-01-14

## 2020-12-14 RX ORDER — ZINC GLUCONATE 50 MG
50 TABLET ORAL DAILY
COMMUNITY

## 2020-12-14 ASSESSMENT — ANXIETY QUESTIONNAIRES
5. BEING SO RESTLESS THAT IT IS HARD TO SIT STILL: NOT AT ALL
1. FEELING NERVOUS, ANXIOUS, OR ON EDGE: NOT AT ALL
3. WORRYING TOO MUCH ABOUT DIFFERENT THINGS: NOT AT ALL
6. BECOMING EASILY ANNOYED OR IRRITABLE: NOT AT ALL
2. NOT BEING ABLE TO STOP OR CONTROL WORRYING: NOT AT ALL
GAD7 TOTAL SCORE: 0
7. FEELING AFRAID AS IF SOMETHING AWFUL MIGHT HAPPEN: NOT AT ALL

## 2020-12-14 ASSESSMENT — PATIENT HEALTH QUESTIONNAIRE - PHQ9
5. POOR APPETITE OR OVEREATING: NOT AT ALL
SUM OF ALL RESPONSES TO PHQ QUESTIONS 1-9: 1

## 2020-12-14 ASSESSMENT — MIFFLIN-ST. JEOR: SCORE: 1605

## 2020-12-14 NOTE — PROGRESS NOTES
Subjective     Eb Eisenberg is a 65 year old male who presents to clinic today for the following health issues:    HPI         Diabetes Follow-up    How often are you checking your blood sugar? Continuous glucose monitor  What time of day are you checking your blood sugars (select all that apply)?  Before and after meals  Have you had any blood sugars above 200?  Yes occasionally  Have you had any blood sugars below 70?  No    What symptoms do you notice when your blood sugar is low?  None    What concerns do you have today about your diabetes? None     Do you have any of these symptoms? (Select all that apply)  No numbness or tingling in feet.  No redness, sores or blisters on feet.  No complaints of excessive thirst.  No reports of blurry vision.  No significant changes to weight.      BP Readings from Last 2 Encounters:   20 100/62   20 92/62     Hemoglobin A1C (%)   Date Value   2020 7.2 (H)   2019 8.0 (H)     LDL Cholesterol Calculated (mg/dL)   Date Value   2020 63   2019 57          Hypertension Follow-up      Do you check your blood pressure regularly outside of the clinic? No     Are you following a low salt diet? Yes    Are your blood pressures ever more than 140 on the top number (systolic) OR more   than 90 on the bottom number (diastolic), for example 140/90? No    Depression Followup    How are you doing with your depression since your last visit? No change    Are you having other symptoms that might be associated with depression? No    Have you had a significant life event?  No     Are you feeling anxious or having panic attacks?   No    Do you have any concerns with your use of alcohol or other drugs? No    Social History     Tobacco Use     Smoking status: Former Smoker     Quit date: 2001     Years since quittin.9     Smokeless tobacco: Never Used   Substance Use Topics     Alcohol use: No     Comment: quit in early      Drug use: No     PHQ  6/18/2020 9/16/2020 12/14/2020   PHQ-9 Total Score 0 0 1   Q9: Thoughts of better off dead/self-harm past 2 weeks Not at all Not at all Not at all     VIVIANA-7 SCORE 9/19/2019 9/16/2020 12/14/2020   Total Score - - -   Total Score 1 3 0     Last PHQ-9 12/14/2020   1.  Little interest or pleasure in doing things 0   2.  Feeling down, depressed, or hopeless 0   3.  Trouble falling or staying asleep, or sleeping too much 1   4.  Feeling tired or having little energy 0   5.  Poor appetite or overeating 0   6.  Feeling bad about yourself 0   7.  Trouble concentrating 0   8.  Moving slowly or restless 0   Q9: Thoughts of better off dead/self-harm past 2 weeks 0   PHQ-9 Total Score 1   Difficulty at work, home, or with people -     VIVIANA-7  12/14/2020   1. Feeling nervous, anxious, or on edge 0   2. Not being able to stop or control worrying 0   3. Worrying too much about different things 0   4. Trouble relaxing 0   5. Being so restless that it is hard to sit still 0   6. Becoming easily annoyed or irritable 0   7. Feeling afraid, as if something awful might happen 0   VIVIANA-7 Total Score 0   If you checked any problems, how difficult have they made it for you to do your work, take care of things at home, or get along with other people? -         Suicide Assessment Five-step Evaluation and Treatment (SAFE-T)      How many servings of fruits and vegetables do you eat daily?  2-3    On average, how many sweetened beverages do you drink each day (Examples: soda, juice, sweet tea, etc.  Do NOT count diet or artificially sweetened beverages)?   0    How many days per week do you exercise enough to make your heart beat faster? 4    How many minutes a day do you exercise enough to make your heart beat faster? 20 - 29    How many days per week do you miss taking your medication? 0    Review of Systems   Constitutional, HEENT, cardiovascular, pulmonary, gi and gu systems are negative, except as otherwise noted.      Objective    /62  "(BP Location: Right arm, Patient Position: Sitting, Cuff Size: Adult Large)   Pulse 73   Temp 98.3  F (36.8  C) (Tympanic)   Resp 16   Ht 1.753 m (5' 9\")   Wt 83.5 kg (184 lb)   SpO2 97%   BMI 27.17 kg/m    Body mass index is 27.17 kg/m .  Physical Exam   GENERAL: healthy, alert and no distress  NECK: no adenopathy, no asymmetry, masses, or scars and thyroid normal to palpation  RESP: lungs clear to auscultation - no rales, rhonchi or wheezes  CV: regular rate and rhythm, normal S1 S2, no S3 or S4, no murmur, click or rub, no peripheral edema and peripheral pulses strong  ABDOMEN: soft, nontender, no hepatosplenomegaly, no masses and bowel sounds normal  MS: no gross musculoskeletal defects noted, no edema            Assessment & Plan     Type 1 diabetes mellitus with vascular disease (H)     - Hemoglobin A1c    Chronic narcotic use     - HYDROcodone-acetaminophen (NORCO)  MG per tablet; Take 1 tablet by mouth every 6 hours as needed for moderate to severe pain    Diabetic polyneuropathy associated with type 1 diabetes mellitus (H)     - HYDROcodone-acetaminophen (NORCO)  MG per tablet; Take 1 tablet by mouth every 6 hours as needed for moderate to severe pain    Cervical stenosis of spine     - HYDROcodone-acetaminophen (NORCO)  MG per tablet; Take 1 tablet by mouth every 6 hours as needed for moderate to severe pain      Td given today      No follow-ups on file.    Jackeline Rachel MD  [facility]      "

## 2020-12-15 ASSESSMENT — ANXIETY QUESTIONNAIRES: GAD7 TOTAL SCORE: 0

## 2021-01-14 DIAGNOSIS — E10.42 DIABETIC POLYNEUROPATHY ASSOCIATED WITH TYPE 1 DIABETES MELLITUS (H): ICD-10-CM

## 2021-01-14 DIAGNOSIS — M48.02 CERVICAL STENOSIS OF SPINE: ICD-10-CM

## 2021-01-14 DIAGNOSIS — F11.90 CHRONIC NARCOTIC USE: ICD-10-CM

## 2021-01-14 RX ORDER — HYDROCODONE BITARTRATE AND ACETAMINOPHEN 10; 325 MG/1; MG/1
1 TABLET ORAL EVERY 6 HOURS PRN
Qty: 120 TABLET | Refills: 0 | Status: SHIPPED | OUTPATIENT
Start: 2021-01-14 | End: 2021-02-11

## 2021-01-14 NOTE — TELEPHONE ENCOUNTER
Pt wants refill today please.  No need to call patient back, unless there are questions or problems.

## 2021-01-26 DIAGNOSIS — E10.9 TYPE 1 DIABETES, HBA1C GOAL < 8% (H): ICD-10-CM

## 2021-01-26 DIAGNOSIS — I51.9 LEFT VENTRICULAR DYSFUNCTION: ICD-10-CM

## 2021-01-26 DIAGNOSIS — E78.5 HYPERLIPIDEMIA LDL GOAL <70: ICD-10-CM

## 2021-01-26 DIAGNOSIS — N40.1 BENIGN NON-NODULAR PROSTATIC HYPERPLASIA WITH LOWER URINARY TRACT SYMPTOMS: ICD-10-CM

## 2021-01-26 DIAGNOSIS — E10.42 DIABETIC POLYNEUROPATHY ASSOCIATED WITH TYPE 1 DIABETES MELLITUS (H): ICD-10-CM

## 2021-01-26 DIAGNOSIS — E10.59 TYPE 1 DIABETES MELLITUS WITH VASCULAR DISEASE (H): ICD-10-CM

## 2021-01-26 DIAGNOSIS — F32.2 MAJOR DEPRESSIVE DISORDER, SINGLE EPISODE, SEVERE (H): ICD-10-CM

## 2021-01-27 RX ORDER — LOSARTAN POTASSIUM 50 MG/1
TABLET ORAL
Qty: 90 TABLET | Refills: 1 | Status: SHIPPED | OUTPATIENT
Start: 2021-01-27 | End: 2021-11-22

## 2021-01-27 RX ORDER — ROSUVASTATIN CALCIUM 40 MG/1
TABLET, COATED ORAL
Qty: 90 TABLET | Refills: 1 | Status: SHIPPED | OUTPATIENT
Start: 2021-01-27 | End: 2022-02-07

## 2021-01-27 RX ORDER — METOPROLOL SUCCINATE 25 MG/1
TABLET, EXTENDED RELEASE ORAL
Qty: 90 TABLET | Refills: 1 | Status: SHIPPED | OUTPATIENT
Start: 2021-01-27 | End: 2021-11-22

## 2021-01-27 RX ORDER — TAMSULOSIN HYDROCHLORIDE 0.4 MG/1
CAPSULE ORAL
Qty: 90 CAPSULE | Refills: 1 | Status: SHIPPED | OUTPATIENT
Start: 2021-01-27 | End: 2021-09-10

## 2021-02-09 ENCOUNTER — TELEPHONE (OUTPATIENT)
Dept: FAMILY MEDICINE | Facility: CLINIC | Age: 66
End: 2021-02-09

## 2021-02-09 NOTE — TELEPHONE ENCOUNTER
Reason for Call: Request for an order or referral:    Order or referral being requested: Order for [facility] placed on MD's desk for completion.      Date needed: as soon as possible    Has the patient been seen by the PCP for this problem? YES    Additional comments:     Call taken on 2/9/2021 at 11:56 AM by Beverley Wright

## 2021-02-10 ENCOUNTER — MEDICAL CORRESPONDENCE (OUTPATIENT)
Dept: HEALTH INFORMATION MANAGEMENT | Facility: CLINIC | Age: 66
End: 2021-02-10

## 2021-02-10 DIAGNOSIS — F11.90 CHRONIC NARCOTIC USE: ICD-10-CM

## 2021-02-10 DIAGNOSIS — M48.02 CERVICAL STENOSIS OF SPINE: ICD-10-CM

## 2021-02-10 DIAGNOSIS — E10.42 DIABETIC POLYNEUROPATHY ASSOCIATED WITH TYPE 1 DIABETES MELLITUS (H): ICD-10-CM

## 2021-02-10 NOTE — TELEPHONE ENCOUNTER
Routing refill request to provider for review/approval because:  Drug not on the [facility] refill protocol     Vero MTZ MSN, RN

## 2021-02-11 RX ORDER — HYDROCODONE BITARTRATE AND ACETAMINOPHEN 10; 325 MG/1; MG/1
1 TABLET ORAL EVERY 6 HOURS PRN
Qty: 120 TABLET | Refills: 0 | Status: SHIPPED | OUTPATIENT
Start: 2021-02-11 | End: 2021-03-09

## 2021-03-08 ENCOUNTER — TELEPHONE (OUTPATIENT)
Dept: FAMILY MEDICINE | Facility: CLINIC | Age: 66
End: 2021-03-08

## 2021-03-08 DIAGNOSIS — M48.02 CERVICAL STENOSIS OF SPINE: ICD-10-CM

## 2021-03-08 DIAGNOSIS — F11.90 CHRONIC NARCOTIC USE: ICD-10-CM

## 2021-03-08 DIAGNOSIS — E10.42 DIABETIC POLYNEUROPATHY ASSOCIATED WITH TYPE 1 DIABETES MELLITUS (H): ICD-10-CM

## 2021-03-09 RX ORDER — HYDROCODONE BITARTRATE AND ACETAMINOPHEN 10; 325 MG/1; MG/1
1 TABLET ORAL EVERY 6 HOURS PRN
Qty: 120 TABLET | Refills: 0 | OUTPATIENT
Start: 2021-03-09

## 2021-03-09 RX ORDER — HYDROCODONE BITARTRATE AND ACETAMINOPHEN 10; 325 MG/1; MG/1
1 TABLET ORAL EVERY 6 HOURS PRN
Qty: 120 TABLET | Refills: 0 | Status: SHIPPED | OUTPATIENT
Start: 2021-03-09 | End: 2021-04-02

## 2021-03-09 NOTE — TELEPHONE ENCOUNTER
Routing refill request to provider for review/approval because:  Drug not on the [facility] refill protocol     Lottie Quinteros RN

## 2021-03-11 ENCOUNTER — TELEPHONE (OUTPATIENT)
Dept: FAMILY MEDICINE | Facility: CLINIC | Age: 66
End: 2021-03-11

## 2021-03-11 NOTE — TELEPHONE ENCOUNTER
Reason for Call: Request for an order or referral:    Order or referral being requested: Updated Order for [facility] placed on MD's desk to sign.      Date needed: at your convenience    Has the patient been seen by the PCP for this problem? NO    Additional comments:       Call taken on 3/11/2021 at 1:26 PM by Beverley Wright

## 2021-03-12 ENCOUNTER — IMMUNIZATION (OUTPATIENT)
Dept: FAMILY MEDICINE | Facility: CLINIC | Age: 66
End: 2021-03-12
Payer: COMMERCIAL

## 2021-03-12 PROCEDURE — 0011A PR COVID VAC MODERNA 100 MCG/0.5 ML IM: CPT

## 2021-03-12 PROCEDURE — 91301 PR COVID VAC MODERNA 100 MCG/0.5 ML IM: CPT

## 2021-03-15 ENCOUNTER — MEDICAL CORRESPONDENCE (OUTPATIENT)
Dept: HEALTH INFORMATION MANAGEMENT | Facility: CLINIC | Age: 66
End: 2021-03-15

## 2021-03-18 ENCOUNTER — TELEPHONE (OUTPATIENT)
Dept: FAMILY MEDICINE | Facility: CLINIC | Age: 66
End: 2021-03-18

## 2021-03-18 DIAGNOSIS — E10.59 TYPE 1 DIABETES MELLITUS WITH VASCULAR DISEASE (H): ICD-10-CM

## 2021-03-18 NOTE — TELEPHONE ENCOUNTER
PA REQUIRED- TRANSITION FILL      Please route determinations to the Pharm Diabetes pool ([id number]).      Thank you,  [facility]

## 2021-03-18 NOTE — TELEPHONE ENCOUNTER
PA REQUIRED TRANSITION FILL      Please route determinations to the Pharm Diabetes pool ([id number]).      Thank you,  [facility]

## 2021-03-18 NOTE — TELEPHONE ENCOUNTER
"Please send new Rx for Dexcom Sensors (Medicare B Compliant)  Rx must be written this way:    Dexcom G6 Sensors  Qty: 3  Refills: Can have up to 5 additional refills  Sig \"CHANGE EVERY 10 DAYS\"    Please call [phone] and speak to one of our D[employer] Medical [employer] Team members if you have any questions.        Please send new Rx for Dexcom Transmitter(Medicare B Compliant)  Rx must be written this way:    Dexcom G6 Transmitter:  Qty:1  Refills: can have 1 additional refill  Sig \"CHANGE EVERY 90 DAYS\"    Please call [phone] and speak to one of our Durable Medical Equipment Team members if you have any questions.    "

## 2021-03-19 DIAGNOSIS — E10.59 TYPE 1 DIABETES MELLITUS WITH VASCULAR DISEASE (H): ICD-10-CM

## 2021-03-19 RX ORDER — PROCHLORPERAZINE 25 MG/1
1 SUPPOSITORY RECTAL CONTINUOUS
Qty: 1 EACH | Refills: 1 | Status: SHIPPED | OUTPATIENT
Start: 2021-03-19 | End: 2021-09-10

## 2021-03-19 NOTE — TELEPHONE ENCOUNTER
Requested Prescriptions   Pending Prescriptions Disp Refills     Continuous Blood Gluc Transmit (DEXCOM G6 TRANSMITTER) MISC 1 each 1     Si Device continuous Change every 3 months       There is no refill protocol information for this order

## 2021-03-19 NOTE — TELEPHONE ENCOUNTER
PA Initiation    Medication: Continuous Blood Gluc Sensor (DEXCOM G6 SENSOR) MISC  Insurance Company: [employer] - Phone [phone] Fax [phone]  Pharmacy Filling the Rx: [facility] - [place], [place] - [facility] [address]  Filling Pharmacy Phone: [phone]  Filling Pharmacy Fax:    Start Date: 3/19/2021

## 2021-03-19 NOTE — TELEPHONE ENCOUNTER
PA Initiation    Medication: Continuous Blood Gluc Transmit (DEXCOM G6 TRANSMITTER) MISC   Insurance Company: [employer] - Phone [phone] Fax [phone]  Pharmacy Filling the Rx: [facility] - [place], [place] - [address]  Filling Pharmacy Phone: [phone]  Filling Pharmacy Fax:    Start Date: 3/19/2021

## 2021-03-19 NOTE — TELEPHONE ENCOUNTER
Prior Authorization Approval    Authorization Effective Date: 2/17/2021  Authorization Expiration Date: 3/19/2022  Medication: Continuous Blood Gluc Transmit (DEXCOM G6 TRANSMITTER) MISC--APPROVED  Approved Dose/Quantity:   Reference #:     Insurance Company: [employer] - Phone [phone] Fax [phone]  Expected CoPay:       CoPay Card Available:      Foundation Assistance Needed:    Which Pharmacy is filling the prescription (Not needed for infusion/clinic administered): [facility] - [facility] [address]  Pharmacy Notified: Yes  Patient Notified: Yes **Instructed pharmacy to notify patient when script is ready to /ship.**

## 2021-03-19 NOTE — TELEPHONE ENCOUNTER
Prior Authorization Approval    Authorization Effective Date: 2/17/2021  Authorization Expiration Date: 3/19/2022  Medication: Continuous Blood Gluc Sensor (DEXCOM G6 SENSOR) MISC--APPROVED  Approved Dose/Quantity:   Reference #:     Insurance Company: [employer] - Phone [phone] Fax [phone]  Expected CoPay:       CoPay Card Available:      Foundation Assistance Needed:    Which Pharmacy is filling the prescription (Not needed for infusion/clinic administered): [place] [facility] - [facility] [address]  Pharmacy Notified: Yes  Patient Notified: Yes **Instructed pharmacy to notify patient when script is ready to /ship.**

## 2021-03-22 RX ORDER — PROCHLORPERAZINE 25 MG/1
1 SUPPOSITORY RECTAL CONTINUOUS
Qty: 3 EACH | Refills: 5 | Status: SHIPPED | OUTPATIENT
Start: 2021-03-22 | End: 2021-09-10

## 2021-03-22 NOTE — TELEPHONE ENCOUNTER
Requested Prescriptions   Pending Prescriptions Disp Refills     Continuous Blood Gluc Sensor (DEXCOM G6 SENSOR) MISC 3 each 5     Si Box continuous Change every 10 days       There is no refill protocol information for this order

## 2021-04-02 ENCOUNTER — OFFICE VISIT (OUTPATIENT)
Dept: FAMILY MEDICINE | Facility: CLINIC | Age: 66
End: 2021-04-02
Payer: COMMERCIAL

## 2021-04-02 VITALS
BODY MASS INDEX: 27.7 KG/M2 | SYSTOLIC BLOOD PRESSURE: 112 MMHG | TEMPERATURE: 97.8 F | WEIGHT: 187 LBS | HEIGHT: 69 IN | OXYGEN SATURATION: 95 % | DIASTOLIC BLOOD PRESSURE: 62 MMHG | RESPIRATION RATE: 16 BRPM | HEART RATE: 73 BPM

## 2021-04-02 DIAGNOSIS — E10.59 TYPE 1 DIABETES MELLITUS WITH VASCULAR DISEASE (H): ICD-10-CM

## 2021-04-02 DIAGNOSIS — E10.42 DIABETIC POLYNEUROPATHY ASSOCIATED WITH TYPE 1 DIABETES MELLITUS (H): ICD-10-CM

## 2021-04-02 DIAGNOSIS — F11.90 CHRONIC NARCOTIC USE: ICD-10-CM

## 2021-04-02 DIAGNOSIS — Z51.81 ENCOUNTER FOR THERAPEUTIC DRUG LEVEL MONITORING: ICD-10-CM

## 2021-04-02 DIAGNOSIS — Z00.00 ENCOUNTER FOR MEDICARE ANNUAL WELLNESS EXAM: Primary | ICD-10-CM

## 2021-04-02 DIAGNOSIS — M48.02 CERVICAL STENOSIS OF SPINE: ICD-10-CM

## 2021-04-02 LAB
1OH-MIDAZOLAM UR QL SCN: NORMAL
6MAM UR CFM-MCNC: NORMAL NG/ML
6MAM/CREAT UR: NORMAL NG/MG{CREAT}
7AMINOCLONAZEPAM UR QL CFM: NORMAL
7AMINOFLUNITRAZEPAM UR QL: NORMAL
A-OH ALPRAZ UR QL CFM: NORMAL
A-OH-TRIAZOLAM UR QL SCN: NORMAL
ALPRAZ UR QL CFM: NORMAL
AMPHET UR CFM-MCNC: NORMAL NG/ML
AMPHET/CREAT UR: NORMAL NG/MG{CREAT}
ANION GAP SERPL CALCULATED.3IONS-SCNC: 4 MMOL/L (ref 3–14)
BUN SERPL-MCNC: 14 MG/DL (ref 7–30)
BUPRENORPHINE UR CFM-MCNC: NORMAL NG/ML
BUPRENORPHINE/CREAT UR: NORMAL NG/MG{CREAT}
BZE UR CFM-MCNC: NORMAL NG/ML
BZE/CREAT UR: NORMAL NG/MG{CREAT}
CALCIUM SERPL-MCNC: 8.8 MG/DL (ref 8.5–10.1)
CHLORIDE SERPL-SCNC: 103 MMOL/L (ref 94–109)
CLONAZEPAM UR QL CFM: NORMAL
CO2 SERPL-SCNC: 29 MMOL/L (ref 20–32)
COCAETHYLENE UR CFM-MCNC: NORMAL NG/ML
COCAETHYLENE/CREAT UR CFM: NORMAL NG/MG{CREAT}
CODEINE UR CFM-MCNC: NORMAL NG/ML
CODEINE/CREAT UR: NORMAL NG/MG{CREAT}
CREAT SERPL-MCNC: 1.05 MG/DL (ref 0.66–1.25)
CREAT UR-MCNC: 74 MG/DL
CREAT UR-MCNC: NORMAL MG/DL
DHC UR CFM-MCNC: NORMAL NG/ML
DHC/CREAT UR: NORMAL NG/MG{CREAT}
DIAZEPAM UR QL CFM: NORMAL
EDDP CTO UR SCN-MCNC: NORMAL NG/ML
EDDP/CREAT UR: NORMAL NG/MG{CREAT}
FENTANYL UR CFM-MCNC: NORMAL NG/ML
FENTANYL/CREAT UR: NORMAL NG/MG{CREAT}
GABAPENTIN UR QL CFM: NORMAL
GFR SERPL CREATININE-BSD FRML MDRD: 73 ML/MIN/{1.73_M2}
GLUCOSE SERPL-MCNC: 228 MG/DL (ref 70–99)
HBA1C MFR BLD: 7.6 % (ref 0–5.6)
HYDROCODONE UR CFM-MCNC: NORMAL NG/ML
HYDROCODONE/CREAT UR: NORMAL NG/MG{CREAT}
HYDROMORPHONE UR CFM-MCNC: NORMAL NG/ML
HYDROMORPHONE/CREAT UR: NORMAL NG/MG{CREAT}
KETAMINE UR QL CFM: NORMAL
LORAZEPAM UR QL CFM: NORMAL
MDA UR CFM-MCNC: NORMAL NG/ML
MDA/CREAT UR: NORMAL NG/MG{CREAT}
MDEA UR CFM-MCNC: NORMAL NG/ML
MDEA/CREAT UR: NORMAL NG/MG{CREAT}
MDMA UR CFM-MCNC: NORMAL NG/ML
MDMA/CREAT UR: NORMAL NG/MG{CREAT}
ME-PHENIDATE UR CFM-MCNC: NORMAL NG/ML
ME-PHENIDATE UR CFM-MCNC: NORMAL NG/ML
ME-PHENIDATE/CREAT UR: NORMAL NG/MG{CREAT}
ME-PHENIDATE/CREAT UR: NORMAL NG/MG{CREAT}
MEPERIDINE UR CFM-MCNC: NORMAL NG/ML
MEPERIDINE/CREAT UR: NORMAL NG/MG{CREAT}
METHADONE UR CFM-MCNC: NORMAL NG/ML
METHADONE/CREAT UR: NORMAL NG/MG{CREAT}
METHAMPHET UR CFM-MCNC: NORMAL NG/ML
METHAMPHET/CREAT UR: NORMAL NG/MG{CREAT}
MICROALBUMIN UR-MCNC: 9 MG/L
MICROALBUMIN/CREAT UR: 12.69 MG/G CR (ref 0–17)
MORPHINE UR CFM-MCNC: NORMAL NG/ML
MORPHINE/CREAT UR: NORMAL NG/MG{CREAT}
N-NORTRAMADOL/CREAT UR CFM: NORMAL NG/MG{CREAT}
NALOXONE UR CFM-MCNC: NORMAL NG/ML
NALOXONE: NORMAL NG/MG{CREAT}
NALTREXONE UR CFM-MCNC: NORMAL NG/ML
NALTREXONE/CREAT UR: NORMAL NG/MG{CREAT}
NORBUPRENORPHINE UR CFM-MCNC: NORMAL NG/ML
NORBUPRENORPHINE/CREAT UR: NORMAL NG/MG{CREAT}
NORCODEINE UR-MCNC: NORMAL NG/ML
NORCODEINE/CREAT UR CFM: NORMAL NG/MG{CREAT}
NORDIAZEPAM UR QL CFM: NORMAL
NORFENTANYL UR CFM-MCNC: NORMAL NG/ML
NORFENTANYL/CREAT UR: NORMAL NG/MG{CREAT}
NORMEPERIDINE UR CFM-MCNC: NORMAL NG/ML
NORMEPERIDINE/CREAT UR: NORMAL NG/MG{CREAT}
NORTAPENTADOL UR QL SCN: NORMAL
O-NORTRAMADOL UR CFM-MCNC: NORMAL NG/ML
OXAZEPAM UR QL CFM: NORMAL
OXYCODONE UR CFM-MCNC: NORMAL NG/ML
OXYCODONE/CREAT UR: NORMAL NG/MG{CREAT}
OXYMORPHONE UR CFM-MCNC: NORMAL NG/ML
OXYMORPHONE/CREAT UR: NORMAL NG/MG{CREAT}
PCP UR QL CFM: NORMAL
POTASSIUM SERPL-SCNC: 4.1 MMOL/L (ref 3.4–5.3)
PREGABALIN UR QL CFM: NORMAL
PROPOXYPH UR QL CFM: NORMAL
RPT COMMENT: NORMAL
SODIUM SERPL-SCNC: 136 MMOL/L (ref 133–144)
SUFENTANIL UR-MCNC: NORMAL NG/ML
SUFENTANIL/CREAT UR CFM: NORMAL NG/MG{CREAT}
TAPENTADOL UR QL CFM: NORMAL
TEMAZEPAM UR QL CFM: NORMAL
THEBAINE SERPL-MCNC: NORMAL NG/ML
THEBAINE: NORMAL NG/MG{CREAT}
TRAMADOL CTO UR CFM-MCNC: NORMAL NG/ML
TRAMADOL/CREAT UR: NORMAL NG/MG{CREAT}

## 2021-04-02 PROCEDURE — 99207 PR FOOT EXAM NO CHARGE: CPT | Mod: 25 | Performed by: FAMILY MEDICINE

## 2021-04-02 PROCEDURE — 99397 PER PM REEVAL EST PAT 65+ YR: CPT | Performed by: FAMILY MEDICINE

## 2021-04-02 PROCEDURE — 80307 DRUG TEST PRSMV CHEM ANLYZR: CPT | Performed by: FAMILY MEDICINE

## 2021-04-02 PROCEDURE — 80048 BASIC METABOLIC PNL TOTAL CA: CPT | Performed by: FAMILY MEDICINE

## 2021-04-02 PROCEDURE — 99213 OFFICE O/P EST LOW 20 MIN: CPT | Mod: 25 | Performed by: FAMILY MEDICINE

## 2021-04-02 PROCEDURE — 82043 UR ALBUMIN QUANTITATIVE: CPT | Performed by: FAMILY MEDICINE

## 2021-04-02 PROCEDURE — 36415 COLL VENOUS BLD VENIPUNCTURE: CPT | Performed by: FAMILY MEDICINE

## 2021-04-02 PROCEDURE — 83036 HEMOGLOBIN GLYCOSYLATED A1C: CPT | Performed by: FAMILY MEDICINE

## 2021-04-02 RX ORDER — INSULIN PUMP SYRINGE, 3 ML
EACH MISCELLANEOUS
Qty: 40 EACH | Refills: 4 | Status: SHIPPED | OUTPATIENT
Start: 2021-04-02 | End: 2022-04-14

## 2021-04-02 RX ORDER — HYDROCODONE BITARTRATE AND ACETAMINOPHEN 10; 325 MG/1; MG/1
1 TABLET ORAL EVERY 6 HOURS PRN
Qty: 120 TABLET | Refills: 0 | Status: SHIPPED | OUTPATIENT
Start: 2021-05-12 | End: 2021-04-08

## 2021-04-02 RX ORDER — INFUSION SET FOR INSULIN PUMP
INFUSION SETS-PARAPHERNALIA MISCELLANEOUS
Qty: 4 BOX | Refills: 4 | Status: SHIPPED | OUTPATIENT
Start: 2021-04-02 | End: 2022-04-14

## 2021-04-02 ASSESSMENT — ACTIVITIES OF DAILY LIVING (ADL): CURRENT_FUNCTION: NO ASSISTANCE NEEDED

## 2021-04-02 ASSESSMENT — PAIN SCALES - GENERAL: PAINLEVEL: MODERATE PAIN (5)

## 2021-04-02 ASSESSMENT — MIFFLIN-ST. JEOR: SCORE: 1613.61

## 2021-04-02 NOTE — LETTER
Opioid / Opioid Plus Controlled Substance Agreement    This is an agreement between you and your provider about the safe and appropriate use of controlled substance/opioids prescribed by your care team. Controlled substances are medicines that can cause physical and mental dependence (abuse).    There are strict laws about having and using these medicines. We here at [facility] are committing to working with you in your efforts to get better. To support you in this work, we ll help you schedule regular office appointments for medicine refills. If we must cancel or change your appointment for any reason, we ll make sure you have enough medicine to last until your next appointment.     As a Provider, I will:    Listen carefully to your concerns and treat you with respect.     Recommend a treatment plan that I believe is in your best interest. This plan may involve therapies other than opioid pain medication.     Talk with you often about the possible benefits, and the risk of harm of any medicine that we prescribe for you.     Provide a plan on how to taper (discontinue or go off) using this medicine if the decision is made to stop its use.    As a Patient, I understand that opioid(s):     Are a controlled substance prescribed by my care team to help me function or work and manage my condition(s).     Are strong medicines and can cause serious side effects such as:    Drowsiness, which can seriously affect my driving ability    A lower breathing rate, enough to cause death    Harm to my thinking ability     Depression     Abuse of and addiction to this medicine    Need to be taken exactly as prescribed. Combining opioids with certain medicines or chemicals (such as illegal drugs, sedatives, sleeping pills, and benzodiazepines) can be dangerous or even fatal. If I stop opioids suddenly, I may have severe withdrawal symptoms.    Do not work for all types of pain nor for all patients. If they re not helpful, I may  be asked to stop them.        The risks, benefits and side effects of these medicine(s) were explained to me. I agree that:  1. I will take part in other treatments as advised by my care team. This may be psychiatry or counseling, physical therapy, behavioral therapy, group treatment or a referral to a specialist.     2. I will keep all my appointments. I understand that this is part of the monitoring of opioids. My care team may require an office visit for EVERY opioid/controlled substance refill. If I miss appointments or don t follow instructions, my care team may stop my medicine.    3. I will take my medicines as prescribed. I will not change the dose or schedule unless my care team tells me to. There will be no refills if I run out early.     4. I may be asked to come to the clinic and complete a urine drug test or complete a pill count at any time. If I don t give a urine sample or participate in a pill count, the care team may stop my medicine.    5. I will only receive prescriptions from this clinic for chronic pain. If I am treated by another provider for acute pain issues, I will tell them that I am taking opioid pain medication for chronic pain and that I have a treatment agreement with this provider. I will inform my [facility] care team within one business day if I am given a prescription for any pain medication by another healthcare provider. My [facility] care team can contact other providers and pharmacists about my use of any medicines.    6. It is up to me to make sure that I don t run out of my medicines on weekends or holidays. If my care team is willing to refill my opioid prescription without a visit, I must request refills only during office hours. Refills may take up to 3 business days to process. I will use one pharmacy to fill all my opioid and other controlled substance prescriptions. I will notify the clinic about any changes to my insurance or medication  availability.    7. I am responsible for my prescriptions. If the medicine/prescription is lost, stolen or destroyed, it will not be replaced. I also agree not to share controlled substance medicines with anyone.    8. I am aware I should not use any illegal or recreational drugs. I agree not to drink alcohol unless my care team says I can.       9. If I enroll in the [place] Medical Cannabis program, I will tell my care team prior to my next refill.     10. I will tell my care team right away if I become pregnant, have a new medical problem treated outside of my regular clinic, or have a change in my medications.    11. I understand that this medicine can affect my thinking, judgment and reaction time. Alcohol and drugs affect the brain and body, which can affect the safety of my driving. Being under the influence of alcohol or drugs can affect my decision-making, behaviors, personal safety, and the safety of others. Driving while impaired (DWI) can occur if a person is driving, operating, or in physical control of a car, motorcycle, boat, snowmobile, ATV, motorbike, off-road vehicle, or any other motor vehicle ([place] Statute 169A.20). I understand the risk if I choose to drive or operate any vehicle or machinery.    I understand that if I do not follow any of the conditions above, my prescriptions or treatment may be stopped or changed.          Opioids  What You Need to Know    What are opioids?   Opioids are pain medicines that must be prescribed by a doctor. They are also known as narcotics.     Examples are:   1. morphine (MS Contin, Hanny)  2. oxycodone (Oxycontin)  3. oxycodone and acetaminophen (Percocet)  4. hydrocodone and acetaminophen (Vicodin, Norco)   5. fentanyl patch (Duragesic)   6. hydromorphone (Dilaudid)   7. methadone  8. codeine (Tylenol #3)     What do opioids do well?   Opioids are best for severe short-term pain such as after a surgery or injury. They may work well for cancer pain. They may  help some people with long-lasting (chronic) pain.     What do opioids NOT do well?   Opioids never get rid of pain entirely, and they don t work well for most patients with chronic pain. Opioids don t reduce swelling, one of the causes of pain.                                    Other ways to manage chronic pain and improve function include:       Treat the health problem that may be causing pain    Anti-inflammation medicines, which reduce swelling and tenderness, such as ibuprofen (Advil, Motrin) or naproxen (Aleve)    Acetaminophen (Tylenol)    Antidepressants and anti-seizure medicines, especially for nerve pain    Topical treatments such as patches or creams    Injections or nerve blocks    Chiropractic or osteopathic treatment    Acupuncture, massage, deep breathing, meditation, visual imagery, aromatherapy    Use heat or ice at the pain site    Physical therapy     Exercise    Stop smoking    Take part in therapy       Risks and side effects     Talk to your doctor before you start or decide to keep taking opioids. Possible side effects include:      Lowering your breathing rate enough to cause death    Overdose, including death, especially if taking higher than prescribed doses    Worse depression symptoms; less pleasure in things you usually enjoy    Feeling tired or sluggish    Slower thoughts or cloudy thinking    Being more sensitive to pain over time; pain is harder to control    Trouble sleeping or restless sleep    Changes in hormone levels (for example, less testosterone)    Changes in sex drive or ability to have sex    Constipation    Unsafe driving    Itching and sweating    Dizziness    Nausea, throwing up and dry mouth    What else should I know about opioids?    Opioids may lead to dependence, tolerance, or addiction.      Dependence means that if you stop or reduce the medicine too quickly, you will have withdrawal symptoms. These include loose poop (diarrhea), jitters, flu-like symptoms,  nervousness and tremors. Dependence is not the same as addiction.                       Tolerance means needing higher doses over time to get the same effect. This may increase the chance of serious side effects.      Addiction is when people improperly use a substance that harms their body, their mind or their relations with others. Use of opiates can cause a relapse of addiction if you have a history of drug or alcohol abuse.      People who have used opioids for a long time may have a lower quality of life, worse depression, higher levels of pain and more visits to doctors.    You can overdose on opioids. Take these steps to lower your risk of overdose:    1. Recognize the signs:  Signs of overdose include decrease or loss of consciousness (blackout), slowed breathing, trouble waking up and blue lips. If someone is worried about overdose, they should call 911.    2. Talk to your doctor about Narcan (naloxone).   If you are at risk for overdose, you may be given a prescription for Narcan. This medicine very quickly reverses the effects of opioids.   If you overdose, a friend or family member can give you Narcan while waiting for the ambulance. They need to know the signs of overdose and how to give Narcan.     3. Don't use alcohol or street drugs.   Taking them with opioids can cause death.    4. Do not take any of these medicines unless your doctor says it s OK. Taking these with opioids can cause death:    Benzodiazepines, such as lorazepam (Ativan), alprazolam (Xanax) or diazepam (Valium)    Muscle relaxers, such as cyclobenzaprine (Flexeril)    Sleeping pills like zolpidem (Ambien)     Other opioids      How to keep you and other people safe while taking opioids:    1. Never share your opioids with others.  Opioid medicines are regulated by the Drug Enforcement Agency (REMI). Selling or sharing medications is a criminal act.    2. Be sure to store opioids in a secure place, locked up if possible. Young children  can easily swallow them and overdose.    3. When you are traveling with your medicines, keep them in the original bottles. If you use a pill box, be sure you also carry a copy of your medicine list from your clinic or pharmacy.    4. Safe disposal of opioids    Most pharmacies have places to get rid of medicine, called disposal kiosks. Medicine disposal options are also available in every [facility]. Search your county and  medication disposal  to find more options. You can find more details at:  [email or web address]     I agree that my provider, clinic care team, and pharmacy may work with any city, state or federal law enforcement agency that investigates the misuse, sale, or other diversion of my controlled medicine. I will allow my provider to discuss my care with, or share a copy of, this agreement with any other treating provider, pharmacy or emergency room where I receive care.    I have read this agreement and have asked questions about anything I did not understand.    _______________________________________________________  Patient Signature - Eb Eisenberg _____________________                   Date     _______________________________________________________  Provider Signature - Jackeline Rachel MD   _____________________                   Date     _______________________________________________________  Witness Signature (required if provider not present while patient signing)   _____________________                   Date

## 2021-04-02 NOTE — PROGRESS NOTES
"SUBJECTIVE:   Eb Eisenberg is a 66 year old male who presents for Preventive Visit.      Patient has been advised of split billing requirements and indicates understanding: Yes   Are you in the first 12 months of your Medicare coverage?  No    Healthy Habits:    In general, how would you rate your overall health?  Excellent    Frequency of exercise:  None    Duration of exercise:  Less than 15 minutes    Do you usually eat at least 4 servings of fruit and vegetables a day, include whole grains    & fiber and avoid regularly eating high fat or \"junk\" foods?  No    Taking medications regularly:  No    Barriers to taking medications:  None    Medication side effects:  None    Ability to successfully perform activities of daily living:  No assistance needed    Home Safety:  No safety concerns identified    Hearing Impairment:  No hearing concerns    In the past 6 months, have you been bothered by leaking of urine?  No    In general, how would you rate your overall mental or emotional health?  Good      PHQ-2 Total Score:    Additional concerns today:  No    Do you feel safe in your environment? Yes    Have you ever done Advance Care Planning? (For example, a Health Directive, POLST, or a discussion with a medical provider or your loved ones about your wishes): Yes, advance care planning is on file.       Fall risk  Fallen 2 or more times in the past year?: No  Any fall with injury in the past year?: No    Cognitive Screening   1) Repeat 3 items (Leader, Season, Table)    2) Clock draw: NORMAL  3) 3 item recall: Recalls 2 objects   Results: NORMAL clock, 1-2 items recalled: COGNITIVE IMPAIRMENT LESS LIKELY    Mini-CogTM Copyright ARSENIO Meza. Licensed by the author for use in [facility]; reprinted with permission (johnny@.[facility]). All rights reserved.      Do you have sleep apnea, excessive snoring or daytime drowsiness?: yes    Reviewed and updated as needed this visit by clinical staff  Tobacco  Allergies  " Meds  Problems  Med Hx  Surg Hx  Fam Hx          Reviewed and updated as needed this visit by Provider                Social History     Tobacco Use     Smoking status: Former Smoker     Quit date: 2001     Years since quittin.2     Smokeless tobacco: Never Used   Substance Use Topics     Alcohol use: No     Comment: quit in early '80s     If you drink alcohol do you typically have >3 drinks per day or >7 drinks per week? No    No flowsheet data found.        Diabetes Follow-up    How often are you checking your blood sugar? Continuous glucose monitor  What time of day are you checking your blood sugars (select all that apply)?  Before and after meals and At bedtime  Have you had any blood sugars above 200?  Yes occ  Have you had any blood sugars below 70?  Yes occ    What symptoms do you notice when your blood sugar is low?  None    What concerns do you have today about your diabetes? None     Do you have any of these symptoms? (Select all that apply)  No numbness or tingling in feet.  No redness, sores or blisters on feet.  No complaints of excessive thirst.  No reports of blurry vision.  No significant changes to weight.        Hyperlipidemia Follow-Up      Are you regularly taking any medication or supplement to lower your cholesterol?   Yes- crestor    Are you having muscle aches or other side effects that you think could be caused by your cholesterol lowering medication?  No    Hypertension Follow-up      Do you check your blood pressure regularly outside of the clinic? No     Are you following a low salt diet? Yes    Are your blood pressures ever more than 140 on the top number (systolic) OR more   than 90 on the bottom number (diastolic), for example 140/90? No    BP Readings from Last 2 Encounters:   21 112/62   20 100/62     Hemoglobin A1C (%)   Date Value   2021 7.6 (H)   2020 7.4 (H)     LDL Cholesterol Calculated (mg/dL)   Date Value   2020 63   2019 57  "        Current providers sharing in care for this patient include:   Patient Care Team:  Jackeline Rachel MD as PCP - General  Jackeline Rachel MD as Assigned PCP  Christina Espinosa, RN as Diabetes Educator (Diabetes Education)    The following health maintenance items are reviewed in Epic and correct as of today:  Health Maintenance Due   Topic Date Due     URINE DRUG SCREEN  Never done     TREATMENT AGREEMENT FOR CHRONIC PAIN MANAGEMENT  Never done     ZOSTER IMMUNIZATION (2 of 2) 02/13/2020     FALL RISK ASSESSMENT  09/19/2020     COVID-19 Vaccine (2 - Moderna 2-dose series) 04/09/2021     Lab work is in process    Any new diagnosis of family breast, ovarian, or bowel cancer?     FSH-7: No flowsheet data found.  click delete button to remove this line now    Pertinent mammograms are reviewed under the imaging tab.    Review of Systems  Constitutional, HEENT, cardiovascular, pulmonary, gi and gu systems are negative, except as otherwise noted.    OBJECTIVE:   /62   Pulse 73   Temp 97.8  F (36.6  C) (Tympanic)   Resp 16   Ht 1.753 m (5' 9\")   Wt 84.8 kg (187 lb)   SpO2 95%   BMI 27.62 kg/m   Estimated body mass index is 27.62 kg/m  as calculated from the following:    Height as of this encounter: 1.753 m (5' 9\").    Weight as of this encounter: 84.8 kg (187 lb).  Physical Exam  GENERAL: healthy, alert and no distress  NECK: no adenopathy, no asymmetry, masses, or scars and thyroid normal to palpation  RESP: lungs clear to auscultation - no rales, rhonchi or wheezes  CV: regular rate and rhythm, normal S1 S2, no S3 or S4, no murmur, click or rub, no peripheral edema and peripheral pulses strong  ABDOMEN: soft, nontender, no hepatosplenomegaly, no masses and bowel sounds normal  MS: no gross musculoskeletal defects noted, no edema    Diabetic Foot Screen:  Any complaints of increased pain or numbness ? No  Is there a foot ulcer now or a history of foot ulcer? No  Does the foot have an abnormal shape? " "No  Are the nails thick, too long or ingrown? No  Are there any redness or open areas? No         Sensation Testing done at all points on the diagram with monofilament     Right Foot: Sensation Normal at all points  Left Foot: Sensation Normal at all points     Risk Category: 0- No loss of protective sensation  Performed by Jackeline Rachel MD    Diagnostic Test Results:  Labs reviewed in Epic  Results for orders placed or performed in visit on 04/02/21 (from the past 24 hour(s))   Hemoglobin A1c   Result Value Ref Range    Hemoglobin A1C 7.6 (H) 0 - 5.6 %       ASSESSMENT / PLAN:   1. Encounter for Medicare annual wellness exam      2. Type 1 diabetes mellitus with vascular disease (H)   fair control  - Basic metabolic panel  - Albumin Random Urine Quantitative with Creat Ratio  - Hemoglobin A1c  - FOOT EXAM  - OFFICE/OUTPT VISIT,EST,LEVL IV  - insulin cartridge (PARADIGM 3ML) misc pump supply; Insulin cartridge to be used with pump as directed.  Change every 2-3 days or as directed.  Dispense: 40 each; Refill: 4  - infusion set (PARADIGM QUICK-SET 23\" 6MM) misc pump supply; Infusion set to be used with pump.  Change every 2-3 days as directed.  Dispense: 4 Box; Refill: 4    3. Chronic narcotic use   new narcotic contract signed today  - Drug Confirmation Panel Urine with Creat (Care Map)  - HYDROcodone-acetaminophen (NORCO)  MG per tablet; Take 1 tablet by mouth every 6 hours as needed for moderate to severe pain  Dispense: 120 tablet; Refill: 0    4. Diabetic polyneuropathy associated with type 1 diabetes mellitus (H)     - HYDROcodone-acetaminophen (NORCO)  MG per tablet; Take 1 tablet by mouth every 6 hours as needed for moderate to severe pain  Dispense: 120 tablet; Refill: 0    5. Cervical stenosis of spine     - HYDROcodone-acetaminophen (NORCO)  MG per tablet; Take 1 tablet by mouth every 6 hours as needed for moderate to severe pain  Dispense: 120 tablet; Refill: 0    6. Encounter for " "therapeutic drug level monitoring      - Drug Confirmation Panel Urine with Creat (Care Map)    Patient has been advised of split billing requirements and indicates understanding: Yes  COUNSELING:  Reviewed preventive health counseling, as reflected in patient instructions       Regular exercise       Healthy diet/nutrition    Estimated body mass index is 27.62 kg/m  as calculated from the following:    Height as of this encounter: 1.753 m (5' 9\").    Weight as of this encounter: 84.8 kg (187 lb).    Weight management plan: Discussed healthy diet and exercise guidelines    He reports that he quit smoking about 20 years ago. He has never used smokeless tobacco.      Appropriate preventive services were discussed with this patient, including applicable screening as appropriate for cardiovascular disease, diabetes, osteopenia/osteoporosis, and glaucoma.  As appropriate for age/gender, discussed screening for colorectal cancer, prostate cancer, breast cancer, and cervical cancer. Checklist reviewing preventive services available has been given to the patient.    Reviewed patients plan of care and provided an AVS. The Basic Care Plan (routine screening as documented in Health Maintenance) for Eb meets the Care Plan requirement. This Care Plan has been established and reviewed with the Patient.    Counseling Resources:  ATP IV Guidelines  Pooled Cohorts Equation Calculator  Breast Cancer Risk Calculator  Breast Cancer: Medication to Reduce Risk  FRAX Risk Assessment  ICSI Preventive Guidelines  Dietary Guidelines for Americans, 2010  USDA's MyPlate  ASA Prophylaxis  Lung CA Screening    Jackeline Rachel MD  [facility]    Identified Health Risks:    He is at risk for lack of exercise and has been provided with information to increase physical activity for the benefit of his well-being.  The patient was counseled and encouraged to consider modifying their diet and eating habits. He was provided " with information on recommended healthy diet options.

## 2021-04-02 NOTE — PATIENT INSTRUCTIONS
Our Clinic hours are:  Mondays    7:20 am - 7 pm  Tues - Fri  7:20 am - 5 pm    Clinic Phone: [phone]    The clinic lab opens at 7:30 am Mon - Fri and appointments are required.    [facility]. [phone]  Monday  8 am - 7pm  Tues - Fri 8 am - 5:30 pm         Patient Education   Personalized Prevention Plan  You are due for the preventive services outlined below.  Your care team is available to assist you in scheduling these services.  If you have already completed any of these items, please share that information with your care team to update in your medical record.  Health Maintenance Due   Topic Date Due     TREATMENT AGREEMENT FOR CHRONIC PAIN MANAGEMENT  Never done     Zoster (Shingles) Vaccine (2 of 2) 02/13/2020     FALL RISK ASSESSMENT  09/19/2020     COVID-19 Vaccine (2 - Moderna 2-dose series) 04/09/2021       Exercise for a Healthier Heart     Exercise with a friend. When activity is fun, you're more likely to stick with it.   You may wonder how you can improve the health of your heart. If you re thinking about exercise, you re on the right track. You don t need to become an athlete. But you do need a certain amount of brisk exercise to help strengthen your heart. If you have been diagnosed with a heart condition, your healthcare provider may advise exercise to help stabilize your condition. To help make exercise a habit, choose safe, fun activities.   Before you start  Check with your healthcare provider before starting an exercise program. This is especially important if you have not been active for a while. It's also important if you have a long-term (chronic) health problem such as heart disease, diabetes, or obesity. Or if you are at high risk for having these problems.   Why exercise?  Exercising regularly offers many healthy rewards. It can help you do all of the following:    Improve your blood cholesterol level to help prevent further heart trouble    Lower your  blood pressure to help prevent a stroke or heart attack    Control diabetes, or reduce your risk of getting this disease    Improve your heart and lung function    Reach and stay at a healthy weight    Make your muscles stronger so you can stay active    Prevent falls and fractures by slowing the loss of bone mass (osteoporosis)    Manage stress better    Reduce your blood pressure    Improve your sense of self and your body image  Exercise tips      Ease into your routine. Set small goals. Then build on them. If you are not sure what your activity level should be, talk with your healthcare provider first before starting an exercise routine.    Exercise on most days. Aim for a total of 150 minutes (2 hours and 30 minutes) or more of moderate-intensity aerobic activity each week. Or 75 minutes (1 hour and 15 minutes) or more of vigorous-intensity aerobic activity each week. Or try for a combination of both. Moderate activity means that you breathe heavier and your heart rate increases but you can still talk. Think about doing 40 minutes of moderate exercise, 3 to 4 times a week. For best results, activity should last for about 40 minutes to lower blood pressure and cholesterol. It's OK to work up to the 40-minute period over time. Examples of moderate-intensity activity are walking 1 mile in 15 minutes. Or doing 30 to 45 minutes of yard work.    Step up your daily activity level.  Along with your exercise program, try being more active the whole day. Walk instead of drive. Or park further away so that you take more steps each day. Do more household tasks or yard work. You may not be able to meet the advised mount of physical activity. But doing some moderate- or vigorous-intensity aerobic activity can help reduce your risk for heart disease. Your healthcare provider can help you figure out what is best for you.    Choose 1 or more activities you enjoy.  Walking is one of the easiest things you can do. You can also  try swimming, riding a bike, dancing, or taking an exercise class.    When to call your healthcare provider  Call your healthcare provider if you have any of these:     Chest pain or feel dizzy or lightheaded    Burning, tightness, pressure, or heaviness in your chest, neck, shoulders, back, or arms    Abnormal shortness of breath    More joint or muscle pain    A very fast or irregular heartbeat (palpitations)  [employer] last reviewed this educational content on 7/1/2019 2000-2020 [employer]. All rights reserved. This information is not intended as a substitute for professional medical care. Always follow your healthcare professional's instructions.          Understanding USDA MyPlate  The USDA has guidelines to help you make healthy food choices. These are called MyPlate. MyPlate shows the food groups that make up healthy meals using the image of a place setting. Before you eat, think about the healthiest choices for what to put on your plate or in your cup or bowl. To learn more about building a healthy plate, visit www.choosemyplate.gov.     The food groups    Fruits. Any fruit or 100% fruit juice counts as part of the Fruit Group. Fruits may be fresh, canned, frozen, or dried, and may be whole, cut-up, or pureed. Make 1/2 of your plate fruits and vegetables.    Vegetables. Any vegetable or 100% vegetable juice counts as a member of the Vegetable Group. Vegetables may be fresh, frozen, canned, or dried. They can be served raw or cooked and may be whole, cut-up, or mashed. Make 1/2 of your plate fruits and vegetables.    Grains. All foods made from grains are part of the Grains Group. These include wheat, rice, oats, cornmeal, and barley. Grains are often used to make foods such as bread, pasta, oatmeal, cereal, tortillas, and grits. Grains should be no more than 1/4 of your plate. At least half of your grains should be whole grains.    Protein. This group includes meat, poultry, seafood, beans  and peas, eggs, processed soy products (such as tofu), nuts (including nut butters), and seeds. Make protein choices no more than 1/4 of your plate. Meat and poultry choices should be lean or low fat.    Dairy. The Dairy Group includes all fluid milk products and foods made from milk that contain calcium, such as yogurt and cheese. (Foods that have little calcium, such as cream, butter, and cream cheese, are not part of this group.) Most dairy choices should be low-fat or fat-free.    Oils. Oils aren't a food group, but they do contain essential nutrients. However it's important to watch your intake of oils. These are fats that are liquid at room temperature. They include canola, corn, olive, soybean, vegetable, and sunflower oil. Foods that are mainly oil include mayonnaise, certain salad dressings, and soft margarines. You likely already get your daily oil allowance from the foods you eat.  Things to limit  Eating healthy also means limiting these things in your diet:    Salt (sodium). Many processed foods have a lot of sodium. To keep sodium intake down, eat fresh vegetables, meats, poultry, and seafood when possible. Purchase low-sodium, reduced-sodium, or no-salt-added food products at the store. And don't add salt to your meals at home. Instead, season them with herbs and spices such as dill, oregano, cumin, and paprika. Or try adding flavor with lemon or lime zest and juice.    Saturated fat. Saturated fats are most often found in animal products such as beef, pork, and chicken. They are often solid at room temperature, such as butter. To reduce your saturated fat intake, choose leaner cuts of meat and poultry. And try healthier cooking methods such as grilling, broiling, roasting, or baking. For a simple lower-fat swap, use plain nonfat yogurt instead of mayonnaise when making potato salad or macaroni salad.    Added sugars. These are sugars added to foods. They are in foods such as ice cream, candy, soda,  fruit drinks, sports drinks, energy drinks, cookies, pastries, jams, and syrups. Cut down on added sugars by sharing sweet treats with a family member or friend. You can also choose fruit for dessert, and drink water or other unsweetened beverages.  Kristian last reviewed this educational content on 6/1/2020 2000-2020 [employer]. All rights reserved. This information is not intended as a substitute for professional medical care. Always follow your healthcare professional's instructions.

## 2021-04-06 LAB
CREAT UR-MCNC: 74 MG/DL
DHC UR CFM-MCNC: 1060 NG/ML
DHC/CREAT UR: 1432 NG/MG{CREAT}
HYDROCODONE UR CFM-MCNC: 2380 NG/ML
HYDROCODONE/CREAT UR: 3216 NG/MG{CREAT}
HYDROMORPHONE UR CFM-MCNC: 112 NG/ML
HYDROMORPHONE/CREAT UR: 151 NG/MG{CREAT}
RPT COMMENT: ABNORMAL

## 2021-04-09 ENCOUNTER — IMMUNIZATION (OUTPATIENT)
Dept: FAMILY MEDICINE | Facility: CLINIC | Age: 66
End: 2021-04-09
Attending: FAMILY MEDICINE
Payer: COMMERCIAL

## 2021-04-09 PROCEDURE — 0012A PR COVID VAC MODERNA 100 MCG/0.5 ML IM: CPT

## 2021-04-09 PROCEDURE — 91301 PR COVID VAC MODERNA 100 MCG/0.5 ML IM: CPT

## 2021-05-06 DIAGNOSIS — E10.59 TYPE 1 DIABETES MELLITUS WITH VASCULAR DISEASE (H): ICD-10-CM

## 2021-05-06 NOTE — TELEPHONE ENCOUNTER
Pt is out of medication, requesting temporary refill be sent to [facility] pharmacy and full refill be sent to pharmacy mail order chosen.

## 2021-06-10 ENCOUNTER — TELEPHONE (OUTPATIENT)
Dept: FAMILY MEDICINE | Facility: CLINIC | Age: 66
End: 2021-06-10

## 2021-06-10 DIAGNOSIS — F11.90 CHRONIC NARCOTIC USE: ICD-10-CM

## 2021-06-10 DIAGNOSIS — M48.02 CERVICAL STENOSIS OF SPINE: ICD-10-CM

## 2021-06-10 DIAGNOSIS — E10.42 DIABETIC POLYNEUROPATHY ASSOCIATED WITH TYPE 1 DIABETES MELLITUS (H): ICD-10-CM

## 2021-06-10 RX ORDER — HYDROCODONE BITARTRATE AND ACETAMINOPHEN 10; 325 MG/1; MG/1
1 TABLET ORAL EVERY 6 HOURS PRN
Qty: 120 TABLET | Refills: 0 | Status: SHIPPED | OUTPATIENT
Start: 2021-06-11 | End: 2021-07-13

## 2021-06-10 NOTE — TELEPHONE ENCOUNTER
Norco refill.  Last written 5/12/21.  Pt asking for script to be written for 6/11/21 so he can pick it up on Friday.   Pharm is closed on Sat.  Pt last seen 4/2/21.  Advise.  Eliza

## 2021-07-12 DIAGNOSIS — M48.02 CERVICAL STENOSIS OF SPINE: ICD-10-CM

## 2021-07-12 DIAGNOSIS — E10.42 DIABETIC POLYNEUROPATHY ASSOCIATED WITH TYPE 1 DIABETES MELLITUS (H): ICD-10-CM

## 2021-07-12 DIAGNOSIS — F11.90 CHRONIC NARCOTIC USE: ICD-10-CM

## 2021-07-12 NOTE — TELEPHONE ENCOUNTER
Pending Prescriptions:                       Disp   Refills    HYDROcodone-acetaminophen (NORCO)  M*120 ta*0        Sig: Take 1 tablet by mouth every 6 hours as needed for           moderate to severe pain        Routing refill request to provider for review/approval because:  Narcotic not on RN protocol.       Last Written Prescription Date:  6/11/21  Last Fill Quantity: 120,  # refills: 0   Last office visit: 4/2/2021 with prescribing provider:   Future Office Visit:        Paulette Tamayo RN

## 2021-07-13 RX ORDER — HYDROCODONE BITARTRATE AND ACETAMINOPHEN 10; 325 MG/1; MG/1
1 TABLET ORAL EVERY 6 HOURS PRN
Qty: 120 TABLET | Refills: 0 | OUTPATIENT
Start: 2021-07-13

## 2021-07-13 RX ORDER — HYDROCODONE BITARTRATE AND ACETAMINOPHEN 10; 325 MG/1; MG/1
1 TABLET ORAL EVERY 6 HOURS PRN
Qty: 120 TABLET | Refills: 0 | Status: SHIPPED | OUTPATIENT
Start: 2021-07-13 | End: 2021-08-12

## 2021-08-12 ENCOUNTER — OFFICE VISIT (OUTPATIENT)
Dept: FAMILY MEDICINE | Facility: CLINIC | Age: 66
End: 2021-08-12
Payer: COMMERCIAL

## 2021-08-12 VITALS
SYSTOLIC BLOOD PRESSURE: 116 MMHG | OXYGEN SATURATION: 94 % | TEMPERATURE: 97.6 F | DIASTOLIC BLOOD PRESSURE: 64 MMHG | HEIGHT: 69 IN | HEART RATE: 68 BPM | BODY MASS INDEX: 27.7 KG/M2 | RESPIRATION RATE: 14 BRPM | WEIGHT: 187 LBS

## 2021-08-12 DIAGNOSIS — E10.42 DIABETIC POLYNEUROPATHY ASSOCIATED WITH TYPE 1 DIABETES MELLITUS (H): ICD-10-CM

## 2021-08-12 DIAGNOSIS — F11.90 CHRONIC NARCOTIC USE: ICD-10-CM

## 2021-08-12 DIAGNOSIS — F32.2 MAJOR DEPRESSIVE DISORDER, SINGLE EPISODE, SEVERE (H): ICD-10-CM

## 2021-08-12 DIAGNOSIS — M48.02 CERVICAL STENOSIS OF SPINE: ICD-10-CM

## 2021-08-12 PROCEDURE — 99214 OFFICE O/P EST MOD 30 MIN: CPT | Performed by: FAMILY MEDICINE

## 2021-08-12 RX ORDER — HYDROCODONE BITARTRATE AND ACETAMINOPHEN 10; 325 MG/1; MG/1
1 TABLET ORAL EVERY 6 HOURS PRN
Qty: 120 TABLET | Refills: 0 | Status: SHIPPED | OUTPATIENT
Start: 2021-08-12 | End: 2021-09-13

## 2021-08-12 ASSESSMENT — MIFFLIN-ST. JEOR: SCORE: 1613.61

## 2021-08-12 ASSESSMENT — PAIN SCALES - GENERAL: PAINLEVEL: MILD PAIN (3)

## 2021-08-12 ASSESSMENT — ANXIETY QUESTIONNAIRES
2. NOT BEING ABLE TO STOP OR CONTROL WORRYING: NOT AT ALL
6. BECOMING EASILY ANNOYED OR IRRITABLE: NOT AT ALL
5. BEING SO RESTLESS THAT IT IS HARD TO SIT STILL: NOT AT ALL
IF YOU CHECKED OFF ANY PROBLEMS ON THIS QUESTIONNAIRE, HOW DIFFICULT HAVE THESE PROBLEMS MADE IT FOR YOU TO DO YOUR WORK, TAKE CARE OF THINGS AT HOME, OR GET ALONG WITH OTHER PEOPLE: NOT DIFFICULT AT ALL
1. FEELING NERVOUS, ANXIOUS, OR ON EDGE: NOT AT ALL
7. FEELING AFRAID AS IF SOMETHING AWFUL MIGHT HAPPEN: NOT AT ALL
3. WORRYING TOO MUCH ABOUT DIFFERENT THINGS: NOT AT ALL
GAD7 TOTAL SCORE: 0

## 2021-08-12 ASSESSMENT — PATIENT HEALTH QUESTIONNAIRE - PHQ9
SUM OF ALL RESPONSES TO PHQ QUESTIONS 1-9: 2
5. POOR APPETITE OR OVEREATING: NOT AT ALL

## 2021-08-12 NOTE — PATIENT INSTRUCTIONS
Our Clinic hours are:  Mondays    7:20 am - 7 pm  Tues -  Fri  7:20 am - 5 pm    Clinic Phone: [phone]    The clinic lab opens at 7:30 am Mon - Fri and appointments are required.    [facility]. [phone]  Monday  8 am - 7pm  Tues - Fri 8 am - 5:30 pm

## 2021-08-12 NOTE — PROGRESS NOTES
"    Assessment & Plan     Chronic narcotic use   CSA up to date PDMP reviewed  - HYDROcodone-acetaminophen (NORCO)  MG per tablet; Take 1 tablet by mouth every 6 hours as needed for moderate to severe pain    Diabetic polyneuropathy associated with type 1 diabetes mellitus (H)     - HYDROcodone-acetaminophen (NORCO)  MG per tablet; Take 1 tablet by mouth every 6 hours as needed for moderate to severe pain    Cervical stenosis of spine     - HYDROcodone-acetaminophen (NORCO)  MG per tablet; Take 1 tablet by mouth every 6 hours as needed for moderate to severe pain    Major depressive disorder, single episode, severe (H)   patient weaned himself down to 20 mg daily on the fluoxetine  - FLUoxetine (PROZAC) 20 MG capsule; Take 1 capsule (20 mg) by mouth daily                 No follow-ups on file.    Jackeline Rachel MD  [facility]    Agutsina Parson is a 66 year old who presents for the following health issues     HPI     Medication Followup of hydrocodone    Taking Medication as prescribed: yes    Side Effects:  None    Medication Helping Symptoms:  yes         Review of Systems   Constitutional, HEENT, cardiovascular, pulmonary, gi and gu systems are negative, except as otherwise noted.      Objective    /64   Pulse 68   Temp 97.6  F (36.4  C) (Tympanic)   Resp 14   Ht 1.753 m (5' 9\")   Wt 84.8 kg (187 lb)   SpO2 94%   BMI 27.62 kg/m    Body mass index is 27.62 kg/m .  Physical Exam   GENERAL APPEARANCE: healthy, alert and no distress  PSYCH: mentation appears normal and affect normal/bright  MENTAL STATUS EXAM:  Appearance/Behavior: No apparent distress  Speech: Normal  Mood/Affect: normal affect  Insight: Adequate                "

## 2021-08-13 ASSESSMENT — ANXIETY QUESTIONNAIRES: GAD7 TOTAL SCORE: 0

## 2021-09-07 DIAGNOSIS — N40.1 BENIGN NON-NODULAR PROSTATIC HYPERPLASIA WITH LOWER URINARY TRACT SYMPTOMS: ICD-10-CM

## 2021-09-07 DIAGNOSIS — E10.59 TYPE 1 DIABETES MELLITUS WITH VASCULAR DISEASE (H): ICD-10-CM

## 2021-09-10 ENCOUNTER — TELEPHONE (OUTPATIENT)
Dept: FAMILY MEDICINE | Facility: CLINIC | Age: 66
End: 2021-09-10

## 2021-09-10 DIAGNOSIS — E10.42 DIABETIC POLYNEUROPATHY ASSOCIATED WITH TYPE 1 DIABETES MELLITUS (H): ICD-10-CM

## 2021-09-10 DIAGNOSIS — M48.02 CERVICAL STENOSIS OF SPINE: ICD-10-CM

## 2021-09-10 DIAGNOSIS — F11.90 CHRONIC NARCOTIC USE: ICD-10-CM

## 2021-09-10 RX ORDER — PROCHLORPERAZINE 25 MG/1
SUPPOSITORY RECTAL
Qty: 3 EACH | Refills: 5 | Status: SHIPPED | OUTPATIENT
Start: 2021-09-10 | End: 2022-02-24

## 2021-09-10 RX ORDER — PROCHLORPERAZINE 25 MG/1
SUPPOSITORY RECTAL
Qty: 1 EACH | Refills: 1 | Status: SHIPPED | OUTPATIENT
Start: 2021-09-10 | End: 2022-02-24

## 2021-09-10 RX ORDER — TAMSULOSIN HYDROCHLORIDE 0.4 MG/1
CAPSULE ORAL
Qty: 90 CAPSULE | Refills: 2 | Status: SHIPPED | OUTPATIENT
Start: 2021-09-10 | End: 2022-02-07

## 2021-09-10 NOTE — TELEPHONE ENCOUNTER
Reason for Call:  Medication or medication refill:NORCO  Do you use a [facility]?  Name of the pharmacy and phone number for the current request:  [facility] [phone] of the medication requested: NORCO    Other request: need by monday    Can we leave a detailed message on this number? YES    Phone number patient can be reached at: Home number on file [phone] (home)    Best Time: any    Call taken on 9/10/2021 at 12:29 PM by Lizbet Bernal

## 2021-09-10 NOTE — TELEPHONE ENCOUNTER
"Prescription approved per [facility] Refill Protocol.    Requested Prescriptions   Pending Prescriptions Disp Refills     tamsulosin (FLOMAX) 0.4 MG capsule [Pharmacy Med Name: TAMSULOSIN HCL CAPS 0.4MG] 90 capsule 3     Sig: TAKE 1 CAPSULE DAILY       Alpha Blockers Passed - 9/7/2021  2:16 PM        Passed - Blood pressure under 140/90 in past 12 months     BP Readings from Last 3 Encounters:   08/12/21 116/64   04/02/21 112/62   12/14/20 100/62                 Passed - Recent (12 mo) or future (30 days) visit within the authorizing provider's specialty     Patient has had an office visit with the authorizing provider or a provider within the authorizing providers department within the previous 12 mos or has a future within next 30 days. See \"Patient Info\" tab in inbasket, or \"Choose Columns\" in Meds & Orders section of the refill encounter.              Passed - Patient does not have Tadalafil, Vardenafil, or Sildenafil on their medication list        Passed - Medication is active on med list        Passed - Patient is 18 years of age or older             "

## 2021-09-10 NOTE — TELEPHONE ENCOUNTER
"Routing refill request for sensor to provider for review/approval because: Not on the [facility] refill protocol     Prescription for transmitter approved per [facility] Refill Protocol.    Requested Prescriptions   Pending Prescriptions Disp Refills     Continuous Blood Gluc Sensor (DEXCOM G6 SENSOR) MISC [Pharmacy Med Name: DEXCOM G6 SENSOR  MISC] 3 each 5     Sig: CHANGE EVERY 10 DAYS       There is no refill protocol information for this order        Continuous Blood Gluc Transmit (DEXCOM G6 TRANSMITTER) MISC [Pharmacy Med Name: DEXCOM G6 TRANSMITTER  MISC] 1 each 1     Sig: CHANGE EVERY 3 MONTHS       Diabetic Supplies Protocol Passed - 9/10/2021 11:24 AM        Passed - Medication is active on med list        Passed - Patient is 18 years of age or older        Passed - Recent (6 mo) or future (30 days) visit within the authorizing provider's specialty     Patient had office visit in the last 6 months or has a visit in the next 30 days with authorizing provider.  See \"Patient Info\" tab in inbasket, or \"Choose Columns\" in Meds & Orders section of the refill encounter.                 "

## 2021-09-13 ENCOUNTER — TELEPHONE (OUTPATIENT)
Dept: FAMILY MEDICINE | Facility: CLINIC | Age: 66
End: 2021-09-13

## 2021-09-13 DIAGNOSIS — E10.59 TYPE 1 DIABETES MELLITUS WITH VASCULAR DISEASE (H): ICD-10-CM

## 2021-09-13 RX ORDER — HYDROCODONE BITARTRATE AND ACETAMINOPHEN 10; 325 MG/1; MG/1
1 TABLET ORAL EVERY 6 HOURS PRN
Qty: 120 TABLET | Refills: 0 | Status: SHIPPED | OUTPATIENT
Start: 2021-09-13 | End: 2021-10-11

## 2021-11-12 ENCOUNTER — VIRTUAL VISIT (OUTPATIENT)
Dept: FAMILY MEDICINE | Facility: CLINIC | Age: 66
End: 2021-11-12
Payer: COMMERCIAL

## 2021-11-12 DIAGNOSIS — U07.1 INFECTION DUE TO 2019 NOVEL CORONAVIRUS: ICD-10-CM

## 2021-11-12 DIAGNOSIS — I42.8 NON-ISCHEMIC CARDIOMYOPATHY (H): ICD-10-CM

## 2021-11-12 DIAGNOSIS — F11.90 CHRONIC NARCOTIC USE: Primary | ICD-10-CM

## 2021-11-12 DIAGNOSIS — E10.42 DIABETIC POLYNEUROPATHY ASSOCIATED WITH TYPE 1 DIABETES MELLITUS (H): ICD-10-CM

## 2021-11-12 DIAGNOSIS — M48.02 CERVICAL STENOSIS OF SPINE: ICD-10-CM

## 2021-11-12 PROCEDURE — 99213 OFFICE O/P EST LOW 20 MIN: CPT | Mod: TEL | Performed by: FAMILY MEDICINE

## 2021-11-12 RX ORDER — HYDROCODONE BITARTRATE AND ACETAMINOPHEN 10; 325 MG/1; MG/1
1 TABLET ORAL EVERY 6 HOURS PRN
Qty: 120 TABLET | Refills: 0 | Status: SHIPPED | OUTPATIENT
Start: 2021-11-12 | End: 2021-12-08

## 2021-11-12 NOTE — PROGRESS NOTES
"Eb is a 66 year old who is being evaluated via a billable telephone visit.      What phone number would you like to be contacted at? [phone]  How would you like to obtain your AVS? MyChart    Assessment & Plan     Chronic narcotic use     - HYDROcodone-acetaminophen (NORCO)  MG per tablet; Take 1 tablet by mouth every 6 hours as needed for moderate to severe pain    Infection due to 2019 novel coronavirus  Grandson tested positive and lives with him  He has pretty classic symptoms  Recommended monoclonal antibodies    Diabetic polyneuropathy associated with type 1 diabetes mellitus (H)     - HYDROcodone-acetaminophen (NORCO)  MG per tablet; Take 1 tablet by mouth every 6 hours as needed for moderate to severe pain    Cervical stenosis of spine     - HYDROcodone-acetaminophen (NORCO)  MG per tablet; Take 1 tablet by mouth every 6 hours as needed for moderate to severe pain    Non-ischemic cardiomyopathy (H)   stable               BMI:   Estimated body mass index is 27.62 kg/m  as calculated from the following:    Height as of 8/12/21: 1.753 m (5' 9\").    Weight as of 8/12/21: 84.8 kg (187 lb).           No follow-ups on file.    Jakceline Rachel MD  [facility]    Subjective   Eb is a 66 year old who presents for the following health issues     HPI     Medication Followup of vicodin    Taking Medication as prescribed: yes    Side Effects:  None    Medication Helping Symptoms:  yes     Yesterday am started feeling poorly with sore throat and cough, body aches.   Grandson who lives with him tested positive for covid.    Review of Systems   Constitutional, HEENT, cardiovascular, pulmonary, gi and gu systems are negative, except as otherwise noted.      Objective           Vitals:  No vitals were obtained today due to virtual visit.    Physical Exam   healthy, alert and no distress  PSYCH: Alert and oriented times 3; coherent speech, normal   rate and volume, able to " articulate logical thoughts, able   to abstract reason, no tangential thoughts, no hallucinations   or delusions  His affect is normal  RESP: No cough, no audible wheezing, able to talk in full sentences  Remainder of exam unable to be completed due to telephone visits                [email or web address]    If you are high risk and diagnosed with COVID, you may be a candidate for monoclonal antibody infusions through the [place] Department of Health.  See the link above to register yourself with [place] [employer] ([place]).    The [place] Resource Allocation Platform (MNRAP) is an online tool that connects people and health care providers with COVID-19 medications. People, their caregivers, or their health care providers can use MNRAP to find out which medications are available, whether they will help the person, and whether the person is able to get medication.    If someone can get a COVID-19 medication, [facility] passes along information on their behalf to a health care facility that can give it. A final decision about whether someone can get a medication is up to the health care provider at that facility.    Jackeline Rachel M.D.        Phone call duration: 5 minutes

## 2021-11-12 NOTE — PATIENT INSTRUCTIONS
[email or web address]    If you are high risk and diagnosed with COVID, you may be a candidate for monoclonal antibody infusions through the [place] Department of Health.  See the link above to register yourself with [place] [employer] (MNRAP).    The [place] Resource [employer] Platform (MNRAP) is an online tool that connects people and health care providers with COVID-19 medications. People, their caregivers, or their health care providers can use MNRAP to find out which medications are available, whether they will help the person, and whether the person is able to get medication.    If someone can get a COVID-19 medication, MNRAP passes along information on their behalf to a health care facility that can give it. A final decision about whether someone can get a medication is up to the health care provider at that facility.    Jackeline Rachel M.D.        Our Clinic hours are:  Mondays    7:20 am - 7 pm  Tues -  Fri  7:20 am - 5 pm    Clinic Phone: [phone]    The clinic lab opens at 7:30 am Mon - Fri and appointments are required.    [facility]  Ph. [phone]  Monday  8 am - 7pm  Tues - Fri 8 am - 5:30 pm

## 2021-11-23 DIAGNOSIS — E10.59 TYPE 1 DIABETES MELLITUS WITH VASCULAR DISEASE (H): ICD-10-CM

## 2021-11-24 NOTE — TELEPHONE ENCOUNTER
Pending Prescriptions:                       Disp   Refills    NOVOLOG VIAL 100 UNIT/ML soln [Pharmacy Me*60 mL  2        Sig: USE AS DIRECTED IN INSULIN PUMP. TOTAL DAILY DOSE 60           UNITS    Routing refill request to provider for review/approval because:  Labs not current:  A1C is due    Gayathri Burnham RN

## 2021-12-08 DIAGNOSIS — M48.02 CERVICAL STENOSIS OF SPINE: ICD-10-CM

## 2021-12-08 DIAGNOSIS — E10.42 DIABETIC POLYNEUROPATHY ASSOCIATED WITH TYPE 1 DIABETES MELLITUS (H): ICD-10-CM

## 2021-12-08 DIAGNOSIS — F11.90 CHRONIC NARCOTIC USE: ICD-10-CM

## 2021-12-08 RX ORDER — HYDROCODONE BITARTRATE AND ACETAMINOPHEN 10; 325 MG/1; MG/1
1 TABLET ORAL EVERY 6 HOURS PRN
Qty: 120 TABLET | Refills: 0 | Status: SHIPPED | OUTPATIENT
Start: 2021-12-10 | End: 2022-01-10

## 2021-12-08 NOTE — TELEPHONE ENCOUNTER
Routing refill request to provider for review/approval because:  Drug not on the [facility] refill protocol     Routing to out of office providers for PCP.    CARLENE Khan

## 2021-12-08 NOTE — TELEPHONE ENCOUNTER
Prescription faxed to pharmacy, please notify patient.      PDMP Review       Value Time User    State PDMP site checked  Yes 12/8/2021  1:38 PM Sanaz Chappell MD

## 2021-12-08 NOTE — TELEPHONE ENCOUNTER
Pt wants [facility] refill by 12/10, due to pharmacy closed on Sunday. No need to call patient back, unless there are questions or problems.

## 2022-01-10 DIAGNOSIS — M48.02 CERVICAL STENOSIS OF SPINE: ICD-10-CM

## 2022-01-10 DIAGNOSIS — E10.42 DIABETIC POLYNEUROPATHY ASSOCIATED WITH TYPE 1 DIABETES MELLITUS (H): ICD-10-CM

## 2022-01-10 DIAGNOSIS — F11.90 CHRONIC NARCOTIC USE: ICD-10-CM

## 2022-01-10 RX ORDER — HYDROCODONE BITARTRATE AND ACETAMINOPHEN 10; 325 MG/1; MG/1
1 TABLET ORAL EVERY 6 HOURS PRN
Qty: 120 TABLET | Refills: 0 | Status: SHIPPED | OUTPATIENT
Start: 2022-01-10 | End: 2022-02-07

## 2022-01-10 NOTE — TELEPHONE ENCOUNTER
Pt is wanting to  his [place] tomorrow at [facility] pharmacy.

## 2022-02-07 ENCOUNTER — OFFICE VISIT (OUTPATIENT)
Dept: FAMILY MEDICINE | Facility: CLINIC | Age: 67
End: 2022-02-07
Payer: COMMERCIAL

## 2022-02-07 VITALS
SYSTOLIC BLOOD PRESSURE: 120 MMHG | BODY MASS INDEX: 26.96 KG/M2 | WEIGHT: 182 LBS | HEART RATE: 81 BPM | DIASTOLIC BLOOD PRESSURE: 72 MMHG | OXYGEN SATURATION: 95 % | HEIGHT: 69 IN | RESPIRATION RATE: 14 BRPM | TEMPERATURE: 97.5 F

## 2022-02-07 DIAGNOSIS — E10.42 DIABETIC POLYNEUROPATHY ASSOCIATED WITH TYPE 1 DIABETES MELLITUS (H): ICD-10-CM

## 2022-02-07 DIAGNOSIS — I42.8 NON-ISCHEMIC CARDIOMYOPATHY (H): ICD-10-CM

## 2022-02-07 DIAGNOSIS — F32.2 MAJOR DEPRESSIVE DISORDER, SINGLE EPISODE, SEVERE (H): ICD-10-CM

## 2022-02-07 DIAGNOSIS — M48.02 CERVICAL STENOSIS OF SPINE: ICD-10-CM

## 2022-02-07 DIAGNOSIS — E78.5 HYPERLIPIDEMIA LDL GOAL <70: Primary | ICD-10-CM

## 2022-02-07 DIAGNOSIS — N40.1 BENIGN NON-NODULAR PROSTATIC HYPERPLASIA WITH LOWER URINARY TRACT SYMPTOMS: ICD-10-CM

## 2022-02-07 DIAGNOSIS — I51.9 LEFT VENTRICULAR DYSFUNCTION: ICD-10-CM

## 2022-02-07 DIAGNOSIS — F11.90 CHRONIC NARCOTIC USE: ICD-10-CM

## 2022-02-07 DIAGNOSIS — E10.59 TYPE 1 DIABETES MELLITUS WITH VASCULAR DISEASE (H): ICD-10-CM

## 2022-02-07 LAB
ANION GAP SERPL CALCULATED.3IONS-SCNC: 6 MMOL/L (ref 3–14)
BUN SERPL-MCNC: 16 MG/DL (ref 7–30)
CALCIUM SERPL-MCNC: 9.2 MG/DL (ref 8.5–10.1)
CHLORIDE BLD-SCNC: 108 MMOL/L (ref 94–109)
CHOLEST SERPL-MCNC: 130 MG/DL
CO2 SERPL-SCNC: 25 MMOL/L (ref 20–32)
CREAT SERPL-MCNC: 0.87 MG/DL (ref 0.66–1.25)
FASTING STATUS PATIENT QL REPORTED: NORMAL
GFR SERPL CREATININE-BSD FRML MDRD: >90 ML/MIN/1.73M2
GLUCOSE BLD-MCNC: 218 MG/DL (ref 70–99)
HBA1C MFR BLD: 7.9 % (ref 0–5.6)
HDLC SERPL-MCNC: 51 MG/DL
LDLC SERPL CALC-MCNC: 63 MG/DL
NONHDLC SERPL-MCNC: 79 MG/DL
POTASSIUM BLD-SCNC: 3.7 MMOL/L (ref 3.4–5.3)
SODIUM SERPL-SCNC: 139 MMOL/L (ref 133–144)
TRIGL SERPL-MCNC: 79 MG/DL

## 2022-02-07 PROCEDURE — 80048 BASIC METABOLIC PNL TOTAL CA: CPT | Performed by: FAMILY MEDICINE

## 2022-02-07 PROCEDURE — 99214 OFFICE O/P EST MOD 30 MIN: CPT | Performed by: FAMILY MEDICINE

## 2022-02-07 PROCEDURE — 83036 HEMOGLOBIN GLYCOSYLATED A1C: CPT | Performed by: FAMILY MEDICINE

## 2022-02-07 PROCEDURE — 36415 COLL VENOUS BLD VENIPUNCTURE: CPT | Performed by: FAMILY MEDICINE

## 2022-02-07 PROCEDURE — 80061 LIPID PANEL: CPT | Performed by: FAMILY MEDICINE

## 2022-02-07 RX ORDER — ROSUVASTATIN CALCIUM 40 MG/1
TABLET, COATED ORAL
Qty: 90 TABLET | Refills: 1 | Status: SHIPPED | OUTPATIENT
Start: 2022-02-07 | End: 2022-05-11

## 2022-02-07 RX ORDER — TAMSULOSIN HYDROCHLORIDE 0.4 MG/1
CAPSULE ORAL
Qty: 90 CAPSULE | Refills: 3 | Status: SHIPPED | OUTPATIENT
Start: 2022-02-07 | End: 2022-12-05

## 2022-02-07 RX ORDER — METOPROLOL SUCCINATE 25 MG/1
TABLET, EXTENDED RELEASE ORAL
Qty: 90 TABLET | Refills: 1 | Status: SHIPPED | OUTPATIENT
Start: 2022-02-07 | End: 2022-05-11

## 2022-02-07 RX ORDER — HYDROCODONE BITARTRATE AND ACETAMINOPHEN 10; 325 MG/1; MG/1
1 TABLET ORAL EVERY 6 HOURS PRN
Qty: 120 TABLET | Refills: 0 | Status: SHIPPED | OUTPATIENT
Start: 2022-02-07 | End: 2022-03-08

## 2022-02-07 RX ORDER — LOSARTAN POTASSIUM 50 MG/1
TABLET ORAL
Qty: 90 TABLET | Refills: 1 | Status: SHIPPED | OUTPATIENT
Start: 2022-02-07 | End: 2022-05-11

## 2022-02-07 ASSESSMENT — ANXIETY QUESTIONNAIRES
4. TROUBLE RELAXING: NOT AT ALL
7. FEELING AFRAID AS IF SOMETHING AWFUL MIGHT HAPPEN: NOT AT ALL
2. NOT BEING ABLE TO STOP OR CONTROL WORRYING: NOT AT ALL
3. WORRYING TOO MUCH ABOUT DIFFERENT THINGS: NOT AT ALL
IF YOU CHECKED OFF ANY PROBLEMS ON THIS QUESTIONNAIRE, HOW DIFFICULT HAVE THESE PROBLEMS MADE IT FOR YOU TO DO YOUR WORK, TAKE CARE OF THINGS AT HOME, OR GET ALONG WITH OTHER PEOPLE: NOT DIFFICULT AT ALL
5. BEING SO RESTLESS THAT IT IS HARD TO SIT STILL: NOT AT ALL
GAD7 TOTAL SCORE: 0
6. BECOMING EASILY ANNOYED OR IRRITABLE: NOT AT ALL
1. FEELING NERVOUS, ANXIOUS, OR ON EDGE: NOT AT ALL

## 2022-02-07 ASSESSMENT — MIFFLIN-ST. JEOR: SCORE: 1585.93

## 2022-02-07 ASSESSMENT — PATIENT HEALTH QUESTIONNAIRE - PHQ9: SUM OF ALL RESPONSES TO PHQ QUESTIONS 1-9: 2

## 2022-02-07 ASSESSMENT — PAIN SCALES - GENERAL: PAINLEVEL: MODERATE PAIN (5)

## 2022-02-07 NOTE — PATIENT INSTRUCTIONS
Our Clinic hours are:  Mondays    7:20 am - 7 pm  Tues -  Fri  7:20 am - 5 pm    Clinic Phone: [phone]    The clinic lab opens at 7:30 am Mon - Fri and appointments are required.    [facility]. [phone]  Monday  8 am - 7pm  Tues - Fri 8 am - 5:30 pm

## 2022-02-07 NOTE — PROGRESS NOTES
Assessment & Plan     Hyperlipidemia LDL goal <70     - Lipid panel reflex to direct LDL Fasting; Future  - rosuvastatin (CRESTOR) 40 MG tablet; TAKE 1 TABLET EVERY DAY  - Lipid panel reflex to direct LDL Fasting    Type 1 diabetes mellitus with vascular disease (H)  Feels like he needs to have his pump adjusted  - Basic metabolic panel  (Ca, Cl, CO2, Creat, Gluc, K, Na, BUN); Future  - Hemoglobin A1c; Future  - Lipid panel reflex to direct LDL Fasting; Future  - insulin aspart (NOVOLOG VIAL) 100 UNITS/ML vial; USE AS DIRECTED IN INSULIN PUMP. TOTAL DAILY DOSE 60 UNITS  - Basic metabolic panel  (Ca, Cl, CO2, Creat, Gluc, K, Na, BUN)  - Hemoglobin A1c  - Lipid panel reflex to direct LDL Fasting  - AMB Adult Diabetes Educator Referral; Future    Major depressive disorder, single episode, severe (H)   stable  - FLUoxetine (PROZAC) 20 MG capsule; Take 1 capsule (20 mg) by mouth daily    Chronic narcotic use  PDMP reviewed  - HYDROcodone-acetaminophen (NORCO)  MG per tablet; Take 1 tablet by mouth every 6 hours as needed for moderate to severe pain    Diabetic polyneuropathy associated with type 1 diabetes mellitus (H)  stable  - HYDROcodone-acetaminophen (NORCO)  MG per tablet; Take 1 tablet by mouth every 6 hours as needed for moderate to severe pain  - metoprolol succinate ER (TOPROL-XL) 25 MG 24 hr tablet; TAKE 1 TABLET DAILY    Cervical stenosis of spine     - HYDROcodone-acetaminophen (NORCO)  MG per tablet; Take 1 tablet by mouth every 6 hours as needed for moderate to severe pain    Benign non-nodular prostatic hyperplasia with lower urinary tract symptoms  Symptoms well controlled  - tamsulosin (FLOMAX) 0.4 MG capsule; TAKE 1 CAPSULE DAILY    Left ventricular dysfunction     - metoprolol succinate ER (TOPROL-XL) 25 MG 24 hr tablet; TAKE 1 TABLET DAILY  - losartan (COZAAR) 50 MG tablet; TAKE 1 TABLET DAILY    Non-ischemic cardiomyopathy (H)  Last EF 62% in 2019 with Lexiscan               BMI:  "  Estimated body mass index is 26.88 kg/m  as calculated from the following:    Height as of this encounter: 1.753 m (5' 9\").    Weight as of this encounter: 82.6 kg (182 lb).           No follow-ups on file.    Jackeline Rachel MD  [facility]    Agustina Parson is a 67 year old who presents for the following health issues     HPI     Diabetes Follow-up    How often are you checking your blood sugar? Continuous glucose monitor  What time of day are you checking your blood sugars (select all that apply)?  Before meals, After meals and Before and after meals  Have you had any blood sugars above 200?  Yes at night  Have you had any blood sugars below 70?  No    What symptoms do you notice when your blood sugar is low?  None    What concerns do you have today about your diabetes? None     Do you have any of these symptoms? (Select all that apply)  No numbness or tingling in feet.  No redness, sores or blisters on feet.  No complaints of excessive thirst.  No reports of blurry vision.  No significant changes to weight.    Have you had a diabetic eye exam in the last 12 months? No          Hyperlipidemia Follow-Up      Are you regularly taking any medication or supplement to lower your cholesterol?   Yes- crestor    Are you having muscle aches or other side effects that you think could be caused by your cholesterol lowering medication?  No    Hypertension Follow-up      Do you check your blood pressure regularly outside of the clinic? No     Are you following a low salt diet? Yes    Are your blood pressures ever more than 140 on the top number (systolic) OR more   than 90 on the bottom number (diastolic), for example 140/90? No    BP Readings from Last 2 Encounters:   02/07/22 120/72   08/12/21 116/64     Hemoglobin A1C POCT (%)   Date Value   04/02/2021 7.6 (H)   12/14/2020 7.4 (H)     LDL Cholesterol Calculated (mg/dL)   Date Value   09/16/2020 63   09/19/2019 57     Medication Followup of " "vicodin    Taking Medication as prescribed: yes    Side Effects:  None    Medication Helping Symptoms:  yes       How many servings of fruits and vegetables do you eat daily?  0-1    On average, how many sweetened beverages do you drink each day (Examples: soda, juice, sweet tea, etc.  Do NOT count diet or artificially sweetened beverages)?   0    How many days per week do you exercise enough to make your heart beat faster? 3 or less    How many minutes a day do you exercise enough to make your heart beat faster? 9 or less    How many days per week do you miss taking your medication? 0      Review of Systems   Constitutional, HEENT, cardiovascular, pulmonary, gi and gu systems are negative, except as otherwise noted.      Objective    /72   Pulse 81   Temp 97.5  F (36.4  C) (Tympanic)   Resp 14   Ht 1.753 m (5' 9\")   Wt 82.6 kg (182 lb)   SpO2 95%   BMI 26.88 kg/m    Body mass index is 26.88 kg/m .  Physical Exam   GENERAL: healthy, alert and no distress  NECK: no adenopathy, no asymmetry, masses, or scars and thyroid normal to palpation  RESP: lungs clear to auscultation - no rales, rhonchi or wheezes  CV: regular rate and rhythm, normal S1 S2, no S3 or S4, no murmur, click or rub, no peripheral edema and peripheral pulses strong  ABDOMEN: soft, nontender, no hepatosplenomegaly, no masses and bowel sounds normal  MS: no gross musculoskeletal defects noted, no edema    Results for orders placed or performed in visit on 02/07/22 (from the past 24 hour(s))   Hemoglobin A1c   Result Value Ref Range    Hemoglobin A1C 7.9 (H) 0.0 - 5.6 %               "

## 2022-02-08 ASSESSMENT — ANXIETY QUESTIONNAIRES: GAD7 TOTAL SCORE: 0

## 2022-02-23 DIAGNOSIS — E10.59 TYPE 1 DIABETES MELLITUS WITH VASCULAR DISEASE (H): ICD-10-CM

## 2022-02-24 RX ORDER — PROCHLORPERAZINE 25 MG/1
SUPPOSITORY RECTAL
Qty: 1 EACH | Refills: 1 | Status: SHIPPED | OUTPATIENT
Start: 2022-02-24 | End: 2022-08-31

## 2022-02-24 RX ORDER — PROCHLORPERAZINE 25 MG/1
SUPPOSITORY RECTAL
Qty: 9 EACH | Refills: 1 | Status: SHIPPED | OUTPATIENT
Start: 2022-02-24 | End: 2022-08-31

## 2022-03-08 DIAGNOSIS — F11.90 CHRONIC NARCOTIC USE: ICD-10-CM

## 2022-03-08 DIAGNOSIS — E10.42 DIABETIC POLYNEUROPATHY ASSOCIATED WITH TYPE 1 DIABETES MELLITUS (H): ICD-10-CM

## 2022-03-08 DIAGNOSIS — M48.02 CERVICAL STENOSIS OF SPINE: ICD-10-CM

## 2022-03-08 RX ORDER — HYDROCODONE BITARTRATE AND ACETAMINOPHEN 10; 325 MG/1; MG/1
1 TABLET ORAL EVERY 6 HOURS PRN
Qty: 120 TABLET | Refills: 0 | Status: SHIPPED | OUTPATIENT
Start: 2022-03-08 | End: 2022-04-06

## 2022-03-16 ENCOUNTER — TRANSFERRED RECORDS (OUTPATIENT)
Dept: HEALTH INFORMATION MANAGEMENT | Facility: CLINIC | Age: 67
End: 2022-03-16
Payer: COMMERCIAL

## 2022-03-16 LAB — RETINOPATHY: NEGATIVE

## 2022-04-04 ENCOUNTER — ALLIED HEALTH/NURSE VISIT (OUTPATIENT)
Dept: EDUCATION SERVICES | Facility: CLINIC | Age: 67
End: 2022-04-04
Attending: FAMILY MEDICINE
Payer: COMMERCIAL

## 2022-04-04 DIAGNOSIS — E10.59 TYPE 1 DIABETES MELLITUS WITH VASCULAR DISEASE (H): ICD-10-CM

## 2022-04-04 DIAGNOSIS — E11.9 DIABETES MELLITUS WITHOUT COMPLICATION (H): Primary | ICD-10-CM

## 2022-04-04 PROCEDURE — G0108 DIAB MANAGE TRN  PER INDIV: HCPCS

## 2022-04-04 NOTE — PATIENT INSTRUCTIONS
Diabetes Support Resources:  1. Change your insulin pump site every 3 days in areas that you have not used.    2. Be sure to add carbs and a BG every time you are going to give a bolus.    3. Fill up your reservoir to 180 units every 3 days.      4. Try to eat 3 meals per day and stick with 60-75 gms of carbs each meal.      5. Allow 2 hrs for your BG to come down after you bolus insulin.     Bring blood glucose meter and logbook with you to all doctor and follow-up appointments.    Diabetes Education Telephone Visit Follow-up:    We realize your time is valuable and your health is important! We offer a convenient Telephone Visit follow up! It s a quick way to check in for a medication dose adjustment without having to come back to clinic as soon.    Telephone Visits are often covered by insurance. Please check with your insurance plan to see if this type of visit is covered. If not, the cost is less expensive than an office visit:      Up to 10 minutes (Code 95189): $30    11-20 minutes (Code 17918): $59    More than 20 minutes (Code 00344): $85    Talk with your Diabetes Educator if you want to learn more.      [facility]:  For Your Diabetes Education and Nutrition Appointments Call:  [phone]   For Diabetes Education or Nutrition Related Questions:   Phone: [phone]  Send [employer] Message   If you need a medication refill please contact your pharmacy. Please allow 3 business days for your refills to be completed.

## 2022-04-04 NOTE — PROGRESS NOTES
"Diabetes Self Management Training: Insulin Pump Follow-up Visit    Eb Eisenberg presents today for education, modification of medication(s), insulin pump review and download of continuous glucose monitoring related to Type 1 diabetes.    He is accompanied by self    Patient's diabetes management related comments/concerns: Eb is not consistent with his meals and snacks.      Patient would like this visit to be focused around the following diabetes-related behaviors and goals: make pump adjustments    ASSESSMENT:  Patient Problem List and Family Medical History reviewed for relevant medical history, current medical status, and diabetes risk factors.    Current Diabetes Management per Patient:  Insulin Pump Type: Medtronic Minimed 670G System    BG meter: Dexcom              Taking other diabetes medications?   yes:     Diabetes Medication(s)     Diabetic Other       GLUCAGON EMERGENCY KIT 1 MG IJ KIT    use as directed for severe hypoglycemia    Insulin       insulin aspart (NOVOLOG VIAL) 100 UNITS/ML vial    USE AS DIRECTED IN INSULIN PUMP. TOTAL DAILY DOSE 60 UNITS                  Current Pump Settings: See Insulin Pump - Outpatient in Medication List for complete list of settings.     Current Insulin Pump Regimen:                               Patient's most recent   Lab Results   Component Value Date    A1C 7.9 02/07/2022    A1C 7.6 04/02/2021    is not meeting goal of <7.0    Nutrition:  Patient has an inconsistent intake of carbohydrates          Vitals:  There were no vitals taken for this visit.  Estimated body mass index is 26.88 kg/m  as calculated from the following:    Height as of 2/7/22: 1.753 m (5' 9\").    Weight as of 2/7/22: 82.6 kg (182 lb).   Last 3 BP:   BP Readings from Last 3 Encounters:   02/07/22 120/72   08/12/21 116/64   04/02/21 112/62       History   Smoking Status     Former Smoker     Quit date: 1/1/2001   Smokeless Tobacco     Never Used       Labs:  Lab Results   Component Value " Date    A1C 7.9 02/07/2022    A1C 7.6 04/02/2021     Lab Results   Component Value Date     02/07/2022     04/02/2021     Lab Results   Component Value Date    LDL 63 02/07/2022    LDL 63 09/16/2020     HDL Cholesterol   Date Value Ref Range Status   09/16/2020 53 >39 mg/dL Final     Direct Measure HDL   Date Value Ref Range Status   02/07/2022 51 >=40 mg/dL Final   ]  GFR Estimate   Date Value Ref Range Status   02/07/2022 >90 >60 mL/min/1.73m2 Final     Comment:     Effective December 21, 2021 eGFRcr in adults is calculated using the 2021 CKD-EPI creatinine equation which includes age and gender (Zoila et al., NEJM, DOI: 10.1056/XONAnt1882265)   04/02/2021 73 >60 mL/min/[1.73_m2] Final     Comment:     Non  GFR Calc  Starting 12/18/2018, serum creatinine based estimated GFR (eGFR) will be   calculated using the Chronic Kidney Disease Epidemiology Collaboration   (CKD-EPI) equation.       GFR Estimate If Black   Date Value Ref Range Status   04/02/2021 85 >60 mL/min/[1.73_m2] Final     Comment:      GFR Calc  Starting 12/18/2018, serum creatinine based estimated GFR (eGFR) will be   calculated using the Chronic Kidney Disease Epidemiology Collaboration   (CKD-EPI) equation.       Lab Results   Component Value Date    CR 0.87 02/07/2022    CR 1.05 04/02/2021     No results found for: MICROALBUMIN    Socio/Economic History:      Health Beliefs and Attitudes:   Patient Activation Measure Survey Score:  BENEDICTO Score (Last Two) 2/17/2011   BENEDICTO Raw Score 42   Activation Score 66   BENEDICTO Level 3           Diabetes knowledge and skills assessment:     Patient is knowledgeable in diabetes management concepts related to: Healthy Eating, Being Active, Monitoring, Taking Medication, Problem Solving and Healthy Coping    Patient needs further education on the following diabetes management concepts: Healthy Eating, Taking Medication and Healthy Coping      INTERVENTION:  Patient would  benefit from changes  in basal rate overnight to , using the bolus wizard appropriately, bolusing before meals, treating low BG correctly and changing infusion set every 3 days.    Assessment: Eb states he is here today for pump adjustments as his A1c is high after assessing Peterson lifestyle he is depressed no outside activities he stays at home watches TV all day no exercise no scheduled meals he eats when he is hungry he chooses the wrong foods if his blood sugars are high he says he cheats on the pump he will give himself more insulin just to bring his blood sugars I am unable to make adjustments to his meal planning sensitivity is spot on.  It is tough adjusting the meals as he does not have any mealtimes that are scheduled so I will not be making those changes I did make some basal changes overnight where he could use a little more basal insulin during sometimes and then sometimes during the day  While his biggest problem is he changes his sites every 4.3 times a day and in a week where he is supposed to be changing his site every 3 days he admits that when he puts a new site and his blood sugars drop when he has an outside his blood sugars are staying up he does not comply with the guidelines of pump management which is why he has extra supplies at home he feels as reservoir up to the [address] and then he waits until his insulin runs out I have suggested that he only fill with up to 175 and that should be what he needs every 3 days he uses the Dexcom to monitor his blood sugars I have discussed with him monitor his blood sugars every time he eats carbohydrates and he does not eat very healthy if he is home alone when his grandchildren are with him he might eat a little bit better I will continue to follow-up with him I do recommended endocrinologist for him but he refuses at this time so will be relying on Dr. Rachel to be monitoring her pump and is signing off on any orders that should be made to his pump  this is a tough case because there is no regulation regulatory to his meal planning and I do not think he is interested in making any changes in his lifestyle very nice gentleman but also quite depressed          PLAN:  See Patient Instructions for co-developed, patient-stated behavior change goals.  AVS printed and provided to patient today.  Written instructions for pump setting changes given to patient.     FOLLOW-UP:  Follow-up with PCP recommended.  Follow-up with endocrinology recommended.  Chart routed to referring provider.    Ongoing plan for education and support: Follow-up with primary care provider    Nessa Espinosa RN/[facility] Diabetes Educator    Time Spent: 60 minutes  Encounter Type: Individual    Download data sent to be scanned into this Epic encounter.    Any diabetes medication dose changes were made via the CDE Protocol and Collaborative Practice Agreement with the patient's primary care provider. A copy of this encounter was shared with the provider.

## 2022-04-04 NOTE — LETTER
"    4/4/2022         RE: Eb Eisenberg  [id number]  [place] [address]        Dear Colleague,    Thank you for referring your patient, Eb Eisenberg, to the [facility]. Please see a copy of my visit note below.    Diabetes Self Management Training: Insulin Pump Follow-up Visit    Eb Eisenberg presents today for education, modification of medication(s), insulin pump review and download of continuous glucose monitoring related to Type 1 diabetes.    He is accompanied by self    Patient's diabetes management related comments/concerns: Eb is not consistent with his meals and snacks.      Patient would like this visit to be focused around the following diabetes-related behaviors and goals: make pump adjustments    ASSESSMENT:  Patient Problem List and Family Medical History reviewed for relevant medical history, current medical status, and diabetes risk factors.    Current Diabetes Management per Patient:  Insulin Pump Type: Medtronic Minimed 670G System    BG meter: Dexcom              Taking other diabetes medications?   yes:     Diabetes Medication(s)     Diabetic Other       GLUCAGON EMERGENCY KIT 1 MG IJ KIT    use as directed for severe hypoglycemia    Insulin       insulin aspart (NOVOLOG VIAL) 100 UNITS/ML vial    USE AS DIRECTED IN INSULIN PUMP. TOTAL DAILY DOSE 60 UNITS                  Current Pump Settings: See Insulin Pump - Outpatient in Medication List for complete list of settings.     Current Insulin Pump Regimen:                               Patient's most recent   Lab Results   Component Value Date    A1C 7.9 02/07/2022    A1C 7.6 04/02/2021    is not meeting goal of <7.0    Nutrition:  Patient has an inconsistent intake of carbohydrates          Vitals:  There were no vitals taken for this visit.  Estimated body mass index is 26.88 kg/m  as calculated from the following:    Height as of 2/7/22: 1.753 m (5' 9\").    Weight as of 2/7/22: 82.6 kg (182 lb). "   Last 3 BP:   BP Readings from Last 3 Encounters:   02/07/22 120/72   08/12/21 116/64   04/02/21 112/62       History   Smoking Status     Former Smoker     Quit date: 1/1/2001   Smokeless Tobacco     Never Used       Labs:  Lab Results   Component Value Date    A1C 7.9 02/07/2022    A1C 7.6 04/02/2021     Lab Results   Component Value Date     02/07/2022     04/02/2021     Lab Results   Component Value Date    LDL 63 02/07/2022    LDL 63 09/16/2020     HDL Cholesterol   Date Value Ref Range Status   09/16/2020 53 >39 mg/dL Final     Direct Measure HDL   Date Value Ref Range Status   02/07/2022 51 >=40 mg/dL Final   ]  GFR Estimate   Date Value Ref Range Status   02/07/2022 >90 >60 mL/min/1.73m2 Final     Comment:     Effective December 21, 2021 eGFRcr in adults is calculated using the 2021 CKD-EPI creatinine equation which includes age and gender (Zoila et al., NEJ, DOI: 10.1056/FPXBvq7314748)   04/02/2021 73 >60 mL/min/[1.73_m2] Final     Comment:     Non  GFR Calc  Starting 12/18/2018, serum creatinine based estimated GFR (eGFR) will be   calculated using the Chronic Kidney Disease Epidemiology Collaboration   (CKD-EPI) equation.       GFR Estimate If Black   Date Value Ref Range Status   04/02/2021 85 >60 mL/min/[1.73_m2] Final     Comment:      GFR Calc  Starting 12/18/2018, serum creatinine based estimated GFR (eGFR) will be   calculated using the Chronic Kidney Disease Epidemiology Collaboration   (CKD-EPI) equation.       Lab Results   Component Value Date    CR 0.87 02/07/2022    CR 1.05 04/02/2021     No results found for: MICROALBUMIN    Socio/Economic History:      Health Beliefs and Attitudes:   Patient Activation Measure Survey Score:  BENEDICTO Score (Last Two) 2/17/2011   BENEDICTO Raw Score 42   Activation Score 66   BENEDICTO Level 3           Diabetes knowledge and skills assessment:     Patient is knowledgeable in diabetes management concepts related to: Healthy  Eating, Being Active, Monitoring, Taking Medication, Problem Solving and Healthy Coping    Patient needs further education on the following diabetes management concepts: Healthy Eating, Taking Medication and Healthy Coping      INTERVENTION:  Patient would benefit from changes  in basal rate overnight to , using the bolus wizard appropriately, bolusing before meals, treating low BG correctly and changing infusion set every 3 days.    Assessment: Eb states he is here today for pump adjustments as his A1c is high after assessing Peterson lifestyle he is depressed no outside activities he stays at home watches TV all day no exercise no scheduled meals he eats when he is hungry he chooses the wrong foods if his blood sugars are high he says he cheats on the pump he will give himself more insulin just to bring his blood sugars I am unable to make adjustments to his meal planning sensitivity is spot on.  It is tough adjusting the meals as he does not have any mealtimes that are scheduled so I will not be making those changes I did make some basal changes overnight where he could use a little more basal insulin during sometimes and then sometimes during the day  While his biggest problem is he changes his sites every 4.3 times a day and in a week where he is supposed to be changing his site every 3 days he admits that when he puts a new site and his blood sugars drop when he has an outside his blood sugars are staying up he does not comply with the guidelines of pump management which is why he has extra supplies at home he feels as reservoir up to the [address] and then he waits until his insulin runs out I have suggested that he only fill with up to 175 and that should be what he needs every 3 days he uses the Dexcom to monitor his blood sugars I have discussed with him monitor his blood sugars every time he eats carbohydrates and he does not eat very healthy if he is home alone when his grandchildren are with him he might  eat a little bit better I will continue to follow-up with him I do recommended endocrinologist for him but he refuses at this time so will be relying on Dr. Rachel to be monitoring her pump and is signing off on any orders that should be made to his pump this is a tough case because there is no regulation regulatory to his meal planning and I do not think he is interested in making any changes in his lifestyle very nice gentleman but also quite depressed          PLAN:  See Patient Instructions for co-developed, patient-stated behavior change goals.  AVS printed and provided to patient today.  Written instructions for pump setting changes given to patient.     FOLLOW-UP:  Follow-up with PCP recommended.  Follow-up with endocrinology recommended.  Chart routed to referring provider.    Ongoing plan for education and support: Follow-up with primary care provider    Nessa Espinosa RN/[facility] Diabetes Educator    Time Spent: 60 minutes  Encounter Type: Individual    Download data sent to be scanned into this Epic encounter.    Any diabetes medication dose changes were made via the CDE Protocol and Collaborative Practice Agreement with the patient's primary care provider. A copy of this encounter was shared with the provider.

## 2022-04-06 DIAGNOSIS — M48.02 CERVICAL STENOSIS OF SPINE: ICD-10-CM

## 2022-04-06 DIAGNOSIS — E10.42 DIABETIC POLYNEUROPATHY ASSOCIATED WITH TYPE 1 DIABETES MELLITUS (H): ICD-10-CM

## 2022-04-06 DIAGNOSIS — F11.90 CHRONIC NARCOTIC USE: ICD-10-CM

## 2022-04-06 RX ORDER — HYDROCODONE BITARTRATE AND ACETAMINOPHEN 10; 325 MG/1; MG/1
1 TABLET ORAL EVERY 6 HOURS PRN
Qty: 120 TABLET | Refills: 0 | Status: SHIPPED | OUTPATIENT
Start: 2022-04-08 | End: 2022-05-11

## 2022-04-06 NOTE — TELEPHONE ENCOUNTER
Routing refill request to provider for review/approval because:  Drug not on the [facility] refill protocol     CARLENE Khan

## 2022-04-14 DIAGNOSIS — E10.59 TYPE 1 DIABETES MELLITUS WITH VASCULAR DISEASE (H): ICD-10-CM

## 2022-04-14 RX ORDER — INFUSION SET FOR INSULIN PUMP
INFUSION SETS-PARAPHERNALIA MISCELLANEOUS
Qty: 40 EACH | Refills: 3 | Status: SHIPPED | OUTPATIENT
Start: 2022-04-14 | End: 2023-05-24

## 2022-04-14 RX ORDER — INSULIN PUMP SYRINGE, 3 ML
EACH MISCELLANEOUS
Qty: 10 EACH | Refills: 3 | Status: SHIPPED | OUTPATIENT
Start: 2022-04-14 | End: 2022-06-06 | Stop reason: DRUGHIGH

## 2022-05-11 ENCOUNTER — OFFICE VISIT (OUTPATIENT)
Dept: FAMILY MEDICINE | Facility: CLINIC | Age: 67
End: 2022-05-11
Payer: COMMERCIAL

## 2022-05-11 VITALS
WEIGHT: 181 LBS | SYSTOLIC BLOOD PRESSURE: 112 MMHG | HEIGHT: 69 IN | TEMPERATURE: 97.8 F | OXYGEN SATURATION: 95 % | HEART RATE: 73 BPM | BODY MASS INDEX: 26.81 KG/M2 | DIASTOLIC BLOOD PRESSURE: 62 MMHG | RESPIRATION RATE: 16 BRPM

## 2022-05-11 DIAGNOSIS — Z79.899 ENCOUNTER FOR LONG-TERM (CURRENT) USE OF MEDICATIONS: ICD-10-CM

## 2022-05-11 DIAGNOSIS — F11.90 CHRONIC NARCOTIC USE: ICD-10-CM

## 2022-05-11 DIAGNOSIS — E10.42 DIABETIC POLYNEUROPATHY ASSOCIATED WITH TYPE 1 DIABETES MELLITUS (H): ICD-10-CM

## 2022-05-11 DIAGNOSIS — F32.2 MAJOR DEPRESSIVE DISORDER, SINGLE EPISODE, SEVERE (H): Primary | ICD-10-CM

## 2022-05-11 DIAGNOSIS — E78.5 HYPERLIPIDEMIA LDL GOAL <70: ICD-10-CM

## 2022-05-11 DIAGNOSIS — I51.9 LEFT VENTRICULAR DYSFUNCTION: ICD-10-CM

## 2022-05-11 DIAGNOSIS — M48.02 CERVICAL STENOSIS OF SPINE: ICD-10-CM

## 2022-05-11 DIAGNOSIS — Z23 HIGH PRIORITY FOR 2019-NCOV VACCINE: ICD-10-CM

## 2022-05-11 DIAGNOSIS — M25.50 ARTHRALGIA, UNSPECIFIED JOINT: ICD-10-CM

## 2022-05-11 LAB
CREAT UR-MCNC: 106 MG/DL
CREAT UR-MCNC: 109 MG/DL
MICROALBUMIN UR-MCNC: 6 MG/L
MICROALBUMIN/CREAT UR: 5.66 MG/G CR (ref 0–17)

## 2022-05-11 PROCEDURE — 80307 DRUG TEST PRSMV CHEM ANLYZR: CPT | Performed by: FAMILY MEDICINE

## 2022-05-11 PROCEDURE — 91305 COVID-19,PF,PFIZER (12+ YRS): CPT | Performed by: FAMILY MEDICINE

## 2022-05-11 PROCEDURE — 82043 UR ALBUMIN QUANTITATIVE: CPT | Performed by: FAMILY MEDICINE

## 2022-05-11 PROCEDURE — 99214 OFFICE O/P EST MOD 30 MIN: CPT | Mod: 25 | Performed by: FAMILY MEDICINE

## 2022-05-11 PROCEDURE — 0054A COVID-19,PF,PFIZER (12+ YRS): CPT | Performed by: FAMILY MEDICINE

## 2022-05-11 RX ORDER — METOPROLOL SUCCINATE 25 MG/1
TABLET, EXTENDED RELEASE ORAL
Qty: 90 TABLET | Refills: 1 | Status: SHIPPED | OUTPATIENT
Start: 2022-05-11 | End: 2022-09-09

## 2022-05-11 RX ORDER — LOSARTAN POTASSIUM 50 MG/1
TABLET ORAL
Qty: 90 TABLET | Refills: 1 | Status: SHIPPED | OUTPATIENT
Start: 2022-05-11 | End: 2022-09-09

## 2022-05-11 RX ORDER — BUPROPION HYDROCHLORIDE 150 MG/1
150 TABLET ORAL EVERY MORNING
Qty: 90 TABLET | Refills: 3 | Status: SHIPPED | OUTPATIENT
Start: 2022-05-11 | End: 2023-03-01

## 2022-05-11 RX ORDER — HYDROCODONE BITARTRATE AND ACETAMINOPHEN 10; 325 MG/1; MG/1
1 TABLET ORAL EVERY 6 HOURS PRN
Qty: 120 TABLET | Refills: 0 | Status: SHIPPED | OUTPATIENT
Start: 2022-05-11 | End: 2022-06-08

## 2022-05-11 RX ORDER — PAROXETINE 20 MG/1
20 TABLET, FILM COATED ORAL EVERY MORNING
Qty: 90 TABLET | Refills: 3 | Status: SHIPPED | OUTPATIENT
Start: 2022-05-11 | End: 2023-03-01

## 2022-05-11 RX ORDER — ROSUVASTATIN CALCIUM 40 MG/1
TABLET, COATED ORAL
Qty: 90 TABLET | Refills: 1 | Status: SHIPPED | OUTPATIENT
Start: 2022-05-11 | End: 2022-09-09

## 2022-05-11 ASSESSMENT — PATIENT HEALTH QUESTIONNAIRE - PHQ9
SUM OF ALL RESPONSES TO PHQ QUESTIONS 1-9: 0
5. POOR APPETITE OR OVEREATING: NOT AT ALL

## 2022-05-11 ASSESSMENT — ANXIETY QUESTIONNAIRES

## 2022-05-11 ASSESSMENT — PAIN SCALES - GENERAL: PAINLEVEL: MODERATE PAIN (5)

## 2022-05-11 NOTE — PROGRESS NOTES
Assessment & Plan     Major depressive disorder, single episode, severe (H)   restart paroxetine (stop the fluoxetine) and add wellbutrin  Patient feels that this regimen in the past worked  Better for him    - PARoxetine (PAXIL) 20 MG tablet; Take 1 tablet (20 mg) by mouth every morning  - buPROPion (WELLBUTRIN XL) 150 MG 24 hr tablet; Take 1 tablet (150 mg) by mouth every morning    Left ventricular dysfunction  stable  - losartan (COZAAR) 50 MG tablet; TAKE 1 TABLET DAILY  - metoprolol succinate ER (TOPROL XL) 25 MG 24 hr tablet; TAKE 1 TABLET DAILY    Diabetic polyneuropathy associated with type 1 diabetes mellitus (H)  Improved diabetic control  Pain is still high due to the neuropathy - stable on this dose of pain medication  - Albumin Random Urine Quantitative with Creat Ratio; Future  - metoprolol succinate ER (TOPROL XL) 25 MG 24 hr tablet; TAKE 1 TABLET DAILY  - HYDROcodone-acetaminophen (NORCO)  MG per tablet; Take 1 tablet by mouth every 6 hours as needed for moderate to severe pain  - Albumin Random Urine Quantitative with Creat Ratio    Hyperlipidemia LDL goal <70     - rosuvastatin (CRESTOR) 40 MG tablet; TAKE 1 TABLET EVERY DAY    Chronic narcotic use  New CSA signed  - [id number] - Urine Drug Confirmation Panel (Comprehensive); Future  - HYDROcodone-acetaminophen (NORCO)  MG per tablet; Take 1 tablet by mouth every 6 hours as needed for moderate to severe pain  - [id number] - Urine Drug Confirmation Panel (Comprehensive)    Arthralgia, unspecified joint      - [id number] - Urine Drug Confirmation Panel (Comprehensive); Future  - [id number] - Urine Drug Confirmation Panel (Comprehensive)    Cervical stenosis of spine     - HYDROcodone-acetaminophen (NORCO)  MG per tablet; Take 1 tablet by mouth every 6 hours as needed for moderate to severe pain    Encounter for long-term (current) use of medications                  BMI:   Estimated body mass index is 26.73 kg/m  as calculated from the  "following:    Height as of this encounter: 1.753 m (5' 9\").    Weight as of this encounter: 82.1 kg (181 lb).           No follow-ups on file.    Jackeline Rachel MD  [facility]   Eb is a 67 year old who presents for the following health issues     HPI   Chief Complaint   Patient presents with     Recheck Medication     Patient would like to get refills on pain medication.     Depression Screening     Patient would like to go back to Paxil and Wellbutrin due to not wanting to have conversations like before.      Medication Question     Would like to discuss ASA     Imm/Inj     Patient would like to discuss which #4 covid vaccine he should get.      Medication Followup of Norco    Taking Medication as prescribed: yes    Side Effects:  None    Medication Helping Symptoms:  yes      Depression and Anxiety Follow-Up    How are you doing with your depression since your last visit? No change    How are you doing with your anxiety since your last visit?  No change    Are you having other symptoms that might be associated with depression or anxiety? Yes:  doesn't feel like he likes to talk anymore with people    Have you had a significant life event? No     Do you have any concerns with your use of alcohol or other drugs? No    Social History     Tobacco Use     Smoking status: Former Smoker     Quit date: 2001     Years since quittin.3     Smokeless tobacco: Never Used   Vaping Use     Vaping Use: Never used   Substance Use Topics     Alcohol use: No     Comment: quit in early      Drug use: No     PHQ 2021   PHQ-9 Total Score 2 2 0   Q9: Thoughts of better off dead/self-harm past 2 weeks Not at all Not at all Not at all     VIVIANA-7 SCORE 2021   Total Score - - -   Total Score 0 0 0     Last PHQ-9 2022   1.  Little interest or pleasure in doing things 0   2.  Feeling down, depressed, or hopeless 0   3.  Trouble " "falling or staying asleep, or sleeping too much 0   4.  Feeling tired or having little energy 0   5.  Poor appetite or overeating 0   6.  Feeling bad about yourself 0   7.  Trouble concentrating 0   8.  Moving slowly or restless 0   Q9: Thoughts of better off dead/self-harm past 2 weeks 0   PHQ-9 Total Score 0   Difficulty at work, home, or with people Not difficult at all     VIVIANA-7  5/11/2022   1. Feeling nervous, anxious, or on edge 0   2. Not being able to stop or control worrying 0   3. Worrying too much about different things 0   4. Trouble relaxing 0   5. Being so restless that it is hard to sit still 0   6. Becoming easily annoyed or irritable 0   7. Feeling afraid, as if something awful might happen 0   VIVIANA-7 Total Score 0   If you checked any problems, how difficult have they made it for you to do your work, take care of things at home, or get along with other people? Not difficult at all       Suicide Assessment Five-step Evaluation and Treatment (SAFE-T)      How many servings of fruits and vegetables do you eat daily?  0-1    On average, how many sweetened beverages do you drink each day (Examples: soda, juice, sweet tea, etc.  Do NOT count diet or artificially sweetened beverages)?   0    How many days per week do you exercise enough to make your heart beat faster? 3 or less    How many minutes a day do you exercise enough to make your heart beat faster? 9 or less    How many days per week do you miss taking your medication? 0        Review of Systems         Objective    /62   Pulse 73   Temp 97.8  F (36.6  C) (Tympanic)   Resp 16   Ht 1.753 m (5' 9\")   Wt 82.1 kg (181 lb)   SpO2 95%   BMI 26.73 kg/m    Body mass index is 26.73 kg/m .  Physical Exam   GENERAL APPEARANCE: healthy, alert and no distress  PSYCH: mentation appears normal and affect normal/bright                "

## 2022-05-11 NOTE — PATIENT INSTRUCTIONS
Our Clinic hours are:  Mondays    7:20 am - 7 pm  Tues -  Fri  7:20 am - 5 pm    Clinic Phone: [phone]    The clinic lab opens at 7:30 am Mon - Fri and appointments are required.    [facility]. [phone]  Monday  8 am - 7pm  Tues - Fri 8 am - 5:30 pm

## 2022-05-11 NOTE — LETTER
Opioid / Opioid Plus Controlled Substance Agreement    This is an agreement between you and your provider about the safe and appropriate use of controlled substance/opioids prescribed by your care team. Controlled substances are medicines that can cause physical and mental dependence (abuse).    There are strict laws about having and using these medicines. We here at [facility] are committing to working with you in your efforts to get better. To support you in this work, we ll help you schedule regular office appointments for medicine refills. If we must cancel or change your appointment for any reason, we ll make sure you have enough medicine to last until your next appointment.     As a Provider, I will:    Listen carefully to your concerns and treat you with respect.     Recommend a treatment plan that I believe is in your best interest. This plan may involve therapies other than opioid pain medication.     Talk with you often about the possible benefits, and the risk of harm of any medicine that we prescribe for you.     Provide a plan on how to taper (discontinue or go off) using this medicine if the decision is made to stop its use.    As a Patient, I understand that opioid(s):     Are a controlled substance prescribed by my care team to help me function or work and manage my condition(s).     Are strong medicines and can cause serious side effects such as:    Drowsiness, which can seriously affect my driving ability    A lower breathing rate, enough to cause death    Harm to my thinking ability     Depression     Abuse of and addiction to this medicine    Need to be taken exactly as prescribed. Combining opioids with certain medicines or chemicals (such as illegal drugs, sedatives, sleeping pills, and benzodiazepines) can be dangerous or even fatal. If I stop opioids suddenly, I may have severe withdrawal symptoms.    Do not work for all types of pain nor for all patients. If they re not helpful, I may  be asked to stop them.        The risks, benefits and side effects of these medicine(s) were explained to me. I agree that:  1. I will take part in other treatments as advised by my care team. This may be psychiatry or counseling, physical therapy, behavioral therapy, group treatment or a referral to a specialist.     2. I will keep all my appointments. I understand that this is part of the monitoring of opioids. My care team may require an office visit for EVERY opioid/controlled substance refill. If I miss appointments or don t follow instructions, my care team may stop my medicine.    3. I will take my medicines as prescribed. I will not change the dose or schedule unless my care team tells me to. There will be no refills if I run out early.     4. I may be asked to come to the clinic and complete a urine drug test or complete a pill count at any time. If I don t give a urine sample or participate in a pill count, the care team may stop my medicine.    5. I will only receive prescriptions from this clinic for chronic pain. If I am treated by another provider for acute pain issues, I will tell them that I am taking opioid pain medication for chronic pain and that I have a treatment agreement with this provider. I will inform my [facility] care team within one business day if I am given a prescription for any pain medication by another healthcare provider. My [facility] care team can contact other providers and pharmacists about my use of any medicines.    6. It is up to me to make sure that I don t run out of my medicines on weekends or holidays. If my care team is willing to refill my opioid prescription without a visit, I must request refills only during office hours. Refills may take up to 3 business days to process. I will use one pharmacy to fill all my opioid and other controlled substance prescriptions. I will notify the clinic about any changes to my insurance or medication  availability.    7. I am responsible for my prescriptions. If the medicine/prescription is lost, stolen or destroyed, it will not be replaced. I also agree not to share controlled substance medicines with anyone.    8. I am aware I should not use any illegal or recreational drugs. I agree not to drink alcohol unless my care team says I can.       9. If I enroll in the [place] [facility] program, I will tell my care team prior to my next refill.     10. I will tell my care team right away if I become pregnant, have a new medical problem treated outside of my regular clinic, or have a change in my medications.    11. I understand that this medicine can affect my thinking, judgment and reaction time. Alcohol and drugs affect the brain and body, which can affect the safety of my driving. Being under the influence of alcohol or drugs can affect my decision-making, behaviors, personal safety, and the safety of others. Driving while impaired (DWI) can occur if a person is driving, operating, or in physical control of a car, motorcycle, boat, snowmobile, ATV, motorbike, off-road vehicle, or any other motor vehicle ([place] Statute 169A.20). I understand the risk if I choose to drive or operate any vehicle or machinery.    I understand that if I do not follow any of the conditions above, my prescriptions or treatment may be stopped or changed.          Opioids  What You Need to Know    What are opioids?   Opioids are pain medicines that must be prescribed by a doctor. They are also known as narcotics.     Examples are:   1. morphine (MS Contin, Hanny)  2. oxycodone (Oxycontin)  3. oxycodone and acetaminophen (Percocet)  4. hydrocodone and acetaminophen (Vicodin, Norco)   5. fentanyl patch (Duragesic)   6. hydromorphone (Dilaudid)   7. methadone  8. codeine (Tylenol #3)     What do opioids do well?   Opioids are best for severe short-term pain such as after a surgery or injury. They may work well for cancer pain. They may  help some people with long-lasting (chronic) pain.     What do opioids NOT do well?   Opioids never get rid of pain entirely, and they don t work well for most patients with chronic pain. Opioids don t reduce swelling, one of the causes of pain.                                    Other ways to manage chronic pain and improve function include:       Treat the health problem that may be causing pain    Anti-inflammation medicines, which reduce swelling and tenderness, such as ibuprofen (Advil, Motrin) or naproxen (Aleve)    Acetaminophen (Tylenol)    Antidepressants and anti-seizure medicines, especially for nerve pain    Topical treatments such as patches or creams    Injections or nerve blocks    Chiropractic or osteopathic treatment    Acupuncture, massage, deep breathing, meditation, visual imagery, aromatherapy    Use heat or ice at the pain site    Physical therapy     Exercise    Stop smoking    Take part in therapy       Risks and side effects     Talk to your doctor before you start or decide to keep taking opioids. Possible side effects include:      Lowering your breathing rate enough to cause death    Overdose, including death, especially if taking higher than prescribed doses    Worse depression symptoms; less pleasure in things you usually enjoy    Feeling tired or sluggish    Slower thoughts or cloudy thinking    Being more sensitive to pain over time; pain is harder to control    Trouble sleeping or restless sleep    Changes in hormone levels (for example, less testosterone)    Changes in sex drive or ability to have sex    Constipation    Unsafe driving    Itching and sweating    Dizziness    Nausea, throwing up and dry mouth    What else should I know about opioids?    Opioids may lead to dependence, tolerance, or addiction.      Dependence means that if you stop or reduce the medicine too quickly, you will have withdrawal symptoms. These include loose poop (diarrhea), jitters, flu-like symptoms,  nervousness and tremors. Dependence is not the same as addiction.                       Tolerance means needing higher doses over time to get the same effect. This may increase the chance of serious side effects.      Addiction is when people improperly use a substance that harms their body, their mind or their relations with others. Use of opiates can cause a relapse of addiction if you have a history of drug or alcohol abuse.      People who have used opioids for a long time may have a lower quality of life, worse depression, higher levels of pain and more visits to doctors.    You can overdose on opioids. Take these steps to lower your risk of overdose:    1. Recognize the signs:  Signs of overdose include decrease or loss of consciousness (blackout), slowed breathing, trouble waking up and blue lips. If someone is worried about overdose, they should call 911.    2. Talk to your doctor about Narcan (naloxone).   If you are at risk for overdose, you may be given a prescription for Narcan. This medicine very quickly reverses the effects of opioids.   If you overdose, a friend or family member can give you Narcan while waiting for the ambulance. They need to know the signs of overdose and how to give Narcan.     3. Don't use alcohol or street drugs.   Taking them with opioids can cause death.    4. Do not take any of these medicines unless your doctor says it s OK. Taking these with opioids can cause death:    Benzodiazepines, such as lorazepam (Ativan), alprazolam (Xanax) or diazepam (Valium)    Muscle relaxers, such as cyclobenzaprine (Flexeril)    Sleeping pills like zolpidem (Ambien)     Other opioids      How to keep you and other people safe while taking opioids:    1. Never share your opioids with others.  Opioid medicines are regulated by the Drug Enforcement Agency (REMI). Selling or sharing medications is a criminal act.    2. Be sure to store opioids in a secure place, locked up if possible. Young children  can easily swallow them and overdose.    3. When you are traveling with your medicines, keep them in the original bottles. If you use a pill box, be sure you also carry a copy of your medicine list from your clinic or pharmacy.    4. Safe disposal of opioids    Most pharmacies have places to get rid of medicine, called disposal kiosks. Medicine disposal options are also available in every [facility]. Search your county and  medication disposal  to find more options. You can find more details at:  [email or web address]     I agree that my provider, clinic care team, and pharmacy may work with any city, state or federal law enforcement agency that investigates the misuse, sale, or other diversion of my controlled medicine. I will allow my provider to discuss my care with, or share a copy of, this agreement with any other treating provider, pharmacy or emergency room where I receive care.    I have read this agreement and have asked questions about anything I did not understand.    _______________________________________________________  Patient Signature - Eb Eisenberg _____________________                   Date     _______________________________________________________  Provider Signature - Jackeline Rachel MD   _____________________                   Date     _______________________________________________________  Witness Signature (required if provider not present while patient signing)   _____________________                   Date

## 2022-05-12 ASSESSMENT — ANXIETY QUESTIONNAIRES: GAD7 TOTAL SCORE: 0

## 2022-05-15 LAB
DHC UR CFM-MCNC: 1720 NG/ML
DHC/CREAT UR: 1578 NG/MG {CREAT}
HYDROCODONE UR CFM-MCNC: 2660 NG/ML
HYDROCODONE/CREAT UR: 2440 NG/MG {CREAT}
HYDROMORPHONE UR CFM-MCNC: 440 NG/ML
HYDROMORPHONE/CREAT UR: 404 NG/MG {CREAT}

## 2022-05-23 ENCOUNTER — ALLIED HEALTH/NURSE VISIT (OUTPATIENT)
Dept: EDUCATION SERVICES | Facility: CLINIC | Age: 67
End: 2022-05-23
Payer: COMMERCIAL

## 2022-05-23 DIAGNOSIS — E10.9 TYPE 1 DIABETES, HBA1C GOAL < 8% (H): ICD-10-CM

## 2022-05-23 DIAGNOSIS — E11.9 DIABETES MELLITUS WITHOUT COMPLICATION (H): Primary | ICD-10-CM

## 2022-05-23 PROCEDURE — G0108 DIAB MANAGE TRN  PER INDIV: HCPCS

## 2022-05-23 NOTE — PROGRESS NOTES
Diabetes Self Management Training: Insulin Pump Follow-up Visit    Eb Eisenberg presents today for education, modification of medication(s), insulin pump review and download of continuous glucose monitoring related to Type 1 diabetes.    He is accompanied by self    Patient's diabetes management related comments/concerns: has a new replacement pump 670G due to the O ring    Patient's emotional response to diabetes: expresses readiness to learn        ASSESSMENT:  Patient Problem List and Family Medical History reviewed for relevant medical history, current medical status, and diabetes risk factors.    Current Diabetes Management per Patient:  Insulin Pump Type: Medtronic Minimed 670G System    BG meter: Dexcom G6 sensor    Taking other diabetes medications?   no:     Diabetes Medication(s)     Diabetic Other       GLUCAGON EMERGENCY KIT 1 MG IJ KIT    use as directed for severe hypoglycemia    Insulin       insulin aspart (NOVOLOG VIAL) 100 UNITS/ML vial    USE AS DIRECTED IN INSULIN PUMP. TOTAL DAILY DOSE 60 UNITS          Pump Problem Solving: Patient has glucagon emergency kit: Yes. Patient understands DKA prevention: Yes. Patient has ketone test strips: Yes. Patient has an insulin multiple daily injection back-up plan: Yes.    Blood Glucose Results and Insulin Use: See scanned pump report for details.    Current Pump Settings: See Insulin Pump - Outpatient in Medication List for complete list of settings.     Current Insulin Pump Regimen:   : Date last updated:  5/23/2022   Medtronic Minimed: 670G   BASAL RATES and times:   12   AM (midnight): 0.825 units/hour   3 am: 0.900 units/hour   9   AM: 0.750 units/hour   1 pm: 0.825 units/hour   9    PM: 0.750 units/hour     CARB RATIO and times:   12   AM (midnight): 6.5   8  AM (noon):  7.2   5    PM:  8   Corection Factor (Sensitivity) and times:   12   AM (midnight): 34 mg/dL   BLOOD GLUCOSE TARGET and times:   12   AM (midnight): 110 - 120Active Insulin Time:  3  "hours                                       Patient's most recent   Lab Results   Component Value Date    A1C 7.9 02/07/2022    A1C 7.6 04/02/2021    is not meeting goal of <7.0        Vitals:  There were no vitals taken for this visit.  Estimated body mass index is 26.73 kg/m  as calculated from the following:    Height as of 5/11/22: 1.753 m (5' 9\").    Weight as of 5/11/22: 82.1 kg (181 lb).   Last 3 BP:   BP Readings from Last 3 Encounters:   05/11/22 112/62   02/07/22 120/72   08/12/21 116/64       History   Smoking Status     Former Smoker     Quit date: 1/1/2001   Smokeless Tobacco     Never Used       Labs:  Lab Results   Component Value Date    A1C 7.9 02/07/2022    A1C 7.6 04/02/2021     Lab Results   Component Value Date     02/07/2022     04/02/2021     Lab Results   Component Value Date    LDL 63 02/07/2022    LDL 63 09/16/2020     HDL Cholesterol   Date Value Ref Range Status   09/16/2020 53 >39 mg/dL Final     Direct Measure HDL   Date Value Ref Range Status   02/07/2022 51 >=40 mg/dL Final   ]  GFR Estimate   Date Value Ref Range Status   02/07/2022 >90 >60 mL/min/1.73m2 Final     Comment:     Effective December 21, 2021 eGFRcr in adults is calculated using the 2021 CKD-EPI creatinine equation which includes age and gender (Zoila munson al., NEJ, DOI: 10.1056/IDXGpg7207921)   04/02/2021 73 >60 mL/min/[1.73_m2] Final     Comment:     Non  GFR Calc  Starting 12/18/2018, serum creatinine based estimated GFR (eGFR) will be   calculated using the Chronic Kidney Disease Epidemiology Collaboration   (CKD-EPI) equation.       GFR Estimate If Black   Date Value Ref Range Status   04/02/2021 85 >60 mL/min/[1.73_m2] Final     Comment:      GFR Calc  Starting 12/18/2018, serum creatinine based estimated GFR (eGFR) will be   calculated using the Chronic Kidney Disease Epidemiology Collaboration   (CKD-EPI) equation.       Lab Results   Component Value Date    CR 0.87 " 02/07/2022    CR 1.05 04/02/2021     No results found for: MICROALBUMIN    Socio/Economic History:    Support system: family    Health Beliefs and Attitudes:   Patient Activation Measure Survey Score:  BENEDICTO Score (Last Two) 2/17/2011   BENEDICTO Raw Score 42   Activation Score 66   BENEDICTO Level 3             INTERVENTION:  Patient would benefit from increase in basal rate overnight to see above setting, decrease in correction/sensitivity, decrease in carbohydrate ratio and changing infusion set every 3 days.    Changes made to pump settings:  basal rate: see above settings  carb ratio: see above settingd  correction/sensitivity: see above settings              PLAN:  See Patient Instructions for co-developed, patient-stated behavior change goals.  AVS printed and provided to patient today.  Written instructions for pump setting changes given to patient.     FOLLOW-UP:  Follow-up appointment scheduled on June 6.  Chart routed to referring provider.        Nesas Espinosa RN/[facility] Diabetes Educator    Time Spent: 60 minutes  Encounter Type: Individual    Download data sent to be scanned into this Epic encounter.    Any diabetes medication dose changes were made via the MARYE Protocol and Collaborative Practice Agreement with the patient's primary care provider. A copy of this encounter was shared with the provider.

## 2022-06-06 ENCOUNTER — ALLIED HEALTH/NURSE VISIT (OUTPATIENT)
Dept: EDUCATION SERVICES | Facility: CLINIC | Age: 67
End: 2022-06-06
Payer: COMMERCIAL

## 2022-06-06 DIAGNOSIS — E10.9 TYPE 1 DIABETES, HBA1C GOAL < 8% (H): ICD-10-CM

## 2022-06-06 DIAGNOSIS — E11.9 DIABETES MELLITUS WITHOUT COMPLICATION (H): Primary | ICD-10-CM

## 2022-06-06 PROCEDURE — G0108 DIAB MANAGE TRN  PER INDIV: HCPCS

## 2022-06-06 NOTE — PATIENT INSTRUCTIONS
Diabetes Support Resources:  Let me know if lows and what time.  Continue to fix the highs, consider changing your site       Bring blood glucose meter and logbook with you to all doctor and follow-up appointments.    Diabetes Education Telephone Visit Follow-up:    We realize your time is valuable and your health is important! We offer a convenient Telephone Visit follow up! It s a quick way to check in for a medication dose adjustment without having to come back to clinic as soon.    Telephone Visits are often covered by insurance. Please check with your insurance plan to see if this type of visit is covered. If not, the cost is less expensive than an office visit:    Up to 10 minutes (Code 75189): $30  11-20 minutes (Code 35458): $59  More than 20 minutes (Code 81680): $85    Talk with your Diabetes Educator if you want to learn more.      [facility]:  For Your Diabetes Education and Nutrition Appointments Call:  [phone]   For Diabetes Education or Nutrition Related Questions:   Phone: [phone]  Send YooDeal Message   If you need a medication refill please contact your pharmacy. Please allow 3 business days for your refills to be completed.

## 2022-06-06 NOTE — PROGRESS NOTES
"Diabetes Self-Management Education & Support    Presents for: Follow-up    SUBJECTIVE/OBJECTIVE:  Presents for: Follow-up  Accompanied by: Self  Focus of Visit: Taking Medication, Insulin Pump, CGM  Type of Pump visit: Pump Review  Type of CGM visit: Personal CGM Start  Diabetes type: Type 1  Disease course: Improving  Transportation concerns: No  Difficulty affording diabetes medication?: No  Difficulty affording diabetes testing supplies?: No  Other concerns:: None  Cultural Influences/Ethnic Background:  Not  or       Diabetes Symptoms & Complications:  Fatigue: Sometimes  Neuropathy: No  Polydipsia: No  Polyphagia: No  Polyuria: No  Visual change: No  Slow healing wounds: No  Symptom course: Improving  Weight trend: Stable  Complications assessed today?: No  Retinopathy: No    Patient Problem List and Family Medical History reviewed for relevant medical history, current medical status, and diabetes risk factors.    Vitals:  There were no vitals taken for this visit.  Estimated body mass index is 26.73 kg/m  as calculated from the following:    Height as of 5/11/22: 1.753 m (5' 9\").    Weight as of 5/11/22: 82.1 kg (181 lb).   Last 3 BP:   BP Readings from Last 3 Encounters:   05/11/22 112/62   02/07/22 120/72   08/12/21 116/64       History   Smoking Status     Former Smoker     Quit date: 1/1/2001   Smokeless Tobacco     Never Used       Labs:  Lab Results   Component Value Date    A1C 7.9 02/07/2022    A1C 7.6 04/02/2021     Lab Results   Component Value Date     02/07/2022     04/02/2021     Lab Results   Component Value Date    LDL 63 02/07/2022    LDL 63 09/16/2020     HDL Cholesterol   Date Value Ref Range Status   09/16/2020 53 >39 mg/dL Final     Direct Measure HDL   Date Value Ref Range Status   02/07/2022 51 >=40 mg/dL Final   ]  GFR Estimate   Date Value Ref Range Status   02/07/2022 >90 >60 mL/min/1.73m2 Final     Comment:     Effective December 21, 2021 eGFRcr in adults " is calculated using the 2021 CKD-EPI creatinine equation which includes age and gender (Zoila munson al., NEJ, DOI: 10.1056/WJRSov7720615)   04/02/2021 73 >60 mL/min/[1.73_m2] Final     Comment:     Non  GFR Calc  Starting 12/18/2018, serum creatinine based estimated GFR (eGFR) will be   calculated using the Chronic Kidney Disease Epidemiology Collaboration   (CKD-EPI) equation.       GFR Estimate If Black   Date Value Ref Range Status   04/02/2021 85 >60 mL/min/[1.73_m2] Final     Comment:      GFR Calc  Starting 12/18/2018, serum creatinine based estimated GFR (eGFR) will be   calculated using the Chronic Kidney Disease Epidemiology Collaboration   (CKD-EPI) equation.       Lab Results   Component Value Date    CR 0.87 02/07/2022    CR 1.05 04/02/2021     No results found for: MICROALBUMIN    Healthy Eating:  Healthy Eating Assessed Today: Yes  Cultural/Jew diet restrictions?: No  Meal planning/habits: Carb counting, Plate planning method  Breakfast: bowl of cheerios or PB sandwich  Lunch: sloppy joes or goolash  Dinner: same or pizza  Other: does not eat well or follow a plan  Beverages: Water  Has patient met with a dietitian in the past?: No    Being Active:  Being Active Assessed Today: Yes  Exercise:: Currently not exercising    Monitoring:  Monitoring Assessed Today: Yes  Did patient bring glucose meter to appointment? : Yes  Blood Glucose Meter: CGM  Times checking blood sugar at home (number): 5+  Times checking blood sugar at home (per): Day  Blood glucose trend: Decreasing                                                      Taking Medications:  Diabetes Medication(s)     Diabetic Other       GLUCAGON EMERGENCY KIT 1 MG IJ KIT    use as directed for severe hypoglycemia    Insulin       insulin aspart (NOVOLOG VIAL) 100 UNITS/ML vial    USE AS DIRECTED IN INSULIN PUMP. TOTAL DAILY DOSE 60 UNITS          Taking Medication Assessed Today: Yes  Current Treatments: Insulin  Pump  Given by: Patient  Injection/Infusion sites: Abdomen  Problems taking diabetes medications regularly?: No  Diabetes medication side effects?: No    Problem Solving:  Problem Solving Assessed Today: Yes  Is the patient at risk for hypoglycemia?: Yes  Hypoglycemia Frequency: Rarely  Hypoglycemia Treatment: Juice, Other food  Patient carries a carbohydrate source: Yes  Does patient have glucagon emergency kit?: Yes  Is the patient at risk for DKA?: Yes  Does patient have ketone test strips?: Yes  Does patient have DKA prevention plan?: Yes  Does patient have sick day plan for diabetes management?: Yes    Hypoglycemia symptoms  Sweats: Yes         Reducing Risks:  Reducing Risks Assessed Today: No  Has dilated eye exam at least once a year?: Yes  Feet checked by healthcare provider in the last year?: Yes    Healthy Coping:  Healthy Coping Assessed Today: Yes  Emotional response to diabetes: Confidence diabetes can be controlled  Stage of change: ACTION (Actively working towards change)  Patient Activation Measure Survey Score:  BENEDICTO Score (Last Two) 2/17/2011   BENEDICTO Raw Score 42   Activation Score 66   BENEDICTO Level 3       Diabetes knowledge and skills assessment:   Patient is knowledgeable in diabetes management concepts related to: Healthy Eating, Being Active, Monitoring, Taking Medication and Problem Solving    Patient needs further education on the following diabetes management concepts: Healthy Eating, Being Active, Monitoring, Taking Medication and Problem Solving    Based on learning assessment above, most appropriate setting for further diabetes education would be: Individual setting.      INTERVENTIONS:    Education provided today on:  AADE Self-Care Behaviors:  Diabetes Pathophysiology  Healthy Eating: carbohydrate counting, consistency in amount, composition, and timing of food intake, heart healthy diet, eating out, portion control, plate planning method and label reading  Being Active: relationship to  blood glucose and describe appropriate activity program  Monitoring: purpose, proper technique, log and interpret results, individual blood glucose targets and frequency of monitoring  Taking Medication: action of prescribed medication, drawing up, administering and storing injectable diabetes medications, proper site selection and rotation for injections, side effects of prescribed medications and when to take medications  Problem Solving: high blood glucose - causes, signs/symptoms, treatment and prevention, low blood glucose - causes, signs/symptoms, treatment and prevention, carrying a carbohydrate source at all times, safe travel and when to call health care provider    Opportunities for ongoing education and support in diabetes-self management were discussed.    Pt verbalized understanding of concepts discussed and recommendations provided today.       Education Materials Provided:  No new materials provided today      ASSESSMENT:  Eb pretty much does his own routine, he lives with his son and 2 grandchildren, eats 2 meals per day, brunch and dinner.  That may change once the kids are done with school.  He loves to fish, so that is his exercising.  He claims to pretty much stay at home person.  Trying to keep his BG to keep him from going low.  He may some days under carb his foods and we did discuss this.  He is willing to keep in touch and have his pump made changes accordingly, today I changed his carb ratios.          Patient's most recent   Lab Results   Component Value Date    A1C 7.9 02/07/2022    A1C 7.6 04/02/2021    is not meeting goal of <7.0    PLAN  See Patient Instructions for co-developed, patient-stated behavior change goals.  AVS printed and provided to patient today. See Follow-Up section for recommended follow-up.    Nessa Espinosa RN/[facility] Diabetes Educator    Time Spent: 30 minutes  Encounter Type: Individual    Any diabetes medication dose changes were made via the MARYE Protocol and  Collaborative Practice Agreement with the patient's primary care provider. A copy of this encounter was shared with the provider.

## 2022-06-08 DIAGNOSIS — M48.02 CERVICAL STENOSIS OF SPINE: ICD-10-CM

## 2022-06-08 DIAGNOSIS — E10.42 DIABETIC POLYNEUROPATHY ASSOCIATED WITH TYPE 1 DIABETES MELLITUS (H): ICD-10-CM

## 2022-06-08 DIAGNOSIS — F11.90 CHRONIC NARCOTIC USE: ICD-10-CM

## 2022-06-08 RX ORDER — HYDROCODONE BITARTRATE AND ACETAMINOPHEN 10; 325 MG/1; MG/1
1 TABLET ORAL EVERY 6 HOURS PRN
Qty: 120 TABLET | Refills: 0 | Status: SHIPPED | OUTPATIENT
Start: 2022-06-10 | End: 2022-07-06

## 2022-06-15 ENCOUNTER — TELEPHONE (OUTPATIENT)
Dept: FAMILY MEDICINE | Facility: CLINIC | Age: 67
End: 2022-06-15

## 2022-06-15 NOTE — TELEPHONE ENCOUNTER
Please route determinations to the Pharm Diabetes pool ([id number]).      Thank you!    Diabetes Care Services Team   [facility]  [address] [place] [address]

## 2022-06-15 NOTE — TELEPHONE ENCOUNTER
Please route determinations to the Pharm Diabetes pool ([id number]).      Thank you!    Diabetes Care Services Team   [facility]  [address] [place] [address]

## 2022-06-20 NOTE — TELEPHONE ENCOUNTER
PA Initiation    Medication: Continuous Blood Gluc Transmit (DEXCOM G6 TRANSMITTER) MISC   Insurance Company: [employer] - Phone [phone] Fax [phone]  Pharmacy Filling the Rx: [facility] - [place], [place] - [facility] [address]  Filling Pharmacy Phone: [phone]  Filling Pharmacy Fax: [phone]  Start Date: 6/20/2022

## 2022-06-20 NOTE — TELEPHONE ENCOUNTER
PA Initiation    Medication: Continuous Blood Gluc Sensor (DEXCOM G6 SENSOR) MISC   Insurance Company: [employer] - Phone [phone] Fax [phone]  Pharmacy Filling the Rx: [facility] - [place], [place] - [facility] [address]  Filling Pharmacy Phone: [phone]  Filling Pharmacy Fax: [phone]  Start Date: 6/20/2022

## 2022-06-21 NOTE — TELEPHONE ENCOUNTER
Prior Authorization Approval    Authorization Effective Date: 5/21/2022  Authorization Expiration Date: 6/19/2025  Medication: Continuous Blood Gluc Sensor (DEXCOM G6 SENSOR) MISC--APPROVED  Approved Dose/Quantity:   Reference #:     Insurance Company: [employer] - Phone [phone] Fax [phone]  Expected CoPay:       CoPay Card Available:      Foundation Assistance Needed:    Which Pharmacy is filling the prescription (Not needed for infusion/clinic administered): [facility] - [facility] [address]  Pharmacy Notified: Yes  Patient Notified: Yes **Instructed pharmacy to notify patient when script is ready to /ship.**

## 2022-06-21 NOTE — TELEPHONE ENCOUNTER
Prior Authorization Approval    Authorization Effective Date: 5/21/2022  Authorization Expiration Date: 6/19/2025  Medication: Continuous Blood Gluc Transmit (DEXCOM G6 TRANSMITTER) MISC--APPROVED  Approved Dose/Quantity:   Reference #:     Insurance Company: [employer] - Phone [phone] Fax [phone]  Expected CoPay:       CoPay Card Available:      Foundation Assistance Needed:    Which Pharmacy is filling the prescription (Not needed for infusion/clinic administered): [facility] - [facility] [address]  Pharmacy Notified: Yes  Patient Notified: Yes **Instructed pharmacy to notify patient when script is ready to /ship.**

## 2022-08-15 ENCOUNTER — ALLIED HEALTH/NURSE VISIT (OUTPATIENT)
Dept: EDUCATION SERVICES | Facility: CLINIC | Age: 67
End: 2022-08-15
Payer: COMMERCIAL

## 2022-08-15 DIAGNOSIS — E10.59 TYPE 1 DIABETES MELLITUS WITH VASCULAR DISEASE (H): Primary | ICD-10-CM

## 2022-08-15 PROCEDURE — G0108 DIAB MANAGE TRN  PER INDIV: HCPCS | Performed by: DIETITIAN, REGISTERED

## 2022-08-15 NOTE — PROGRESS NOTES
"Diabetes Self-Management Education & Support    Presents for:      SUBJECTIVE/OBJECTIVE:  Diabetes education in the past 24mo: Yes  Diabetes type: Type 1  Disease course: Stable  How confident are you filling out medical forms by yourself:: Somewhat  Cultural Influences/Ethnic Background:  Not  or     Diabetes Symptoms & Complications:  Fatigue: Sometimes  Neuropathy: No  Polydipsia: No  Polyphagia: No  Polyuria: No  Visual change: No  Slow healing wounds: No  Autonomic neuropathy: No  CVA: No  Heart disease: No  Nephropathy: No  Peripheral neuropathy: No  Peripheral Vascular Disease: No  Retinopathy: No  Sexual dysfunction: No    Patient Problem List and Family Medical History reviewed for relevant medical history, current medical status, and diabetes risk factors.    Vitals:  There were no vitals taken for this visit.  Estimated body mass index is 26.73 kg/m  as calculated from the following:    Height as of 5/11/22: 1.753 m (5' 9\").    Weight as of 5/11/22: 82.1 kg (181 lb).   Last 3 BP:   BP Readings from Last 3 Encounters:   05/11/22 112/62   02/07/22 120/72   08/12/21 116/64       History   Smoking Status     Former Smoker     Quit date: 1/1/2001   Smokeless Tobacco     Never Used       Labs:  Lab Results   Component Value Date    A1C 7.9 02/07/2022    A1C 7.6 04/02/2021     Lab Results   Component Value Date     02/07/2022     04/02/2021     Lab Results   Component Value Date    LDL 63 02/07/2022    LDL 63 09/16/2020     HDL Cholesterol   Date Value Ref Range Status   09/16/2020 53 >39 mg/dL Final     Direct Measure HDL   Date Value Ref Range Status   02/07/2022 51 >=40 mg/dL Final   ]  GFR Estimate   Date Value Ref Range Status   02/07/2022 >90 >60 mL/min/1.73m2 Final     Comment:     Effective December 21, 2021 eGFRcr in adults is calculated using the 2021 CKD-EPI creatinine equation which includes age and gender (Zoila munson al., NEJM, DOI: 10.1056/VHUZiz5589626)   04/02/2021 73 " >60 mL/min/[1.73_m2] Final     Comment:     Non  GFR Calc  Starting 12/18/2018, serum creatinine based estimated GFR (eGFR) will be   calculated using the Chronic Kidney Disease Epidemiology Collaboration   (CKD-EPI) equation.       GFR Estimate If Black   Date Value Ref Range Status   04/02/2021 85 >60 mL/min/[1.73_m2] Final     Comment:      GFR Calc  Starting 12/18/2018, serum creatinine based estimated GFR (eGFR) will be   calculated using the Chronic Kidney Disease Epidemiology Collaboration   (CKD-EPI) equation.       Lab Results   Component Value Date    CR 0.87 02/07/2022    CR 1.05 04/02/2021     No results found for: MICROALBUMIN    Healthy Eating:  Cultural/Restoration diet restrictions?: No  Meal planning/habits: Carb counting, Low salt, Frequent snacking  How many times a week on average do you eat food made away from home (restaurant/take-out)?: 5+  Meals include: Dinner, Morning Snack  Beverages: Water, Diet soda    Being Active:  Days per week of moderate to strenuous exercise (like a brisk walk): (P) 7  On average, minutes per day of exercise at this level: (P) 20  How intense was your typical exercise? : (P) Moderate (like brisk walking)  Exercise Minutes per Week: (P) 140  Barrier to exercise: None    Monitoring:  Blood Glucose Meter: CGM  Blood glucose trend: Fluctuating                      Taking Medications:  Diabetes Medication(s)     Diabetic Other       GLUCAGON EMERGENCY KIT 1 MG IJ KIT    use as directed for severe hypoglycemia    Insulin       insulin aspart (NOVOLOG VIAL) 100 UNITS/ML vial    USE AS DIRECTED IN INSULIN PUMP. TOTAL DAILY DOSE 60 UNITS          Current Treatments: Insulin Pump  Dose schedule: (P) Other  Given by: (P) Patient  Injection/Infusion sites: (P) Abdomen                Problem Solving:   Discussed below     Reducing Risks:  CAD Risks: Diabetes Mellitus  Has dilated eye exam at least once a year?: Yes  Sees dentist every 6 months?:  No  Feet checked by healthcare provider in the last year?: Yes    Healthy Coping:  Informal Support system:: Children, Friends  Patient Activation Measure Survey Score:  BENEDICTO Score (Last Two) 2/17/2011   BENEDICTO Raw Score 42   Activation Score 66   BENEDICTO Level 3       Diabetes knowledge and skills assessment:   Patient is knowledgeable in diabetes management concepts related to: Healthy Eating, Being Active, Monitoring and Taking Medication    Continuing education needed on the following diabetes management concepts: pump management    Based on learning assessment above, most appropriate setting for further diabetes education would be: Individual setting.      INTERVENTIONS:    REPORTS:  See above.    Insulin Pump Information   MedNextly Minimed: 670G   BASAL RATES and times:   12 AM (midnight): 0.825 units/hour   3 am: 0.900 units/hour   9 AM: 0.750 units/hour   1 pm: 0.825 units/hour   9 PM: 0.750 units/hour   CARB RATIO and times:   12 AM (midnight): 6.5   8 AM (noon): 7.2   11:00: 6.5   5 PM: 7.2   Corection Factor (Sensitivity) and times: 12 AM (midnight): 34 mg/dL   BLOOD GLUCOSE TARGET and times: 12 AM (midnight): 110 - 120  Active Insulin Time: 3 hours   Sensor: Dexcom G6 Carelink / Diasend username: Carelink / Diasend Password:    Education provided today on:  AADE Self-Care Behaviors:  Problem Solving: high blood glucose - causes, signs/symptoms, treatment and prevention and low blood glucose - causes, signs/symptoms, treatment and prevention    Education specific to insulin pump provided today on:   Maximizing use of pump therapy, only correcting with meals, review of carbohydrate counting.    Opportunities for ongoing education and support in diabetes-self management were discussed.    Pt verbalized understanding of concepts discussed and recommendations provided today.       Education Materials Provided:  No new materials provided today    ASSESSMENT  Patient reports having diabetes for ~40 years and use of  pump therapy for ~20-25 years.  Recent a1c of 7.9.  Is interested in getting BG in better control.        Glucose Patterns & Trends:  appears overtreating highs and lows resulting in rebound lows and highs respectively    Patient would benefit from only use corretive bolusing at meal times and not closer than 4 hours..    Changes made to pump settings:  No changes made    Changes made to sensor settings:   No changes made    PLAN  See Patient Instructions for co-developed, patient-stated behavior change goals.  AVS printed and provided to patient today. See Follow-Up section for recommended follow-up.    Time Spent: 60 minutes  Encounter Type: Individual    Any diabetes medication dose changes were made via the CDE Protocol and Collaborative Practice Agreement with the patient's primary care provider. A copy of this encounter was shared with the provider.    Aubree Hamilton RD, [facility], 2:57 PM, 8/15/2022

## 2022-08-15 NOTE — PATIENT INSTRUCTIONS
Whenever you eat, give yourself an injection (aim for 15 minutes before)  Try not to give a bolus between meals unless you are eating.    Start entering 97 grams for the muffins rather than 85.    Consider recording food intake for a few days before next appointment.

## 2022-08-15 NOTE — LETTER
"    8/15/2022         RE: Eb Eisenberg  [address] [place] [address]        Dear Colleague,    Thank you for referring your patient, Eb Eisenberg, to the [facility]. Please see a copy of my visit note below.    Diabetes Self-Management Education & Support    Presents for:      SUBJECTIVE/OBJECTIVE:  Diabetes education in the past 24mo: Yes  Diabetes type: Type 1  Disease course: Stable  How confident are you filling out medical forms by yourself:: Somewhat  Cultural Influences/Ethnic Background:  Not  or     Diabetes Symptoms & Complications:  Fatigue: Sometimes  Neuropathy: No  Polydipsia: No  Polyphagia: No  Polyuria: No  Visual change: No  Slow healing wounds: No  Autonomic neuropathy: No  CVA: No  Heart disease: No  Nephropathy: No  Peripheral neuropathy: No  Peripheral Vascular Disease: No  Retinopathy: No  Sexual dysfunction: No    Patient Problem List and Family Medical History reviewed for relevant medical history, current medical status, and diabetes risk factors.    Vitals:  There were no vitals taken for this visit.  Estimated body mass index is 26.73 kg/m  as calculated from the following:    Height as of 5/11/22: 1.753 m (5' 9\").    Weight as of 5/11/22: 82.1 kg (181 lb).   Last 3 BP:   BP Readings from Last 3 Encounters:   05/11/22 112/62   02/07/22 120/72   08/12/21 116/64       History   Smoking Status     Former Smoker     Quit date: 1/1/2001   Smokeless Tobacco     Never Used       Labs:  Lab Results   Component Value Date    A1C 7.9 02/07/2022    A1C 7.6 04/02/2021     Lab Results   Component Value Date     02/07/2022     04/02/2021     Lab Results   Component Value Date    LDL 63 02/07/2022    LDL 63 09/16/2020     HDL Cholesterol   Date Value Ref Range Status   09/16/2020 53 >39 mg/dL Final     Direct Measure HDL   Date Value Ref Range Status   02/07/2022 51 >=40 mg/dL Final   ]  GFR Estimate   Date Value Ref Range Status "   02/07/2022 >90 >60 mL/min/1.73m2 Final     Comment:     Effective December 21, 2021 eGFRcr in adults is calculated using the 2021 CKD-EPI creatinine equation which includes age and gender (Zoila munson al., NEJM, DOI: 10.1056/XLSGgw8250742)   04/02/2021 73 >60 mL/min/[1.73_m2] Final     Comment:     Non  GFR Calc  Starting 12/18/2018, serum creatinine based estimated GFR (eGFR) will be   calculated using the Chronic Kidney Disease Epidemiology Collaboration   (CKD-EPI) equation.       GFR Estimate If Black   Date Value Ref Range Status   04/02/2021 85 >60 mL/min/[1.73_m2] Final     Comment:      GFR Calc  Starting 12/18/2018, serum creatinine based estimated GFR (eGFR) will be   calculated using the Chronic Kidney Disease Epidemiology Collaboration   (CKD-EPI) equation.       Lab Results   Component Value Date    CR 0.87 02/07/2022    CR 1.05 04/02/2021     No results found for: MICROALBUMIN    Healthy Eating:  Cultural/Orthodoxy diet restrictions?: No  Meal planning/habits: Carb counting, Low salt, Frequent snacking  How many times a week on average do you eat food made away from home (restaurant/take-out)?: 5+  Meals include: Dinner, Morning Snack  Beverages: Water, Diet soda    Being Active:  Days per week of moderate to strenuous exercise (like a brisk walk): (P) 7  On average, minutes per day of exercise at this level: (P) 20  How intense was your typical exercise? : (P) Moderate (like brisk walking)  Exercise Minutes per Week: (P) 140  Barrier to exercise: None    Monitoring:  Blood Glucose Meter: CGM  Blood glucose trend: Fluctuating                      Taking Medications:  Diabetes Medication(s)     Diabetic Other       GLUCAGON EMERGENCY KIT 1 MG IJ KIT    use as directed for severe hypoglycemia    Insulin       insulin aspart (NOVOLOG VIAL) 100 UNITS/ML vial    USE AS DIRECTED IN INSULIN PUMP. TOTAL DAILY DOSE 60 UNITS          Current Treatments: Insulin Pump  Dose schedule:  (P) Other  Given by: (P) Patient  Injection/Infusion sites: (P) Abdomen                Problem Solving:   Discussed below     Reducing Risks:  CAD Risks: Diabetes Mellitus  Has dilated eye exam at least once a year?: Yes  Sees dentist every 6 months?: No  Feet checked by healthcare provider in the last year?: Yes    Healthy Coping:  Informal Support system:: Children, Friends  Patient Activation Measure Survey Score:  BENEDICTO Score (Last Two) 2/17/2011   BENEDICTO Raw Score 42   Activation Score 66   BENEDICTO Level 3       Diabetes knowledge and skills assessment:   Patient is knowledgeable in diabetes management concepts related to: Healthy Eating, Being Active, Monitoring and Taking Medication    Continuing education needed on the following diabetes management concepts: pump management    Based on learning assessment above, most appropriate setting for further diabetes education would be: Individual setting.      INTERVENTIONS:    REPORTS:  See above.    Insulin Pump Information   Medtronic Minimed: 670G   BASAL RATES and times:   12 AM (midnight): 0.825 units/hour   3 am: 0.900 units/hour   9 AM: 0.750 units/hour   1 pm: 0.825 units/hour   9 PM: 0.750 units/hour   CARB RATIO and times:   12 AM (midnight): 6.5   8 AM (noon): 7.2   11:00: 6.5   5 PM: 7.2   Corection Factor (Sensitivity) and times: 12 AM (midnight): 34 mg/dL   BLOOD GLUCOSE TARGET and times: 12 AM (midnight): 110 - 120  Active Insulin Time: 3 hours   Sensor: Dexcom G6 Carelink / Diasend username: Carelink / Diasend Password:    Education provided today on:  AADE Self-Care Behaviors:  Problem Solving: high blood glucose - causes, signs/symptoms, treatment and prevention and low blood glucose - causes, signs/symptoms, treatment and prevention    Education specific to insulin pump provided today on:   Maximizing use of pump therapy.    Opportunities for ongoing education and support in diabetes-self management were discussed.    Pt verbalized understanding of  concepts discussed and recommendations provided today.       Education Materials Provided:  {CDE EDUCATION MATERIALS:020157}    Pump Education Materials: ***    ASSESSMENT  ***  Glucose Patterns & Trends:  appears overtreating highs and lows resulting in rebound lows and highs respectively    Patient would benefit from only use corretive bolusing at meal times and not closer than 4 hours..    Changes made to pump settings:  No changes made    Changes made to sensor settings:   No changes made    PLAN  See Patient Instructions for co-developed, patient-stated behavior change goals.  AVS printed and provided to patient today. See Follow-Up section for recommended follow-up.    Time Spent: 60 minutes  Encounter Type: Individual    Any diabetes medication dose changes were made via the CDE Protocol and Collaborative Practice Agreement with the patient's primary care provider. A copy of this encounter was shared with the provider.    Aubree Hamilton RD, [facility], 2:57 PM, 8/15/2022

## 2022-08-30 DIAGNOSIS — E10.59 TYPE 1 DIABETES MELLITUS WITH VASCULAR DISEASE (H): ICD-10-CM

## 2022-08-30 NOTE — TELEPHONE ENCOUNTER
"Requested Prescriptions   Pending Prescriptions Disp Refills    Continuous Blood Gluc Transmit (DEXCOM G6 TRANSMITTER) MISC [Pharmacy Med Name: DEXCOM G6 TRANSMITTER  MISC] 1 each 1     Sig: CHANGE EVERY 3 MONTHS        Diabetic Supplies Protocol Passed - 8/30/2022  3:02 PM        Passed - Medication is active on med list        Passed - Patient is 18 years of age or older        Passed - Recent (6 mo) or future (30 days) visit within the authorizing provider's specialty     Patient had office visit in the last 6 months or has a visit in the next 30 days with authorizing provider.  See \"Patient Info\" tab in inbasket, or \"Choose Columns\" in Meds & Orders section of the refill encounter.               Continuous Blood Gluc Sensor (DEXCOM G6 SENSOR) MISC [Pharmacy Med Name: DEXCOM G6 SENSOR  MISC]  1     Sig: CHANGE EVERY 10 DAYS        There is no refill protocol information for this order        insulin cartridge (PARADIGM 3ML) misc pump supply [Pharmacy Med Name: PARADIGM PUMP RESERVOIR 3ML  MISC]  3     Sig: INSULIN CARTRIDGE TO BE USED WITH PUMP AS DIRECTED.  CHANGE EVERY 2-3 DAYS OR AS DIRECTED.        There is no refill protocol information for this order           "

## 2022-08-31 RX ORDER — PROCHLORPERAZINE 25 MG/1
SUPPOSITORY RECTAL
Qty: 9 EACH | Refills: 1 | Status: SHIPPED | OUTPATIENT
Start: 2022-08-31 | End: 2023-02-16

## 2022-08-31 RX ORDER — INSULIN PUMP SYRINGE, 3 ML
EACH MISCELLANEOUS
Qty: 30 EACH | Refills: 3 | Status: SHIPPED | OUTPATIENT
Start: 2022-08-31 | End: 2023-08-16

## 2022-08-31 RX ORDER — PROCHLORPERAZINE 25 MG/1
SUPPOSITORY RECTAL
Qty: 1 EACH | Refills: 1 | Status: SHIPPED | OUTPATIENT
Start: 2022-08-31 | End: 2023-02-16

## 2022-09-09 ENCOUNTER — OFFICE VISIT (OUTPATIENT)
Dept: FAMILY MEDICINE | Facility: CLINIC | Age: 67
End: 2022-09-09
Payer: COMMERCIAL

## 2022-09-09 VITALS
WEIGHT: 184.25 LBS | BODY MASS INDEX: 27.29 KG/M2 | HEART RATE: 78 BPM | TEMPERATURE: 97.4 F | DIASTOLIC BLOOD PRESSURE: 60 MMHG | OXYGEN SATURATION: 92 % | HEIGHT: 69 IN | SYSTOLIC BLOOD PRESSURE: 100 MMHG | RESPIRATION RATE: 16 BRPM

## 2022-09-09 DIAGNOSIS — E10.42 DIABETIC POLYNEUROPATHY ASSOCIATED WITH TYPE 1 DIABETES MELLITUS (H): ICD-10-CM

## 2022-09-09 DIAGNOSIS — E78.5 HYPERLIPIDEMIA LDL GOAL <70: ICD-10-CM

## 2022-09-09 DIAGNOSIS — M48.02 CERVICAL STENOSIS OF SPINE: ICD-10-CM

## 2022-09-09 DIAGNOSIS — E10.319 TYPE 1 DIABETES MELLITUS WITH RETINOPATHY, MACULAR EDEMA PRESENCE UNSPECIFIED, UNSPECIFIED LATERALITY, UNSPECIFIED RETINOPATHY SEVERITY (H): ICD-10-CM

## 2022-09-09 DIAGNOSIS — F11.90 CHRONIC NARCOTIC USE: ICD-10-CM

## 2022-09-09 DIAGNOSIS — Z00.00 ENCOUNTER FOR MEDICARE ANNUAL WELLNESS EXAM: Primary | ICD-10-CM

## 2022-09-09 DIAGNOSIS — I51.9 LEFT VENTRICULAR DYSFUNCTION: ICD-10-CM

## 2022-09-09 LAB
ALBUMIN SERPL BCG-MCNC: 4 G/DL (ref 3.5–5.2)
ALP SERPL-CCNC: 88 U/L (ref 40–129)
ALT SERPL W P-5'-P-CCNC: 41 U/L (ref 10–50)
ANION GAP SERPL CALCULATED.3IONS-SCNC: 12 MMOL/L (ref 7–15)
AST SERPL W P-5'-P-CCNC: 31 U/L (ref 10–50)
BILIRUB SERPL-MCNC: 0.5 MG/DL
BUN SERPL-MCNC: 20.9 MG/DL (ref 8–23)
CALCIUM SERPL-MCNC: 8.5 MG/DL (ref 8.8–10.2)
CHLORIDE SERPL-SCNC: 104 MMOL/L (ref 98–107)
CREAT SERPL-MCNC: 1.06 MG/DL (ref 0.67–1.17)
DEPRECATED HCO3 PLAS-SCNC: 24 MMOL/L (ref 22–29)
GFR SERPL CREATININE-BSD FRML MDRD: 76 ML/MIN/1.73M2
GLUCOSE SERPL-MCNC: 213 MG/DL (ref 70–99)
HBA1C MFR BLD: 7.7 % (ref 0–5.6)
POTASSIUM SERPL-SCNC: 4.5 MMOL/L (ref 3.4–5.3)
PROT SERPL-MCNC: 6.4 G/DL (ref 6.4–8.3)
SODIUM SERPL-SCNC: 140 MMOL/L (ref 136–145)

## 2022-09-09 PROCEDURE — 99207 PR FOOT EXAM NO CHARGE: CPT | Performed by: FAMILY MEDICINE

## 2022-09-09 PROCEDURE — 80053 COMPREHEN METABOLIC PANEL: CPT | Performed by: FAMILY MEDICINE

## 2022-09-09 PROCEDURE — 90662 IIV NO PRSV INCREASED AG IM: CPT | Performed by: FAMILY MEDICINE

## 2022-09-09 PROCEDURE — 83036 HEMOGLOBIN GLYCOSYLATED A1C: CPT | Performed by: FAMILY MEDICINE

## 2022-09-09 PROCEDURE — 99213 OFFICE O/P EST LOW 20 MIN: CPT | Mod: 25 | Performed by: FAMILY MEDICINE

## 2022-09-09 PROCEDURE — G0439 PPPS, SUBSEQ VISIT: HCPCS | Performed by: FAMILY MEDICINE

## 2022-09-09 PROCEDURE — G0008 ADMIN INFLUENZA VIRUS VAC: HCPCS | Performed by: FAMILY MEDICINE

## 2022-09-09 PROCEDURE — 36415 COLL VENOUS BLD VENIPUNCTURE: CPT | Performed by: FAMILY MEDICINE

## 2022-09-09 RX ORDER — ROSUVASTATIN CALCIUM 40 MG/1
TABLET, COATED ORAL
Qty: 90 TABLET | Refills: 1 | Status: SHIPPED | OUTPATIENT
Start: 2022-09-09 | End: 2023-03-01

## 2022-09-09 RX ORDER — HYDROCODONE BITARTRATE AND ACETAMINOPHEN 10; 325 MG/1; MG/1
1 TABLET ORAL EVERY 6 HOURS PRN
Qty: 120 TABLET | Refills: 0 | Status: SHIPPED | OUTPATIENT
Start: 2022-10-07 | End: 2022-11-06

## 2022-09-09 RX ORDER — HYDROCODONE BITARTRATE AND ACETAMINOPHEN 10; 325 MG/1; MG/1
1 TABLET ORAL EVERY 6 HOURS PRN
Qty: 120 TABLET | Refills: 0 | Status: SHIPPED | OUTPATIENT
Start: 2022-09-09 | End: 2022-10-09

## 2022-09-09 RX ORDER — METOPROLOL SUCCINATE 25 MG/1
TABLET, EXTENDED RELEASE ORAL
Qty: 90 TABLET | Refills: 1 | Status: SHIPPED | OUTPATIENT
Start: 2022-09-09 | End: 2023-03-01

## 2022-09-09 RX ORDER — HYDROCODONE BITARTRATE AND ACETAMINOPHEN 10; 325 MG/1; MG/1
1 TABLET ORAL EVERY 6 HOURS PRN
Qty: 120 TABLET | Refills: 0 | Status: SHIPPED | OUTPATIENT
Start: 2022-11-07 | End: 2022-12-05

## 2022-09-09 RX ORDER — LOSARTAN POTASSIUM 50 MG/1
TABLET ORAL
Qty: 90 TABLET | Refills: 1 | Status: SHIPPED | OUTPATIENT
Start: 2022-09-09 | End: 2023-03-01

## 2022-09-09 ASSESSMENT — ANXIETY QUESTIONNAIRES
6. BECOMING EASILY ANNOYED OR IRRITABLE: NOT AT ALL
5. BEING SO RESTLESS THAT IT IS HARD TO SIT STILL: NOT AT ALL
8. IF YOU CHECKED OFF ANY PROBLEMS, HOW DIFFICULT HAVE THESE MADE IT FOR YOU TO DO YOUR WORK, TAKE CARE OF THINGS AT HOME, OR GET ALONG WITH OTHER PEOPLE?: NOT DIFFICULT AT ALL
1. FEELING NERVOUS, ANXIOUS, OR ON EDGE: NOT AT ALL
GAD7 TOTAL SCORE: 0
7. FEELING AFRAID AS IF SOMETHING AWFUL MIGHT HAPPEN: NOT AT ALL
4. TROUBLE RELAXING: NOT AT ALL
IF YOU CHECKED OFF ANY PROBLEMS ON THIS QUESTIONNAIRE, HOW DIFFICULT HAVE THESE PROBLEMS MADE IT FOR YOU TO DO YOUR WORK, TAKE CARE OF THINGS AT HOME, OR GET ALONG WITH OTHER PEOPLE: NOT DIFFICULT AT ALL
2. NOT BEING ABLE TO STOP OR CONTROL WORRYING: NOT AT ALL
3. WORRYING TOO MUCH ABOUT DIFFERENT THINGS: NOT AT ALL
7. FEELING AFRAID AS IF SOMETHING AWFUL MIGHT HAPPEN: NOT AT ALL
GAD7 TOTAL SCORE: 0

## 2022-09-09 ASSESSMENT — ENCOUNTER SYMPTOMS
SORE THROAT: 0
MYALGIAS: 1
DIARRHEA: 0
JOINT SWELLING: 0
NERVOUS/ANXIOUS: 0
CHILLS: 0
CONSTIPATION: 0
PALPITATIONS: 0
HEARTBURN: 0
HEMATOCHEZIA: 0
EYE PAIN: 0
DYSURIA: 0
FEVER: 0
DIZZINESS: 0
SHORTNESS OF BREATH: 1
COUGH: 0
HEMATURIA: 0
ARTHRALGIAS: 0
HEADACHES: 0
PARESTHESIAS: 0
WEAKNESS: 1
ABDOMINAL PAIN: 0
NAUSEA: 0
FREQUENCY: 0

## 2022-09-09 ASSESSMENT — PAIN SCALES - GENERAL: PAINLEVEL: NO PAIN (0)

## 2022-09-09 ASSESSMENT — ACTIVITIES OF DAILY LIVING (ADL): CURRENT_FUNCTION: NO ASSISTANCE NEEDED

## 2022-09-09 NOTE — PATIENT INSTRUCTIONS
Patient Education   Personalized Prevention Plan  You are due for the preventive services outlined below.  Your care team is available to assist you in scheduling these services.  If you have already completed any of these items, please share that information with your care team to update in your medical record.  Health Maintenance Due   Topic Date Due     Zoster (Shingles) Vaccine (2 of 2) 02/13/2020     Pneumococcal Vaccine (3 - PPSV23 or PCV20) 09/19/2020     Diabetic Foot Exam  04/02/2022     FALL RISK ASSESSMENT  04/02/2022     A1C Lab  08/07/2022     Basic Metabolic Panel  08/07/2022     Flu Vaccine (1) 09/01/2022

## 2022-10-04 ENCOUNTER — ALLIED HEALTH/NURSE VISIT (OUTPATIENT)
Dept: EDUCATION SERVICES | Facility: CLINIC | Age: 67
End: 2022-10-04
Payer: COMMERCIAL

## 2022-10-04 DIAGNOSIS — E10.9 TYPE 1 DIABETES, HBA1C GOAL < 8% (H): ICD-10-CM

## 2022-10-04 DIAGNOSIS — E10.59 TYPE 1 DIABETES MELLITUS WITH VASCULAR DISEASE (H): Primary | ICD-10-CM

## 2022-10-04 PROCEDURE — G0108 DIAB MANAGE TRN  PER INDIV: HCPCS | Performed by: DIETITIAN, REGISTERED

## 2022-10-04 NOTE — LETTER
10/4/2022         RE: Eb Eisenberg  [address] [place] [address]        Dear Colleague,    Thank you for referring your patient, Eb Eisenberg, to the [facility]. Please see a copy of my visit note below.    Days BG is good, but overnight it is going up    Diabetes Self-Management Education & Support    Presents for: Insulin Pump Review    Type of Service: In Person Visit    Assessment Type:   REPORTS:  See Dexcom and pump reports below.    Insulin Pump Information   Medtronic Minimed: 670G   BASAL RATES and times:   12   AM (midnight): 0.825 units/hour   3 am: 0.900 units/hour   9   AM: 0.750 units/hour   1 pm: 0.825 units/hour   9    PM: 0.750 units/hour     CARB RATIO and times:   12   AM (midnight): 6.5   8  AM (noon):  7.2   11:00: 6.5   5    PM:  7.2   Corection Factor (Sensitivity) and times:   12   AM (midnight): 34 mg/dL   BLOOD GLUCOSE TARGET and times:   12   AM (midnight): 110 - 120Active Insulin Time:  3 hours       Sensor:  Dexcom G6    Education specific to insulin pump provided today on:   We discussed use of temporary basal rates, but patient states he will forget, so just wants to make some minor adjustments to pump settings.  States he use to use temporary basal rates, but not interested in that at this time.    Opportunities for ongoing education and support in diabetes-self management were discussed.    Pt verbalized understanding of concepts discussed and recommendations provided today.       Pump Education Materials: no new materials    ASSESSMENT  Patient states has had diabetes for ~40 years and on pump therapy for 20-25 years.  He sees a pattern of low BG in the afternoon and then running high over the night.  Dexcom reports support what patient is seeing.  Recent a1c of 7.7 (previous a1c of 7.9).    Continue education with the following diabetes management concepts: Healthy Eating, Being Active, Monitoring, Taking Medication, Problem Solving,  "Reducing Risks and Healthy Coping    Patient would benefit from adjusting basal rate: reduce rate from 1pm to 9pm from 0.825 units/hr to 0.775 units/hr and increase basal rate at 9pm to MN from 0.75 units/hr to 0.8 units/hr.  Less than 10% adjustments.    PLAN    1.  Reduce basal rate from 1pm to 9pm from 0.825 units/hr to 0.775 units/hr  2. Increase basal rate from 9pm to MN from  0.75 units/hr to 0.8 units/hr.  3.   Follow-up: 1/10/23    See Care Plan for co-developed, patient-state behavior change goals.  AVS provided for patient today.    Education Materials Provided:  No new materials provided today      SUBJECTIVE/OBJECTIVE:  Presents for: Insulin Pump Review  Accompanied by: Self  Diabetes education in the past 24mo: Yes  Diabetes type: Type 1  Disease course: Stable  How confident are you filling out medical forms by yourself:: Somewhat  Cultural Influences/Ethnic Background:  Not  or       Diabetes Symptoms & Complications:  Fatigue: Sometimes  Neuropathy: No  Polydipsia: No  Polyphagia: No  Polyuria: No  Visual change: No  Slow healing wounds: No  Autonomic neuropathy: No  CVA: No  Heart disease: No  Nephropathy: No  Peripheral neuropathy: No  Peripheral Vascular Disease: No  Retinopathy: No  Sexual dysfunction: No    Patient Problem List and Family Medical History reviewed for relevant medical history, current medical status, and diabetes risk factors.    Vitals:  There were no vitals taken for this visit.  Estimated body mass index is 27.21 kg/m  as calculated from the following:    Height as of 9/9/22: 1.753 m (5' 9\").    Weight as of 9/9/22: 83.6 kg (184 lb 4 oz).   Last 3 BP:   BP Readings from Last 3 Encounters:   09/09/22 100/60   05/11/22 112/62   02/07/22 120/72       History   Smoking Status     Former Smoker     Quit date: 1/1/2001   Smokeless Tobacco     Never Used       Labs:  Lab Results   Component Value Date    A1C 7.7 09/09/2022    A1C 7.6 04/02/2021     Lab Results "   Component Value Date     09/09/2022     02/07/2022     04/02/2021     Lab Results   Component Value Date    LDL 63 02/07/2022    LDL 63 09/16/2020     HDL Cholesterol   Date Value Ref Range Status   09/16/2020 53 >39 mg/dL Final     Direct Measure HDL   Date Value Ref Range Status   02/07/2022 51 >=40 mg/dL Final   ]  GFR Estimate   Date Value Ref Range Status   09/09/2022 76 >60 mL/min/1.73m2 Final     Comment:     Effective December 21, 2021 eGFRcr in adults is calculated using the 2021 CKD-EPI creatinine equation which includes age and gender (Zoila et al., NEJ, DOI: 10.1056/GXZWba3196671)   04/02/2021 73 >60 mL/min/[1.73_m2] Final     Comment:     Non  GFR Calc  Starting 12/18/2018, serum creatinine based estimated GFR (eGFR) will be   calculated using the Chronic Kidney Disease Epidemiology Collaboration   (CKD-EPI) equation.       GFR Estimate If Black   Date Value Ref Range Status   04/02/2021 85 >60 mL/min/[1.73_m2] Final     Comment:      GFR Calc  Starting 12/18/2018, serum creatinine based estimated GFR (eGFR) will be   calculated using the Chronic Kidney Disease Epidemiology Collaboration   (CKD-EPI) equation.       Lab Results   Component Value Date    CR 1.06 09/09/2022    CR 1.05 04/02/2021     No results found for: MICROALBUMIN    Healthy Eating:  Cultural/Yazidism diet restrictions?: No  Meal planning/habits: Carb counting, Low salt, Frequent snacking  How many times a week on average do you eat food made away from home (restaurant/take-out)?: 5+  Meals include: Dinner, Morning Snack, Lunch  Lunch: 3 hard boiled eggs, 4 cheesesticks  Dinner: 7:30pm: 3 sl frozen pizza, diet pepsi,  Snacks: PM: nothing; HS: nothing  Beverages: Water, Diet soda    Being Active:  Being Active Assessed Today: Yes  Days per week of moderate to strenuous exercise (like a brisk walk): 7  On average, minutes per day of exercise at this level: 20  How intense was your  typical exercise? : Moderate (like brisk walking)  Exercise Minutes per Week: 140  Barrier to exercise: None    Monitoring:  Monitoring Assessed Today: Yes  Blood Glucose Meter: CGM (Dexcom)  Blood glucose trend: Fluctuating                              Taking Medications:  Diabetes Medication(s)     Diabetic Other       GLUCAGON EMERGENCY KIT 1 MG IJ KIT    use as directed for severe hypoglycemia    Insulin       insulin aspart (NOVOLOG VIAL) 100 UNITS/ML vial    USE AS DIRECTED IN INSULIN PUMP. TOTAL DAILY DOSE 60 UNITS          Current Treatments: Insulin Pump  Dose schedule: Other  Given by: Patient  Injection/Infusion sites: Abdomen                          Problem Solving:       Reducing Risks:  CAD Risks: Diabetes Mellitus  Has dilated eye exam at least once a year?: Yes  Sees dentist every 6 months?: No  Feet checked by healthcare provider in the last year?: Yes    Healthy Coping:  Informal Support system:: Children, Friends  Patient Activation Measure Survey Score:  BENEDICTO Score (Last Two) 2/17/2011   BENEDICTO Raw Score 42   Activation Score 66   BENEDICTO Level 3         Care Plan and Education Provided:  Care Plan: Diabetes   Updates made by Aubree Hamilton since 10/4/2022 12:00 AM      Problem: HbA1C Not In Goal       Goal: Establish Regular Follow-Ups with PCP       Task: Discuss with PCP the recommended timing for patient's next follow up visit(s)    Responsible User: Aubree Hamilton      Task: Discuss schedule for PCP visits with patient    Responsible User: Aubree Hamilton      Goal: Get HbA1C Level in Goal       Task: Educate patient on diabetes education self-management topics    Responsible User: Aubree Hamilton      Task: Educate patient on benefits of regular glucose monitoring    Responsible User: Aubree Hamilton      Task: Refer patient to appropriate extended care team member, as needed (Medication Therapy Management, Behavioral Health, Physical Therapy, etc.)    Responsible User: Aubree Hamilton       Task: Discuss diabetes treatment plan with patient    Responsible User: Aubree Hamilton      Problem: Diabetes Self-Management Education Needed to Optimize Self-Care Behaviors       Goal: Understand diabetes pathophysiology and disease progression       Task: Provide education on diabetes pathophysiology and disease progression specfic to patient's diabetes type    Responsible User: Aubree Hamilton      Goal: Healthy Eating - follow a healthy eating pattern for diabetes       Task: Provide education on portion control and consistency in amount, composition and timing of food intake    Responsible User: Aubree Hamilton      Task: Provide education on managing carbohydrate intake (carbohydrate counting, plate planning method, etc.)    Responsible User: Aubree Hamilton      Task: Provide education on weight management    Responsible User: Aubree Hamilton      Task: Provide education on heart healthy eating    Responsible User: Aubree Hamilton      Task: Provide education on eating out    Responsible User: Aubree Hamilton      Task: Develop individualized healthy eating plan with patient    Responsible User: Aubree Hamilton      Goal: Being Active - get regular physical activity, working up to at least 150 minutes per week       Task: Provide education on relationship of activity to glucose and precautions to take if at risk for low glucose    Responsible User: Aubree Hamilton      Task: Discuss barriers to physical activity with patient    Responsible User: Aubree Hamilton      Task: Develop physical activity plan with patient    Responsible User: Aubree Hamilton      Task: Explore community resources including walking groups, assistance programs, and home videos    Responsible User: Aubree Hamilton      Goal: Monitoring - monitor glucose and ketones as directed    This Visit's Progress: 100%   Note:    Wears Dexcom     Task: Provide education on blood glucose monitoring (purpose, proper technique,  frequency, glucose targets, interpreting results, when to use glucose control solution, sharps disposal)    Responsible User: Aubree Hamilton      Task: Provide education on continuous glucose monitoring (sensor placement, use of vasyl or /reader, understanding glucose trends, alerts and alarms, differences between sensor glucose and blood glucose)    Responsible User: Aubree Hamilton      Task: Provide education on ketone monitoring (when to monitor, frequency, etc.)    Responsible User: Aubree Hamilton      Goal: Taking Medication - patient is consistently taking medications as directed    This Visit's Progress: 80%   Note:    Wearing pump     Task: Provide education on action of prescribed medication, including when to take and possible side effects    Responsible User: Aubree Hamilton      Task: Provide education on insulin and injectable diabetes medications, including administration, storage, site selection and rotation for injection sites    Responsible User: Aubree Hamilton      Task: Discuss barriers to medication adherence with patient and provide management technique ideas as appropriate    Responsible User: Aubree Hamilton      Task: Provide education on frequency and refill details of medications    Responsible User: Aubree Hamilton      Goal: Problem Solving - know how to prevent and manage short-term diabetes complications       Task: Provide education on high blood glucose - causes, signs/symptoms, prevention and treatment    Responsible User: Aubree Hamilton      Task: Provide education on low blood glucose - causes, signs/symptoms, prevention, treatment, carrying a carbohydrate source at all times, and medical identification    Responsible User: Aubree Hamilton      Task: Provide education on safe travel with diabetes    Responsible User: Aubree Hamilton      Task: Provide education on how to care for diabetes on sick days    Responsible User: Aubree Hamilton      Task: Provide  education on when to call a health care provider    Responsible User: Aubree Hamilton      Goal: Reducing Risks - know how to prevent and treat long-term diabetes complications       Task: Provide education on major complications of diabetes, prevention, early diagnostic measures and treatment of complications    Responsible User: Aubree Hamilton      Task: Provide education on recommended care for dental, eye and foot health    Responsible User: Aubree Hamilton      Task: Provide education on Hemoglobin A1c - goals and relationship to blood glucose levels    Responsible User: Aubree Hamilton      Task: Provide education on recommendations for heart health - lipid levels and goals, blood pressure and goals, and aspirin therapy, if indicated    Responsible User: Aubree Hamilton      Task: Provide education on tobacco cessation    Responsible User: Aubree Hamilton      Goal: Healthy Coping - use available resources to cope with the challenges of managing diabetes       Task: Discuss recognizing feelings about having diabetes    Responsible User: Aubree Hamilton      Task: Provide education on the benefits of making appropriate lifestyle changes    Responsible User: Aubree Hamilton      Task: Provide education on benefits of utilizing support systems    Responsible User: Aubree Hamilton      Task: Discuss methods for coping with stress    Responsible User: Aubree Hamilton      Task: Provide education on when to seek professional counseling    Responsible User: Aubree Hamilton          Time Spent: 60 minutes  Encounter Type: Individual    Any diabetes medication dose changes were made via the CDE Protocol per the patient's referring provider. A copy of this encounter was shared with the provider.

## 2022-10-04 NOTE — PATIENT INSTRUCTIONS
Reduce basal rate from 1pm to 9pm from 0.825 units/hr to 0.775 units/hr  Increase basal rate from 9pm to MN from  0.75 units/hr to 0.8 units/hr.

## 2022-10-04 NOTE — PROGRESS NOTES
Days BG is good, but overnight it is going up    Diabetes Self-Management Education & Support    Presents for: Insulin Pump Review    Type of Service: In Person Visit    Assessment Type:   REPORTS:  See Dexcom and pump reports below.    Insulin Pump Information   Medtronic Minimed: 670G   BASAL RATES and times:   12   AM (midnight): 0.825 units/hour   3 am: 0.900 units/hour   9   AM: 0.750 units/hour   1 pm: 0.825 units/hour   9    PM: 0.750 units/hour     CARB RATIO and times:   12   AM (midnight): 6.5   8  AM (noon):  7.2   11:00: 6.5   5    PM:  7.2   Corection Factor (Sensitivity) and times:   12   AM (midnight): 34 mg/dL   BLOOD GLUCOSE TARGET and times:   12   AM (midnight): 110 - 120Active Insulin Time:  3 hours       Sensor:  Dexcom G6    Education specific to insulin pump provided today on:   We discussed use of temporary basal rates, but patient states he will forget, so just wants to make some minor adjustments to pump settings.  States he use to use temporary basal rates, but not interested in that at this time.    Opportunities for ongoing education and support in diabetes-self management were discussed.    Pt verbalized understanding of concepts discussed and recommendations provided today.       Pump Education Materials: no new materials    ASSESSMENT  Patient states has had diabetes for ~40 years and on pump therapy for 20-25 years.  He sees a pattern of low BG in the afternoon and then running high over the night.  Dexcom reports support what patient is seeing.  Recent a1c of 7.7 (previous a1c of 7.9).    Continue education with the following diabetes management concepts: Healthy Eating, Being Active, Monitoring, Taking Medication, Problem Solving, Reducing Risks and Healthy Coping    Patient would benefit from adjusting basal rate: reduce rate from 1pm to 9pm from 0.825 units/hr to 0.775 units/hr and increase basal rate at 9pm to MN from 0.75 units/hr to 0.8 units/hr.  Less than 10%  "adjustments.    PLAN    1.  Reduce basal rate from 1pm to 9pm from 0.825 units/hr to 0.775 units/hr  2. Increase basal rate from 9pm to MN from  0.75 units/hr to 0.8 units/hr.  3.   Follow-up: 1/10/23    See Care Plan for co-developed, patient-state behavior change goals.  AVS provided for patient today.    Education Materials Provided:  No new materials provided today      SUBJECTIVE/OBJECTIVE:  Presents for: Insulin Pump Review  Accompanied by: Self  Diabetes education in the past 24mo: Yes  Diabetes type: Type 1  Disease course: Stable  How confident are you filling out medical forms by yourself:: Somewhat  Cultural Influences/Ethnic Background:  Not  or       Diabetes Symptoms & Complications:  Fatigue: Sometimes  Neuropathy: No  Polydipsia: No  Polyphagia: No  Polyuria: No  Visual change: No  Slow healing wounds: No  Autonomic neuropathy: No  CVA: No  Heart disease: No  Nephropathy: No  Peripheral neuropathy: No  Peripheral Vascular Disease: No  Retinopathy: No  Sexual dysfunction: No    Patient Problem List and Family Medical History reviewed for relevant medical history, current medical status, and diabetes risk factors.    Vitals:  There were no vitals taken for this visit.  Estimated body mass index is 27.21 kg/m  as calculated from the following:    Height as of 9/9/22: 1.753 m (5' 9\").    Weight as of 9/9/22: 83.6 kg (184 lb 4 oz).   Last 3 BP:   BP Readings from Last 3 Encounters:   09/09/22 100/60   05/11/22 112/62   02/07/22 120/72       History   Smoking Status     Former Smoker     Quit date: 1/1/2001   Smokeless Tobacco     Never Used       Labs:  Lab Results   Component Value Date    A1C 7.7 09/09/2022    A1C 7.6 04/02/2021     Lab Results   Component Value Date     09/09/2022     02/07/2022     04/02/2021     Lab Results   Component Value Date    LDL 63 02/07/2022    LDL 63 09/16/2020     HDL Cholesterol   Date Value Ref Range Status   09/16/2020 53 >39 mg/dL " Final     Direct Measure HDL   Date Value Ref Range Status   02/07/2022 51 >=40 mg/dL Final   ]  GFR Estimate   Date Value Ref Range Status   09/09/2022 76 >60 mL/min/1.73m2 Final     Comment:     Effective December 21, 2021 eGFRcr in adults is calculated using the 2021 CKD-EPI creatinine equation which includes age and gender (Zoila munson al., [place], DOI: 10.1056/DOYNvg6803501)   04/02/2021 73 >60 mL/min/[1.73_m2] Final     Comment:     Non  GFR Calc  Starting 12/18/2018, serum creatinine based estimated GFR (eGFR) will be   calculated using the Chronic Kidney Disease Epidemiology Collaboration   (CKD-EPI) equation.       GFR Estimate If Black   Date Value Ref Range Status   04/02/2021 85 >60 mL/min/[1.73_m2] Final     Comment:      GFR Calc  Starting 12/18/2018, serum creatinine based estimated GFR (eGFR) will be   calculated using the Chronic Kidney Disease Epidemiology Collaboration   (CKD-EPI) equation.       Lab Results   Component Value Date    CR 1.06 09/09/2022    CR 1.05 04/02/2021     No results found for: MICROALBUMIN    Healthy Eating:  Cultural/Presybeterian diet restrictions?: No  Meal planning/habits: Carb counting, Low salt, Frequent snacking  How many times a week on average do you eat food made away from home (restaurant/take-out)?: 5+  Meals include: Dinner, Morning Snack, Lunch  Lunch: 3 hard boiled eggs, 4 cheesesticks  Dinner: 7:30pm: 3 sl frozen pizza, diet pepsi,  Snacks: PM: nothing; HS: nothing  Beverages: Water, Diet soda    Being Active:  Being Active Assessed Today: Yes  Days per week of moderate to strenuous exercise (like a brisk walk): 7  On average, minutes per day of exercise at this level: 20  How intense was your typical exercise? : Moderate (like brisk walking)  Exercise Minutes per Week: 140  Barrier to exercise: None    Monitoring:  Monitoring Assessed Today: Yes  Blood Glucose Meter: CGM (Dexcom)  Blood glucose trend:  Fluctuating                              Taking Medications:  Diabetes Medication(s)     Diabetic Other       GLUCAGON EMERGENCY KIT 1 MG IJ KIT    use as directed for severe hypoglycemia    Insulin       insulin aspart (NOVOLOG VIAL) 100 UNITS/ML vial    USE AS DIRECTED IN INSULIN PUMP. TOTAL DAILY DOSE 60 UNITS          Current Treatments: Insulin Pump  Dose schedule: Other  Given by: Patient  Injection/Infusion sites: Abdomen                          Problem Solving:       Reducing Risks:  CAD Risks: Diabetes Mellitus  Has dilated eye exam at least once a year?: Yes  Sees dentist every 6 months?: No  Feet checked by healthcare provider in the last year?: Yes    Healthy Coping:  Informal Support system:: Children, Friends  Patient Activation Measure Survey Score:  BENEDICTO Score (Last Two) 2/17/2011   BENEDICTO Raw Score 42   Activation Score 66   BENEDICTO Level 3         Care Plan and Education Provided:  Care Plan: Diabetes   Updates made by Aubree Hamilton since 10/4/2022 12:00 AM      Problem: HbA1C Not In Goal       Goal: Establish Regular Follow-Ups with PCP       Task: Discuss with PCP the recommended timing for patient's next follow up visit(s)    Responsible User: Aubree Hamilton      Task: Discuss schedule for PCP visits with patient    Responsible User: Aubree Hamilton      Goal: Get HbA1C Level in Goal       Task: Educate patient on diabetes education self-management topics    Responsible User: Aubree Hamilton      Task: Educate patient on benefits of regular glucose monitoring    Responsible User: Aubree Hamilton      Task: Refer patient to appropriate extended care team member, as needed (Medication Therapy Management, Behavioral Health, Physical Therapy, etc.)    Responsible User: Aubree Hamilton      Task: Discuss diabetes treatment plan with patient    Responsible User: Aubree Hamilton      Problem: Diabetes Self-Management Education Needed to Optimize Self-Care Behaviors       Goal: Understand diabetes  pathophysiology and disease progression       Task: Provide education on diabetes pathophysiology and disease progression specfic to patient's diabetes type    Responsible User: Aubree Hamilton      Goal: Healthy Eating - follow a healthy eating pattern for diabetes       Task: Provide education on portion control and consistency in amount, composition and timing of food intake    Responsible User: Aubree Hamilton      Task: Provide education on managing carbohydrate intake (carbohydrate counting, plate planning method, etc.)    Responsible User: Aubree Hamilton      Task: Provide education on weight management    Responsible User: Aubree Hamilton      Task: Provide education on heart healthy eating    Responsible User: Aubree Hamilton      Task: Provide education on eating out    Responsible User: Aubree Hamilton      Task: Develop individualized healthy eating plan with patient    Responsible User: Aubree Hamilton      Goal: Being Active - get regular physical activity, working up to at least 150 minutes per week       Task: Provide education on relationship of activity to glucose and precautions to take if at risk for low glucose    Responsible User: Aubree Hamilton      Task: Discuss barriers to physical activity with patient    Responsible User: Aubree Hamilton      Task: Develop physical activity plan with patient    Responsible User: Aubree Hamilton      Task: Explore community resources including walking groups, assistance programs, and home videos    Responsible User: Aubree Hamilton      Goal: Monitoring - monitor glucose and ketones as directed    This Visit's Progress: 100%   Note:    Wears Dexcom     Task: Provide education on blood glucose monitoring (purpose, proper technique, frequency, glucose targets, interpreting results, when to use glucose control solution, sharps disposal)    Responsible User: Aubree Hamilton      Task: Provide education on continuous glucose monitoring (sensor  placement, use of vasyl or /reader, understanding glucose trends, alerts and alarms, differences between sensor glucose and blood glucose)    Responsible User: Aubree Hamilton      Task: Provide education on ketone monitoring (when to monitor, frequency, etc.)    Responsible User: Aubree Hamilton      Goal: Taking Medication - patient is consistently taking medications as directed    This Visit's Progress: 80%   Note:    Wearing pump     Task: Provide education on action of prescribed medication, including when to take and possible side effects    Responsible User: Aubree Hamilton      Task: Provide education on insulin and injectable diabetes medications, including administration, storage, site selection and rotation for injection sites    Responsible User: Aubree Hamilton      Task: Discuss barriers to medication adherence with patient and provide management technique ideas as appropriate    Responsible User: Aubree Hamilton      Task: Provide education on frequency and refill details of medications    Responsible User: Aubree Hamilton      Goal: Problem Solving - know how to prevent and manage short-term diabetes complications       Task: Provide education on high blood glucose - causes, signs/symptoms, prevention and treatment    Responsible User: Aubree Hamilton      Task: Provide education on low blood glucose - causes, signs/symptoms, prevention, treatment, carrying a carbohydrate source at all times, and medical identification    Responsible User: Aubree Hamilton      Task: Provide education on safe travel with diabetes    Responsible User: Aubree Hamilton      Task: Provide education on how to care for diabetes on sick days    Responsible User: Aubree Hamilton      Task: Provide education on when to call a health care provider    Responsible User: Aubree Hamilton      Goal: Reducing Risks - know how to prevent and treat long-term diabetes complications       Task: Provide education on major  complications of diabetes, prevention, early diagnostic measures and treatment of complications    Responsible User: Aubree Hamilton      Task: Provide education on recommended care for dental, eye and foot health    Responsible User: Aubree Hamilton      Task: Provide education on Hemoglobin A1c - goals and relationship to blood glucose levels    Responsible User: Aubree Hamilton      Task: Provide education on recommendations for heart health - lipid levels and goals, blood pressure and goals, and aspirin therapy, if indicated    Responsible User: Aubree Hamilton      Task: Provide education on tobacco cessation    Responsible User: Aubree Hamilton      Goal: Healthy Coping - use available resources to cope with the challenges of managing diabetes       Task: Discuss recognizing feelings about having diabetes    Responsible User: Aubree Hamilton      Task: Provide education on the benefits of making appropriate lifestyle changes    Responsible User: Aubree Hamilton      Task: Provide education on benefits of utilizing support systems    Responsible User: Aubree Hamilton      Task: Discuss methods for coping with stress    Responsible User: Aubree Hamilton      Task: Provide education on when to seek professional counseling    Responsible User: Aubree Hamilton          Time Spent: 60 minutes  Encounter Type: Individual    Any diabetes medication dose changes were made via the CDE Protocol per the patient's referring provider. A copy of this encounter was shared with the provider.

## 2022-12-05 ENCOUNTER — OFFICE VISIT (OUTPATIENT)
Dept: FAMILY MEDICINE | Facility: CLINIC | Age: 67
End: 2022-12-05
Payer: COMMERCIAL

## 2022-12-05 VITALS
WEIGHT: 186.8 LBS | OXYGEN SATURATION: 95 % | RESPIRATION RATE: 16 BRPM | SYSTOLIC BLOOD PRESSURE: 114 MMHG | TEMPERATURE: 97.5 F | HEART RATE: 71 BPM | DIASTOLIC BLOOD PRESSURE: 62 MMHG | HEIGHT: 69 IN | BODY MASS INDEX: 27.67 KG/M2

## 2022-12-05 DIAGNOSIS — E10.42 DIABETIC POLYNEUROPATHY ASSOCIATED WITH TYPE 1 DIABETES MELLITUS (H): Primary | ICD-10-CM

## 2022-12-05 DIAGNOSIS — F11.90 CHRONIC NARCOTIC USE: ICD-10-CM

## 2022-12-05 DIAGNOSIS — M48.02 CERVICAL STENOSIS OF SPINE: ICD-10-CM

## 2022-12-05 DIAGNOSIS — N40.1 BENIGN NON-NODULAR PROSTATIC HYPERPLASIA WITH LOWER URINARY TRACT SYMPTOMS: ICD-10-CM

## 2022-12-05 PROCEDURE — 99213 OFFICE O/P EST LOW 20 MIN: CPT | Performed by: FAMILY MEDICINE

## 2022-12-05 RX ORDER — HYDROCODONE BITARTRATE AND ACETAMINOPHEN 10; 325 MG/1; MG/1
1 TABLET ORAL EVERY 6 HOURS PRN
Qty: 120 TABLET | Refills: 0 | Status: SHIPPED | OUTPATIENT
Start: 2023-01-04 | End: 2023-02-03

## 2022-12-05 RX ORDER — HYDROCODONE BITARTRATE AND ACETAMINOPHEN 10; 325 MG/1; MG/1
1 TABLET ORAL EVERY 6 HOURS PRN
Qty: 120 TABLET | Refills: 0 | Status: SHIPPED | OUTPATIENT
Start: 2022-12-05 | End: 2023-01-04

## 2022-12-05 RX ORDER — TAMSULOSIN HYDROCHLORIDE 0.4 MG/1
CAPSULE ORAL
Qty: 90 CAPSULE | Refills: 3 | Status: SHIPPED | OUTPATIENT
Start: 2022-12-05 | End: 2023-10-18

## 2022-12-05 RX ORDER — HYDROCODONE BITARTRATE AND ACETAMINOPHEN 10; 325 MG/1; MG/1
1 TABLET ORAL EVERY 6 HOURS PRN
Qty: 120 TABLET | Refills: 0 | Status: SHIPPED | OUTPATIENT
Start: 2023-02-03 | End: 2023-03-01

## 2022-12-05 ASSESSMENT — ANXIETY QUESTIONNAIRES
GAD7 TOTAL SCORE: 0
IF YOU CHECKED OFF ANY PROBLEMS ON THIS QUESTIONNAIRE, HOW DIFFICULT HAVE THESE PROBLEMS MADE IT FOR YOU TO DO YOUR WORK, TAKE CARE OF THINGS AT HOME, OR GET ALONG WITH OTHER PEOPLE: NOT DIFFICULT AT ALL
5. BEING SO RESTLESS THAT IT IS HARD TO SIT STILL: NOT AT ALL
8. IF YOU CHECKED OFF ANY PROBLEMS, HOW DIFFICULT HAVE THESE MADE IT FOR YOU TO DO YOUR WORK, TAKE CARE OF THINGS AT HOME, OR GET ALONG WITH OTHER PEOPLE?: NOT DIFFICULT AT ALL
7. FEELING AFRAID AS IF SOMETHING AWFUL MIGHT HAPPEN: NOT AT ALL
1. FEELING NERVOUS, ANXIOUS, OR ON EDGE: NOT AT ALL
2. NOT BEING ABLE TO STOP OR CONTROL WORRYING: NOT AT ALL
4. TROUBLE RELAXING: NOT AT ALL
GAD7 TOTAL SCORE: 0
3. WORRYING TOO MUCH ABOUT DIFFERENT THINGS: NOT AT ALL
7. FEELING AFRAID AS IF SOMETHING AWFUL MIGHT HAPPEN: NOT AT ALL
GAD7 TOTAL SCORE: 0
6. BECOMING EASILY ANNOYED OR IRRITABLE: NOT AT ALL

## 2022-12-05 ASSESSMENT — PATIENT HEALTH QUESTIONNAIRE - PHQ9
10. IF YOU CHECKED OFF ANY PROBLEMS, HOW DIFFICULT HAVE THESE PROBLEMS MADE IT FOR YOU TO DO YOUR WORK, TAKE CARE OF THINGS AT HOME, OR GET ALONG WITH OTHER PEOPLE: NOT DIFFICULT AT ALL
SUM OF ALL RESPONSES TO PHQ QUESTIONS 1-9: 3
SUM OF ALL RESPONSES TO PHQ QUESTIONS 1-9: 3

## 2022-12-05 ASSESSMENT — PAIN SCALES - GENERAL: PAINLEVEL: NO PAIN (0)

## 2022-12-05 NOTE — PROGRESS NOTES
Assessment & Plan     Diabetic polyneuropathy associated with type 1 diabetes mellitus (H)     - HYDROcodone-acetaminophen (NORCO)  MG per tablet; Take 1 tablet by mouth every 6 hours as needed for moderate to severe pain  - HYDROcodone-acetaminophen (NORCO)  MG per tablet; Take 1 tablet by mouth every 6 hours as needed for moderate to severe pain  - HYDROcodone-acetaminophen (NORCO)  MG per tablet; Take 1 tablet by mouth every 6 hours as needed for moderate to severe pain    Chronic narcotic use     - HYDROcodone-acetaminophen (NORCO)  MG per tablet; Take 1 tablet by mouth every 6 hours as needed for moderate to severe pain  - HYDROcodone-acetaminophen (NORCO)  MG per tablet; Take 1 tablet by mouth every 6 hours as needed for moderate to severe pain  - HYDROcodone-acetaminophen (NORCO)  MG per tablet; Take 1 tablet by mouth every 6 hours as needed for moderate to severe pain    Cervical stenosis of spine     - HYDROcodone-acetaminophen (NORCO)  MG per tablet; Take 1 tablet by mouth every 6 hours as needed for moderate to severe pain  - HYDROcodone-acetaminophen (NORCO)  MG per tablet; Take 1 tablet by mouth every 6 hours as needed for moderate to severe pain  - HYDROcodone-acetaminophen (NORCO)  MG per tablet; Take 1 tablet by mouth every 6 hours as needed for moderate to severe pain    Benign non-nodular prostatic hyperplasia with lower urinary tract symptoms     - tamsulosin (FLOMAX) 0.4 MG capsule; TAKE 1 CAPSULE DAILY                 No follow-ups on file.    Jackeline Rachel MD  [facility]   Eb is a 67 year old, presenting for the following health issues:  Medication Request (Medication refill 3 month. )      History of Present Illness       Back Pain:  He presents for follow up of back pain. Patient's back pain is a recurring problem.  Location of back pain:  Right middle of back, right upper back and right  "shoulder  Description of back pain: dull ache  Back pain spreads: nowhere    Since patient first noticed back pain, pain is: always present, but gets better and worse  Does back pain interfere with his job:  No      Diabetes:   He presents for follow up of diabetes.  He is checking home blood glucose with a continuous glucose monitor.  He checks blood glucose before meals, before and after meals and at bedtime.  Blood glucose is sometimes over 200 and sometimes under 70. He is aware of hypoglycemia symptoms including confusion. He is concerned about other.  He is not experiencing numbness or burning in feet, excessive thirst, blurry vision, weight changes or redness, sores or blisters on feet.         Vascular Disease:  He presents for follow up of vascular disease.  He never takes nitroglycerin. He takes daily aspirin.    He eats 0-1 servings of fruits and vegetables daily.He consumes 0 sweetened beverage(s) daily.He exercises with enough effort to increase his heart rate 30 to 60 minutes per day.  He exercises with enough effort to increase his heart rate 3 or less days per week.   He is taking medications regularly.    Today's PHQ-9         PHQ-9 Total Score: 3    PHQ-9 Q9 Thoughts of better off dead/self-harm past 2 weeks :   Not at all    How difficult have these problems made it for you to do your work, take care of things at home, or get along with other people: Not difficult at all  Today's VIVIANA-7 Score: 0             Review of Systems   Constitutional, HEENT, cardiovascular, pulmonary, gi and gu systems are negative, except as otherwise noted.      Objective    /62 (BP Location: Right arm, Patient Position: Sitting, Cuff Size: Adult Large)   Pulse 71   Temp 97.5  F (36.4  C) (Tympanic)   Resp 16   Ht 1.753 m (5' 9\")   Wt 84.7 kg (186 lb 12.8 oz)   SpO2 95%   BMI 27.59 kg/m    Body mass index is 27.59 kg/m .  Physical Exam   GENERAL: healthy, alert and no distress  NECK: no adenopathy, no " asymmetry, masses, or scars and thyroid normal to palpation  RESP: lungs clear to auscultation - no rales, rhonchi or wheezes  CV: regular rate and rhythm, normal S1 S2, no S3 or S4, no murmur, click or rub, no peripheral edema and peripheral pulses strong  ABDOMEN: soft, nontender, no hepatosplenomegaly, no masses and bowel sounds normal  MS: no gross musculoskeletal defects noted, no edema        Labs up to date

## 2023-01-10 ENCOUNTER — ALLIED HEALTH/NURSE VISIT (OUTPATIENT)
Dept: EDUCATION SERVICES | Facility: CLINIC | Age: 68
End: 2023-01-10
Payer: COMMERCIAL

## 2023-01-10 DIAGNOSIS — E10.9 TYPE 1 DIABETES, HBA1C GOAL < 8% (H): ICD-10-CM

## 2023-01-10 PROCEDURE — G0108 DIAB MANAGE TRN  PER INDIV: HCPCS | Performed by: DIETITIAN, REGISTERED

## 2023-01-10 NOTE — PATIENT INSTRUCTIONS
Plan:    1.  Try to only treat lows with 30 grams of carbohydrate and wait 15 minutes, read cgms and make sure BG is coming up.    2.  Consider reducing a bolus at a meal prior to exercise (or shoveling).    3.  Goal is to prevent spiking high after lows and drop too low after a high.    4. Continue to wear your pump and Dexcom.  5.  No adjustments made to pump today.  6.  Follow up with Dr. Rachel March 1st, 2023 as scheduled.    7.  Follow up with Diabetes Education April 11th at 11am.    Aubree Hamilton RD, [facility][note 3], 12:09 PM, 1/10/2023

## 2023-01-10 NOTE — PROGRESS NOTES
Diabetes Self-Management Education & Support    Presents for: Follow-up    Type of Service: In Person Visit    ASSESSMENT:    Insulin Pump Information   MedGlycominds Minimed: 670G   BASAL RATES and times:   12   AM (midnight): 0.825 units/hour   3 am: 0.900 units/hour   9   AM: 0.750 units/hour   1 pm: 0.775 units/hour   9    PM: 0.800 units/hour     CARB RATIO:   6.5      Corection Factor (Sensitivity) and times:    34 mg/dL     BLOOD GLUCOSE TARGET and times:   12   AM (midnight): 110 - 120    Active Insulin Time:  3 hours       Sensor:  Dexcom G6    Patient has had type 1 diabetes for ~41 years.  Recent a1c of 7.7 (previous 7.9).  Currently being managed with pump therapy. He is on aspirin and statin therapy.  Complications of:  CAD, retinopathy, polyneuropathy, ED.  Last seen by diabetes education 10/4/22.  Home BG monitoring (Dexcom G6) reveals:  64.4% in target (up from 48.1% at last diabetes ed visit).     Primary concern: feels BG in AM is high alot.    Discussed: cgms results (show AM BG was high only 4 out of 10 days, discussed he may feel that because he is very engaged and motivated to prevent highs, so it is hard for him when he sees highs), appropriately treating low BG and high BG, without over treating in either direction.  Patient expressed understanding.      Patient's most recent   Lab Results   Component Value Date    A1C 7.7 09/09/2022    A1C 7.6 04/02/2021     is not meeting goal of <7.5    Diabetes knowledge and skills assessment:   Patient is knowledgeable in diabetes management concepts related to: Monitoring and Taking Medication    Continue education with the following diabetes management concepts: Healthy Eating, Being Active, Monitoring, Taking Medication, Problem Solving, Reducing Risks and Healthy Coping    Based on learning assessment above, most appropriate setting for further diabetes education would be: Individual setting.      PLAN    1.  Try to only treat lows with 30 grams of  "carbohydrate and wait 15 minutes, read cgms and make sure BG is coming up.    2.  Consider reducing a bolus at a meal prior to exercise (or shoveling).    3.  Goal is to prevent spiking high after lows and drop too low after a high.    4. Continue to wear your pump and Dexcom.  5.  No adjustments made to pump today.  6.  Follow up with Dr. Rachel March 1st, 2023 as scheduled.    7.  Follow up with Diabetes Education April 11th at 11am.    Aubree Hamilton RD, [facility], 12:09 PM, 1/10/2023      See Care Plan for co-developed, patient-state behavior change goals.  AVS provided for patient today.    Education Materials Provided:  No new materials provided today      SUBJECTIVE/OBJECTIVE:  Presents for: Follow-up  Accompanied by: Self  Diabetes education in the past 24mo: Yes  Focus of Visit: Insulin Pump, CGM  Type of Pump visit: Pump Review  Type of CGM visit: Personal CGM Follow-up  Diabetes type: Type 1  Transportation concerns: No  Other concerns:: None  Cultural Influences/Ethnic Background:  Not  or     Diabetes Symptoms & Complications:          Patient Problem List and Family Medical History reviewed for relevant medical history, current medical status, and diabetes risk factors.    Vitals:  There were no vitals taken for this visit.  Estimated body mass index is 27.59 kg/m  as calculated from the following:    Height as of 12/5/22: 1.753 m (5' 9\").    Weight as of 12/5/22: 84.7 kg (186 lb 12.8 oz).   Last 3 BP:   BP Readings from Last 3 Encounters:   12/05/22 114/62   09/09/22 100/60   05/11/22 112/62       History   Smoking Status     Former     Quit date: 1/1/2001   Smokeless Tobacco     Never       Labs:  Lab Results   Component Value Date    A1C 7.7 09/09/2022    A1C 7.6 04/02/2021     Lab Results   Component Value Date     09/09/2022     02/07/2022     04/02/2021     Lab Results   Component Value Date    LDL 63 02/07/2022    LDL 63 09/16/2020     HDL Cholesterol   Date " Value Ref Range Status   09/16/2020 53 >39 mg/dL Final     Direct Measure HDL   Date Value Ref Range Status   02/07/2022 51 >=40 mg/dL Final   ]  GFR Estimate   Date Value Ref Range Status   09/09/2022 76 >60 mL/min/1.73m2 Final     Comment:     Effective December 21, 2021 eGFRcr in adults is calculated using the 2021 CKD-EPI creatinine equation which includes age and gender (Zoila et al., [place], DOI: 10.1056/AMCXiw2203263)   04/02/2021 73 >60 mL/min/[1.73_m2] Final     Comment:     Non  GFR Calc  Starting 12/18/2018, serum creatinine based estimated GFR (eGFR) will be   calculated using the Chronic Kidney Disease Epidemiology Collaboration   (CKD-EPI) equation.       GFR Estimate If Black   Date Value Ref Range Status   04/02/2021 85 >60 mL/min/[1.73_m2] Final     Comment:      GFR Calc  Starting 12/18/2018, serum creatinine based estimated GFR (eGFR) will be   calculated using the Chronic Kidney Disease Epidemiology Collaboration   (CKD-EPI) equation.       Lab Results   Component Value Date    CR 1.06 09/09/2022    CR 1.05 04/02/2021     No results found for: MICROALBUMIN    Healthy Eating:  Healthy Eating Assessed Today: Yes  Cultural/Cheondoism diet restrictions?: No  Meal planning/habits: Carb counting, Low salt, Frequent snacking  How many times a week on average do you eat food made away from home (restaurant/take-out)?: 5+  Meals include: Dinner, Morning Snack, Lunch  Lunch: muffin  Dinner: 2 hotpockets  Has patient met with a dietitian in the past?: Yes    Being Active:  Being Active Assessed Today: Yes  Exercise:: Yes (shoveling)    Monitoring:  Monitoring Assessed Today: Yes  Did patient bring glucose meter to appointment? : Yes  Blood Glucose Meter: CGM (DexcomG6)                      Taking Medications:  Diabetes Medication(s)     Diabetic Other       GLUCAGON EMERGENCY KIT 1 MG IJ KIT    use as directed for severe hypoglycemia    Insulin       insulin aspart (NOVOLOG  VIAL) 100 UNITS/ML vial    USE AS DIRECTED IN INSULIN PUMP. TOTAL DAILY DOSE 60 UNITS          Taking Medication Assessed Today: Yes  Current Treatments: Insulin Pump  Given by: Patient  Injection/Infusion sites: Abdomen    Problem Solving:  Problem Solving Assessed Today: Yes  Is the patient at risk for hypoglycemia?: Yes  Hypoglycemia Frequency: Weekly  Hypoglycemia Treatment: Juice, Other food  Patient carries a carbohydrate source: Yes    Hypoglycemia symptoms  Sweats: Yes         Reducing Risks:       Healthy Coping:     Patient Activation Measure Survey Score:  BENEDICTO Score (Last Two) 2/17/2011   BENEDICTO Raw Score 42   Activation Score 66   BENEDICTO Level 3         Care Plan and Education Provided:  Care Plan: Diabetes   Updates made by Aubree Hamilton since 1/10/2023 12:00 AM      Problem: Diabetes Self-Management Education Needed to Optimize Self-Care Behaviors       Goal: Problem Solving - know how to prevent and manage short-term diabetes complications    This Visit's Progress: 50%   Note:    Focused on appropriately treating low BG.         Time Spent: 60 minutes  Encounter Type: Individual    Any diabetes medication dose changes were made via the CDE Protocol per the patient's primary care provider. A copy of this encounter was shared with the provider.

## 2023-01-10 NOTE — LETTER
1/10/2023         RE: Eb Eisenberg  [address] [place] [address]        Dear Colleague,    Thank you for referring your patient, Eb Eisenberg, to the [facility]. Please see a copy of my visit note below.    Diabetes Self-Management Education & Support    Presents for: Follow-up    Type of Service: In Person Visit    ASSESSMENT:    Insulin Pump Information   MedKaggle Minimed: 670G   BASAL RATES and times:   12   AM (midnight): 0.825 units/hour   3 am: 0.900 units/hour   9   AM: 0.750 units/hour   1 pm: 0.775 units/hour   9    PM: 0.800 units/hour     CARB RATIO:   6.5      Corection Factor (Sensitivity) and times:    34 mg/dL     BLOOD GLUCOSE TARGET and times:   12   AM (midnight): 110 - 120    Active Insulin Time:  3 hours       Sensor:  Dexcom G6    Patient has had type 1 diabetes for ~41 years.  Recent a1c of 7.7 (previous 7.9).  Currently being managed with pump therapy. He is on aspirin and statin therapy.  Complications of:  CAD, retinopathy, polyneuropathy, ED.  Last seen by diabetes education 10/4/22.  Home BG monitoring (Dexcom G6) reveals:  64.4% in target (up from 48.1% at last diabetes ed visit).     Primary concern: feels BG in AM is high alot.    Discussed: cgms results (show AM BG was high only 4 out of 10 days, discussed he may feel that because he is very engaged and motivated to prevent highs, so it is hard for him when he sees highs), appropriately treating low BG and high BG, without over treating in either direction.  Patient expressed understanding.      Patient's most recent   Lab Results   Component Value Date    A1C 7.7 09/09/2022    A1C 7.6 04/02/2021     is not meeting goal of <7.5    Diabetes knowledge and skills assessment:   Patient is knowledgeable in diabetes management concepts related to: Monitoring and Taking Medication    Continue education with the following diabetes management concepts: Healthy Eating, Being Active, Monitoring,  "Taking Medication, Problem Solving, Reducing Risks and Healthy Coping    Based on learning assessment above, most appropriate setting for further diabetes education would be: Individual setting.      PLAN    1.  Try to only treat lows with 30 grams of carbohydrate and wait 15 minutes, read cgms and make sure BG is coming up.    2.  Consider reducing a bolus at a meal prior to exercise (or shoveling).    3.  Goal is to prevent spiking high after lows and drop too low after a high.    4. Continue to wear your pump and Dexcom.  5.  No adjustments made to pump today.  6.  Follow up with Dr. Racehl March 1st, 2023 as scheduled.    7.  Follow up with Diabetes Education April 11th at 11am.    Aubree Hamilton RD, [facility], 12:09 PM, 1/10/2023      See Care Plan for co-developed, patient-state behavior change goals.  AVS provided for patient today.    Education Materials Provided:  No new materials provided today      SUBJECTIVE/OBJECTIVE:  Presents for: Follow-up  Accompanied by: Self  Diabetes education in the past 24mo: Yes  Focus of Visit: Insulin Pump, CGM  Type of Pump visit: Pump Review  Type of CGM visit: Personal CGM Follow-up  Diabetes type: Type 1  Transportation concerns: No  Other concerns:: None  Cultural Influences/Ethnic Background:  Not  or     Diabetes Symptoms & Complications:          Patient Problem List and Family Medical History reviewed for relevant medical history, current medical status, and diabetes risk factors.    Vitals:  There were no vitals taken for this visit.  Estimated body mass index is 27.59 kg/m  as calculated from the following:    Height as of 12/5/22: 1.753 m (5' 9\").    Weight as of 12/5/22: 84.7 kg (186 lb 12.8 oz).   Last 3 BP:   BP Readings from Last 3 Encounters:   12/05/22 114/62   09/09/22 100/60   05/11/22 112/62       History   Smoking Status     Former     Quit date: 1/1/2001   Smokeless Tobacco     Never       Labs:  Lab Results   Component Value Date    A1C " 7.7 09/09/2022    A1C 7.6 04/02/2021     Lab Results   Component Value Date     09/09/2022     02/07/2022     04/02/2021     Lab Results   Component Value Date    LDL 63 02/07/2022    LDL 63 09/16/2020     HDL Cholesterol   Date Value Ref Range Status   09/16/2020 53 >39 mg/dL Final     Direct Measure HDL   Date Value Ref Range Status   02/07/2022 51 >=40 mg/dL Final   ]  GFR Estimate   Date Value Ref Range Status   09/09/2022 76 >60 mL/min/1.73m2 Final     Comment:     Effective December 21, 2021 eGFRcr in adults is calculated using the 2021 CKD-EPI creatinine equation which includes age and gender (Zoila et al., [place], DOI: 10.1056/YNXDwa3443818)   04/02/2021 73 >60 mL/min/[1.73_m2] Final     Comment:     Non  GFR Calc  Starting 12/18/2018, serum creatinine based estimated GFR (eGFR) will be   calculated using the Chronic Kidney Disease Epidemiology Collaboration   (CKD-EPI) equation.       GFR Estimate If Black   Date Value Ref Range Status   04/02/2021 85 >60 mL/min/[1.73_m2] Final     Comment:      GFR Calc  Starting 12/18/2018, serum creatinine based estimated GFR (eGFR) will be   calculated using the Chronic Kidney Disease Epidemiology Collaboration   (CKD-EPI) equation.       Lab Results   Component Value Date    CR 1.06 09/09/2022    CR 1.05 04/02/2021     No results found for: MICROALBUMIN    Healthy Eating:  Healthy Eating Assessed Today: Yes  Cultural/Hinduism diet restrictions?: No  Meal planning/habits: Carb counting, Low salt, Frequent snacking  How many times a week on average do you eat food made away from home (restaurant/take-out)?: 5+  Meals include: Dinner, Morning Snack, Lunch  Lunch: muffin  Dinner: 2 hotpockets  Has patient met with a dietitian in the past?: Yes    Being Active:  Being Active Assessed Today: Yes  Exercise:: Yes (shoveling)    Monitoring:  Monitoring Assessed Today: Yes  Did patient bring glucose meter to appointment? :  Yes  Blood Glucose Meter: CGM (DexcomG6)                      Taking Medications:  Diabetes Medication(s)     Diabetic Other       GLUCAGON EMERGENCY KIT 1 MG IJ KIT    use as directed for severe hypoglycemia    Insulin       insulin aspart (NOVOLOG VIAL) 100 UNITS/ML vial    USE AS DIRECTED IN INSULIN PUMP. TOTAL DAILY DOSE 60 UNITS          Taking Medication Assessed Today: Yes  Current Treatments: Insulin Pump  Given by: Patient  Injection/Infusion sites: Abdomen    Problem Solving:  Problem Solving Assessed Today: Yes  Is the patient at risk for hypoglycemia?: Yes  Hypoglycemia Frequency: Weekly  Hypoglycemia Treatment: Juice, Other food  Patient carries a carbohydrate source: Yes    Hypoglycemia symptoms  Sweats: Yes         Reducing Risks:       Healthy Coping:     Patient Activation Measure Survey Score:  BENEDICTO Score (Last Two) 2/17/2011   BENEDICTO Raw Score 42   Activation Score 66   BENEDICTO Level 3         Care Plan and Education Provided:  Care Plan: Diabetes   Updates made by Aubree Hamilton since 1/10/2023 12:00 AM      Problem: Diabetes Self-Management Education Needed to Optimize Self-Care Behaviors       Goal: Problem Solving - know how to prevent and manage short-term diabetes complications    This Visit's Progress: 50%   Note:    Focused on appropriately treating low BG.         Time Spent: 60 minutes  Encounter Type: Individual    Any diabetes medication dose changes were made via the CDE Protocol per the patient's primary care provider. A copy of this encounter was shared with the provider.

## 2023-03-01 ENCOUNTER — OFFICE VISIT (OUTPATIENT)
Dept: FAMILY MEDICINE | Facility: CLINIC | Age: 68
End: 2023-03-01
Payer: COMMERCIAL

## 2023-03-01 VITALS
TEMPERATURE: 97.8 F | BODY MASS INDEX: 28.49 KG/M2 | OXYGEN SATURATION: 94 % | RESPIRATION RATE: 16 BRPM | SYSTOLIC BLOOD PRESSURE: 120 MMHG | HEART RATE: 82 BPM | HEIGHT: 69 IN | DIASTOLIC BLOOD PRESSURE: 60 MMHG | WEIGHT: 192.38 LBS

## 2023-03-01 DIAGNOSIS — E78.5 HYPERLIPIDEMIA LDL GOAL <70: ICD-10-CM

## 2023-03-01 DIAGNOSIS — I25.10 CORONARY ARTERY DISEASE INVOLVING NATIVE HEART WITHOUT ANGINA PECTORIS, UNSPECIFIED VESSEL OR LESION TYPE: ICD-10-CM

## 2023-03-01 DIAGNOSIS — M48.02 CERVICAL STENOSIS OF SPINE: ICD-10-CM

## 2023-03-01 DIAGNOSIS — M25.511 CHRONIC RIGHT SHOULDER PAIN: ICD-10-CM

## 2023-03-01 DIAGNOSIS — G89.29 CHRONIC RIGHT SHOULDER PAIN: ICD-10-CM

## 2023-03-01 DIAGNOSIS — E10.319 TYPE 1 DIABETES MELLITUS WITH RETINOPATHY, MACULAR EDEMA PRESENCE UNSPECIFIED, UNSPECIFIED LATERALITY, UNSPECIFIED RETINOPATHY SEVERITY (H): Primary | ICD-10-CM

## 2023-03-01 DIAGNOSIS — F11.90 CHRONIC NARCOTIC USE: ICD-10-CM

## 2023-03-01 DIAGNOSIS — I51.9 LEFT VENTRICULAR DYSFUNCTION: ICD-10-CM

## 2023-03-01 DIAGNOSIS — I42.8 NON-ISCHEMIC CARDIOMYOPATHY (H): ICD-10-CM

## 2023-03-01 DIAGNOSIS — F32.2 MAJOR DEPRESSIVE DISORDER, SINGLE EPISODE, SEVERE (H): ICD-10-CM

## 2023-03-01 DIAGNOSIS — E10.42 DIABETIC POLYNEUROPATHY ASSOCIATED WITH TYPE 1 DIABETES MELLITUS (H): ICD-10-CM

## 2023-03-01 DIAGNOSIS — E10.59 TYPE 1 DIABETES MELLITUS WITH VASCULAR DISEASE (H): ICD-10-CM

## 2023-03-01 LAB
ANION GAP SERPL CALCULATED.3IONS-SCNC: 11 MMOL/L (ref 7–15)
BUN SERPL-MCNC: 19.7 MG/DL (ref 8–23)
CALCIUM SERPL-MCNC: 9.2 MG/DL (ref 8.8–10.2)
CHLORIDE SERPL-SCNC: 107 MMOL/L (ref 98–107)
CHOLEST SERPL-MCNC: 136 MG/DL
CREAT SERPL-MCNC: 1.04 MG/DL (ref 0.67–1.17)
DEPRECATED HCO3 PLAS-SCNC: 24 MMOL/L (ref 22–29)
GFR SERPL CREATININE-BSD FRML MDRD: 78 ML/MIN/1.73M2
GLUCOSE SERPL-MCNC: 206 MG/DL (ref 70–99)
HBA1C MFR BLD: 7.9 % (ref 0–5.6)
HDLC SERPL-MCNC: 59 MG/DL
LDLC SERPL CALC-MCNC: 56 MG/DL
NONHDLC SERPL-MCNC: 77 MG/DL
POTASSIUM SERPL-SCNC: 4.4 MMOL/L (ref 3.4–5.3)
SODIUM SERPL-SCNC: 142 MMOL/L (ref 136–145)
TRIGL SERPL-MCNC: 105 MG/DL

## 2023-03-01 PROCEDURE — 36415 COLL VENOUS BLD VENIPUNCTURE: CPT | Performed by: FAMILY MEDICINE

## 2023-03-01 PROCEDURE — 99214 OFFICE O/P EST MOD 30 MIN: CPT | Performed by: FAMILY MEDICINE

## 2023-03-01 PROCEDURE — 0134A COVID-19 VACCINE BIVALENT BOOSTER 18+ (MODERNA): CPT | Performed by: FAMILY MEDICINE

## 2023-03-01 PROCEDURE — 80061 LIPID PANEL: CPT | Performed by: FAMILY MEDICINE

## 2023-03-01 PROCEDURE — 80048 BASIC METABOLIC PNL TOTAL CA: CPT | Performed by: FAMILY MEDICINE

## 2023-03-01 PROCEDURE — 83036 HEMOGLOBIN GLYCOSYLATED A1C: CPT | Performed by: FAMILY MEDICINE

## 2023-03-01 PROCEDURE — 91313 COVID-19 VACCINE BIVALENT BOOSTER 18+ (MODERNA): CPT | Performed by: FAMILY MEDICINE

## 2023-03-01 RX ORDER — PAROXETINE 20 MG/1
20 TABLET, FILM COATED ORAL EVERY MORNING
Qty: 90 TABLET | Refills: 3 | Status: SHIPPED | OUTPATIENT
Start: 2023-03-01 | End: 2024-04-22 | Stop reason: DRUGHIGH

## 2023-03-01 RX ORDER — HYDROCODONE BITARTRATE AND ACETAMINOPHEN 10; 325 MG/1; MG/1
1 TABLET ORAL EVERY 6 HOURS PRN
Qty: 120 TABLET | Refills: 0 | Status: SHIPPED | OUTPATIENT
Start: 2023-04-28 | End: 2023-05-24

## 2023-03-01 RX ORDER — HYDROCODONE BITARTRATE AND ACETAMINOPHEN 10; 325 MG/1; MG/1
1 TABLET ORAL EVERY 6 HOURS PRN
Qty: 120 TABLET | Refills: 0 | Status: SHIPPED | OUTPATIENT
Start: 2023-03-31 | End: 2023-04-30

## 2023-03-01 RX ORDER — BUPROPION HYDROCHLORIDE 150 MG/1
150 TABLET ORAL EVERY MORNING
Qty: 90 TABLET | Refills: 3 | Status: SHIPPED | OUTPATIENT
Start: 2023-03-01 | End: 2024-07-18

## 2023-03-01 RX ORDER — LOSARTAN POTASSIUM 50 MG/1
TABLET ORAL
Qty: 90 TABLET | Refills: 1 | Status: SHIPPED | OUTPATIENT
Start: 2023-03-01 | End: 2023-09-12

## 2023-03-01 RX ORDER — HYDROCODONE BITARTRATE AND ACETAMINOPHEN 10; 325 MG/1; MG/1
1 TABLET ORAL EVERY 6 HOURS PRN
Qty: 120 TABLET | Refills: 0 | Status: SHIPPED | OUTPATIENT
Start: 2023-03-01 | End: 2023-05-31

## 2023-03-01 RX ORDER — INSULIN ASPART 100 [IU]/ML
INJECTION, SOLUTION INTRAVENOUS; SUBCUTANEOUS
Qty: 60 ML | Refills: 3 | Status: SHIPPED | OUTPATIENT
Start: 2023-03-01 | End: 2024-03-11

## 2023-03-01 RX ORDER — ROSUVASTATIN CALCIUM 40 MG/1
TABLET, COATED ORAL
Qty: 90 TABLET | Refills: 1 | Status: SHIPPED | OUTPATIENT
Start: 2023-03-01 | End: 2023-10-18

## 2023-03-01 RX ORDER — METOPROLOL SUCCINATE 25 MG/1
TABLET, EXTENDED RELEASE ORAL
Qty: 90 TABLET | Refills: 1 | Status: SHIPPED | OUTPATIENT
Start: 2023-03-01 | End: 2023-09-12

## 2023-03-01 ASSESSMENT — ANXIETY QUESTIONNAIRES
5. BEING SO RESTLESS THAT IT IS HARD TO SIT STILL: NOT AT ALL
GAD7 TOTAL SCORE: 0
7. FEELING AFRAID AS IF SOMETHING AWFUL MIGHT HAPPEN: NOT AT ALL
IF YOU CHECKED OFF ANY PROBLEMS ON THIS QUESTIONNAIRE, HOW DIFFICULT HAVE THESE PROBLEMS MADE IT FOR YOU TO DO YOUR WORK, TAKE CARE OF THINGS AT HOME, OR GET ALONG WITH OTHER PEOPLE: NOT DIFFICULT AT ALL
8. IF YOU CHECKED OFF ANY PROBLEMS, HOW DIFFICULT HAVE THESE MADE IT FOR YOU TO DO YOUR WORK, TAKE CARE OF THINGS AT HOME, OR GET ALONG WITH OTHER PEOPLE?: NOT DIFFICULT AT ALL
7. FEELING AFRAID AS IF SOMETHING AWFUL MIGHT HAPPEN: NOT AT ALL
4. TROUBLE RELAXING: NOT AT ALL
2. NOT BEING ABLE TO STOP OR CONTROL WORRYING: NOT AT ALL
6. BECOMING EASILY ANNOYED OR IRRITABLE: NOT AT ALL
GAD7 TOTAL SCORE: 0
3. WORRYING TOO MUCH ABOUT DIFFERENT THINGS: NOT AT ALL
1. FEELING NERVOUS, ANXIOUS, OR ON EDGE: NOT AT ALL

## 2023-03-01 ASSESSMENT — PATIENT HEALTH QUESTIONNAIRE - PHQ9: SUM OF ALL RESPONSES TO PHQ QUESTIONS 1-9: 0

## 2023-03-01 ASSESSMENT — PAIN SCALES - GENERAL: PAINLEVEL: SEVERE PAIN (6)

## 2023-03-01 NOTE — PROGRESS NOTES
Assessment & Plan     Type 1 diabetes mellitus with retinopathy, macular edema presence unspecified, unspecified laterality, unspecified retinopathy severity (H)  Well controlled for patient - with tighter control he often has more hypoglycemia and this has been severe in the past.   - HEMOGLOBIN A1C; Future  - BASIC METABOLIC PANEL; Future  - Albumin Random Urine Quantitative with Creat Ratio; Future  - HEMOGLOBIN A1C  - BASIC METABOLIC PANEL  - Albumin Random Urine Quantitative with Creat Ratio  - COVID-19,PF,MODERNA BIVALENT (18+YRS)    Coronary artery disease involving native heart without angina pectoris, unspecified vessel or lesion type  Stable, asymptomatic  - Lipid panel reflex to direct LDL Non-fasting; Future  - Lipid panel reflex to direct LDL Non-fasting    Non-ischemic cardiomyopathy (H)  Stable, asymptomatic  Last testing - Lexiscan 2019 showing EF 62%  Continue ACEi    Major depressive disorder, single episode, severe (H)  Stable  - PARoxetine (PAXIL) 20 MG tablet; Take 1 tablet (20 mg) by mouth every morning  - buPROPion (WELLBUTRIN XL) 150 MG 24 hr tablet; Take 1 tablet (150 mg) by mouth every morning    Diabetic polyneuropathy associated with type 1 diabetes mellitus (H)   stable  - HYDROcodone-acetaminophen (NORCO)  MG per tablet; Take 1 tablet by mouth every 6 hours as needed for moderate to severe pain  - HYDROcodone-acetaminophen (NORCO)  MG per tablet; Take 1 tablet by mouth every 6 hours as needed for moderate to severe pain  - HYDROcodone-acetaminophen (NORCO)  MG per tablet; Take 1 tablet by mouth every 6 hours as needed for moderate to severe pain  - metoprolol succinate ER (TOPROL XL) 25 MG 24 hr tablet; TAKE 1 TABLET DAILY    Chronic narcotic use     - HYDROcodone-acetaminophen (NORCO)  MG per tablet; Take 1 tablet by mouth every 6 hours as needed for moderate to severe pain  - HYDROcodone-acetaminophen (NORCO)  MG per tablet; Take 1 tablet by mouth every  "6 hours as needed for moderate to severe pain  - HYDROcodone-acetaminophen (NORCO)  MG per tablet; Take 1 tablet by mouth every 6 hours as needed for moderate to severe pain    Cervical stenosis of spine     - HYDROcodone-acetaminophen (NORCO)  MG per tablet; Take 1 tablet by mouth every 6 hours as needed for moderate to severe pain  - HYDROcodone-acetaminophen (NORCO)  MG per tablet; Take 1 tablet by mouth every 6 hours as needed for moderate to severe pain  - HYDROcodone-acetaminophen (NORCO)  MG per tablet; Take 1 tablet by mouth every 6 hours as needed for moderate to severe pain    Type 1 diabetes mellitus with vascular disease (H)     - insulin aspart (NOVOLOG VIAL) 100 UNITS/ML vial; USE AS DIRECTED IN INSULIN PUMP, TOTAL DAILY DOSE 60 UNITS    Left ventricular dysfunction     - losartan (COZAAR) 50 MG tablet; TAKE 1 TABLET DAILY  - metoprolol succinate ER (TOPROL XL) 25 MG 24 hr tablet; TAKE 1 TABLET DAILY    Hyperlipidemia LDL goal <70     - rosuvastatin (CRESTOR) 40 MG tablet; TAKE 1 TABLET EVERY DAY    Chronic right shoulder pain  New concern  Limited time to assess properly today, will refer to ortho  - Orthopedic  Referral; Future             BMI:   Estimated body mass index is 28.41 kg/m  as calculated from the following:    Height as of this encounter: 1.753 m (5' 9\").    Weight as of this encounter: 87.3 kg (192 lb 6 oz).           No follow-ups on file.    Jackeline Rachel MD  [facility]   Eb is a 68 year old, presenting for the following health issues:  Diabetes    Diabetes Follow-up  Insulin - Novolog      History of Present Illness       Back Pain:  He presents for follow up of back pain. Patient's back pain is a chronic problem.  Location of back pain:  Left upper back, left side of neck and left shoulder  Description of back pain: dull ache  Back pain spreads: left shoulder and left side of neck    Since patient first " noticed back pain, pain is: gradually worsening  Does back pain interfere with his job:  Yes      Diabetes:   He presents for follow up of diabetes.  He is checking home blood glucose with a continuous glucose monitor.  He checks blood glucose before and after meals.  Blood glucose is sometimes over 200 and sometimes under 70. He is aware of hypoglycemia symptoms including shakiness and weakness. He has no concerns regarding his diabetes at this time.  He is not experiencing numbness or burning in feet, excessive thirst, blurry vision, weight changes or redness, sores or blisters on feet. The patient has not had a diabetic eye exam in the last 12 months.         Vascular Disease:  He presents for follow up of vascular disease.  He never takes nitroglycerin. He takes daily aspirin.    Reason for visit:  Diabetes follow up    He eats 0-1 servings of fruits and vegetables daily.He consumes 0 sweetened beverage(s) daily.He exercises with enough effort to increase his heart rate 10 to 19 minutes per day.  He exercises with enough effort to increase his heart rate 3 or less days per week.   He is taking medications regularly.  Today's VIVIANA-7 Score: 0           Hyperlipidemia Follow-Up  Rosuvastatin 40mg qd    Are you regularly taking any medication or supplement to lower your cholesterol?   Yes- statin    Are you having muscle aches or other side effects that you think could be caused by your cholesterol lowering medication?  No    Hypertension Follow-up  Losartan 50mg every day, metoprolol 25mg qd    Do you check your blood pressure regularly outside of the clinic? No     Are you following a low salt diet? Yes    Are your blood pressures ever more than 140 on the top number (systolic) OR more   than 90 on the bottom number (diastolic), for example 140/90? N/A    BP Readings from Last 2 Encounters:   03/01/23 120/60   12/05/22 114/62     Hemoglobin A1C (%)   Date Value   09/09/2022 7.7 (H)   02/07/2022 7.9 (H)   04/02/2021  7.6 (H)   2020 7.4 (H)     LDL Cholesterol Calculated (mg/dL)   Date Value   2022 63   2020 63   2019 57       Depression Followup  Wellbutrin XL 150mg every day, paxil 20mg qd    How are you doing with your depression since your last visit? No change    Are you having other symptoms that might be associated with depression? No    Have you had a significant life event?  No     Are you feeling anxious or having panic attacks?   No    Do you have any concerns with your use of alcohol or other drugs? No    Social History     Tobacco Use     Smoking status: Former     Types: Cigarettes     Quit date: 2001     Years since quittin.1     Smokeless tobacco: Never   Vaping Use     Vaping Use: Never used   Substance Use Topics     Alcohol use: No     Comment: quit in early      Drug use: No     PHQ 2022 2022 3/1/2023   PHQ-9 Total Score 0 3 0   Q9: Thoughts of better off dead/self-harm past 2 weeks Not at all Not at all Not at all     VIVIANA-7 SCORE 2022 2022 3/1/2023   Total Score - - -   Total Score 0 (minimal anxiety) 0 (minimal anxiety) 0 (minimal anxiety)   Total Score 0 0 0     Last PHQ-9 3/1/2023   1.  Little interest or pleasure in doing things 0   2.  Feeling down, depressed, or hopeless 0   3.  Trouble falling or staying asleep, or sleeping too much 0   4.  Feeling tired or having little energy 0   5.  Poor appetite or overeating 0   6.  Feeling bad about yourself 0   7.  Trouble concentrating 0   8.  Moving slowly or restless 0   Q9: Thoughts of better off dead/self-harm past 2 weeks 0   PHQ-9 Total Score 0   Difficulty at work, home, or with people -     VIVIANA-7  3/1/2023   1. Feeling nervous, anxious, or on edge 0   2. Not being able to stop or control worrying 0   3. Worrying too much about different things 0   4. Trouble relaxing 0   5. Being so restless that it is hard to sit still 0   6. Becoming easily annoyed or irritable 0   7. Feeling afraid, as if  "something awful might happen 0   VIVIANA-7 Total Score 0   If you checked any problems, how difficult have they made it for you to do your work, take care of things at home, or get along with other people? Not difficult at all       Suicide Assessment Five-step Evaluation and Treatment (SAFE-T)          Review of Systems   Constitutional, HEENT, cardiovascular, pulmonary, gi and gu systems are negative, except as otherwise noted.      Right shoulder pain.  Ruptured right biceps tendon several years ago, didn't have repair.    Ears itch all the time.           Objective    /60   Pulse 82   Temp 97.8  F (36.6  C) (Tympanic)   Resp 16   Ht 1.753 m (5' 9\")   Wt 87.3 kg (192 lb 6 oz)   SpO2 94%   BMI 28.41 kg/m    Body mass index is 28.41 kg/m .  Physical Exam   GENERAL: healthy, alert and no distress  NECK: no adenopathy, no asymmetry, masses, or scars and thyroid normal to palpation  RESP: lungs clear to auscultation - no rales, rhonchi or wheezes  CV: regular rate and rhythm, normal S1 S2, no S3 or S4, no murmur, click or rub, no peripheral edema and peripheral pulses strong  ABDOMEN: soft, nontender, no hepatosplenomegaly, no masses and bowel sounds normal  MS: no gross musculoskeletal defects noted, no edema    Results for orders placed or performed in visit on 03/01/23   HEMOGLOBIN A1C     Status: Abnormal   Result Value Ref Range    Hemoglobin A1C 7.9 (H) 0.0 - 5.6 %                   "

## 2023-03-02 LAB
CREAT UR-MCNC: 64.7 MG/DL
MICROALBUMIN UR-MCNC: <12 MG/L
MICROALBUMIN/CREAT UR: NORMAL MG/G{CREAT}

## 2023-03-02 PROCEDURE — 82043 UR ALBUMIN QUANTITATIVE: CPT | Performed by: FAMILY MEDICINE

## 2023-03-02 PROCEDURE — 82570 ASSAY OF URINE CREATININE: CPT | Performed by: FAMILY MEDICINE

## 2023-03-02 NOTE — RESULT ENCOUNTER NOTE
Eb,    These labs are normal or acceptable.    Please contact my office if you have questions.    Jackeline Rachel M.D.

## 2023-03-23 ENCOUNTER — ANCILLARY PROCEDURE (OUTPATIENT)
Dept: GENERAL RADIOLOGY | Facility: CLINIC | Age: 68
End: 2023-03-23
Attending: FAMILY MEDICINE
Payer: COMMERCIAL

## 2023-03-23 ENCOUNTER — OFFICE VISIT (OUTPATIENT)
Dept: ORTHOPEDICS | Facility: CLINIC | Age: 68
End: 2023-03-23
Attending: FAMILY MEDICINE
Payer: COMMERCIAL

## 2023-03-23 VITALS — BODY MASS INDEX: 28.44 KG/M2 | WEIGHT: 192 LBS | HEIGHT: 69 IN

## 2023-03-23 DIAGNOSIS — M19.011 OSTEOARTHRITIS OF GLENOHUMERAL JOINT, RIGHT: Primary | ICD-10-CM

## 2023-03-23 DIAGNOSIS — M25.511 CHRONIC RIGHT SHOULDER PAIN: ICD-10-CM

## 2023-03-23 DIAGNOSIS — G89.29 CHRONIC RIGHT SHOULDER PAIN: ICD-10-CM

## 2023-03-23 DIAGNOSIS — M75.81 ROTATOR CUFF TENDINITIS, RIGHT: ICD-10-CM

## 2023-03-23 PROCEDURE — 20611 DRAIN/INJ JOINT/BURSA W/US: CPT | Mod: RT | Performed by: FAMILY MEDICINE

## 2023-03-23 PROCEDURE — 99204 OFFICE O/P NEW MOD 45 MIN: CPT | Mod: 25 | Performed by: FAMILY MEDICINE

## 2023-03-23 PROCEDURE — 73030 X-RAY EXAM OF SHOULDER: CPT | Mod: TC | Performed by: RADIOLOGY

## 2023-03-23 RX ADMIN — TRIAMCINOLONE ACETONIDE 40 MG: 40 INJECTION, SUSPENSION INTRA-ARTICULAR; INTRAMUSCULAR at 12:12

## 2023-03-23 RX ADMIN — ROPIVACAINE HYDROCHLORIDE 3 ML: 5 INJECTION, SOLUTION EPIDURAL; INFILTRATION; PERINEURAL at 12:12

## 2023-03-23 NOTE — PROGRESS NOTES
Eb Eisenberg  :  1955  DOS: 3/23/2023  MRN: [id number]    Sports Medicine Clinic Visit    PCP: Jackeline Rachel    Eb Eisenberg is a 68 year old Right hand dominant male who is seen in consultation at the request of  Jackeline Rachel M.D. presenting with right shoulder pain.    Injury: Gradual onset of pain over the past ~ 4 months (2022).  Pain located over right lateral shoulder, nonradiating.  Additional Features:  Positive: weakness.  Symptoms are better with rest.  Symptoms are worse with: shoulder flexion/abduction, reaching overhead, IR behind back, lifting, lying on right shoulder.  Other evaluation and/or treatments so far consists of: Ibuprofen, Other medications: Norco (has for chronic pain) and Rest.  Recent imaging completed: No recent imaging completed.  Prior History of related problems: history of similar right shoulder pain in  that improved with steroid injection.  Status post c-spine fusion surgery ~ 10 years ago.    Social History: retired    Review of Systems  Musculoskeletal: as above  Remainder of review of systems is negative including constitutional, CV, pulmonary, GI, Skin and Neurologic except as noted in HPI or medical history.    Past Medical History:   Diagnosis Date     Carpal tunnel syndrome     Bilateral      Impotence of organic origin      Pure hypercholesterolemia      Past Surgical History:   Procedure Laterality Date     BACK SURGERY       COLONOSCOPY  2014    Procedure: COLONOSCOPY;  Colonoscopy  ;  Surgeon: Jesus Beltrán MD;  Location: [facility]     ESOPHAGOSCOPY, GASTROSCOPY, DUODENOSCOPY (EGD), COMBINED  2014    Procedure: COMBINED ESOPHAGOSCOPY, GASTROSCOPY, DUODENOSCOPY (EGD);  Gastroscopy  ;  Surgeon: Jesus Beltrán MD;  Location: [facility]     ORTHOPEDIC SURGERY       PHACOEMULSIFICATION WITH STANDARD INTRAOCULAR LENS IMPLANT Right 2016    Procedure: PHACOEMULSIFICATION WITH STANDARD INTRAOCULAR LENS IMPLANT;  Surgeon: Anthony Pugh,  "MD;  Location: [facility]     PHACOEMULSIFICATION WITH STANDARD INTRAOCULAR LENS IMPLANT Left 7/14/2016    Procedure: PHACOEMULSIFICATION WITH STANDARD INTRAOCULAR LENS IMPLANT;  Surgeon: Anthony Pugh MD;  Location: [facility]     SURGICAL HISTORY OF -   2004    carpal tunnel surgery, bilateral     SURGICAL HISTORY OF -   1/5/2010    Laminectomy/Discectomy Cervical/fusion     VASECTOMY  1990     Family History   Problem Relation Age of Onset     Diabetes Father      Cancer Brother         ear and face     Diabetes Brother      Parkinsonism Brother        Objective  Ht 1.753 m (5' 9\")   Wt 87.1 kg (192 lb)   BMI 28.35 kg/m        General: healthy, alert and in no distress      HEENT: no scleral icterus or conjunctival erythema     Skin: no suspicious lesions or rash. No jaundice.     CV: regular rhythm by palpation, 2+ distal pulses, no pedal edema      Resp: normal respiratory effort without conversational dyspnea     Psych: normal mood and affect      Gait: nonantalgic, appropriate coordination and balance     Neuro: normal light touch sensory exam of the extremities. Motor strength as noted below     Right Shoulder exam    ROM:        Mildly decreased terminal active and passive ROM with flexion, extension, abduction, internal and external rotation.    Tender:        posterior shoulder       Anterior GH joint       Mild subacromial space    Non Tender:       remainder of shoulder       sternoclavicular joint       acromioclavicular joint    Strength:        abduction 5/5       internal rotation 5/5       external rotation 5/5       adduction 5/5    Impingement testing:        positive (+) Neer       neg (-) Rudd       painful empty can       neg (-) crossover       positive (+) crank    Stability testing:       neg (-) anterior glide       neg (-) sulcus sign    Skin:       no visible deformities       well perfused       capillary refill brisk    Sensation:        normal sensation over shoulder and upper " extremity       Radiology  Examination:  XR SHOULDER RIGHT G/E 3 VIEWS     Date:  3/23/2023 11:25 AM      Clinical Information: Chronic right shoulder pain.     Comparison: none.                                                                      Impression:     1.  Normal joint alignment. Mild degenerative joint space narrowing  and spurring in the glenohumeral and acromioclavicular joints.     2.  Right shoulder negative for fracture or concerning bone lesion.     3.  Degenerative changes in the cervical spine, and prior low anterior  cervical fusion.    Large Joint Injection/Arthocentesis: R glenohumeral joint    Date/Time: 3/23/2023 12:12 PM    Performed by: Farhat Smalls DO  Authorized by: Farhat Smalls DO    Indications:  Pain and osteoarthritis  Needle Size:  21 G  Guidance: ultrasound    Approach:  Posterolateral  Location:  Shoulder      Site:  R glenohumeral joint  Medications:  3 mL ropivacaine 5 MG/ML; 40 mg triamcinolone 40 MG/ML  Outcome:  Tolerated well, no immediate complications  Procedure discussed: discussed risks, benefits, and alternatives    Consent Given by:  Patient  Timeout: timeout called immediately prior to procedure    Prep: patient was prepped and draped in usual sterile fashion     Ultrasound images of procedure were permanently stored.           Assessment:  1. Osteoarthritis of glenohumeral joint, right    2. Chronic right shoulder pain    3. Rotator cuff tendinitis, right        Plan:  Discussed the assessment with the patient.  Follow up: prn based on clinical progress  Subacute right shoulder pain in the setting of chronic cervical radicular issues  Pain in right shoulder does not appear radicular, but problems can interrelate  Some RTC sx, no helene weakness  Most focal pain in right GH joint  Trial of diagnostic and hopefully therapeutic CSI of the right GH joint today  PT options reviewed  Oral Tylenol and topical Voltaren gel reviewed as safe OTC options,  reviewed safe dosing strategies  XR images independently visualized and reviewed with patient today in clinic  Consider advanced imaging based on progress  Expectations and goals of CSI reviewed  Often 2-3 days for steroid effect, and can take up to two weeks for maximum effect  We discussed modified progressive pain-free activity as tolerated  Do not overuse in first two weeks if feeling better due to concern for vulnerability while steroid is working  Supportive care reviewed  All questions were answered today  Contact us with additional questions or concerns  Signs and sx of concern reviewed    Thanks very much for sending this nice gentleman to us, I will keep you updated with his progress      Farhat Smalls DO, CAQ  Sports Medicine Physician  [facility]                Disclaimer: This note consists of symbols derived from keyboarding, dictation and/or voice recognition software. As a result, there may be errors in the script that have gone undetected. Please consider this when interpreting information found in this chart.

## 2023-03-23 NOTE — LETTER
3/23/2023         RE: Eb Eisenberg  [address] [place] [address]        Dear Colleague,    Thank you for referring your patient, Eb Eisenberg, to the [facility] [place]. Please see a copy of my visit note below.    Eb Eisenberg  :  1955  DOS: 3/23/2023  MRN: [id number]    Sports Medicine Clinic Visit    PCP: Jackeline Rachel    Eb Eisenberg is a 68 year old Right hand dominant male who is seen in consultation at the request of  Jackeline Rachel M.D. presenting with right shoulder pain.    Injury: Gradual onset of pain over the past ~ 4 months (2022).  Pain located over right lateral shoulder, nonradiating.  Additional Features:  Positive: weakness.  Symptoms are better with rest.  Symptoms are worse with: shoulder flexion/abduction, reaching overhead, IR behind back, lifting, lying on right shoulder.  Other evaluation and/or treatments so far consists of: Ibuprofen, Other medications: Norco (has for chronic pain) and Rest.  Recent imaging completed: No recent imaging completed.  Prior History of related problems: history of similar right shoulder pain in  that improved with steroid injection.  Status post c-spine fusion surgery ~ 10 years ago.    Social History: retired    Review of Systems  Musculoskeletal: as above  Remainder of review of systems is negative including constitutional, CV, pulmonary, GI, Skin and Neurologic except as noted in HPI or medical history.    Past Medical History:   Diagnosis Date     Carpal tunnel syndrome     Bilateral      Impotence of organic origin      Pure hypercholesterolemia      Past Surgical History:   Procedure Laterality Date     BACK SURGERY       COLONOSCOPY  2014    Procedure: COLONOSCOPY;  Colonoscopy  ;  Surgeon: Jesus Beltrán MD;  Location: [facility]     ESOPHAGOSCOPY, GASTROSCOPY, DUODENOSCOPY (EGD), COMBINED  2014    Procedure: COMBINED ESOPHAGOSCOPY, GASTROSCOPY, DUODENOSCOPY (EGD);   "Gastroscopy  ;  Surgeon: Jesus Beltrán MD;  Location: [facility]     ORTHOPEDIC SURGERY       PHACOEMULSIFICATION WITH STANDARD INTRAOCULAR LENS IMPLANT Right 5/26/2016    Procedure: PHACOEMULSIFICATION WITH STANDARD INTRAOCULAR LENS IMPLANT;  Surgeon: Anthony Pugh MD;  Location: [facility]     PHACOEMULSIFICATION WITH STANDARD INTRAOCULAR LENS IMPLANT Left 7/14/2016    Procedure: PHACOEMULSIFICATION WITH STANDARD INTRAOCULAR LENS IMPLANT;  Surgeon: Anthony Pugh MD;  Location: [facility]     SURGICAL HISTORY OF -   2004    carpal tunnel surgery, bilateral     SURGICAL HISTORY OF -   1/5/2010    Laminectomy/Discectomy Cervical/fusion     VASECTOMY  1990     Family History   Problem Relation Age of Onset     Diabetes Father      Cancer Brother         ear and face     Diabetes Brother      Parkinsonism Brother        Objective  Ht 1.753 m (5' 9\")   Wt 87.1 kg (192 lb)   BMI 28.35 kg/m        General: healthy, alert and in no distress      HEENT: no scleral icterus or conjunctival erythema     Skin: no suspicious lesions or rash. No jaundice.     CV: regular rhythm by palpation, 2+ distal pulses, no pedal edema      Resp: normal respiratory effort without conversational dyspnea     Psych: normal mood and affect      Gait: nonantalgic, appropriate coordination and balance     Neuro: normal light touch sensory exam of the extremities. Motor strength as noted below     Right Shoulder exam    ROM:        Mildly decreased terminal active and passive ROM with flexion, extension, abduction, internal and external rotation.    Tender:        posterior shoulder       Anterior GH joint       Mild subacromial space    Non Tender:       remainder of shoulder       sternoclavicular joint       acromioclavicular joint    Strength:        abduction 5/5       internal rotation 5/5       external rotation 5/5       adduction 5/5    Impingement testing:        positive (+) Neer       neg (-) Rudd       painful empty can       " neg (-) crossover       positive (+) crank    Stability testing:       neg (-) anterior glide       neg (-) sulcus sign    Skin:       no visible deformities       well perfused       capillary refill brisk    Sensation:        normal sensation over shoulder and upper extremity       Radiology  Examination:  XR SHOULDER RIGHT G/E 3 VIEWS     Date:  3/23/2023 11:25 AM      Clinical Information: Chronic right shoulder pain.     Comparison: none.                                                                      Impression:     1.  Normal joint alignment. Mild degenerative joint space narrowing  and spurring in the glenohumeral and acromioclavicular joints.     2.  Right shoulder negative for fracture or concerning bone lesion.     3.  Degenerative changes in the cervical spine, and prior low anterior  cervical fusion.    Large Joint Injection/Arthocentesis: R glenohumeral joint    Date/Time: 3/23/2023 12:12 PM    Performed by: Farhat Smalls DO  Authorized by: Farhat Smalls DO    Indications:  Pain and osteoarthritis  Needle Size:  21 G  Guidance: ultrasound    Approach:  Posterolateral  Location:  Shoulder      Site:  R glenohumeral joint  Medications:  3 mL ropivacaine 5 MG/ML; 40 mg triamcinolone 40 MG/ML  Outcome:  Tolerated well, no immediate complications  Procedure discussed: discussed risks, benefits, and alternatives    Consent Given by:  Patient  Timeout: timeout called immediately prior to procedure    Prep: patient was prepped and draped in usual sterile fashion     Ultrasound images of procedure were permanently stored.           Assessment:  1. Chronic right shoulder pain        Plan:  Discussed the assessment with the patient.  Follow up: prn based on clinical progress  Subacute right shoulder pain in the setting of chronic cervical radicular issues  Pain in right shoulder does not appear radicular, but problems can interrelate  Some RTC sx, no helene weakness  Most focal pain in  right GH joint  Trial of diagnostic and hopefully therapeutic CSI of the right GH joint today  PT options reviewed  Oral Tylenol and topical Voltaren gel reviewed as safe OTC options, reviewed safe dosing strategies  XR images independently visualized and reviewed with patient today in clinic  Consider advanced imaging based on progress  Expectations and goals of CSI reviewed  Often 2-3 days for steroid effect, and can take up to two weeks for maximum effect  We discussed modified progressive pain-free activity as tolerated  Do not overuse in first two weeks if feeling better due to concern for vulnerability while steroid is working  Supportive care reviewed  All questions were answered today  Contact us with additional questions or concerns  Signs and sx of concern reviewed    Thanks very much for sending this nice gentleman to us, I will keep you updated with his progress      Farhat Smalls DO, OMAR  Sports Medicine Physician  [facility]                Disclaimer: This note consists of symbols derived from keyboarding, dictation and/or voice recognition software. As a result, there may be errors in the script that have gone undetected. Please consider this when interpreting information found in this chart.      Again, thank you for allowing me to participate in the care of your patient.        Sincerely,        Farhat Smalls DO

## 2023-04-11 ENCOUNTER — ALLIED HEALTH/NURSE VISIT (OUTPATIENT)
Dept: EDUCATION SERVICES | Facility: CLINIC | Age: 68
End: 2023-04-11
Payer: COMMERCIAL

## 2023-04-11 DIAGNOSIS — E10.59 TYPE 1 DIABETES MELLITUS WITH VASCULAR DISEASE (H): Primary | ICD-10-CM

## 2023-04-11 PROCEDURE — G0108 DIAB MANAGE TRN  PER INDIV: HCPCS | Performed by: DIETITIAN, REGISTERED

## 2023-04-11 NOTE — LETTER
"    4/11/2023         RE: Eb Eisenberg  [address] [place] [address]        Dear Colleague,    Thank you for referring your patient, Eb Eisenberg, to the [facility]. Please see a copy of my visit note below.    Diabetes Self-Management Education & Support    Presents for: Insulin Pump Review    Type of Service: In Person Visit    Assessment Type:   REPORTS:        On this day, patient ate muffin and took 21.2 units of insulin at 12.  At 3:30pm ate a sandwich of 65 grams of carbohydrate, did not take insulin.  This pattern happens frequently.  Also enters 125-130 grams of carbohydrate even if muffin is only 80 grams of carbohydrate because \"I will go high if I don't enter that\".      On this day, muffin and 20.2 units of insulin at 11am.  2pm: ate 65 g carbohydrate, took 11.4 units.  At 3pm: ate 65 g carbohydrate, took 10.0 units insulin.                    Insulin Pump Information  Insulin Pump Brand: [employer] Tees  Infusion Set: Medtronic  Medtronic Infusion Set: Quick Set  Does patient have an insulin multiple daily injection back-up plan?: Yes (has syringes)    Education specific to insulin pump provided today on:   treating hypoglycemia correctly (Rule of 15), bolusing premeal and changing infusion sets every 3 days.    Opportunities for ongoing education and support in diabetes-self management were discussed.    Pt verbalized understanding of concepts discussed and recommendations provided today.       ASSESSMENT  Patient has had type 1 diabetes for ~41 years.  Recent a1c of 7.9 (previous 7.7, 7.9, 7.6).  Currently being managed with pump therapy.  he is on ASA and statin therapy.  Complications of:  CAD, retinopathy, polyneuropathy, ED.  Last seen by diabetes education 1/10/23.  Home BG monitoring (Dexcom G6) reveals:  45% in target, 3% <70.     Primary concern: ongoing problems with BG.  Forgot the goals we had set at last visit.  Not able to inject insulin sooner and " "still treating lows with too much carbohydrate.  He also continues to enter carbohydrates based on how much insulin he wants to get rather than putting in the correct grams and us adjusting the ICR.  \"I would have to learn new numbers and that feels confusing.\"    Discussed:  Importance of injecting before meals and proper treatment of hypoglycemia.  Patient expressed understanding, but struggles to do these things.    Patient would benefit from adjusting ICR, but it would require him to remember to enter the actual grams of carbohydrate rather than what is gotten use to doing.  He understands the problems it creates that he is not entering exact grams.      PLAN    1.  Your desire to change when you inject the time of your insulin especially for breakfast is a good thing. Try to inject at least 15 minutes before you eat especially breakfast.    2.  If you are unable to inject the insulin 15 minutes before eating, ask yourself why? What is it that keeps you from making that change.  3.  Again remember to treat lows with only 30 grams of carbohydrate  4.  Follow up with Dr. Rachel as directed.  5.  Follow up with Diabetes Education 4/27/23 at 1pm.      See Care Plan for co-developed, patient-state behavior change goals.  AVS provided for patient today.    Education Materials Provided:  No new materials provided today      SUBJECTIVE/OBJECTIVE:  Presents for: Insulin Pump Review  Accompanied by: Self  Diabetes education in the past 24mo: Yes  Focus of Visit: Insulin Pump  Type of Pump visit: Pump Review  Type of CGM visit: Personal CGM Follow-up  Diabetes type: Type 1  Disease course: Stable  How confident are you filling out medical forms by yourself:: Somewhat  Diabetes management related comments/concerns: elevated BG, correcting without results  Transportation concerns: No  Difficulty affording diabetes medication?: No  Difficulty affording diabetes testing supplies?: No  Cultural Influences/Ethnic " "Background:  Not  or       Diabetes Symptoms & Complications:  Fatigue: Sometimes  Neuropathy: No  Polydipsia: No  Polyphagia: No  Polyuria: No  Visual change: Yes  Slow healing wounds: No  Autonomic neuropathy: No  CVA: No  Heart disease: Yes  Nephropathy: No  Peripheral neuropathy: No  Peripheral Vascular Disease: Yes  Retinopathy: No  Sexual dysfunction: Yes    Patient Problem List and Family Medical History reviewed for relevant medical history, current medical status, and diabetes risk factors.    Vitals:  There were no vitals taken for this visit.  Estimated body mass index is 28.35 kg/m  as calculated from the following:    Height as of 3/23/23: 1.753 m (5' 9\").    Weight as of 3/23/23: 87.1 kg (192 lb).   Last 3 BP:   BP Readings from Last 3 Encounters:   03/01/23 120/60   12/05/22 114/62   09/09/22 100/60       History   Smoking Status     Former     Types: Cigarettes     Quit date: 1/1/2001   Smokeless Tobacco     Never       Labs:  Lab Results   Component Value Date    A1C 7.9 03/01/2023    A1C 7.6 04/02/2021     Lab Results   Component Value Date     03/01/2023     02/07/2022     04/02/2021     Lab Results   Component Value Date    LDL 56 03/01/2023    LDL 63 09/16/2020     HDL Cholesterol   Date Value Ref Range Status   09/16/2020 53 >39 mg/dL Final     Direct Measure HDL   Date Value Ref Range Status   03/01/2023 59 >=40 mg/dL Final   ]  GFR Estimate   Date Value Ref Range Status   03/01/2023 78 >60 mL/min/1.73m2 Final     Comment:     eGFR calculated using 2021 CKD-EPI equation.   04/02/2021 73 >60 mL/min/[1.73_m2] Final     Comment:     Non  GFR Calc  Starting 12/18/2018, serum creatinine based estimated GFR (eGFR) will be   calculated using the Chronic Kidney Disease Epidemiology Collaboration   (CKD-EPI) equation.       GFR Estimate If Black   Date Value Ref Range Status   04/02/2021 85 >60 mL/min/[1.73_m2] Final     Comment:      " GFR Calc  Starting 12/18/2018, serum creatinine based estimated GFR (eGFR) will be   calculated using the Chronic Kidney Disease Epidemiology Collaboration   (CKD-EPI) equation.       Lab Results   Component Value Date    CR 1.04 03/01/2023    CR 1.05 04/02/2021     No results found for: MICROALBUMIN    Healthy Eating:  Healthy Eating Assessed Today: Yes  Cultural/Moravian diet restrictions?: No  Meal planning/habits: Carb counting  How many times a week on average do you eat food made away from home (restaurant/take-out)?: 4  Meals include: Lunch, Dinner  Breakfast: usually doesn't eat  Lunch: 1:30pm muffin (130 grams of carb)  Dinner: 5pm:  ate nothing  Snacks: 3pm sandwich  Beverages: Diet soda (drinks 6 diet soda's per day)    Being Active:  Being Active Assessed Today: Yes  Exercise:: Yes  Days per week of moderate to strenuous exercise (like a brisk walk): (P) 7  On average, minutes per day of exercise at this level: (P) 30  How intense was your typical exercise? : (P) Moderate (like brisk walking)  Exercise Minutes per Week: (P) 210  Barrier to exercise: None    Monitoring:  Monitoring Assessed Today: Yes  Did patient bring glucose meter to appointment? : Yes  Blood Glucose Meter: CGM  Blood glucose trend: Fluctuating    See above    Taking Medications:  Diabetes Medication(s)     Diabetic Other       GLUCAGON EMERGENCY KIT 1 MG IJ KIT    use as directed for severe hypoglycemia    Insulin       insulin aspart (NOVOLOG VIAL) 100 UNITS/ML vial    USE AS DIRECTED IN INSULIN PUMP, TOTAL DAILY DOSE 60 UNITS          Taking Medication Assessed Today: Yes  Current Treatments: Insulin Pump  Dose schedule: (P) Pre-breakfast, Pre-lunch, Pre-dinner, At bedtime  Given by: (P) Patient  Injection/Infusion sites: Abdomen  Problems taking diabetes medications regularly?: No  Diabetes medication side effects?: No    Problem Solving:  Problem Solving Assessed Today: Yes  Is the patient at risk for hypoglycemia?:  Yes  Hypoglycemia Frequency: Weekly (one day a week; wonders if a year ago got an auto bolus from someone near him, so has since turned off the autobolus on the pump)  Hypoglycemia Treatment: Other food, Glucose (tablets or gel), Juice  Patient carries a carbohydrate source: Yes  Medical ID: Yes  Does patient have glucagon emergency kit?: No (too expensive)    Hypoglycemia symptoms  Confusion: Yes  Feeling shaky: Yes         Reducing Risks:  Reducing Risks Assessed Today: Yes  Diabetes Risks: Age over 45 years  CAD Risks: Diabetes Mellitus  Has dilated eye exam at least once a year?: No  Sees dentist every 6 months?: No  Feet checked by healthcare provider in the last year?: Yes    Healthy Coping:  Informal Support system:: Family, Friends  Stage of change: ACTION (Actively working towards change)  Patient Activation Measure Survey Score:      2/17/2011    11:00 AM   BENEDICTO Score (Last Two)   BENEDICTO Raw Score 42   Activation Score 66   BENEDICTO Level 3         Care Plan and Education Provided:  Care Plan: Diabetes   Updates made by Aubree Hamilton since 4/11/2023 12:00 AM      Problem: Diabetes Self-Management Education Needed to Optimize Self-Care Behaviors       Goal: Healthy Eating - follow a healthy eating pattern for diabetes    This Visit's Progress: 30%          Time Spent: 60 minutes  Encounter Type: Individual    Any diabetes medication dose changes were made via the CDE Protocol per the patient's referring provider. A copy of this encounter was shared with the provider.

## 2023-04-11 NOTE — PATIENT INSTRUCTIONS
PLAN    1.  Your desire to change when you inject the time of your insulin especially for breakfast is a good thing. Try to inject at least 15 minutes before you eat especially breakfast.    2.  If you are unable to inject the insulin 15 minutes before eating, ask yourself why? What is it that keeps you from making that change.  3.  Again remember to treat lows with only 30 grams of carbohydrate  4.  Follow up with Dr. Rachel as directed.  5.  Follow up with Diabetes Education 4/27/23 at 1pm.      Aubree Hamilton RD, [facility], 11:59 AM, 4/11/2023

## 2023-04-11 NOTE — PROGRESS NOTES
"Diabetes Self-Management Education & Support    Presents for: Insulin Pump Review    Type of Service: In Person Visit    Assessment Type:   REPORTS:        On this day, patient ate muffin and took 21.2 units of insulin at 12.  At 3:30pm ate a sandwich of 65 grams of carbohydrate, did not take insulin.  This pattern happens frequently.  Also enters 125-130 grams of carbohydrate even if muffin is only 80 grams of carbohydrate because \"I will go high if I don't enter that\".      On this day, muffin and 20.2 units of insulin at 11am.  2pm: ate 65 g carbohydrate, took 11.4 units.  At 3pm: ate 65 g carbohydrate, took 10.0 units insulin.                    Insulin Pump Information  Insulin Pump Brand: Medtronic  Infusion Set: Medtronic  Medtronic Infusion Set: Quick Set  Does patient have an insulin multiple daily injection back-up plan?: Yes (has syringes)    Education specific to insulin pump provided today on:   treating hypoglycemia correctly (Rule of 15), bolusing premeal and changing infusion sets every 3 days.    Opportunities for ongoing education and support in diabetes-self management were discussed.    Pt verbalized understanding of concepts discussed and recommendations provided today.       ASSESSMENT  Patient has had type 1 diabetes for ~41 years.  Recent a1c of 7.9 (previous 7.7, 7.9, 7.6).  Currently being managed with pump therapy.  he is on ASA and statin therapy.  Complications of:  CAD, retinopathy, polyneuropathy, ED.  Last seen by diabetes education 1/10/23.  Home BG monitoring (Dexcom G6) reveals:  45% in target, 3% <70.     Primary concern: ongoing problems with BG.  Forgot the goals we had set at last visit.  Not able to inject insulin sooner and still treating lows with too much carbohydrate.  He also continues to enter carbohydrates based on how much insulin he wants to get rather than putting in the correct grams and us adjusting the ICR.  \"I would have to learn new numbers and that feels " "confusing.\"    Discussed:  Importance of injecting before meals and proper treatment of hypoglycemia.  Patient expressed understanding, but struggles to do these things.    Patient would benefit from adjusting ICR, but it would require him to remember to enter the actual grams of carbohydrate rather than what is gotten use to doing.  He understands the problems it creates that he is not entering exact grams.      PLAN    1.  Your desire to change when you inject the time of your insulin especially for breakfast is a good thing. Try to inject at least 15 minutes before you eat especially breakfast.    2.  If you are unable to inject the insulin 15 minutes before eating, ask yourself why? What is it that keeps you from making that change.  3.  Again remember to treat lows with only 30 grams of carbohydrate  4.  Follow up with Dr. Rachel as directed.  5.  Follow up with Diabetes Education 4/27/23 at 1pm.      See Care Plan for co-developed, patient-state behavior change goals.  AVS provided for patient today.    Education Materials Provided:  No new materials provided today      SUBJECTIVE/OBJECTIVE:  Presents for: Insulin Pump Review  Accompanied by: Self  Diabetes education in the past 24mo: Yes  Focus of Visit: Insulin Pump  Type of Pump visit: Pump Review  Type of CGM visit: Personal CGM Follow-up  Diabetes type: Type 1  Disease course: Stable  How confident are you filling out medical forms by yourself:: Somewhat  Diabetes management related comments/concerns: elevated BG, correcting without results  Transportation concerns: No  Difficulty affording diabetes medication?: No  Difficulty affording diabetes testing supplies?: No  Cultural Influences/Ethnic Background:  Not  or       Diabetes Symptoms & Complications:  Fatigue: Sometimes  Neuropathy: No  Polydipsia: No  Polyphagia: No  Polyuria: No  Visual change: Yes  Slow healing wounds: No  Autonomic neuropathy: No  CVA: No  Heart disease: " "Yes  Nephropathy: No  Peripheral neuropathy: No  Peripheral Vascular Disease: Yes  Retinopathy: No  Sexual dysfunction: Yes    Patient Problem List and Family Medical History reviewed for relevant medical history, current medical status, and diabetes risk factors.    Vitals:  There were no vitals taken for this visit.  Estimated body mass index is 28.35 kg/m  as calculated from the following:    Height as of 3/23/23: 1.753 m (5' 9\").    Weight as of 3/23/23: 87.1 kg (192 lb).   Last 3 BP:   BP Readings from Last 3 Encounters:   03/01/23 120/60   12/05/22 114/62   09/09/22 100/60       History   Smoking Status     Former     Types: Cigarettes     Quit date: 1/1/2001   Smokeless Tobacco     Never       Labs:  Lab Results   Component Value Date    A1C 7.9 03/01/2023    A1C 7.6 04/02/2021     Lab Results   Component Value Date     03/01/2023     02/07/2022     04/02/2021     Lab Results   Component Value Date    LDL 56 03/01/2023    LDL 63 09/16/2020     HDL Cholesterol   Date Value Ref Range Status   09/16/2020 53 >39 mg/dL Final     Direct Measure HDL   Date Value Ref Range Status   03/01/2023 59 >=40 mg/dL Final   ]  GFR Estimate   Date Value Ref Range Status   03/01/2023 78 >60 mL/min/1.73m2 Final     Comment:     eGFR calculated using 2021 CKD-EPI equation.   04/02/2021 73 >60 mL/min/[1.73_m2] Final     Comment:     Non  GFR Calc  Starting 12/18/2018, serum creatinine based estimated GFR (eGFR) will be   calculated using the Chronic Kidney Disease Epidemiology Collaboration   (CKD-EPI) equation.       GFR Estimate If Black   Date Value Ref Range Status   04/02/2021 85 >60 mL/min/[1.73_m2] Final     Comment:      GFR Calc  Starting 12/18/2018, serum creatinine based estimated GFR (eGFR) will be   calculated using the Chronic Kidney Disease Epidemiology Collaboration   (CKD-EPI) equation.       Lab Results   Component Value Date    CR 1.04 03/01/2023    CR 1.05 " 04/02/2021     No results found for: MICROALBUMIN    Healthy Eating:  Healthy Eating Assessed Today: Yes  Cultural/Moravian diet restrictions?: No  Meal planning/habits: Carb counting  How many times a week on average do you eat food made away from home (restaurant/take-out)?: 4  Meals include: Lunch, Dinner  Breakfast: usually doesn't eat  Lunch: 1:30pm muffin (130 grams of carb)  Dinner: 5pm:  ate nothing  Snacks: 3pm sandwich  Beverages: Diet soda (drinks 6 diet soda's per day)    Being Active:  Being Active Assessed Today: Yes  Exercise:: Yes  Days per week of moderate to strenuous exercise (like a brisk walk): (P) 7  On average, minutes per day of exercise at this level: (P) 30  How intense was your typical exercise? : (P) Moderate (like brisk walking)  Exercise Minutes per Week: (P) 210  Barrier to exercise: None    Monitoring:  Monitoring Assessed Today: Yes  Did patient bring glucose meter to appointment? : Yes  Blood Glucose Meter: CGM  Blood glucose trend: Fluctuating    See above    Taking Medications:  Diabetes Medication(s)     Diabetic Other       GLUCAGON EMERGENCY KIT 1 MG IJ KIT    use as directed for severe hypoglycemia    Insulin       insulin aspart (NOVOLOG VIAL) 100 UNITS/ML vial    USE AS DIRECTED IN INSULIN PUMP, TOTAL DAILY DOSE 60 UNITS          Taking Medication Assessed Today: Yes  Current Treatments: Insulin Pump  Dose schedule: (P) Pre-breakfast, Pre-lunch, Pre-dinner, At bedtime  Given by: (P) Patient  Injection/Infusion sites: Abdomen  Problems taking diabetes medications regularly?: No  Diabetes medication side effects?: No    Problem Solving:  Problem Solving Assessed Today: Yes  Is the patient at risk for hypoglycemia?: Yes  Hypoglycemia Frequency: Weekly (one day a week; wonders if a year ago got an auto bolus from someone near him, so has since turned off the autobolus on the pump)  Hypoglycemia Treatment: Other food, Glucose (tablets or gel), Juice  Patient carries a  carbohydrate source: Yes  Medical ID: Yes  Does patient have glucagon emergency kit?: No (too expensive)    Hypoglycemia symptoms  Confusion: Yes  Feeling shaky: Yes         Reducing Risks:  Reducing Risks Assessed Today: Yes  Diabetes Risks: Age over 45 years  CAD Risks: Diabetes Mellitus  Has dilated eye exam at least once a year?: No  Sees dentist every 6 months?: No  Feet checked by healthcare provider in the last year?: Yes    Healthy Coping:  Informal Support system:: Family, Friends  Stage of change: ACTION (Actively working towards change)  Patient Activation Measure Survey Score:      2/17/2011    11:00 AM   BENEDICTO Score (Last Two)   BENEDICTO Raw Score 42   Activation Score 66   BENEDICTO Level 3         Care Plan and Education Provided:  Care Plan: Diabetes   Updates made by Aubree Hamilton since 4/11/2023 12:00 AM      Problem: Diabetes Self-Management Education Needed to Optimize Self-Care Behaviors       Goal: Healthy Eating - follow a healthy eating pattern for diabetes    This Visit's Progress: 30%          Time Spent: 60 minutes  Encounter Type: Individual    Any diabetes medication dose changes were made via the CDE Protocol per the patient's referring provider. A copy of this encounter was shared with the provider.

## 2023-04-24 RX ORDER — ROPIVACAINE HYDROCHLORIDE 5 MG/ML
3 INJECTION, SOLUTION EPIDURAL; INFILTRATION; PERINEURAL
Status: SHIPPED | OUTPATIENT
Start: 2023-03-23

## 2023-04-24 RX ORDER — TRIAMCINOLONE ACETONIDE 40 MG/ML
40 INJECTION, SUSPENSION INTRA-ARTICULAR; INTRAMUSCULAR
Status: SHIPPED | OUTPATIENT
Start: 2023-03-23

## 2023-04-27 ENCOUNTER — ALLIED HEALTH/NURSE VISIT (OUTPATIENT)
Dept: EDUCATION SERVICES | Facility: CLINIC | Age: 68
End: 2023-04-27
Payer: COMMERCIAL

## 2023-04-27 DIAGNOSIS — E10.9 TYPE 1 DIABETES, HBA1C GOAL < 8% (H): ICD-10-CM

## 2023-04-27 DIAGNOSIS — E10.59 TYPE 1 DIABETES MELLITUS WITH VASCULAR DISEASE (H): Primary | ICD-10-CM

## 2023-04-27 PROCEDURE — 97802 MEDICAL NUTRITION INDIV IN: CPT | Performed by: DIETITIAN, REGISTERED

## 2023-04-27 NOTE — PATIENT INSTRUCTIONS
PLAN    1.  Continue to bolus 15 minutes before eating if possible.  2.  Keep up the good work of only treating lows with 30 grams of carbohydrate.  3.  We made slight increases to your basal insulin and slight decreases to your meal time insulin.  Let's see how this makes a difference.  4.  Follow up with Dr. Rachel as directed.  5.  Follow up with Diabetes Education 6/15/23 at 2pm.    Aubree Hamilton RD, [facility], 1:58 PM, 4/27/2023

## 2023-04-27 NOTE — PROGRESS NOTES
Medical Nutrition Therapy for Diabetes  Visit Type:Initial assessment and intervention    Eb Eisenberg presents today for MNT and education related to type 1 diabetes.   He is accompanied by self.     ASSESSMENT:   Patient comments/concerns relating to nutrition: estimating carbohydrate intake.    NUTRITION HISTORY:    Breakfast: nothing  Lunch: sandwich  Dinner: muffin or pizza  Snacks: nutty bar  Beverages: Juice just to treat lows    Misses meals? Yes, schedule is inconsistent  Eats out:  4-5 meals/per week     Previous diet education:  yes     Food allergies/intolerances: non reported    Diet is high in: sodium  Diet is low in: fruits and vegetables    EXERCISE: sporadic or irregular exercise    SOCIO/ECONOMIC:   Lives with: self    BLOOD GLUCOSE MONITORING:  Patient glucose self monitoring as follows: cgms.   BG meter: DexcomG6  BG results: see reports     BG values are: In goal 58% of the time  Patient's most recent   Lab Results   Component Value Date    A1C 7.9 03/01/2023    A1C 7.6 04/02/2021    is not meeting goal of <7.5                        Insulin Pump Information   MedLaunchHeared: 670G   BASAL RATES and times:   12   AM (midnight): 0.825 units/hour   3 am: 0.900 units/hour   9   AM: 0.750 units/hour   1 pm: 0.775 units/hour   9    PM: 0.800 units/hour     CARB RATIO:   MN to 8am: 6.5   8am to 11am: 7.2  11am to 5pm: 6.5  5pm to MN: 7.2     Corection Factor (Sensitivity) and times:    34 mg/dL     BLOOD GLUCOSE TARGET and times:   12   AM (midnight): 110 - 120    Current TDD: 68.6 units/day  72% bolus  28% basal    Education specific to insulin pump provided today on:   importance of counting carbohydrates accurately    Opportunities for ongoing education and support in diabetes-self management were discussed.    Pt verbalized understanding of concepts discussed and recommendations provided today.       ASSESSMENT  Patient has had type 1 diabetes for ~41 years.  Recent a1c of 7.9 (previous 7.7,  "7.9, 7.6).  Currently being managed with pump therapy.  he is on ASA and statin therapy.  Complications of:  CAD, retinopathy, polyneuropathy, ED.  Last seen by diabetes education 4/11/23.  Home BG monitoring (Dexcom G6) reveals:  58% in target, 2% <70 (which is an improvement from 45% in target, 3% lows).      Primary concern: feels like BG slightly improving.    Discussed: continuing to bolus 15 minutes before meals, adjustments to basal rates and ICR.  Patient expressed understanding.    Patient would benefit from increase in basal rate and decrease in carbohydrate ratio.    Changes made to pump settings:  12   AM (midnight): 0.825 units/hour >>0.90 units/hr  3 am: 0.900 units/hour >>1.0 units/hr  9   AM: 0.750 units/hour >>0.825 units/hr  1 pm: 0.775 units/hour >>0.85 units/hr  9    PM: 0.800 units/hour >>0.875 units/hr    ICR (grams/units)  MN 6.5 >>7  8am 7.2>>8  11am 6.5>>7  5pm 7.2..8    MEDICATIONS:  Current Outpatient Medications   Medication     Continuous Blood Gluc  (DEXCOM G6 ) VITO     Continuous Blood Gluc Sensor (DEXCOM G6 SENSOR) MISC     Continuous Blood Gluc Transmit (DEXCOM G6 TRANSMITTER) MISC     insulin aspart (NOVOLOG VIAL) 100 UNITS/ML vial     INSULIN PUMP - OUTPATIENT     aspirin 81 MG chewable tablet     blood glucose (CONTOUR NEXT TEST) test strip     blood glucose (NO BRAND SPECIFIED) test strip     buPROPion (WELLBUTRIN XL) 150 MG 24 hr tablet     cholecalciferol 25 MCG (1000 UT) TABS     DOCUSATE SODIUM PO     GLUCAGON EMERGENCY KIT 1 MG IJ KIT     HYDROcodone-acetaminophen (NORCO)  MG per tablet     HYDROcodone-acetaminophen (NORCO)  MG per tablet     [START ON 4/28/2023] HYDROcodone-acetaminophen (NORCO)  MG per tablet     infusion set (PARADIGM QUICK-SET 23\" 6MM) [facility] pump supply     insulin cartridge (PARADIGM 3ML) misc pump supply     Insulin Syringe 30G X 1/2\" 0.5 ML MISC     Krill Oil 1000 MG CAPS     losartan (COZAAR) 50 MG tablet     " metoprolol succinate ER (TOPROL XL) 25 MG 24 hr tablet     MULTIVITAMINS OR TABS     PARoxetine (PAXIL) 20 MG tablet     rosuvastatin (CRESTOR) 40 MG tablet     tamsulosin (FLOMAX) 0.4 MG capsule     Urine Glucose-Ketones Test (KETO-DIASTIX) STRP     zinc gluconate 50 MG tablet     Current Facility-Administered Medications   Medication     3 mL ropivacaine (NAROPIN) injection 5 mg/mL     triamcinolone (KENALOG-40) injection 40 mg       LABS:  Lab Results   Component Value Date    A1C 7.9 03/01/2023    A1C 7.6 04/02/2021     Lab Results   Component Value Date     03/01/2023     02/07/2022     04/02/2021     Lab Results   Component Value Date    LDL 56 03/01/2023    LDL 63 09/16/2020     HDL Cholesterol   Date Value Ref Range Status   09/16/2020 53 >39 mg/dL Final     Direct Measure HDL   Date Value Ref Range Status   03/01/2023 59 >=40 mg/dL Final   ]  GFR Estimate   Date Value Ref Range Status   03/01/2023 78 >60 mL/min/1.73m2 Final     Comment:     eGFR calculated using 2021 CKD-EPI equation.   04/02/2021 73 >60 mL/min/[1.73_m2] Final     Comment:     Non  GFR Calc  Starting 12/18/2018, serum creatinine based estimated GFR (eGFR) will be   calculated using the Chronic Kidney Disease Epidemiology Collaboration   (CKD-EPI) equation.       Lab Results   Component Value Date    CR 1.04 03/01/2023    CR 1.05 04/02/2021     No results found for: MICROALBUMIN    ANTHROPOMETRICS:  Vitals: There were no vitals taken for this visit.  There is no height or weight on file to calculate BMI.      Wt Readings from Last 5 Encounters:   03/23/23 87.1 kg (192 lb)   03/01/23 87.3 kg (192 lb 6 oz)   12/05/22 84.7 kg (186 lb 12.8 oz)   09/09/22 83.6 kg (184 lb 4 oz)   05/11/22 82.1 kg (181 lb)       NUTRITION DIAGNOSIS: Impaired nutrient utilization related to diagnosis of diabetes as evidenced by cgms results.    NUTRITION INTERVENTION:  Review of Carbohydrate counting    PATIENT'S BEHAVIOR CHANGE  GOALS:   See Patient Instructions for patient stated behavior change goals. AVS was printed and given to patient at today's appointment.    MONITOR / EVALUATE:  RD will monitor/evaluate:  Blood Glucose / A1c    FOLLOW-UP:    PLAN  1.  Continue to bolus 15 minutes before eating if possible.  2.  Keep up the good work of only treating lows with 30 grams of carbohydrate.  3.  We made slight increases to your basal insulin and slight decreases to your meal time insulin.  Let's see how this makes a difference.  4.  Follow up with Dr. Rachel as directed.  5.  Follow up with Diabetes Education 6/15/23 at 2pm.  Aubree Hamilton RD, [facility], 4:09 PM, 4/27/2023    Time spent in minutes: 45 minutes  Encounter: Individual

## 2023-04-27 NOTE — LETTER
4/27/2023         RE: Eb Eisenberg  [id number]  [place] [address]        Dear Colleague,    Thank you for referring your patient, Eb Eisenberg, to the [facility]. Please see a copy of my visit note below.    Medical Nutrition Therapy for Diabetes  Visit Type:Initial assessment and intervention    Eb Eisenberg presents today for MNT and education related to type 1 diabetes.   He is accompanied by self.     ASSESSMENT:   Patient comments/concerns relating to nutrition: estimating carbohydrate intake.    NUTRITION HISTORY:    Breakfast: nothing  Lunch: sandwich  Dinner: muffin or pizza  Snacks: nutty bar  Beverages: Juice just to treat lows    Misses meals? Yes, schedule is inconsistent  Eats out:  4-5 meals/per week     Previous diet education:  yes     Food allergies/intolerances: non reported    Diet is high in: sodium  Diet is low in: fruits and vegetables    EXERCISE: sporadic or irregular exercise    SOCIO/ECONOMIC:   Lives with: self    BLOOD GLUCOSE MONITORING:  Patient glucose self monitoring as follows: cgms.   BG meter: DexcomG6  BG results: see reports     BG values are: In goal 58% of the time  Patient's most recent   Lab Results   Component Value Date    A1C 7.9 03/01/2023    A1C 7.6 04/02/2021    is not meeting goal of <7.5                        Insulin Pump Information   Medtronic Minimed: 670G   BASAL RATES and times:   12   AM (midnight): 0.825 units/hour   3 am: 0.900 units/hour   9   AM: 0.750 units/hour   1 pm: 0.775 units/hour   9    PM: 0.800 units/hour     CARB RATIO:   MN to 8am: 6.5   8am to 11am: 7.2  11am to 5pm: 6.5  5pm to MN: 7.2     Corection Factor (Sensitivity) and times:    34 mg/dL     BLOOD GLUCOSE TARGET and times:   12   AM (midnight): 110 - 120    Current TDD: 68.6 units/day  72% bolus  28% basal    Education specific to insulin pump provided today on:   importance of counting carbohydrates accurately    Opportunities for ongoing  education and support in diabetes-self management were discussed.    Pt verbalized understanding of concepts discussed and recommendations provided today.       ASSESSMENT  Patient has had type 1 diabetes for ~41 years.  Recent a1c of 7.9 (previous 7.7, 7.9, 7.6).  Currently being managed with pump therapy.  he is on ASA and statin therapy.  Complications of:  CAD, retinopathy, polyneuropathy, ED.  Last seen by diabetes education 4/11/23.  Home BG monitoring (Dexcom G6) reveals:  58% in target, 2% <70 (which is an improvement from 45% in target, 3% lows).      Primary concern: feels like BG slightly improving.    Discussed: continuing to bolus 15 minutes before meals, adjustments to basal rates and ICR.  Patient expressed understanding.    Patient would benefit from increase in basal rate and decrease in carbohydrate ratio.    Changes made to pump settings:  12   AM (midnight): 0.825 units/hour >>0.90 units/hr  3 am: 0.900 units/hour >>1.0 units/hr  9   AM: 0.750 units/hour >>0.825 units/hr  1 pm: 0.775 units/hour >>0.85 units/hr  9    PM: 0.800 units/hour >>0.875 units/hr    ICR (grams/units)  MN 6.5 >>7  8am 7.2>>8  11am 6.5>>7  5pm 7.2..8    MEDICATIONS:  Current Outpatient Medications   Medication     Continuous Blood Gluc  (DEXCOM G6 ) VITO     Continuous Blood Gluc Sensor (DEXCOM G6 SENSOR) MISC     Continuous Blood Gluc Transmit (DEXCOM G6 TRANSMITTER) MISC     insulin aspart (NOVOLOG VIAL) 100 UNITS/ML vial     INSULIN PUMP - OUTPATIENT     aspirin 81 MG chewable tablet     blood glucose (CONTOUR NEXT TEST) test strip     blood glucose (NO BRAND SPECIFIED) test strip     buPROPion (WELLBUTRIN XL) 150 MG 24 hr tablet     cholecalciferol 25 MCG (1000 UT) TABS     DOCUSATE SODIUM PO     GLUCAGON EMERGENCY KIT 1 MG IJ KIT     HYDROcodone-acetaminophen (NORCO)  MG per tablet     HYDROcodone-acetaminophen (NORCO)  MG per tablet     [START ON 4/28/2023] HYDROcodone-acetaminophen (NORCO)  " MG per tablet     infusion set (PARADIGM QUICK-SET 23\" 6MM) misc pump supply     insulin cartridge (PARADIGM 3ML) misc pump supply     Insulin Syringe 30G X 1/2\" 0.5 ML MISC     Krill Oil 1000 MG CAPS     losartan (COZAAR) 50 MG tablet     metoprolol succinate ER (TOPROL XL) 25 MG 24 hr tablet     MULTIVITAMINS OR TABS     PARoxetine (PAXIL) 20 MG tablet     rosuvastatin (CRESTOR) 40 MG tablet     tamsulosin (FLOMAX) 0.4 MG capsule     Urine Glucose-Ketones Test (KETO-DIASTIX) STRP     zinc gluconate 50 MG tablet     Current Facility-Administered Medications   Medication     3 mL ropivacaine (NAROPIN) injection 5 mg/mL     triamcinolone (KENALOG-40) injection 40 mg       LABS:  Lab Results   Component Value Date    A1C 7.9 03/01/2023    A1C 7.6 04/02/2021     Lab Results   Component Value Date     03/01/2023     02/07/2022     04/02/2021     Lab Results   Component Value Date    LDL 56 03/01/2023    LDL 63 09/16/2020     HDL Cholesterol   Date Value Ref Range Status   09/16/2020 53 >39 mg/dL Final     Direct Measure HDL   Date Value Ref Range Status   03/01/2023 59 >=40 mg/dL Final   ]  GFR Estimate   Date Value Ref Range Status   03/01/2023 78 >60 mL/min/1.73m2 Final     Comment:     eGFR calculated using 2021 CKD-EPI equation.   04/02/2021 73 >60 mL/min/[1.73_m2] Final     Comment:     Non  GFR Calc  Starting 12/18/2018, serum creatinine based estimated GFR (eGFR) will be   calculated using the Chronic Kidney Disease Epidemiology Collaboration   (CKD-EPI) equation.       Lab Results   Component Value Date    CR 1.04 03/01/2023    CR 1.05 04/02/2021     No results found for: MICROALBUMIN    ANTHROPOMETRICS:  Vitals: There were no vitals taken for this visit.  There is no height or weight on file to calculate BMI.      Wt Readings from Last 5 Encounters:   03/23/23 87.1 kg (192 lb)   03/01/23 87.3 kg (192 lb 6 oz)   12/05/22 84.7 kg (186 lb 12.8 oz)   09/09/22 83.6 kg (184 " lb 4 oz)   05/11/22 82.1 kg (181 lb)       NUTRITION DIAGNOSIS: Impaired nutrient utilization related to diagnosis of diabetes as evidenced by cgms results.    NUTRITION INTERVENTION:  Review of Carbohydrate counting    PATIENT'S BEHAVIOR CHANGE GOALS:   See Patient Instructions for patient stated behavior change goals. AVS was printed and given to patient at today's appointment.    MONITOR / EVALUATE:  RD will monitor/evaluate:  Blood Glucose / A1c    FOLLOW-UP:    PLAN  1.  Continue to bolus 15 minutes before eating if possible.  2.  Keep up the good work of only treating lows with 30 grams of carbohydrate.  3.  We made slight increases to your basal insulin and slight decreases to your meal time insulin.  Let's see how this makes a difference.  4.  Follow up with Dr. Rachel as directed.  5.  Follow up with Diabetes Education 6/15/23 at 2pm.  Aubree Hamilton RD, [facility], 4:09 PM, 4/27/2023    Time spent in minutes: 45 minutes  Encounter: Individual

## 2023-05-23 DIAGNOSIS — E10.59 TYPE 1 DIABETES MELLITUS WITH VASCULAR DISEASE (H): ICD-10-CM

## 2023-05-24 DIAGNOSIS — F11.90 CHRONIC NARCOTIC USE: ICD-10-CM

## 2023-05-24 DIAGNOSIS — M48.02 CERVICAL STENOSIS OF SPINE: ICD-10-CM

## 2023-05-24 DIAGNOSIS — E10.42 DIABETIC POLYNEUROPATHY ASSOCIATED WITH TYPE 1 DIABETES MELLITUS (H): ICD-10-CM

## 2023-05-24 RX ORDER — HYDROCODONE BITARTRATE AND ACETAMINOPHEN 10; 325 MG/1; MG/1
1 TABLET ORAL EVERY 6 HOURS PRN
Qty: 120 TABLET | Refills: 0 | Status: SHIPPED | OUTPATIENT
Start: 2023-05-26 | End: 2023-05-31

## 2023-05-24 RX ORDER — INFUSION SET FOR INSULIN PUMP
INFUSION SETS-PARAPHERNALIA MISCELLANEOUS
Qty: 40 EACH | Refills: 3 | Status: SHIPPED | OUTPATIENT
Start: 2023-05-24 | End: 2024-06-12

## 2023-05-31 ENCOUNTER — OFFICE VISIT (OUTPATIENT)
Dept: FAMILY MEDICINE | Facility: CLINIC | Age: 68
End: 2023-05-31
Payer: COMMERCIAL

## 2023-05-31 VITALS
BODY MASS INDEX: 28.47 KG/M2 | TEMPERATURE: 97.6 F | WEIGHT: 192.25 LBS | HEIGHT: 69 IN | OXYGEN SATURATION: 96 % | DIASTOLIC BLOOD PRESSURE: 80 MMHG | SYSTOLIC BLOOD PRESSURE: 112 MMHG | HEART RATE: 94 BPM | RESPIRATION RATE: 18 BRPM

## 2023-05-31 DIAGNOSIS — E10.319 TYPE 1 DIABETES MELLITUS WITH RETINOPATHY, MACULAR EDEMA PRESENCE UNSPECIFIED, UNSPECIFIED LATERALITY, UNSPECIFIED RETINOPATHY SEVERITY (H): Primary | ICD-10-CM

## 2023-05-31 DIAGNOSIS — E10.59 TYPE 1 DIABETES MELLITUS WITH VASCULAR DISEASE (H): ICD-10-CM

## 2023-05-31 DIAGNOSIS — M48.02 CERVICAL STENOSIS OF SPINE: ICD-10-CM

## 2023-05-31 DIAGNOSIS — F11.90 CHRONIC NARCOTIC USE: ICD-10-CM

## 2023-05-31 DIAGNOSIS — E10.42 DIABETIC POLYNEUROPATHY ASSOCIATED WITH TYPE 1 DIABETES MELLITUS (H): ICD-10-CM

## 2023-05-31 DIAGNOSIS — F11.20 CONTINUOUS OPIOID DEPENDENCE (H): ICD-10-CM

## 2023-05-31 PROCEDURE — 99214 OFFICE O/P EST MOD 30 MIN: CPT | Mod: 25 | Performed by: FAMILY MEDICINE

## 2023-05-31 PROCEDURE — 90732 PPSV23 VACC 2 YRS+ SUBQ/IM: CPT | Performed by: FAMILY MEDICINE

## 2023-05-31 PROCEDURE — G0009 ADMIN PNEUMOCOCCAL VACCINE: HCPCS | Performed by: FAMILY MEDICINE

## 2023-05-31 RX ORDER — HYDROCODONE BITARTRATE AND ACETAMINOPHEN 10; 325 MG/1; MG/1
1 TABLET ORAL EVERY 6 HOURS PRN
Qty: 120 TABLET | Refills: 0 | Status: SHIPPED | OUTPATIENT
Start: 2023-08-25 | End: 2023-09-25

## 2023-05-31 RX ORDER — HYDROCODONE BITARTRATE AND ACETAMINOPHEN 10; 325 MG/1; MG/1
1 TABLET ORAL EVERY 6 HOURS PRN
Qty: 120 TABLET | Refills: 0 | Status: SHIPPED | OUTPATIENT
Start: 2023-06-26 | End: 2023-07-26

## 2023-05-31 RX ORDER — HYDROCODONE BITARTRATE AND ACETAMINOPHEN 10; 325 MG/1; MG/1
1 TABLET ORAL EVERY 6 HOURS PRN
Qty: 120 TABLET | Refills: 0 | Status: SHIPPED | OUTPATIENT
Start: 2023-07-26 | End: 2023-08-25

## 2023-05-31 RX ORDER — DIPHENHYD/PHENYLEPH/ACETAMINOP 25-650/30
LIQUID (ML) ORAL
Qty: 100 EACH | Refills: 1 | Status: SHIPPED | OUTPATIENT
Start: 2023-05-31 | End: 2024-07-22

## 2023-05-31 ASSESSMENT — PAIN SCALES - GENERAL: PAINLEVEL: NO PAIN (0)

## 2023-05-31 NOTE — PROGRESS NOTES
Assessment & Plan     Type 1 diabetes mellitus with retinopathy, macular edema presence unspecified, unspecified laterality, unspecified retinopathy severity (H)   stable, has diabetic ed follow up soon  - Miscellaneous DME Order  - Benzocaine-Isopropyl Alcohol (ALCOHOL SWABS WITH BENZOCAINE) 6-70 % PADS; Use alcohol swab with benzocaine as needed once every 3 days with insulin pump change and every 10 days with CGM change.    Diabetic polyneuropathy associated with type 1 diabetes mellitus (H)   stable  Eye exam is coming up  - HYDROcodone-acetaminophen (NORCO)  MG per tablet; Take 1 tablet by mouth every 6 hours as needed for moderate to severe pain  - HYDROcodone-acetaminophen (NORCO)  MG per tablet; Take 1 tablet by mouth every 6 hours as needed for moderate to severe pain  - HYDROcodone-acetaminophen (NORCO)  MG per tablet; Take 1 tablet by mouth every 6 hours as needed for moderate to severe pain  - Miscellaneous DME Order  - Benzocaine-Isopropyl Alcohol (ALCOHOL SWABS WITH BENZOCAINE) 6-70 % PADS; Use alcohol swab with benzocaine as needed once every 3 days with insulin pump change and every 10 days with CGM change.    Chronic narcotic use  New CSA signed today  - HYDROcodone-acetaminophen (NORCO)  MG per tablet; Take 1 tablet by mouth every 6 hours as needed for moderate to severe pain  - HYDROcodone-acetaminophen (NORCO)  MG per tablet; Take 1 tablet by mouth every 6 hours as needed for moderate to severe pain  - HYDROcodone-acetaminophen (NORCO)  MG per tablet; Take 1 tablet by mouth every 6 hours as needed for moderate to severe pain    Cervical stenosis of spine     - HYDROcodone-acetaminophen (NORCO)  MG per tablet; Take 1 tablet by mouth every 6 hours as needed for moderate to severe pain  - HYDROcodone-acetaminophen (NORCO)  MG per tablet; Take 1 tablet by mouth every 6 hours as needed for moderate to severe pain  - HYDROcodone-acetaminophen (NORCO)  " MG per tablet; Take 1 tablet by mouth every 6 hours as needed for moderate to severe pain    F11.2 - Continuous opioid dependence (H)       Type 1 diabetes mellitus with vascular disease (H)     - Miscellaneous DME Order  - Benzocaine-Isopropyl Alcohol (ALCOHOL SWABS WITH BENZOCAINE) 6-70 % PADS; Use alcohol swab with benzocaine as needed once every 3 days with insulin pump change and every 10 days with CGM change.             BMI:   Estimated body mass index is 28.39 kg/m  as calculated from the following:    Height as of this encounter: 1.753 m (5' 9\").    Weight as of this encounter: 87.2 kg (192 lb 4 oz).           Jackeline Rachel MD  [facility]   Eb is a 68 year old, presenting for the following health issues:  Diabetes      HPI     - He is requesting refill of IV prep pads sent to specialty pharmacy    Pain History:  When did you first notice your pain? 10+ years   Have you seen this provider for your pain in the past?   Yes   Where in your body do you have pain? Polyneuropathy and neck pain  Are you seeing anyone else for your pain? No        5/11/2022    11:04 AM 12/5/2022     9:57 AM 3/1/2023    11:14 AM   PHQ-9 SCORE   PHQ-9 Total Score MyChart  3 (Minimal depression)    PHQ-9 Total Score 0 3 0           9/9/2022    10:57 AM 12/5/2022     9:59 AM 3/1/2023    10:59 AM   VIVIANA-7 SCORE   Total Score 0 (minimal anxiety) 0 (minimal anxiety) 0 (minimal anxiety)   Total Score 0 0 0             5/11/2022    11:04 AM 12/5/2022     9:57 AM 3/1/2023    11:14 AM   PHQ-9 SCORE   PHQ-9 Total Score MyChart  3 (Minimal depression)    PHQ-9 Total Score 0 3 0         9/9/2022    10:57 AM 12/5/2022     9:59 AM 3/1/2023    10:59 AM   VIVIANA-7 SCORE   Total Score 0 (minimal anxiety) 0 (minimal anxiety) 0 (minimal anxiety)   Total Score 0 0 0         3/1/2023    11:06 AM 5/31/2023     1:59 PM   PEG Score   PEG Total Score 2.33 3.33       Chronic Pain Follow Up:    Location of pain: " "Neck and polyneuopathy  Analgesia/pain control:    - Recent changes:  Same    - Overall control: Comfortably manageable    - Current treatments: Opioids    Adherence:     - Do you ever take more pain medicine than prescribed? No    - When did you take your last dose of pain medicine?  This morning   Adverse effects: No   PDMP Review       Value Time User    State PDMP site checked  Yes 5/31/2023  1:25 PM Jackeline Rachel MD        Last CSA Agreement:   CSA -- Patient Level:     [Media Unavailable] Controlled Substance Agreement - Opioid - Scan on 5/12/2022  9:46 AM   [Media Unavailable] Controlled Substance Agreement - Opioid - Scan on 4/9/2021 10:57 AM       Last UDS: 5/15/2022      Review of Systems   Constitutional, HEENT, cardiovascular, pulmonary, gi and gu systems are negative, except as otherwise noted.      Objective    /80   Pulse 94   Temp 97.6  F (36.4  C) (Tympanic)   Resp 18   Ht 1.753 m (5' 9\")   Wt 87.2 kg (192 lb 4 oz)   SpO2 96%   BMI 28.39 kg/m    Body mass index is 28.39 kg/m .  Physical Exam   GENERAL: healthy, alert and no distress  NECK: no adenopathy, no asymmetry, masses, or scars and thyroid normal to palpation  RESP: lungs clear to auscultation - no rales, rhonchi or wheezes  CV: regular rate and rhythm, normal S1 S2, no S3 or S4, no murmur, click or rub, no peripheral edema and peripheral pulses strong  ABDOMEN: soft, nontender, no hepatosplenomegaly, no masses and bowel sounds normal  MS: no gross musculoskeletal defects noted, no edema                      "

## 2023-05-31 NOTE — PROGRESS NOTES
Answers for HPI/ROS submitted by the patient on 5/31/2023  Frequency of checking blood sugars:: continous glucose monitor  What time of day are you checking your blood sugars : before and after meals  Have you had any blood sugars above 200?: Yes  Have you had any blood sugars below 70?: Yes  Hypoglycemia symptoms:: blurred vision  Diabetic concerns:: none  Paraesthesia present:: none of these symptoms  Have you had a diabetic eye exam within the last year?: No  Dyspnea:: No  Edema:: No  Are you using more pillows than usual at night?: No  Do you cough at night?: No  Checking weight daily:: No  Weight change recently:: None  Heart Medication Side Effects:: None  Frequency:: None  Nitroglycerin use:: never  Do you take an aspirin every day?: Yes  Your back pain is: recurring  Where is your back pain located? : left lower back, right shoulder  How would you describe your back pain? : dull ache, shooting  Where does your back pain spread? : nowhere  Since you noticed your back pain, how has it changed? : always present, but gets better and worse  Does your back pain interfere with your job?: Not applicable  If yes, which:: cold, heat  What is the reason for your visit today? : meds  How many servings of fruits and vegetables do you eat daily?: 0-1  On average, how many sweetened beverages do you drink each day (Examples: soda, juice, sweet tea, etc.  Do NOT count diet or artificially sweetened beverages)?: 0  How many minutes a day do you exercise enough to make your heart beat faster?: 9 or less  How many days a week do you exercise enough to make your heart beat faster?: 3 or less  How many days per week do you miss taking your medication?: 0

## 2023-05-31 NOTE — LETTER
Opioid / Opioid Plus Controlled Substance Agreement    This is an agreement between you and your provider about the safe and appropriate use of controlled substance/opioids prescribed by your care team. Controlled substances are medicines that can cause physical and mental dependence (abuse).    There are strict laws about having and using these medicines. We here at [facility] are committing to working with you in your efforts to get better. To support you in this work, we ll help you schedule regular office appointments for medicine refills. If we must cancel or change your appointment for any reason, we ll make sure you have enough medicine to last until your next appointment.     As a Provider, I will:  Listen carefully to your concerns and treat you with respect.   Recommend a treatment plan that I believe is in your best interest. This plan may involve therapies other than opioid pain medication.   Talk with you often about the possible benefits, and the risk of harm of any medicine that we prescribe for you.   Provide a plan on how to taper (discontinue or go off) using this medicine if the decision is made to stop its use.    As a Patient, I understand that opioid(s):   Are a controlled substance prescribed by my care team to help me function or work and manage my condition(s).   Are strong medicines and can cause serious side effects such as:  Drowsiness, which can seriously affect my driving ability  A lower breathing rate, enough to cause death  Harm to my thinking ability   Depression   Abuse of and addiction to this medicine  Need to be taken exactly as prescribed. Combining opioids with certain medicines or chemicals (such as illegal drugs, sedatives, sleeping pills, and benzodiazepines) can be dangerous or even fatal. If I stop opioids suddenly, I may have severe withdrawal symptoms.  Do not work for all types of pain nor for all patients. If they re not helpful, I may be asked to stop  them.        The risks, benefits and side effects of these medicine(s) were explained to me. I agree that:  I will take part in other treatments as advised by my care team. This may be psychiatry or counseling, physical therapy, behavioral therapy, group treatment or a referral to a specialist.     I will keep all my appointments. I understand that this is part of the monitoring of opioids. My care team may require an office visit for EVERY opioid/controlled substance refill. If I miss appointments or don t follow instructions, my care team may stop my medicine.    I will take my medicines as prescribed. I will not change the dose or schedule unless my care team tells me to. There will be no refills if I run out early.     I may be asked to come to the clinic and complete a urine drug test or complete a pill count at any time. If I don t give a urine sample or participate in a pill count, the care team may stop my medicine.    I will only receive prescriptions from this clinic for chronic pain. If I am treated by another provider for acute pain issues, I will tell them that I am taking opioid pain medication for chronic pain and that I have a treatment agreement with this provider. I will inform my [facility] care team within one business day if I am given a prescription for any pain medication by another healthcare provider. My [facility] care team can contact other providers and pharmacists about my use of any medicines.    It is up to me to make sure that I don t run out of my medicines on weekends or holidays. If my care team is willing to refill my opioid prescription without a visit, I must request refills only during office hours. Refills may take up to 3 business days to process. I will use one pharmacy to fill all my opioid and other controlled substance prescriptions. I will notify the clinic about any changes to my insurance or medication availability.    I am responsible for my  prescriptions. If the medicine/prescription is lost, stolen or destroyed, it will not be replaced. I also agree not to share controlled substance medicines with anyone.    I am aware I should not use any illegal or recreational drugs. I agree not to drink alcohol unless my care team says I can.       If I enroll in the [place] [facility] Cann[facility] program, I will tell my care team prior to my next refill.     I will tell my care team right away if I become pregnant, have a new medical problem treated outside of my regular clinic, or have a change in my medications.    I understand that this medicine can affect my thinking, judgment and reaction time. Alcohol and drugs affect the brain and body, which can affect the safety of my driving. Being under the influence of alcohol or drugs can affect my decision-making, behaviors, personal safety, and the safety of others. Driving while impaired (DWI) can occur if a person is driving, operating, or in physical control of a car, motorcycle, boat, snowmobile, ATV, motorbike, off-road vehicle, or any other motor vehicle ([place] Statute 169A.20). I understand the risk if I choose to drive or operate any vehicle or machinery.    I understand that if I do not follow any of the conditions above, my prescriptions or treatment may be stopped or changed.          Opioids  What You Need to Know    What are opioids?   Opioids are pain medicines that must be prescribed by a doctor. They are also known as narcotics.     Examples are:   morphine (MS Contin, Hanny)  oxycodone (Oxycontin)  oxycodone and acetaminophen (Percocet)  hydrocodone and acetaminophen (Vicodin, Norco)   fentanyl patch (Duragesic)   hydromorphone (Dilaudid)   methadone  codeine (Tylenol #3)     What do opioids do well?   Opioids are best for severe short-term pain such as after a surgery or injury. They may work well for cancer pain. They may help some people with long-lasting (chronic) pain.     What do opioids NOT do  well?   Opioids never get rid of pain entirely, and they don t work well for most patients with chronic pain. Opioids don t reduce swelling, one of the causes of pain.                                    Other ways to manage chronic pain and improve function include:     Treat the health problem that may be causing pain  Anti-inflammation medicines, which reduce swelling and tenderness, such as ibuprofen (Advil, Motrin) or naproxen (Aleve)  Acetaminophen (Tylenol)  Antidepressants and anti-seizure medicines, especially for nerve pain  Topical treatments such as patches or creams  Injections or nerve blocks  Chiropractic or osteopathic treatment  Acupuncture, massage, deep breathing, meditation, visual imagery, aromatherapy  Use heat or ice at the pain site  Physical therapy   Exercise  Stop smoking  Take part in therapy       Risks and side effects     Talk to your doctor before you start or decide to keep taking opioids. Possible side effects include:    Lowering your breathing rate enough to cause death  Overdose, including death, especially if taking higher than prescribed doses  Worse depression symptoms; less pleasure in things you usually enjoy  Feeling tired or sluggish  Slower thoughts or cloudy thinking  Being more sensitive to pain over time; pain is harder to control  Trouble sleeping or restless sleep  Changes in hormone levels (for example, less testosterone)  Changes in sex drive or ability to have sex  Constipation  Unsafe driving  Itching and sweating  Dizziness  Nausea, throwing up and dry mouth    What else should I know about opioids?    Opioids may lead to dependence, tolerance, or addiction.    Dependence means that if you stop or reduce the medicine too quickly, you will have withdrawal symptoms. These include loose poop (diarrhea), jitters, flu-like symptoms, nervousness and tremors. Dependence is not the same as addiction.                     Tolerance means needing higher doses over time to  get the same effect. This may increase the chance of serious side effects.    Addiction is when people improperly use a substance that harms their body, their mind or their relations with others. Use of opiates can cause a relapse of addiction if you have a history of drug or alcohol abuse.    People who have used opioids for a long time may have a lower quality of life, worse depression, higher levels of pain and more visits to doctors.    You can overdose on opioids. Take these steps to lower your risk of overdose:    Recognize the signs:  Signs of overdose include decrease or loss of consciousness (blackout), slowed breathing, trouble waking up and blue lips. If someone is worried about overdose, they should call 911.    Talk to your doctor about Narcan (naloxone).   If you are at risk for overdose, you may be given a prescription for Narcan. This medicine very quickly reverses the effects of opioids.   If you overdose, a friend or family member can give you Narcan while waiting for the ambulance. They need to know the signs of overdose and how to give Narcan.     Don't use alcohol or street drugs.   Taking them with opioids can cause death.    Do not take any of these medicines unless your doctor says it s OK. Taking these with opioids can cause death:  Benzodiazepines, such as lorazepam (Ativan), alprazolam (Xanax) or diazepam (Valium)  Muscle relaxers, such as cyclobenzaprine (Flexeril)  Sleeping pills like zolpidem (Ambien)   Other opioids      How to keep you and other people safe while taking opioids:    Never share your opioids with others.  Opioid medicines are regulated by the Drug Enforcement Agency (REMI). Selling or sharing medications is a criminal act.    2. Be sure to store opioids in a secure place, locked up if possible. Young children can easily swallow them and overdose.    3. When you are traveling with your medicines, keep them in the original bottles. If you use a pill box, be sure you also  carry a copy of your medicine list from your clinic or pharmacy.    4. Safe disposal of opioids    Most pharmacies have places to get rid of medicine, called disposal kiosks. Medicine disposal options are also available in every [facility]. Search your county and  medication disposal  to find more options. You can find more details at:  https://www.pca.[facility].[email or web address]./living-green/managing-unwanted-medications     I agree that my provider, clinic care team, and pharmacy may work with any city, state or federal law enforcement agency that investigates the misuse, sale, or other diversion of my controlled medicine. I will allow my provider to discuss my care with, or share a copy of, this agreement with any other treating provider, pharmacy or emergency room where I receive care.    I have read this agreement and have asked questions about anything I did not understand.    _______________________________________________________  Patient Signature - Eb Eisenberg _____________________                   Date     _______________________________________________________  Provider Signature - Jackeline Rachel MD   _____________________                   Date     _______________________________________________________  Witness Signature (required if provider not present while patient signing)   _____________________                   Date

## 2023-06-07 ENCOUNTER — TRANSFERRED RECORDS (OUTPATIENT)
Dept: HEALTH INFORMATION MANAGEMENT | Facility: CLINIC | Age: 68
End: 2023-06-07
Payer: COMMERCIAL

## 2023-06-07 LAB — RETINOPATHY: NEGATIVE

## 2023-06-15 ENCOUNTER — ALLIED HEALTH/NURSE VISIT (OUTPATIENT)
Dept: EDUCATION SERVICES | Facility: CLINIC | Age: 68
End: 2023-06-15
Payer: COMMERCIAL

## 2023-06-15 DIAGNOSIS — E10.59 TYPE 1 DIABETES MELLITUS WITH VASCULAR DISEASE (H): Primary | ICD-10-CM

## 2023-06-15 PROCEDURE — G0108 DIAB MANAGE TRN  PER INDIV: HCPCS | Performed by: DIETITIAN, REGISTERED

## 2023-06-15 NOTE — PROGRESS NOTES
Diabetes Self-Management Education & Support    Presents for: Insulin Pump Review    ASSESSMENT:  Patient has had type 1 diabetes for ~41 years.  Recent a1c of 7.9 (previous 7.7, 7.9, 7.6).  Currently being managed with pump therapy (Medtronic 670G).  he is on ASA and statin therapy.  Complications of:  CAD, retinopathy, polyneuropathy, ED.  Last seen by diabetes education 4/27/23.  Home BG monitoring (Dexcom G6) reveals:  58% in target, 2% <70 (which is an improvement from 45% in target, 3% lows).     Primary concern: had some lows after we increased the basal rate at last visit.    Discussed: pump upgrade and using of corresponding cgms (agreeable if time for upgrade), frequency and purpose of bolusing (currently stacking insulin), pattern management vs chasing BG, adjust basal back to what it was previously.  Patient expressed understanding.         Patient's most recent   Lab Results   Component Value Date    A1C 7.9 03/01/2023    A1C 7.6 04/02/2021     is meeting goal of <8.0    Diabetes knowledge and skills assessment:   Patient is knowledgeable in diabetes management concepts related to: Monitoring and Taking Medication    Continue education with the following diabetes management concepts: Healthy Eating, Being Active, Monitoring, Taking Medication, Problem Solving, Reducing Risks and Healthy Coping    Based on learning assessment above, most appropriate setting for further diabetes education would be: Individual setting.      PLAN    1.  Ok to reach out to pump company to find out about when he can upgrade to 780.    2.  Do not correct BG closer than 2-4 hours.    3.  Always enter carbohydrates into pump.    4.  Consider using guardian4 sensors that goes with Medtronic pump.  5.   Continue to wait 15 minutes before eating after entering carbohydrates and bolusing.    6.  Reduce 1pm basal rate of 0.85 units/hr to 0.775 units/hr.    7.  Continue to wear your Dexcom.   8.  When you are able to upgrade your pump  "we can set up a time to do that.    9.  Follow up with Dr. Rachel as scheduled.  .      See Care Plan for co-developed, patient-state behavior change goals.  AVS provided for patient today.    Education Materials Provided:  No new materials provided today    SUBJECTIVE/OBJECTIVE:  Presents for: Insulin Pump Review  Accompanied by: Self  Diabetes education in the past 24mo: Yes  Focus of Visit: Insulin Pump  Type of Pump visit: Pump Review  Type of CGM visit: Personal CGM Follow-up  Diabetes type: Type 1  Disease course: Stable  How confident are you filling out medical forms by yourself:: Somewhat  Diabetes management related comments/concerns: elevated BG, correcting without results  Transportation concerns: No  Difficulty affording diabetes medication?: No  Difficulty affording diabetes testing supplies?: No  Cultural Influences/Ethnic Background:  Not  or       Diabetes Symptoms & Complications:  Fatigue: Sometimes  Neuropathy: No  Polydipsia: No  Polyphagia: No  Polyuria: No  Visual change: Yes  Slow healing wounds: No  Autonomic neuropathy: No  CVA: No  Heart disease: Yes  Nephropathy: No  Peripheral neuropathy: No  Peripheral Vascular Disease: Yes  Retinopathy: No  Sexual dysfunction: Yes    Patient Problem List and Family Medical History reviewed for relevant medical history, current medical status, and diabetes risk factors.    Vitals:  There were no vitals taken for this visit.  Estimated body mass index is 28.39 kg/m  as calculated from the following:    Height as of 5/31/23: 1.753 m (5' 9\").    Weight as of 5/31/23: 87.2 kg (192 lb 4 oz).   Last 3 BP:   BP Readings from Last 3 Encounters:   05/31/23 112/80   03/01/23 120/60   12/05/22 114/62       History   Smoking Status     Former     Types: Cigarettes     Quit date: 1/1/2001   Smokeless Tobacco     Never       Labs:  Lab Results   Component Value Date    A1C 7.9 03/01/2023    A1C 7.6 04/02/2021     Lab Results   Component Value Date    " " 03/01/2023     02/07/2022     04/02/2021     Lab Results   Component Value Date    LDL 56 03/01/2023    LDL 63 09/16/2020     HDL Cholesterol   Date Value Ref Range Status   09/16/2020 53 >39 mg/dL Final     Direct Measure HDL   Date Value Ref Range Status   03/01/2023 59 >=40 mg/dL Final   ]  GFR Estimate   Date Value Ref Range Status   03/01/2023 78 >60 mL/min/1.73m2 Final     Comment:     eGFR calculated using 2021 CKD-EPI equation.   04/02/2021 73 >60 mL/min/[1.73_m2] Final     Comment:     Non  GFR Calc  Starting 12/18/2018, serum creatinine based estimated GFR (eGFR) will be   calculated using the Chronic Kidney Disease Epidemiology Collaboration   (CKD-EPI) equation.       GFR Estimate If Black   Date Value Ref Range Status   04/02/2021 85 >60 mL/min/[1.73_m2] Final     Comment:      GFR Calc  Starting 12/18/2018, serum creatinine based estimated GFR (eGFR) will be   calculated using the Chronic Kidney Disease Epidemiology Collaboration   (CKD-EPI) equation.       Lab Results   Component Value Date    CR 1.04 03/01/2023    CR 1.05 04/02/2021     No results found for: MICROALBUMIN    Healthy Eating:  Healthy Eating Assessed Today: Yes  Cultural/Orthodox diet restrictions?: No  Meal planning/habits: Carb counting  How many times a week on average do you eat food made away from home (restaurant/take-out)?: 5+  Meals include: Lunch, Dinner  Lunch: muffin (130 grams)  Dinner:  (\"I eat junk food\")  Beverages: Diet soda  Has patient met with a dietitian in the past?: Yes    Being Active:  Being Active Assessed Today: Yes  Exercise:: Currently not exercising    Monitoring:  Monitoring Assessed Today: Yes  Did patient bring glucose meter to appointment? : Yes  Blood Glucose Meter: CGM (Dexcom)            Taking Medications:  Diabetes Medication(s)     Diabetic Other       GLUCAGON EMERGENCY KIT 1 MG IJ KIT    use as directed for severe hypoglycemia    Insulin      "  insulin aspart (NOVOLOG VIAL) 100 UNITS/ML vial    USE AS DIRECTED IN INSULIN PUMP, TOTAL DAILY DOSE 60 UNITS          Taking Medication Assessed Today: Yes  Current Treatments: Insulin Pump  Given by: Patient  Injection/Infusion sites: Abdomen  Problems taking diabetes medications regularly?: No                           Problem Solving:  Problem Solving Assessed Today: Yes  Is the patient at risk for hypoglycemia?: Yes  Hypoglycemia Frequency: Weekly (almost daily)  Patient carries a carbohydrate source: Yes              Reducing Risks:  Reducing Risks Assessed Today: Yes  Diabetes Risks: Age over 45 years  Sees dentist every 6 months?: No  Feet checked by healthcare provider in the last year?: Yes    Healthy Coping:  Healthy Coping Assessed Today: Yes  Stage of change: ACTION (Actively working towards change)  Patient Activation Measure Survey Score:      2/17/2011    11:00 AM   BENEDICTO Score (Last Two)   BENEDICTO Raw Score 42   Activation Score 66   BENEDICTO Level 3         Care Plan and Education Provided:  Care Plan: Diabetes   Updates made by Aubree Hamilton since 6/15/2023 12:00 AM      Problem: Diabetes Self-Management Education Needed to Optimize Self-Care Behaviors       Goal: Monitoring - monitor glucose and ketones as directed    This Visit's Progress: 100%   Recent Progress: 100%   Note:    Wears Dexcom         Aubree Hamilton RD, [facility], 4:46 PM, 6/15/2023    Time Spent: 60 minutes  Encounter Type: Individual    Any diabetes medication dose changes were made via the CDE Protocol per the patient's primary care provider. A copy of this encounter was shared with the provider.

## 2023-06-15 NOTE — PATIENT INSTRUCTIONS
PLAN    1.  Ok to reach out to pump company to find out about when he can upgrade to 780.    2.  Do not correct BG closer than 2-4 hours.    3.  Always enter carbohydrates into pump.    4.  Consider using guardian4 sensors that goes with Medtronic pump.  5.   Continue to wait 15 minutes before eating after entering carbohydrates and bolusing.    6.  Reduce 1pm basal rate of 0.85 units/hr to 0.775 units/hr.    7.  Continue to wear your Dexcom.   8.  When you are able to upgrade your pump we can set up a time to do that.    9.  Follow up with Dr. Rachel as scheduled.    Aubree Hamilton RD, [facility], 3:08 PM, 6/15/2023

## 2023-06-15 NOTE — LETTER
6/15/2023         RE: Eb Eisenberg  [id number] [place] [address]        Dear Colleague,    Thank you for referring your patient, Eb Eisenberg, to the [facility]. Please see a copy of my visit note below.    Diabetes Self-Management Education & Support    Presents for: Insulin Pump Review    ASSESSMENT:  Patient has had type 1 diabetes for ~41 years.  Recent a1c of 7.9 (previous 7.7, 7.9, 7.6).  Currently being managed with pump therapy (Medtronic 670G).  he is on ASA and statin therapy.  Complications of:  CAD, retinopathy, polyneuropathy, ED.  Last seen by diabetes education 4/27/23.  Home BG monitoring (Dexcom G6) reveals:  58% in target, 2% <70 (which is an improvement from 45% in target, 3% lows).     Primary concern: had some lows after we increased the basal rate at last visit.    Discussed: pump upgrade and using of corresponding cgms (agreeable if time for upgrade), frequency and purpose of bolusing (currently stacking insulin), pattern management vs chasing BG, adjust basal back to what it was previously.  Patient expressed understanding.         Patient's most recent   Lab Results   Component Value Date    A1C 7.9 03/01/2023    A1C 7.6 04/02/2021     is meeting goal of <8.0    Diabetes knowledge and skills assessment:   Patient is knowledgeable in diabetes management concepts related to: Monitoring and Taking Medication    Continue education with the following diabetes management concepts: Healthy Eating, Being Active, Monitoring, Taking Medication, Problem Solving, Reducing Risks and Healthy Coping    Based on learning assessment above, most appropriate setting for further diabetes education would be: Individual setting.      PLAN    1.  Ok to reach out to pump company to find out about when he can upgrade to 780.    2.  Do not correct BG closer than 2-4 hours.    3.  Always enter carbohydrates into pump.    4.  Consider using guardian4 sensors that goes with  "Medtronic pump.  5.   Continue to wait 15 minutes before eating after entering carbohydrates and bolusing.    6.  Reduce 1pm basal rate of 0.85 units/hr to 0.775 units/hr.    7.  Continue to wear your Dexcom.   8.  When you are able to upgrade your pump we can set up a time to do that.    9.  Follow up with Dr. Rachel as scheduled.  .      See Care Plan for co-developed, patient-state behavior change goals.  AVS provided for patient today.    Education Materials Provided:  No new materials provided today    SUBJECTIVE/OBJECTIVE:  Presents for: Insulin Pump Review  Accompanied by: Self  Diabetes education in the past 24mo: Yes  Focus of Visit: Insulin Pump  Type of Pump visit: Pump Review  Type of CGM visit: Personal CGM Follow-up  Diabetes type: Type 1  Disease course: Stable  How confident are you filling out medical forms by yourself:: Somewhat  Diabetes management related comments/concerns: elevated BG, correcting without results  Transportation concerns: No  Difficulty affording diabetes medication?: No  Difficulty affording diabetes testing supplies?: No  Cultural Influences/Ethnic Background:  Not  or       Diabetes Symptoms & Complications:  Fatigue: Sometimes  Neuropathy: No  Polydipsia: No  Polyphagia: No  Polyuria: No  Visual change: Yes  Slow healing wounds: No  Autonomic neuropathy: No  CVA: No  Heart disease: Yes  Nephropathy: No  Peripheral neuropathy: No  Peripheral Vascular Disease: Yes  Retinopathy: No  Sexual dysfunction: Yes    Patient Problem List and Family Medical History reviewed for relevant medical history, current medical status, and diabetes risk factors.    Vitals:  There were no vitals taken for this visit.  Estimated body mass index is 28.39 kg/m  as calculated from the following:    Height as of 5/31/23: 1.753 m (5' 9\").    Weight as of 5/31/23: 87.2 kg (192 lb 4 oz).   Last 3 BP:   BP Readings from Last 3 Encounters:   05/31/23 112/80   03/01/23 120/60   12/05/22 " "114/62       History   Smoking Status     Former     Types: Cigarettes     Quit date: 1/1/2001   Smokeless Tobacco     Never       Labs:  Lab Results   Component Value Date    A1C 7.9 03/01/2023    A1C 7.6 04/02/2021     Lab Results   Component Value Date     03/01/2023     02/07/2022     04/02/2021     Lab Results   Component Value Date    LDL 56 03/01/2023    LDL 63 09/16/2020     HDL Cholesterol   Date Value Ref Range Status   09/16/2020 53 >39 mg/dL Final     Direct Measure HDL   Date Value Ref Range Status   03/01/2023 59 >=40 mg/dL Final   ]  GFR Estimate   Date Value Ref Range Status   03/01/2023 78 >60 mL/min/1.73m2 Final     Comment:     eGFR calculated using 2021 CKD-EPI equation.   04/02/2021 73 >60 mL/min/[1.73_m2] Final     Comment:     Non  GFR Calc  Starting 12/18/2018, serum creatinine based estimated GFR (eGFR) will be   calculated using the Chronic Kidney Disease Epidemiology Collaboration   (CKD-EPI) equation.       GFR Estimate If Black   Date Value Ref Range Status   04/02/2021 85 >60 mL/min/[1.73_m2] Final     Comment:      GFR Calc  Starting 12/18/2018, serum creatinine based estimated GFR (eGFR) will be   calculated using the Chronic Kidney Disease Epidemiology Collaboration   (CKD-EPI) equation.       Lab Results   Component Value Date    CR 1.04 03/01/2023    CR 1.05 04/02/2021     No results found for: MICROALBUMIN    Healthy Eating:  Healthy Eating Assessed Today: Yes  Cultural/Confucianism diet restrictions?: No  Meal planning/habits: Carb counting  How many times a week on average do you eat food made away from home (restaurant/take-out)?: 5+  Meals include: Lunch, Dinner  Lunch: muffin (130 grams)  Dinner:  (\"I eat junk food\")  Beverages: Diet soda  Has patient met with a dietitian in the past?: Yes    Being Active:  Being Active Assessed Today: Yes  Exercise:: Currently not exercising    Monitoring:  Monitoring Assessed Today: Yes  Did " patient bring glucose meter to appointment? : Yes  Blood Glucose Meter: CGM (Dexcom)            Taking Medications:  Diabetes Medication(s)     Diabetic Other       GLUCAGON EMERGENCY KIT 1 MG IJ KIT    use as directed for severe hypoglycemia    Insulin       insulin aspart (NOVOLOG VIAL) 100 UNITS/ML vial    USE AS DIRECTED IN INSULIN PUMP, TOTAL DAILY DOSE 60 UNITS          Taking Medication Assessed Today: Yes  Current Treatments: Insulin Pump  Given by: Patient  Injection/Infusion sites: Abdomen  Problems taking diabetes medications regularly?: No                           Problem Solving:  Problem Solving Assessed Today: Yes  Is the patient at risk for hypoglycemia?: Yes  Hypoglycemia Frequency: Weekly (almost daily)  Patient carries a carbohydrate source: Yes              Reducing Risks:  Reducing Risks Assessed Today: Yes  Diabetes Risks: Age over 45 years  Sees dentist every 6 months?: No  Feet checked by healthcare provider in the last year?: Yes    Healthy Coping:  Healthy Coping Assessed Today: Yes  Stage of change: ACTION (Actively working towards change)  Patient Activation Measure Survey Score:      2/17/2011    11:00 AM   BENEDICTO Score (Last Two)   BENEDICTO Raw Score 42   Activation Score 66   BENEDICTO Level 3         Care Plan and Education Provided:  Care Plan: Diabetes   Updates made by Aubree Hamilton since 6/15/2023 12:00 AM      Problem: Diabetes Self-Management Education Needed to Optimize Self-Care Behaviors       Goal: Monitoring - monitor glucose and ketones as directed    This Visit's Progress: 100%   Recent Progress: 100%   Note:    Wears Dexcom         Aubree Hamilton RD, [facility], 4:46 PM, 6/15/2023    Time Spent: 60 minutes  Encounter Type: Individual    Any diabetes medication dose changes were made via the CDE Protocol per the patient's primary care provider. A copy of this encounter was shared with the provider.

## 2023-07-24 ENCOUNTER — TELEPHONE (OUTPATIENT)
Dept: EDUCATION SERVICES | Facility: CLINIC | Age: 68
End: 2023-07-24
Payer: COMMERCIAL

## 2023-07-24 DIAGNOSIS — E10.59 TYPE 1 DIABETES MELLITUS WITH VASCULAR DISEASE (H): Primary | ICD-10-CM

## 2023-07-24 NOTE — TELEPHONE ENCOUNTER
Eb left a message that his dexcom  is not working any longer. I called him back to discuss. He said is son was able to help him get it connected with his phone to use as the  for now, but he would still like to have a separate dexcom . DexCom tech support said his current  was a replacement for the one he originally got in 2020 and it is no longer under warranty. They recommended he contact his insurance company.     Since Eb is hoping to upgrade his pump soon he isn't sure what the next steps should be for his sensor. I told him I would reach out to Aubree, whom he has been working with, to see what she would recommend for next steps. I told him we will be in touch in the next few days.    Nicole Villar RN, [facility]

## 2023-07-25 RX ORDER — PROCHLORPERAZINE 25 MG/1
1 SUPPOSITORY RECTAL ONCE
Qty: 1 EACH | Refills: 0 | Status: SHIPPED | OUTPATIENT
Start: 2023-07-25 | End: 2023-07-25

## 2023-07-25 NOTE — TELEPHONE ENCOUNTER
Called and spoke with patient.  His pump warranty is up 9/23/24 and is planning to upgrade at that time.  He is uncertain of what pump/cgms he will go to at that time.      Discussed his options for a cgms : either pay out of pocket for a  (Medicare with cover a  for Dexcom in 2025) or just continue to use vasyl on phone.  Patient states would like to purchase a  out of pocket.      Prescription for Dexcom G6  placed.    Aubree Hamilton RD, [facility], 4:13 PM, 7/25/2023

## 2023-08-10 ENCOUNTER — PATIENT OUTREACH (OUTPATIENT)
Dept: CARE COORDINATION | Facility: CLINIC | Age: 68
End: 2023-08-10
Payer: COMMERCIAL

## 2023-08-16 DIAGNOSIS — E10.59 TYPE 1 DIABETES MELLITUS WITH VASCULAR DISEASE (H): ICD-10-CM

## 2023-08-16 RX ORDER — PROCHLORPERAZINE 25 MG/1
SUPPOSITORY RECTAL
Qty: 9 EACH | Refills: 1 | Status: SHIPPED | OUTPATIENT
Start: 2023-08-16 | End: 2023-12-21

## 2023-08-16 RX ORDER — PROCHLORPERAZINE 25 MG/1
SUPPOSITORY RECTAL
Qty: 1 EACH | Refills: 1 | Status: SHIPPED | OUTPATIENT
Start: 2023-08-16 | End: 2023-12-21

## 2023-08-16 RX ORDER — INSULIN PUMP SYRINGE, 3 ML
EACH MISCELLANEOUS
Qty: 30 EACH | Refills: 3 | Status: SHIPPED | OUTPATIENT
Start: 2023-08-16 | End: 2024-08-28

## 2023-08-24 ENCOUNTER — PATIENT OUTREACH (OUTPATIENT)
Dept: CARE COORDINATION | Facility: CLINIC | Age: 68
End: 2023-08-24
Payer: COMMERCIAL

## 2023-09-11 DIAGNOSIS — I51.9 LEFT VENTRICULAR DYSFUNCTION: ICD-10-CM

## 2023-09-11 DIAGNOSIS — E10.42 DIABETIC POLYNEUROPATHY ASSOCIATED WITH TYPE 1 DIABETES MELLITUS (H): ICD-10-CM

## 2023-09-12 RX ORDER — METOPROLOL SUCCINATE 25 MG/1
TABLET, EXTENDED RELEASE ORAL
Qty: 90 TABLET | Refills: 1 | Status: SHIPPED | OUTPATIENT
Start: 2023-09-12 | End: 2024-07-18

## 2023-09-12 RX ORDER — LOSARTAN POTASSIUM 50 MG/1
TABLET ORAL
Qty: 90 TABLET | Refills: 1 | Status: SHIPPED | OUTPATIENT
Start: 2023-09-12 | End: 2024-07-18

## 2023-09-25 DIAGNOSIS — F11.90 CHRONIC NARCOTIC USE: ICD-10-CM

## 2023-09-25 DIAGNOSIS — M48.02 CERVICAL STENOSIS OF SPINE: ICD-10-CM

## 2023-09-25 DIAGNOSIS — E10.42 DIABETIC POLYNEUROPATHY ASSOCIATED WITH TYPE 1 DIABETES MELLITUS (H): ICD-10-CM

## 2023-09-25 RX ORDER — HYDROCODONE BITARTRATE AND ACETAMINOPHEN 10; 325 MG/1; MG/1
1 TABLET ORAL EVERY 6 HOURS PRN
Qty: 120 TABLET | Refills: 0 | Status: SHIPPED | OUTPATIENT
Start: 2023-09-25 | End: 2023-10-18

## 2023-09-25 NOTE — TELEPHONE ENCOUNTER
Patient's refill was sent to the [facility] due to the prescription looking odd.     Last time patient picked up medication 8/25/23 for [place] for 30 day supply.     Cued up medication and routing to provider to please evaluate.    Trice Lawson RN on 9/25/2023 at 1:57 PM

## 2023-09-25 NOTE — TELEPHONE ENCOUNTER
FYI - Status Update    Who is Calling: patient    Update: Had to cancel appointment today. Needs hydrocodone refilled still. Has appointment rescheduled for 10/18    Does caller want a call/response back: Yes     Could we send this information to you in [employer] or would you prefer to receive a phone call?:   Patient would prefer a phone call   Okay to leave a detailed message?: Yes at Cell number on file:    Telephone Information:   Mobile [phone]   Mobile [phone]

## 2023-09-25 NOTE — TELEPHONE ENCOUNTER
FYI - Status Update    Who is Calling: patient    Update: Pt called for the second time today looking for update on medication refill. Pt was somewhat irritated towards writer and very pushy.   Sabiha Avila on 9/25/2023 at 3:13 PM      Does caller want a call/response back: No

## 2023-09-28 ENCOUNTER — ALLIED HEALTH/NURSE VISIT (OUTPATIENT)
Dept: EDUCATION SERVICES | Facility: CLINIC | Age: 68
End: 2023-09-28
Payer: COMMERCIAL

## 2023-09-28 DIAGNOSIS — E10.59 TYPE 1 DIABETES MELLITUS WITH VASCULAR DISEASE (H): Primary | ICD-10-CM

## 2023-09-28 PROCEDURE — G0108 DIAB MANAGE TRN  PER INDIV: HCPCS | Performed by: DIETITIAN, REGISTERED

## 2023-09-28 PROCEDURE — 99207 PR DROP WITH A PROCEDURE: CPT | Performed by: DIETITIAN, REGISTERED

## 2023-09-28 NOTE — PATIENT INSTRUCTIONS
PLAN    1.  Treat lows with only 15 grams of carbohydrate and wait 15 minutes before giving another 15 grams and using glucose tablets.  2.  Consider having 1/2 muffin and then wait 2 hrs and have the other 1/2 of muffin or just work on bolusing 20-30 minutes prior to having the muffin.    3.  Add a protein to the muffin ie hardboiled egg, cheesestick or nuts  4.  Continue to wear your Dexcom.    5.  Continue to wear your pump and change reservoir and sets at least every 3 days.  6. Follow up with Dr. Rachel as scheduled 10/18/23.  7. Follow up with Diabetes Education 2/7/23 at 2pm.      Aubree Hamilton RD, [facility], 3:10 PM, 9/28/2023

## 2023-09-28 NOTE — PROGRESS NOTES
Diabetes Self-Management Education & Support    Presents for: Insulin Pump Review    Type of Service: In Person Visit    Assessment Type:   REPORTS:            Blood Glucose Report:            Insulin Pump Information  Insulin Pump Brand: Medtronic  Infusion Set: Medtronic  Medtronic Infusion Set: Quick Set    Education specific to insulin pump provided today on:   importance of changing infusion set every 3 days and treating hypoglycemia correctly (Rule of 15)    Opportunities for ongoing education and support in diabetes-self management were discussed.    Pt verbalized understanding of concepts discussed and recommendations provided today.    ASSESSMENT    -type 1 diabetes for ~41 years  -recent a1c of 7.9  -currently being managed with pump therapy  -on aspirin and statin therapy  -diabetes complications:  CAD, retinopathy, polneuropathy, ED   -last seen by diabetes education 6/15/23  -home BG monitoring (Dexcom G6) reveals:  69% in target, 2% low, 29% above target  Pump: Medtronic 670G  Insulin: Novolog  TDD: 54.9 units  62% bolus (34.1 units)  38% basal (20.8 units)  Set change every 6 days  Reservoir change every 4 days    Continue education with the following diabetes management concepts: Healthy Eating, Being Active, Monitoring, Taking Medication, Problem Solving, Reducing Risks, Healthy Coping, and Insulin Pump Concepts Balancing glucose and insulin  Carbohydrate counting  Problem solving with insulin pump therapy (BG monitoring; hypoglycemia signs/symptoms, treatment (glucagon) and prevention; hyperglycemia signs/symptoms, treatment and prevention; ketones, DKA signs/symptoms and prevention)    Patient would benefit from changing infusion set every 3 days.    Changes made to pump settings:  No changes made today.    PLAN    1.  Treat lows with only 15 grams of carbohydrate and wait 15 minutes before giving another 15 grams and using glucose tablets.  2.  Consider having 1/2 muffin and then wait 2 hrs and  "have the other 1/2 of muffin or just work on bolusing 20-30 minutes prior to having the muffin.    3.  Add a protein to the muffin ie hardboiled egg, cheesestick or nuts  4.  Continue to wear your Dexcom.    5.  Continue to wear your pump and change reservoir and sets at least every 3 days.  6. Follow up with Dr. Rachel as scheduled 10/18/23.  7. Follow up with Diabetes Education 2/7/23 at 2pm.        See Care Plan for co-developed, patient-state behavior change goals.  AVS provided for patient today.    Education Materials Provided:  No new materials provided today      SUBJECTIVE/OBJECTIVE:  Presents for: Insulin Pump Review  Accompanied by: Self  Diabetes education in the past 24mo: Yes  Type of Pump visit: Pump Review  Type of CGM visit: Personal CGM Follow-up  Diabetes type: Type 1  Disease course: Stable  How confident are you filling out medical forms by yourself:: Quite a bit  Transportation concerns: No  Difficulty affording diabetes medication?: Sometimes  Difficulty affording diabetes testing supplies?: Sometimes  Cultural Influences/Ethnic Background:  Not  or     Diabetes Symptoms & Complications:  Fatigue: No  Neuropathy: No  Polydipsia: No  Polyphagia: No  Polyuria: No  Visual change: No  Slow healing wounds: No  Autonomic neuropathy: No  CVA: No  Heart disease: Yes  Nephropathy: No  Peripheral neuropathy: No  Peripheral Vascular Disease: No  Retinopathy: No  Sexual dysfunction: Other    Patient Problem List and Family Medical History reviewed for relevant medical history, current medical status, and diabetes risk factors.    Vitals:  There were no vitals taken for this visit.  Estimated body mass index is 28.39 kg/m  as calculated from the following:    Height as of 5/31/23: 1.753 m (5' 9\").    Weight as of 5/31/23: 87.2 kg (192 lb 4 oz).   Last 3 BP:   BP Readings from Last 3 Encounters:   05/31/23 112/80   03/01/23 120/60   12/05/22 114/62       History   Smoking Status    Former    " Types: Cigarettes    Quit date: 1/1/2001   Smokeless Tobacco    Never       Labs:  Lab Results   Component Value Date    A1C 7.9 03/01/2023    A1C 7.6 04/02/2021     Lab Results   Component Value Date     03/01/2023     02/07/2022     04/02/2021     Lab Results   Component Value Date    LDL 56 03/01/2023    LDL 63 09/16/2020     HDL Cholesterol   Date Value Ref Range Status   09/16/2020 53 >39 mg/dL Final     Direct Measure HDL   Date Value Ref Range Status   03/01/2023 59 >=40 mg/dL Final   ]  GFR Estimate   Date Value Ref Range Status   03/01/2023 78 >60 mL/min/1.73m2 Final     Comment:     eGFR calculated using 2021 CKD-EPI equation.   04/02/2021 73 >60 mL/min/[1.73_m2] Final     Comment:     Non  GFR Calc  Starting 12/18/2018, serum creatinine based estimated GFR (eGFR) will be   calculated using the Chronic Kidney Disease Epidemiology Collaboration   (CKD-EPI) equation.       GFR Estimate If Black   Date Value Ref Range Status   04/02/2021 85 >60 mL/min/[1.73_m2] Final     Comment:      GFR Calc  Starting 12/18/2018, serum creatinine based estimated GFR (eGFR) will be   calculated using the Chronic Kidney Disease Epidemiology Collaboration   (CKD-EPI) equation.       Lab Results   Component Value Date    CR 1.04 03/01/2023    CR 1.05 04/02/2021     No results found for: MICROALBUMIN    Healthy Eating:  Cultural/Protestant diet restrictions?: No  Meal planning/habits: Carb counting, Low carb, Smaller portions, Frequent snacking  How many times a week on average do you eat food made away from home (restaurant/take-out)?: 3  Meals include: Dinner  Beverages: Water, Diet soda    Being Active:  Days per week of moderate to strenuous exercise (like a brisk walk): (P) 5  On average, minutes per day of exercise at this level: (P) 10  How intense was your typical exercise? : (P) Light (like stretching or slow walking)  Exercise Minutes per Week: (P) 50  Barrier to  exercise: None    Monitoring:  Monitoring Assessed Today: Yes  Did patient bring glucose meter to appointment? : Yes  Blood Glucose Meter: CGM (DexcomG6)  Times checking blood sugar at home (number): Other  Times checking blood sugar at home (per): Day  Blood glucose trend: No change                    Taking Medications:  Diabetes Medication(s)       Diabetic Other       GLUCAGON EMERGENCY KIT 1 MG IJ KIT    use as directed for severe hypoglycemia      Insulin       insulin aspart (NOVOLOG VIAL) 100 UNITS/ML vial    USE AS DIRECTED IN INSULIN PUMP, TOTAL DAILY DOSE 60 UNITS            Taking Medication Assessed Today: Yes  Current Treatments: Insulin Pump  Dose schedule: (P) Other  Given by: (P) Patient  Injection/Infusion sites: (P) Abdomen    Problem Solving:                 Reducing Risks:  Reducing Risks Assessed Today: Yes  CAD Risks: Diabetes Mellitus  Has dilated eye exam at least once a year?: Yes  Sees dentist every 6 months?: No  Feet checked by healthcare provider in the last year?: Yes    Healthy Coping:  Healthy Coping Assessed Today: Yes  Informal Support system:: Family, Friends  Stage of change: ACTION (Actively working towards change)  Patient Activation Measure Survey Score:      2/17/2011    11:00 AM   BENEDICTO Score (Last Two)   BENEDICTO Raw Score 42   Activation Score 66   BENEDICTO Level 3         Care Plan and Education Provided:  Care Plan: Diabetes   Updates made by Aubree Hamilton since 9/28/2023 12:00 AM        Problem: Diabetes Self-Management Education Needed to Optimize Self-Care Behaviors         Goal: Monitoring - monitor glucose and ketones as directed    This Visit's Progress: 100%   Recent Progress: 100%   Note:    Wears Dexcom       Goal: Taking Medication - patient is consistently taking medications as directed    This Visit's Progress: 80%   Recent Progress: 80%   Note:    Wearing pump       Goal: Problem Solving - know how to prevent and manage short-term diabetes complications    This  Visit's Progress: 70%   Recent Progress: 50%   Note:    Focused on appropriately treating low BG.         Aubree Hamilton RD, [facility], 4:08 PM, 9/28/2023    Time Spent: 60 minutes  Encounter Type: Individual    Any diabetes medication dose changes were made via the CDE Protocol per the patient's primary care provider. A copy of this encounter was shared with the provider.

## 2023-09-28 NOTE — LETTER
9/28/2023         RE: Eb Eisenberg  [id number]  [place] [address]        Dear Colleague,    Thank you for referring your patient, Eb Eisenberg, to the [facility]. Please see a copy of my visit note below.    Diabetes Self-Management Education & Support    Presents for: Insulin Pump Review    Type of Service: In Person Visit    Assessment Type:   REPORTS:            Blood Glucose Report:            Insulin Pump Information  Insulin Pump Brand: Medtronic  Infusion Set: Medtronic  Medtronic Infusion Set: Quick Set    Education specific to insulin pump provided today on:   importance of changing infusion set every 3 days and treating hypoglycemia correctly (Rule of 15)    Opportunities for ongoing education and support in diabetes-self management were discussed.    Pt verbalized understanding of concepts discussed and recommendations provided today.    ASSESSMENT    -type 1 diabetes for ~41 years  -recent a1c of 7.9  -currently being managed with pump therapy  -on aspirin and statin therapy  -diabetes complications:  CAD, retinopathy, polneuropathy, ED   -last seen by diabetes education 6/15/23  -home BG monitoring (Dexcom G6) reveals:  69% in target, 2% low, 29% above target  Pump: Medtronic 770  Insulin: Novolog  TDD: 54.9 units  62% bolus (34.1 units)  38% basal (20.8 units)  Set change every 6 days  Reservoir change every 4 days    Continue education with the following diabetes management concepts: Healthy Eating, Being Active, Monitoring, Taking Medication, Problem Solving, Reducing Risks, Healthy Coping, and Insulin Pump Concepts Balancing glucose and insulin  Carbohydrate counting  Problem solving with insulin pump therapy (BG monitoring; hypoglycemia signs/symptoms, treatment (glucagon) and prevention; hyperglycemia signs/symptoms, treatment and prevention; ketones, DKA signs/symptoms and prevention)    Patient would benefit from changing infusion set every 3  days.    Changes made to pump settings:  No changes made today.    PLAN    1.  Treat lows with only 15 grams of carbohydrate and wait 15 minutes before giving another 15 grams and using glucose tablets.  2.  Consider having 1/2 muffin and then wait 2 hrs and have the other 1/2 of muffin or just work on bolusing 20-30 minutes prior to having the muffin.    3.  Add a protein to the muffin ie hardboiled egg, cheesestick or nuts  4.  Continue to wear your Dexcom.    5.  Continue to wear your pump and change reservoir and sets at least every 3 days.  6. Follow up with Dr. Rachel as scheduled 10/18/23.  7. Follow up with Diabetes Education 2/7/23 at 2pm.        See Care Plan for co-developed, patient-state behavior change goals.  AVS provided for patient today.    Education Materials Provided:  No new materials provided today      SUBJECTIVE/OBJECTIVE:  Presents for: Insulin Pump Review  Accompanied by: Self  Diabetes education in the past 24mo: Yes  Type of Pump visit: Pump Review  Type of CGM visit: Personal CGM Follow-up  Diabetes type: Type 1  Disease course: Stable  How confident are you filling out medical forms by yourself:: Quite a bit  Transportation concerns: No  Difficulty affording diabetes medication?: Sometimes  Difficulty affording diabetes testing supplies?: Sometimes  Cultural Influences/Ethnic Background:  Not  or     Diabetes Symptoms & Complications:  Fatigue: No  Neuropathy: No  Polydipsia: No  Polyphagia: No  Polyuria: No  Visual change: No  Slow healing wounds: No  Autonomic neuropathy: No  CVA: No  Heart disease: Yes  Nephropathy: No  Peripheral neuropathy: No  Peripheral Vascular Disease: No  Retinopathy: No  Sexual dysfunction: Other    Patient Problem List and Family Medical History reviewed for relevant medical history, current medical status, and diabetes risk factors.    Vitals:  There were no vitals taken for this visit.  Estimated body mass index is 28.39 kg/m  as calculated  "from the following:    Height as of 5/31/23: 1.753 m (5' 9\").    Weight as of 5/31/23: 87.2 kg (192 lb 4 oz).   Last 3 BP:   BP Readings from Last 3 Encounters:   05/31/23 112/80   03/01/23 120/60   12/05/22 114/62       History   Smoking Status     Former     Types: Cigarettes     Quit date: 1/1/2001   Smokeless Tobacco     Never       Labs:  Lab Results   Component Value Date    A1C 7.9 03/01/2023    A1C 7.6 04/02/2021     Lab Results   Component Value Date     03/01/2023     02/07/2022     04/02/2021     Lab Results   Component Value Date    LDL 56 03/01/2023    LDL 63 09/16/2020     HDL Cholesterol   Date Value Ref Range Status   09/16/2020 53 >39 mg/dL Final     Direct Measure HDL   Date Value Ref Range Status   03/01/2023 59 >=40 mg/dL Final   ]  GFR Estimate   Date Value Ref Range Status   03/01/2023 78 >60 mL/min/1.73m2 Final     Comment:     eGFR calculated using 2021 CKD-EPI equation.   04/02/2021 73 >60 mL/min/[1.73_m2] Final     Comment:     Non  GFR Calc  Starting 12/18/2018, serum creatinine based estimated GFR (eGFR) will be   calculated using the Chronic Kidney Disease Epidemiology Collaboration   (CKD-EPI) equation.       GFR Estimate If Black   Date Value Ref Range Status   04/02/2021 85 >60 mL/min/[1.73_m2] Final     Comment:      GFR Calc  Starting 12/18/2018, serum creatinine based estimated GFR (eGFR) will be   calculated using the Chronic Kidney Disease Epidemiology Collaboration   (CKD-EPI) equation.       Lab Results   Component Value Date    CR 1.04 03/01/2023    CR 1.05 04/02/2021     No results found for: MICROALBUMIN    Healthy Eating:  Cultural/Uatsdin diet restrictions?: No  Meal planning/habits: Carb counting, Low carb, Smaller portions, Frequent snacking  How many times a week on average do you eat food made away from home (restaurant/take-out)?: 3  Meals include: Dinner  Beverages: Water, Diet soda    Being Active:  Days per " week of moderate to strenuous exercise (like a brisk walk): (P) 5  On average, minutes per day of exercise at this level: (P) 10  How intense was your typical exercise? : (P) Light (like stretching or slow walking)  Exercise Minutes per Week: (P) 50  Barrier to exercise: None    Monitoring:  Monitoring Assessed Today: Yes  Did patient bring glucose meter to appointment? : Yes  Blood Glucose Meter: CGM (DexcomG6)  Times checking blood sugar at home (number): Other  Times checking blood sugar at home (per): Day  Blood glucose trend: No change                    Taking Medications:  Diabetes Medication(s)       Diabetic Other       GLUCAGON EMERGENCY KIT 1 MG IJ KIT    use as directed for severe hypoglycemia      Insulin       insulin aspart (NOVOLOG VIAL) 100 UNITS/ML vial    USE AS DIRECTED IN INSULIN PUMP, TOTAL DAILY DOSE 60 UNITS            Taking Medication Assessed Today: Yes  Current Treatments: Insulin Pump  Dose schedule: (P) Other  Given by: (P) Patient  Injection/Infusion sites: (P) Abdomen    Problem Solving:                 Reducing Risks:  Reducing Risks Assessed Today: Yes  CAD Risks: Diabetes Mellitus  Has dilated eye exam at least once a year?: Yes  Sees dentist every 6 months?: No  Feet checked by healthcare provider in the last year?: Yes    Healthy Coping:  Healthy Coping Assessed Today: Yes  Informal Support system:: Family, Friends  Stage of change: ACTION (Actively working towards change)  Patient Activation Measure Survey Score:      2/17/2011    11:00 AM   BENEDICTO Score (Last Two)   BENEDICTO Raw Score 42   Activation Score 66   BENEDICTO Level 3         Care Plan and Education Provided:  Care Plan: Diabetes   Updates made by Aubree Hamilton since 9/28/2023 12:00 AM        Problem: Diabetes Self-Management Education Needed to Optimize Self-Care Behaviors         Goal: Monitoring - monitor glucose and ketones as directed    This Visit's Progress: 100%   Recent Progress: 100%   Note:    Wears Dexcom        Goal: Taking Medication - patient is consistently taking medications as directed    This Visit's Progress: 80%   Recent Progress: 80%   Note:    Wearing pump       Goal: Problem Solving - know how to prevent and manage short-term diabetes complications    This Visit's Progress: 70%   Recent Progress: 50%   Note:    Focused on appropriately treating low BG.         Aubree Hamilton RD, [facility], 4:08 PM, 9/28/2023    Time Spent: 60 minutes  Encounter Type: Individual    Any diabetes medication dose changes were made via the CDE Protocol per the patient's primary care provider. A copy of this encounter was shared with the provider.

## 2023-09-29 NOTE — TELEPHONE ENCOUNTER
"Called him,  \"I just need like 16 or something to get me through to the 6th, I had a broken wrist and I had to take extras. I haven't needed extras now for a couple of days, but I will run out like Sunday or something. \"    Advised per the (2013)  pain contract this may not be possible , and that we will route his note to Dr Rachel for her review and recommendations tomorrow.   \"oh, ok, I don't remember that contract.  \"     Dr Rachel, he is asking for more norco as he took extras for his wrist pain/ recent wrist fracture.        Kaylene Messer RNC    " Unable to Determine

## 2023-10-18 ENCOUNTER — OFFICE VISIT (OUTPATIENT)
Dept: FAMILY MEDICINE | Facility: CLINIC | Age: 68
End: 2023-10-18
Payer: COMMERCIAL

## 2023-10-18 ENCOUNTER — LAB (OUTPATIENT)
Dept: LAB | Facility: CLINIC | Age: 68
End: 2023-10-18
Payer: COMMERCIAL

## 2023-10-18 VITALS
OXYGEN SATURATION: 96 % | SYSTOLIC BLOOD PRESSURE: 124 MMHG | DIASTOLIC BLOOD PRESSURE: 70 MMHG | WEIGHT: 189.5 LBS | TEMPERATURE: 97.9 F | BODY MASS INDEX: 28.07 KG/M2 | HEART RATE: 86 BPM | RESPIRATION RATE: 20 BRPM | HEIGHT: 69 IN

## 2023-10-18 DIAGNOSIS — F11.90 CHRONIC NARCOTIC USE: ICD-10-CM

## 2023-10-18 DIAGNOSIS — M48.02 CERVICAL STENOSIS OF SPINE: ICD-10-CM

## 2023-10-18 DIAGNOSIS — E10.319 TYPE 1 DIABETES MELLITUS WITH RETINOPATHY, MACULAR EDEMA PRESENCE UNSPECIFIED, UNSPECIFIED LATERALITY, UNSPECIFIED RETINOPATHY SEVERITY (H): Primary | ICD-10-CM

## 2023-10-18 DIAGNOSIS — E10.319 TYPE 1 DIABETES MELLITUS WITH RETINOPATHY (H): ICD-10-CM

## 2023-10-18 DIAGNOSIS — N40.1 BENIGN NON-NODULAR PROSTATIC HYPERPLASIA WITH LOWER URINARY TRACT SYMPTOMS: ICD-10-CM

## 2023-10-18 DIAGNOSIS — E78.5 HYPERLIPIDEMIA LDL GOAL <70: ICD-10-CM

## 2023-10-18 DIAGNOSIS — E10.42 DIABETIC POLYNEUROPATHY ASSOCIATED WITH TYPE 1 DIABETES MELLITUS (H): ICD-10-CM

## 2023-10-18 LAB
ANION GAP SERPL CALCULATED.3IONS-SCNC: 14 MMOL/L (ref 7–15)
BUN SERPL-MCNC: 9.8 MG/DL (ref 8–23)
CALCIUM SERPL-MCNC: 8.8 MG/DL (ref 8.8–10.2)
CHLORIDE SERPL-SCNC: 101 MMOL/L (ref 98–107)
CREAT SERPL-MCNC: 1.09 MG/DL (ref 0.67–1.17)
DEPRECATED HCO3 PLAS-SCNC: 23 MMOL/L (ref 22–29)
EGFRCR SERPLBLD CKD-EPI 2021: 73 ML/MIN/1.73M2
GLUCOSE SERPL-MCNC: 267 MG/DL (ref 70–99)
HBA1C MFR BLD: 8 % (ref 0–5.6)
POTASSIUM SERPL-SCNC: 3.8 MMOL/L (ref 3.4–5.3)
SODIUM SERPL-SCNC: 138 MMOL/L (ref 135–145)

## 2023-10-18 PROCEDURE — G0008 ADMIN INFLUENZA VIRUS VAC: HCPCS | Performed by: FAMILY MEDICINE

## 2023-10-18 PROCEDURE — 90480 ADMN SARSCOV2 VAC 1/ONLY CMP: CPT | Performed by: FAMILY MEDICINE

## 2023-10-18 PROCEDURE — 99207 PR FOOT EXAM NO CHARGE: CPT | Performed by: FAMILY MEDICINE

## 2023-10-18 PROCEDURE — 83036 HEMOGLOBIN GLYCOSYLATED A1C: CPT

## 2023-10-18 PROCEDURE — 90662 IIV NO PRSV INCREASED AG IM: CPT | Performed by: FAMILY MEDICINE

## 2023-10-18 PROCEDURE — 99214 OFFICE O/P EST MOD 30 MIN: CPT | Mod: 25 | Performed by: FAMILY MEDICINE

## 2023-10-18 PROCEDURE — 80048 BASIC METABOLIC PNL TOTAL CA: CPT

## 2023-10-18 PROCEDURE — 36415 COLL VENOUS BLD VENIPUNCTURE: CPT

## 2023-10-18 PROCEDURE — 91320 SARSCV2 VAC 30MCG TRS-SUC IM: CPT | Performed by: FAMILY MEDICINE

## 2023-10-18 RX ORDER — HYDROCODONE BITARTRATE AND ACETAMINOPHEN 10; 325 MG/1; MG/1
1 TABLET ORAL EVERY 6 HOURS PRN
Qty: 120 TABLET | Refills: 0 | Status: SHIPPED | OUTPATIENT
Start: 2023-10-25 | End: 2023-11-24

## 2023-10-18 RX ORDER — RESPIRATORY SYNCYTIAL VIRUS VACCINE 120MCG/0.5
0.5 KIT INTRAMUSCULAR ONCE
Qty: 1 EACH | Refills: 0 | Status: CANCELLED | OUTPATIENT
Start: 2023-10-18 | End: 2023-10-18

## 2023-10-18 RX ORDER — HYDROCODONE BITARTRATE AND ACETAMINOPHEN 10; 325 MG/1; MG/1
1 TABLET ORAL EVERY 6 HOURS PRN
Qty: 120 TABLET | Refills: 0 | Status: SHIPPED | OUTPATIENT
Start: 2023-11-24 | End: 2023-11-24

## 2023-10-18 RX ORDER — HYDROCODONE BITARTRATE AND ACETAMINOPHEN 10; 325 MG/1; MG/1
1 TABLET ORAL EVERY 6 HOURS PRN
Qty: 120 TABLET | Refills: 0 | Status: SHIPPED | OUTPATIENT
Start: 2023-12-22 | End: 2024-01-21

## 2023-10-18 RX ORDER — TAMSULOSIN HYDROCHLORIDE 0.4 MG/1
CAPSULE ORAL
Qty: 90 CAPSULE | Refills: 3 | Status: SHIPPED | OUTPATIENT
Start: 2023-10-18 | End: 2024-04-22

## 2023-10-18 RX ORDER — ROSUVASTATIN CALCIUM 40 MG/1
TABLET, COATED ORAL
Qty: 90 TABLET | Refills: 1 | Status: SHIPPED | OUTPATIENT
Start: 2023-10-18 | End: 2023-12-26

## 2023-10-18 ASSESSMENT — PATIENT HEALTH QUESTIONNAIRE - PHQ9
SUM OF ALL RESPONSES TO PHQ QUESTIONS 1-9: 4
SUM OF ALL RESPONSES TO PHQ QUESTIONS 1-9: 4
10. IF YOU CHECKED OFF ANY PROBLEMS, HOW DIFFICULT HAVE THESE PROBLEMS MADE IT FOR YOU TO DO YOUR WORK, TAKE CARE OF THINGS AT HOME, OR GET ALONG WITH OTHER PEOPLE: NOT DIFFICULT AT ALL

## 2023-10-18 ASSESSMENT — PAIN SCALES - GENERAL: PAINLEVEL: NO PAIN (0)

## 2023-10-18 NOTE — PROGRESS NOTES
Assessment & Plan     Type 1 diabetes mellitus with retinopathy, macular edema presence unspecified, unspecified laterality, unspecified retinopathy severity (H)   He was expecting his HgbA1c to be closer to 7.1% based on CGM data downloaded about 3 weeks ago, so a bit frustrated by the HgbA1c of 8% today.  Overall control is good  Continue CGM and insulin pump  - HEMOGLOBIN A1C; Future  - BASIC METABOLIC PANEL; Future  - FOOT EXAM    Diabetic polyneuropathy associated with type 1 diabetes mellitus (H)     - HYDROcodone-acetaminophen (NORCO)  MG per tablet; Take 1 tablet by mouth every 6 hours as needed for moderate to severe pain  - HYDROcodone-acetaminophen (NORCO)  MG per tablet; Take 1 tablet by mouth every 6 hours as needed for moderate to severe pain  - HYDROcodone-acetaminophen (NORCO)  MG per tablet; Take 1 tablet by mouth every 6 hours as needed for moderate to severe pain    Chronic narcotic use   PDMP reviewed today  - [id number] - Urine Drug Confirmation Panel (Comprehensive); Future  - HYDROcodone-acetaminophen (NORCO)  MG per tablet; Take 1 tablet by mouth every 6 hours as needed for moderate to severe pain  - HYDROcodone-acetaminophen (NORCO)  MG per tablet; Take 1 tablet by mouth every 6 hours as needed for moderate to severe pain  - HYDROcodone-acetaminophen (NORCO)  MG per tablet; Take 1 tablet by mouth every 6 hours as needed for moderate to severe pain    Cervical stenosis of spine      - HYDROcodone-acetaminophen (NORCO)  MG per tablet; Take 1 tablet by mouth every 6 hours as needed for moderate to severe pain  - HYDROcodone-acetaminophen (NORCO)  MG per tablet; Take 1 tablet by mouth every 6 hours as needed for moderate to severe pain  - HYDROcodone-acetaminophen (NORCO)  MG per tablet; Take 1 tablet by mouth every 6 hours as needed for moderate to severe pain    Hyperlipidemia LDL goal <70     - rosuvastatin (CRESTOR) 40 MG tablet; TAKE 1  "TABLET EVERY DAY    Benign non-nodular prostatic hyperplasia with lower urinary tract symptoms     - tamsulosin (FLOMAX) 0.4 MG capsule; TAKE 1 CAPSULE DAILY             BMI:   Estimated body mass index is 27.98 kg/m  as calculated from the following:    Height as of this encounter: 1.753 m (5' 9\").    Weight as of this encounter: 86 kg (189 lb 8 oz).           Jackeline Rachel MD  [facility]   Eb is a 68 year old, presenting for the following health issues:  Diabetes        10/18/2023     1:24 PM   Additional Questions   Roomed by Amber COLLIER CMA   Accompanied by Self     Diabetes Follow-up  Insulin - Novolog - via pump    History of Present Illness       Diabetes:   He presents for follow up of diabetes.   He is checking home blood glucose with a continuous glucose monitor.   He checks blood glucose before and after meals.  Blood glucose is sometimes over 200 and sometimes under 70. He is aware of hypoglycemia symptoms including blurred vision.    He has no concerns regarding his diabetes at this time.   He is not experiencing numbness or burning in feet, excessive thirst, blurry vision, weight changes or redness, sores or blisters on feet.           Heart Failure:  He presents for follow up of heart failure. He is experiencing shortness of breath with activity only, which is same as usual. He is not experiencing any lower extremity edema.   He denies orthopenea and is not coughing at night. Patient is not checking weight daily. He has recently had a None.  He has no side effects from medications.  He has had no other medical visits for heart failure since the last visit.    Vascular Disease:  He presents for follow up of vascular disease.     He never takes nitroglycerin. He takes daily aspirin.    He eats 0-1 servings of fruits and vegetables daily.He consumes 0 sweetened beverage(s) daily.He exercises with enough effort to increase his heart rate 9 or less minutes per day.  " He exercises with enough effort to increase his heart rate 3 or less days per week.   He is taking medications regularly.       Uses CGM to help with marked hyper/hypoglyemica.  Seems to be much improved with the CGM and insulin pump.         Hyperlipidemia Follow-Up  Rosuvastatin 40mg qd  Are you regularly taking any medication or supplement to lower your cholesterol?   Yes- statin  Are you having muscle aches or other side effects that you think could be caused by your cholesterol lowering medication?  No    Hypertension Follow-up  Losartan 50mg every day, metoprolol 25mg qd  Do you check your blood pressure regularly outside of the clinic? No   Are you following a low salt diet? No  Are your blood pressures ever more than 140 on the top number (systolic) OR more   than 90 on the bottom number (diastolic), for example 140/90? N/A    BP Readings from Last 2 Encounters:   10/18/23 124/70   23 112/80     Hemoglobin A1C (%)   Date Value   2023 7.9 (H)   2022 7.7 (H)   2021 7.6 (H)   2020 7.4 (H)     LDL Cholesterol Calculated (mg/dL)   Date Value   2023 56   2022 63   2020 63   2019 57         Depression Followup  Wellbutrin XL 150mg every day, paxil 20mg qd  How are you doing with your depression since your last visit? No change  Are you having other symptoms that might be associated with depression? No  Have you had a significant life event?  No   Are you feeling anxious or having panic attacks?   No  Do you have any concerns with your use of alcohol or other drugs? No    Social History     Tobacco Use    Smoking status: Former     Types: Cigarettes     Quit date: 2001     Years since quittin.8    Smokeless tobacco: Never   Vaping Use    Vaping Use: Never used   Substance Use Topics    Alcohol use: No     Comment: quit in early     Drug use: No         2022     9:57 AM 3/1/2023    11:14 AM 10/18/2023     1:21 PM   PHQ   PHQ-9 Total Score 3 0 4  "  Q9: Thoughts of better off dead/self-harm past 2 weeks Not at all Not at all Not at all         9/9/2022    10:57 AM 12/5/2022     9:59 AM 3/1/2023    10:59 AM   VIVIANA-7 SCORE   Total Score 0 (minimal anxiety) 0 (minimal anxiety) 0 (minimal anxiety)   Total Score 0 0 0         Suicide Assessment Five-step Evaluation and Treatment (SAFE-T)        Review of Systems   Constitutional, HEENT, cardiovascular, pulmonary, gi and gu systems are negative, except as otherwise noted.      Objective    /70   Pulse 86   Temp 97.9  F (36.6  C) (Tympanic)   Resp 20   Ht 1.753 m (5' 9\")   Wt 86 kg (189 lb 8 oz)   SpO2 96%   BMI 27.98 kg/m    Body mass index is 27.98 kg/m .  Physical Exam   GENERAL: healthy, alert and no distress  NECK: no adenopathy, no asymmetry, masses, or scars and thyroid normal to palpation  RESP: lungs clear to auscultation - no rales, rhonchi or wheezes  CV: regular rate and rhythm, normal S1 S2, no S3 or S4, no murmur, click or rub, no peripheral edema and peripheral pulses strong  ABDOMEN: soft, nontender, no hepatosplenomegaly, no masses and bowel sounds normal  MS: no gross musculoskeletal defects noted, no edema    Results for orders placed or performed in visit on 10/18/23   HEMOGLOBIN A1C     Status: Abnormal   Result Value Ref Range    Hemoglobin A1C 8.0 (H) 0.0 - 5.6 %   [id number] - Urine Drug Confirmation Panel (Comprehensive) *Canceled*     Status: None ()    Narrative    The following orders were created for panel order [id number] - Urine Drug Confirmation Panel (Comprehensive).  Procedure                               Abnormality         Status                     ---------                               -----------         ------                     Urine Drug Confirmation ...[[id number]]                                                 Urine Creatinine for Siddharth...[[id number]]                                                   Please view results for these tests on the individual orders. "

## 2023-10-19 ENCOUNTER — LAB (OUTPATIENT)
Dept: LAB | Facility: CLINIC | Age: 68
End: 2023-10-19
Payer: COMMERCIAL

## 2023-10-19 DIAGNOSIS — F11.90 CHRONIC NARCOTIC USE: ICD-10-CM

## 2023-10-19 LAB — CREAT UR-MCNC: 60 MG/DL

## 2023-10-19 PROCEDURE — G0480 DRUG TEST DEF 1-7 CLASSES: HCPCS

## 2023-10-26 LAB
DHC UR CFM-MCNC: 779 NG/ML
DHC/CREAT UR: 1298 NG/MG {CREAT}
HYDROCODONE UR CFM-MCNC: 980 NG/ML
HYDROCODONE/CREAT UR: 1633 NG/MG {CREAT}
HYDROMORPHONE UR CFM-MCNC: 112 NG/ML
HYDROMORPHONE/CREAT UR: 187 NG/MG {CREAT}

## 2023-11-07 NOTE — PROGRESS NOTES
"SUBJECTIVE:   Eb Eisenberg is a 67 year old male who presents for Preventive Visit.      Patient has been advised of split billing requirements and indicates understanding: Yes  Are you in the first 12 months of your Medicare coverage?  No    Healthy Habits:     In general, how would you rate your overall health?  Good    Frequency of exercise:  6-7 days/week    Duration of exercise:  30-45 minutes    Do you usually eat at least 4 servings of fruit and vegetables a day, include whole grains    & fiber and avoid regularly eating high fat or \"junk\" foods?  No    Taking medications regularly:  Yes    Medication side effects:  None    Ability to successfully perform activities of daily living:  No assistance needed    Home Safety:  No safety concerns identified    Hearing Impairment:  Difficult to understand a speaker at a public meeting or [employer] service    In the past 6 months, have you been bothered by leaking of urine?  No    In general, how would you rate your overall mental or emotional health?  Good      PHQ-2 Total Score: 0    Additional concerns today:  No    Do you feel safe in your environment? Yes    Have you ever done Advance Care Planning? (For example, a Health Directive, POLST, or a discussion with a medical provider or your loved ones about your wishes): Yes, advance care planning is on file.       Fall risk  Fallen 2 or more times in the past year?: No  Any fall with injury in the past year?: No    Cognitive Screening   1) Repeat 3 items (Leader, Season, Table)    2) Clock draw: NORMAL  3) 3 item recall: Recalls 3 objects  Results: 3 items recalled: COGNITIVE IMPAIRMENT LESS LIKELY    Mini-CogTM Copyright ARSENIO Meza. Licensed by the author for use in [facility]; reprinted with permission (johnny@.[facility]). All rights reserved.      Do you have sleep apnea, excessive snoring or daytime drowsiness?: yes    Reviewed and updated as needed this visit by clinical staff   Tobacco  Allergies  " Meds   Med Hx  Surg Hx  Fam Hx  Soc Hx          Reviewed and updated as needed this visit by Provider                   Social History     Tobacco Use     Smoking status: Former Smoker     Quit date: 2001     Years since quittin.7     Smokeless tobacco: Never Used   Substance Use Topics     Alcohol use: No     Comment: quit in early '80s     If you drink alcohol do you typically have >3 drinks per day or >7 drinks per week? No    Alcohol Use 2022   Prescreen: >3 drinks/day or >7 drinks/week? No   Prescreen: >3 drinks/day or >7 drinks/week? -         Diabetes Follow-up  Insulin - Novolog - via pump  How often are you checking your blood sugar? Continuous glucose monitor  What time of day are you checking your blood sugars (select all that apply)?  Before and after meals  Have you had any blood sugars above 200?  Yes   Have you had any blood sugars below 70?  Yes     What symptoms do you notice when your blood sugar is low?  Dizzy    What concerns do you have today about your diabetes? None     Do you have any of these symptoms? (Select all that apply)  No numbness or tingling in feet.  No redness, sores or blisters on feet.  No complaints of excessive thirst.  No reports of blurry vision.  No significant changes to weight.    CGM (dexcom) 114-140 in the morning  Usually climbs in the morning  Diet soda seems to increase his sugar    Last week felt low.  His CGM said 120 but his glucometer said 40            Hyperlipidemia Follow-Up  Rosuvastatin 40mg qd    Are you regularly taking any medication or supplement to lower your cholesterol?   Yes- statin    Are you having muscle aches or other side effects that you think could be caused by your cholesterol lowering medication?  No    Hypertension Follow-up  Losartan 50mg every day, metoprolol 25mg qd    Do you check your blood pressure regularly outside of the clinic? No     Are you following a low salt diet? No    Are your blood pressures ever more than  140 on the top number (systolic) OR more   than 90 on the bottom number (diastolic), for example 140/90? N/A    BP Readings from Last 2 Encounters:   22 100/60   22 112/62     Hemoglobin A1C (%)   Date Value   2022 7.9 (H)   2021 7.6 (H)   2020 7.4 (H)     LDL Cholesterol Calculated (mg/dL)   Date Value   2022 63   2020 63   2019 57       Vascular Disease Follow-up      How often do you take nitroglycerin? Never    Do you take an aspirin every day? Yes    Depression Followup  Wellbutrin XL 150mg every day, paxil 20mg qd    How are you doing with your depression since your last visit? Improved    Are you having other symptoms that might be associated with depression? No    Have you had a significant life event?  No     Are you feeling anxious or having panic attacks?   No    Do you have any concerns with your use of alcohol or other drugs? No    Social History     Tobacco Use     Smoking status: Former Smoker     Quit date: 2001     Years since quittin.7     Smokeless tobacco: Never Used   Vaping Use     Vaping Use: Never used   Substance Use Topics     Alcohol use: No     Comment: quit in early      Drug use: No     PHQ 2021   PHQ-9 Total Score 2 2 0   Q9: Thoughts of better off dead/self-harm past 2 weeks Not at all Not at all Not at all     VIVIANA-7 SCORE 2022   Total Score - - -   Total Score - - 0 (minimal anxiety)   Total Score 0 0 0     Last PHQ-9 2022   1.  Little interest or pleasure in doing things 0   2.  Feeling down, depressed, or hopeless 0   3.  Trouble falling or staying asleep, or sleeping too much 0   4.  Feeling tired or having little energy 0   5.  Poor appetite or overeating 0   6.  Feeling bad about yourself 0   7.  Trouble concentrating 0   8.  Moving slowly or restless 0   Q9: Thoughts of better off dead/self-harm past 2 weeks 0   PHQ-9 Total Score 0   Difficulty at work, home, or with  "people Not difficult at all     VIVIANA-7  9/9/2022   1. Feeling nervous, anxious, or on edge 0   2. Not being able to stop or control worrying 0   3. Worrying too much about different things 0   4. Trouble relaxing 0   5. Being so restless that it is hard to sit still 0   6. Becoming easily annoyed or irritable 0   7. Feeling afraid, as if something awful might happen 0   VIVIANA-7 Total Score 0   If you checked any problems, how difficult have they made it for you to do your work, take care of things at home, or get along with other people? Not difficult at all       Suicide Assessment Five-step Evaluation and Treatment (SAFE-T)      Current providers sharing in care for this patient include:   Patient Care Team:  Jackeline Rachel MD as PCP - General  Jackeline Rachel MD as Assigned PCP  Christina Espinosa, RN as Diabetes Educator (Diabetes Education)  Aubree Hamilton as Registered Dietitian (Diabetes Education)    The following health maintenance items are reviewed in Epic and correct as of today:  Health Maintenance Due   Topic Date Due     ZOSTER IMMUNIZATION (2 of 2) 02/13/2020     Pneumococcal Vaccine: 65+ Years (3 - PPSV23 or PCV20) 09/19/2020     A1C  08/07/2022     BMP  08/07/2022     INFLUENZA VACCINE (1) 09/01/2022         Review of Systems  Constitutional, HEENT, cardiovascular, pulmonary, gi and gu systems are negative, except as otherwise noted.    OBJECTIVE:   /60   Pulse 78   Temp 97.4  F (36.3  C)   Resp 16   Ht 1.753 m (5' 9\")   Wt 83.6 kg (184 lb 4 oz)   SpO2 92%   BMI 27.21 kg/m   Estimated body mass index is 27.21 kg/m  as calculated from the following:    Height as of this encounter: 1.753 m (5' 9\").    Weight as of this encounter: 83.6 kg (184 lb 4 oz).  Physical Exam  GENERAL: healthy, alert and no distress  NECK: no adenopathy, no asymmetry, masses, or scars and thyroid normal to palpation  RESP: lungs clear to auscultation - no rales, rhonchi or wheezes  CV: regular rate and rhythm, " normal S1 S2, no S3 or S4, no murmur, click or rub, no peripheral edema and peripheral pulses strong  ABDOMEN: soft, nontender, no hepatosplenomegaly, no masses and bowel sounds normal  MS: no gross musculoskeletal defects noted, no edema    Diagnostic Test Results:  Labs reviewed in Epic    ASSESSMENT / PLAN:   (Z00.00) Encounter for Medicare annual wellness exam  (primary encounter diagnosis)  Comment:    Plan:      (E10.319) Type 1 diabetes mellitus with retinopathy, macular edema presence unspecified, unspecified laterality, unspecified retinopathy severity (H)  Comment:    Plan: well controlled for a type 1 diabetic who has rather brittle control    (E10.42) Diabetic polyneuropathy associated with type 1 diabetes mellitus (H)  Comment:  Ongoing follow up with ophthalmology   Plan: Hemoglobin A1c, Comprehensive metabolic panel         (BMP + Alb, Alk Phos, ALT, AST, Total. Bili,         TP), FOOT EXAM, metoprolol succinate ER (TOPROL        XL) 25 MG 24 hr tablet,         HYDROcodone-acetaminophen (NORCO)  MG per        tablet, HYDROcodone-acetaminophen (NORCO)          MG per tablet, HYDROcodone-acetaminophen        (NORCO)  MG per tablet             (E78.5) Hyperlipidemia LDL goal <70  Comment:    Plan: rosuvastatin (CRESTOR) 40 MG tablet             (I51.9) Left ventricular dysfunction  Comment:    Plan: metoprolol succinate ER (TOPROL XL) 25 MG 24 hr        tablet, losartan (COZAAR) 50 MG tablet        stable    (F11.90) Chronic narcotic use  Comment: CSA up to date  Plan: HYDROcodone-acetaminophen (NORCO)  MG per        tablet, HYDROcodone-acetaminophen (NORCO)          MG per tablet, HYDROcodone-acetaminophen        (NORCO)  MG per tablet             (M48.02) Cervical stenosis of spine  Comment:    Plan: HYDROcodone-acetaminophen (NORCO)  MG per        tablet, HYDROcodone-acetaminophen (NORCO)          MG per tablet, HYDROcodone-acetaminophen        (NORCO)  " MG per tablet               Patient has been advised of split billing requirements and indicates understanding: Yes    COUNSELING:  Reviewed preventive health counseling, as reflected in patient instructions       Healthy diet/nutrition       Vision screening    Estimated body mass index is 27.21 kg/m  as calculated from the following:    Height as of this encounter: 1.753 m (5' 9\").    Weight as of this encounter: 83.6 kg (184 lb 4 oz).        He reports that he quit smoking about 21 years ago. He has never used smokeless tobacco.      Appropriate preventive services were discussed with this patient, including applicable screening as appropriate for cardiovascular disease, diabetes, osteopenia/osteoporosis, and glaucoma.  As appropriate for age/gender, discussed screening for colorectal cancer, prostate cancer, breast cancer, and cervical cancer. Checklist reviewing preventive services available has been given to the patient.    Reviewed patients plan of care and provided an AVS. The Basic Care Plan (routine screening as documented in Health Maintenance) for Eb meets the Care Plan requirement. This Care Plan has been established and reviewed with the Patient.    Counseling Resources:  ATP IV Guidelines  Pooled Cohorts Equation Calculator  Breast Cancer Risk Calculator  Breast Cancer: Medication to Reduce Risk  FRAX Risk Assessment  ICSI Preventive Guidelines  Dietary Guidelines for Americans, 2010  [employer]'s MyPlate  ASA Prophylaxis  Lung CA Screening    Jackeline Rachel MD  [facility]    Identified Health Risks:  Answers for HPI/ROS submitted by the patient on 9/9/2022  VIVIANA 7 TOTAL SCORE: 0      " Silver Nitrate Text: The wound bed was treated with silver nitrate after the biopsy was performed.

## 2023-11-09 ENCOUNTER — TRANSFERRED RECORDS (OUTPATIENT)
Dept: HEALTH INFORMATION MANAGEMENT | Facility: CLINIC | Age: 68
End: 2023-11-09
Payer: COMMERCIAL

## 2023-11-24 DIAGNOSIS — M48.02 CERVICAL STENOSIS OF SPINE: ICD-10-CM

## 2023-11-24 DIAGNOSIS — F11.90 CHRONIC NARCOTIC USE: ICD-10-CM

## 2023-11-24 DIAGNOSIS — E10.42 DIABETIC POLYNEUROPATHY ASSOCIATED WITH TYPE 1 DIABETES MELLITUS (H): ICD-10-CM

## 2023-11-24 RX ORDER — HYDROCODONE BITARTRATE AND ACETAMINOPHEN 10; 325 MG/1; MG/1
1 TABLET ORAL EVERY 6 HOURS PRN
Qty: 120 TABLET | Refills: 0 | Status: SHIPPED | OUTPATIENT
Start: 2023-11-24 | End: 2024-01-23

## 2023-11-24 NOTE — TELEPHONE ENCOUNTER
[facility] pharmacy is out, he did verify [place] has enough to fill his Rx, please send there today as he is out

## 2023-12-21 DIAGNOSIS — E10.59 TYPE 1 DIABETES MELLITUS WITH VASCULAR DISEASE (H): ICD-10-CM

## 2023-12-21 RX ORDER — PROCHLORPERAZINE 25 MG/1
SUPPOSITORY RECTAL
Qty: 1 EACH | Refills: 1 | Status: SHIPPED | OUTPATIENT
Start: 2023-12-21 | End: 2024-07-24

## 2023-12-21 RX ORDER — PROCHLORPERAZINE 25 MG/1
SUPPOSITORY RECTAL
Qty: 9 EACH | Refills: 1 | Status: SHIPPED | OUTPATIENT
Start: 2023-12-21 | End: 2024-05-03

## 2023-12-24 DIAGNOSIS — E78.5 HYPERLIPIDEMIA LDL GOAL <70: ICD-10-CM

## 2023-12-26 RX ORDER — ROSUVASTATIN CALCIUM 40 MG/1
TABLET, COATED ORAL
Qty: 90 TABLET | Refills: 0 | Status: SHIPPED | OUTPATIENT
Start: 2023-12-26 | End: 2024-07-18

## 2023-12-26 NOTE — TELEPHONE ENCOUNTER
Prescription approved per [facility] Refill Protocol     Francisca Luciano     RN MSN

## 2024-01-23 ENCOUNTER — LAB (OUTPATIENT)
Dept: LAB | Facility: CLINIC | Age: 69
End: 2024-01-23
Payer: COMMERCIAL

## 2024-01-23 ENCOUNTER — OFFICE VISIT (OUTPATIENT)
Dept: FAMILY MEDICINE | Facility: CLINIC | Age: 69
End: 2024-01-23
Payer: COMMERCIAL

## 2024-01-23 VITALS
BODY MASS INDEX: 28.22 KG/M2 | OXYGEN SATURATION: 94 % | HEART RATE: 82 BPM | TEMPERATURE: 97.8 F | WEIGHT: 190.5 LBS | SYSTOLIC BLOOD PRESSURE: 136 MMHG | HEIGHT: 69 IN | DIASTOLIC BLOOD PRESSURE: 70 MMHG

## 2024-01-23 DIAGNOSIS — G47.33 OSA (OBSTRUCTIVE SLEEP APNEA): ICD-10-CM

## 2024-01-23 DIAGNOSIS — E10.42 DIABETIC POLYNEUROPATHY ASSOCIATED WITH TYPE 1 DIABETES MELLITUS (H): ICD-10-CM

## 2024-01-23 DIAGNOSIS — F11.20 CONTINUOUS OPIOID DEPENDENCE (H): ICD-10-CM

## 2024-01-23 DIAGNOSIS — E10.59 TYPE 1 DIABETES MELLITUS WITH VASCULAR DISEASE (H): ICD-10-CM

## 2024-01-23 DIAGNOSIS — Z00.00 ENCOUNTER FOR MEDICARE ANNUAL WELLNESS EXAM: Primary | ICD-10-CM

## 2024-01-23 DIAGNOSIS — F32.2 MAJOR DEPRESSIVE DISORDER, SINGLE EPISODE, SEVERE (H): ICD-10-CM

## 2024-01-23 DIAGNOSIS — M48.02 CERVICAL STENOSIS OF SPINE: ICD-10-CM

## 2024-01-23 DIAGNOSIS — I42.8 NON-ISCHEMIC CARDIOMYOPATHY (H): ICD-10-CM

## 2024-01-23 DIAGNOSIS — E78.5 HYPERLIPIDEMIA LDL GOAL <70: ICD-10-CM

## 2024-01-23 DIAGNOSIS — I51.9 LEFT VENTRICULAR DYSFUNCTION: ICD-10-CM

## 2024-01-23 DIAGNOSIS — E10.319 TYPE 1 DIABETES MELLITUS WITH RETINOPATHY, MACULAR EDEMA PRESENCE UNSPECIFIED, UNSPECIFIED LATERALITY, UNSPECIFIED RETINOPATHY SEVERITY (H): ICD-10-CM

## 2024-01-23 LAB
CHOLEST SERPL-MCNC: 135 MG/DL
FASTING STATUS PATIENT QL REPORTED: YES
HBA1C MFR BLD: 7 % (ref 0–5.6)
HDLC SERPL-MCNC: 49 MG/DL
LDLC SERPL CALC-MCNC: 72 MG/DL
NONHDLC SERPL-MCNC: 86 MG/DL
TRIGL SERPL-MCNC: 71 MG/DL
TSH SERPL DL<=0.005 MIU/L-ACNC: 1.99 UIU/ML (ref 0.3–4.2)

## 2024-01-23 PROCEDURE — 36415 COLL VENOUS BLD VENIPUNCTURE: CPT

## 2024-01-23 PROCEDURE — G0439 PPPS, SUBSEQ VISIT: HCPCS | Performed by: FAMILY MEDICINE

## 2024-01-23 PROCEDURE — 83036 HEMOGLOBIN GLYCOSYLATED A1C: CPT

## 2024-01-23 PROCEDURE — 84443 ASSAY THYROID STIM HORMONE: CPT

## 2024-01-23 PROCEDURE — 99214 OFFICE O/P EST MOD 30 MIN: CPT | Mod: 25 | Performed by: FAMILY MEDICINE

## 2024-01-23 PROCEDURE — 80061 LIPID PANEL: CPT

## 2024-01-23 RX ORDER — PAROXETINE 20 MG/1
20 TABLET, FILM COATED ORAL EVERY MORNING
Qty: 90 TABLET | Refills: 3 | Status: CANCELLED | OUTPATIENT
Start: 2024-01-23

## 2024-01-23 RX ORDER — RESPIRATORY SYNCYTIAL VIRUS VACCINE 120MCG/0.5
0.5 KIT INTRAMUSCULAR ONCE
Qty: 1 EACH | Refills: 0 | Status: CANCELLED | OUTPATIENT
Start: 2024-01-23 | End: 2024-01-23

## 2024-01-23 RX ORDER — METOPROLOL SUCCINATE 25 MG/1
TABLET, EXTENDED RELEASE ORAL
Qty: 90 TABLET | Refills: 3 | Status: CANCELLED | OUTPATIENT
Start: 2024-01-23

## 2024-01-23 RX ORDER — LOSARTAN POTASSIUM 50 MG/1
TABLET ORAL
Qty: 90 TABLET | Refills: 3 | Status: CANCELLED | OUTPATIENT
Start: 2024-01-23

## 2024-01-23 RX ORDER — BUPROPION HYDROCHLORIDE 150 MG/1
150 TABLET ORAL EVERY MORNING
Qty: 90 TABLET | Refills: 3 | Status: CANCELLED | OUTPATIENT
Start: 2024-01-23

## 2024-01-23 RX ORDER — HYDROCODONE BITARTRATE AND ACETAMINOPHEN 10; 325 MG/1; MG/1
1 TABLET ORAL EVERY 6 HOURS PRN
Qty: 120 TABLET | Refills: 0 | Status: SHIPPED | OUTPATIENT
Start: 2024-02-22 | End: 2024-03-23

## 2024-01-23 RX ORDER — HYDROCODONE BITARTRATE AND ACETAMINOPHEN 10; 325 MG/1; MG/1
1 TABLET ORAL EVERY 6 HOURS PRN
Qty: 120 TABLET | Refills: 0 | Status: SHIPPED | OUTPATIENT
Start: 2024-03-22 | End: 2024-04-22

## 2024-01-23 RX ORDER — HYDROCODONE BITARTRATE AND ACETAMINOPHEN 10; 325 MG/1; MG/1
1 TABLET ORAL EVERY 6 HOURS PRN
Qty: 120 TABLET | Refills: 0 | Status: SHIPPED | OUTPATIENT
Start: 2024-01-23 | End: 2024-02-22

## 2024-01-23 RX ORDER — ROSUVASTATIN CALCIUM 40 MG/1
TABLET, COATED ORAL
Qty: 90 TABLET | Refills: 3 | Status: CANCELLED | OUTPATIENT
Start: 2024-01-23

## 2024-01-23 ASSESSMENT — ENCOUNTER SYMPTOMS
DIZZINESS: 0
HEMATOCHEZIA: 0
PALPITATIONS: 0
CHILLS: 0
SORE THROAT: 0
NERVOUS/ANXIOUS: 0
DYSURIA: 0
FREQUENCY: 0
NAUSEA: 0
SHORTNESS OF BREATH: 0
ABDOMINAL PAIN: 0
HEARTBURN: 0
HEMATURIA: 0
EYE PAIN: 0
PARESTHESIAS: 0
WEAKNESS: 0
DIARRHEA: 0
JOINT SWELLING: 0
FEVER: 0
MYALGIAS: 0
ARTHRALGIAS: 0
CONSTIPATION: 0
HEADACHES: 0
COUGH: 0

## 2024-01-23 ASSESSMENT — PATIENT HEALTH QUESTIONNAIRE - PHQ9
SUM OF ALL RESPONSES TO PHQ QUESTIONS 1-9: 5
SUM OF ALL RESPONSES TO PHQ QUESTIONS 1-9: 5
10. IF YOU CHECKED OFF ANY PROBLEMS, HOW DIFFICULT HAVE THESE PROBLEMS MADE IT FOR YOU TO DO YOUR WORK, TAKE CARE OF THINGS AT HOME, OR GET ALONG WITH OTHER PEOPLE: NOT DIFFICULT AT ALL

## 2024-01-23 ASSESSMENT — ACTIVITIES OF DAILY LIVING (ADL): CURRENT_FUNCTION: NO ASSISTANCE NEEDED

## 2024-01-23 ASSESSMENT — PAIN SCALES - GENERAL: PAINLEVEL: MODERATE PAIN (5)

## 2024-01-23 NOTE — PATIENT INSTRUCTIONS
Patient Education   Personalized Prevention Plan  You are due for the preventive services outlined below.  Your care team is available to assist you in scheduling these services.  If you have already completed any of these items, please share that information with your care team to update in your medical record.  Health Maintenance Due   Topic Date Due     Hepatitis A Vaccine (1 of 2 - Risk 2-dose series) Never done     RSV VACCINE (Pregnancy & 60+) (1 - 1-dose 60+ series) Never done     Zoster (Shingles) Vaccine (2 of 2) 02/13/2020     ANNUAL REVIEW OF HM ORDERS  12/05/2023

## 2024-01-23 NOTE — PROGRESS NOTES
"Preventive Care Visit  [facility]  Jackeline Rachel MD, Family Medicine  Jan 23, 2024      SUBJECTIVE:   Eb is a 69 year old, presenting for the following:  Medicare Visit        1/23/2024    11:23 AM   Additional Questions   Roomed by Amber COLLIER CMA   Accompanied by Self     Are you in the first 12 months of your Medicare coverage?  No    Healthy Habits:     In general, how would you rate your overall health?  Good    Frequency of exercise:  2-3 days/week    Duration of exercise:  15-30 minutes    Do you usually eat at least 4 servings of fruit and vegetables a day, include whole grains    & fiber and avoid regularly eating high fat or \"junk\" foods?  No    Taking medications regularly:  Yes    Medication side effects:  None    Ability to successfully perform activities of daily living:  No assistance needed    Home Safety:  No safety concerns identified    Hearing Impairment:  No hearing concerns    In the past 6 months, have you been bothered by leaking of urine?  No    In general, how would you rate your overall mental or emotional health?  Fair    Additional concerns today:  No          Have you ever done Advance Care Planning? (For example, a Health Directive, POLST, or a discussion with a medical provider or your loved ones about your wishes): Yes, advance care planning is on file.       Fall risk  Fallen 2 or more times in the past year?: No  Any fall with injury in the past year?: No    Cognitive Screening   1) Repeat 3 items (Leader, Season, Table)    2) Clock draw: NORMAL  3) 3 item recall: Recalls 3 objects  Results: 3 items recalled: COGNITIVE IMPAIRMENT LESS LIKELY    Mini-CogTM Copyright ARSENIO Meza. Licensed by the author for use in [facility]; reprinted with permission (johnny@.[facility]). All rights reserved.      Do you have sleep apnea, excessive snoring or daytime drowsiness? : no    Reviewed and updated as needed this visit by clinical staff   Tobacco  Allergies  " Meds  Problems  Med Hx  Surg Hx  Fam Hx          Reviewed and updated as needed this visit by Provider   Tobacco  Allergies  Meds  Problems  Med Hx  Surg Hx  Fam Hx          Social History     Tobacco Use    Smoking status: Former     Types: Cigarettes     Quit date: 2001     Years since quittin.0    Smokeless tobacco: Never   Substance Use Topics    Alcohol use: No     Comment: quit in early '80s             2024    11:22 AM   Alcohol Use   Prescreen: >3 drinks/day or >7 drinks/week? No     Do you have a current opioid prescription? (!) YES   How severe is your pain on a scale from 1-10? 5/10           Do you use any other controlled substances or medications that are not prescribed by a provider? None        Diabetes Follow-up  Novolog - via pump  How often are you checking your blood sugar? Continuous glucose monitor  What time of day are you checking your blood sugars (select all that apply)?  Before and after meals  Have you had any blood sugars above 200?  Yes   Have you had any blood sugars below 70?  Yes   What symptoms do you notice when your blood sugar is low?  Shaky, Weak, and Lethargy  What concerns do you have today about your diabetes? None   Do you have any of these symptoms? (Select all that apply)  Neuropathy is stable          Hyperlipidemia Follow-Up  Rosuvastatin 40mg qd  Are you regularly taking any medication or supplement to lower your cholesterol?   Yes- statin  Are you having muscle aches or other side effects that you think could be caused by your cholesterol lowering medication?  No    Hypertension Follow-up  Losartan 50mg every day, metoprolol 25mg qd  Do you check your blood pressure regularly outside of the clinic? No   Are you following a low salt diet? No  Are your blood pressures ever more than 140 on the top number (systolic) OR more   than 90 on the bottom number (diastolic), for example 140/90? N/A    BP Readings from Last 2 Encounters:   24 136/70    10/18/23 124/70     Hemoglobin A1C (%)   Date Value   2024 7.0 (H)   10/18/2023 8.0 (H)   2021 7.6 (H)   2020 7.4 (H)     LDL Cholesterol Calculated (mg/dL)   Date Value   2023 56   2022 63   2020 63   2019 57         Depression and Anxiety Follow-Up  Wellbutrin XL 150mg every day, paxil 20mg qd  How are you doing with your depression since your last visit? No change  How are you doing with your anxiety since your last visit?  No change  Are you having other symptoms that might be associated with depression or anxiety? No  Have you had a significant life event? No   Do you have any concerns with your use of alcohol or other drugs? No    Social History     Tobacco Use    Smoking status: Former     Types: Cigarettes     Quit date: 2001     Years since quittin.0    Smokeless tobacco: Never   Vaping Use    Vaping Use: Never used   Substance Use Topics    Alcohol use: No     Comment: quit in early     Drug use: No         3/1/2023    11:14 AM 10/18/2023     1:21 PM 2024    11:18 AM   PHQ   PHQ-9 Total Score 0 4 5   Q9: Thoughts of better off dead/self-harm past 2 weeks Not at all Not at all Not at all         2022    10:57 AM 2022     9:59 AM 3/1/2023    10:59 AM   VIVIANA-7 SCORE   Total Score 0 (minimal anxiety) 0 (minimal anxiety) 0 (minimal anxiety)   Total Score 0 0 0         Suicide Assessment Five-step Evaluation and Treatment (SAFE-T)      Current providers sharing in care for this patient include:   Patient Care Team:  Jackeline Rachel MD as PCP - General  Jackeline Rachel MD as Assigned PCP  Christina Espinosa, RN as Diabetes Educator (Diabetes Education)  Aubree Hamilton as Registered Dietitian (Diabetes Education)  Jackeline Rachel MD as Assigned Pain Medication Provider  Farhat Smalls DO as Assigned Musculoskeletal Provider    The following health maintenance items are reviewed in Epic and correct as of today:  Health Maintenance    Topic Date Due    RSV VACCINE (Pregnancy & 60+) (1 - 1-dose 60+ series) Never done    ZOSTER IMMUNIZATION (2 of 2) 02/13/2020    LIPID  03/01/2024    VIVIANA ASSESSMENT  03/01/2024    MICROALBUMIN  03/02/2024    COLORECTAL CANCER SCREENING  04/08/2024    BMP  04/18/2024    CONTROLLED SUBSTANCE AGREEMENT FOR CHRONIC PAIN MANAGEMENT  05/31/2024    EYE EXAM  06/07/2024    A1C  07/23/2024    DIABETIC FOOT EXAM  10/18/2024    URINE DRUG SCREEN  10/19/2024    MEDICARE ANNUAL WELLNESS VISIT  01/23/2025    ANNUAL REVIEW OF HM ORDERS  01/23/2025    FALL RISK ASSESSMENT  01/23/2025    PHQ-9  01/23/2025    ADVANCE CARE PLANNING  01/23/2029    DTAP/TDAP/TD IMMUNIZATION (3 - Td or Tdap) 12/14/2030    HEPATITIS C SCREENING  Completed    DEPRESSION ACTION PLAN  Completed    INFLUENZA VACCINE  Completed    Pneumococcal Vaccine: 65+ Years  Completed    AORTIC ANEURYSM SCREENING (SYSTEM ASSIGNED)  Completed    COVID-19 Vaccine  Completed    IPV IMMUNIZATION  Aged Out    HPV IMMUNIZATION  Aged Out    MENINGITIS IMMUNIZATION  Aged Out    RSV MONOCLONAL ANTIBODY  Aged Out    HEPATITIS A IMMUNIZATION  Discontinued     Lab work is in process        Review of Systems   Constitutional:  Negative for chills and fever.   HENT:  Negative for congestion, ear pain, hearing loss and sore throat.    Eyes:  Negative for pain and visual disturbance.   Respiratory:  Negative for cough and shortness of breath.    Cardiovascular:  Negative for chest pain and palpitations.   Gastrointestinal:  Negative for abdominal pain, constipation, diarrhea and nausea.   Genitourinary:  Negative for dysuria, frequency, genital sores, hematuria, penile discharge and urgency.   Musculoskeletal:  Negative for arthralgias, joint swelling and myalgias.   Skin:  Negative for rash.   Neurological:  Negative for dizziness, weakness and headaches.   Psychiatric/Behavioral:  The patient is not nervous/anxious.           OBJECTIVE:   /70   Pulse 82   Temp 97.8  F (36.6  " C) (Tympanic)   Ht 1.753 m (5' 9\")   Wt 86.4 kg (190 lb 8 oz)   SpO2 94%   BMI 28.13 kg/m     Estimated body mass index is 28.13 kg/m  as calculated from the following:    Height as of this encounter: 1.753 m (5' 9\").    Weight as of this encounter: 86.4 kg (190 lb 8 oz).  Physical Exam  GENERAL: alert and no distress  NECK: no adenopathy, no asymmetry, masses, or scars  RESP: lungs clear to auscultation - no rales, rhonchi or wheezes  CV: regular rate and rhythm, normal S1 S2, no S3 or S4, no murmur, click or rub, no peripheral edema  ABDOMEN: soft, nontender, no hepatosplenomegaly, no masses and bowel sounds normal  MS: no gross musculoskeletal defects noted, no edema  PSYCH: mentation appears normal and affect flat    Results for orders placed or performed in visit on 01/23/24   Hemoglobin A1c     Status: Abnormal   Result Value Ref Range    Hemoglobin A1C 7.0 (H) 0.0 - 5.6 %         ASSESSMENT / PLAN:   Encounter for Medicare annual wellness exam     Patient requests NO medication refills today other than his Norco as he has a lot of bottles at home and needs nothing.      Type 1 diabetes mellitus with retinopathy, macular edema presence unspecified, unspecified laterality, unspecified retinopathy severity (H)  Diabetic polyneuropathy associated with type 1 diabetes mellitus (H)  Type 1 diabetes mellitus with vascular disease (H)  Well controlled diabetes  Pain medications for his pain    - HYDROcodone-acetaminophen (NORCO)  MG per tablet; Take 1 tablet by mouth every 6 hours as needed for moderate to severe pain  - HYDROcodone-acetaminophen (NORCO)  MG per tablet; Take 1 tablet by mouth every 6 hours as needed for moderate to severe pain  - HYDROcodone-acetaminophen (NORCO)  MG per tablet; Take 1 tablet by mouth every 6 hours as needed for moderate to severe pain    - Hemoglobin A1c; Future  - TSH with free T4 reflex; Future  - Albumin Random Urine Quantitative with Creat Ratio; " "Future    Continuous opioid dependence (H)  CSA is up to date, PDMP reviewed    Major depressive disorder, single episode, severe (H)  Fair controlled  [place] is more \"depressing\"   Continue medications    Non-ischemic cardiomyopathy (H)  Has been stable    LYNNETTE (obstructive sleep apnea)       Hyperlipidemia LDL goal <70  Continue statin  - Lipid panel reflex to direct LDL Fasting; Future    Cervical stenosis of spine     - HYDROcodone-acetaminophen (NORCO)  MG per tablet; Take 1 tablet by mouth every 6 hours as needed for moderate to severe pain  - HYDROcodone-acetaminophen (NORCO)  MG per tablet; Take 1 tablet by mouth every 6 hours as needed for moderate to severe pain  - HYDROcodone-acetaminophen (NORCO)  MG per tablet; Take 1 tablet by mouth every 6 hours as needed for moderate to severe pain    Left ventricular dysfunction       Patient has been advised of split billing requirements and indicates understanding: Yes      Counseling  Reviewed preventive health counseling, as reflected in patient instructions       Regular exercise       Healthy diet/nutrition      BMI  Estimated body mass index is 28.13 kg/m  as calculated from the following:    Height as of this encounter: 1.753 m (5' 9\").    Weight as of this encounter: 86.4 kg (190 lb 8 oz).   Weight management plan: Discussed healthy diet and exercise guidelines      He reports that he quit smoking about 23 years ago. His smoking use included cigarettes. He has never used smokeless tobacco.      Appropriate preventive services were discussed with this patient, including applicable screening as appropriate for fall prevention, nutrition, physical activity, Tobacco-use cessation, weight loss and cognition.  Checklist reviewing preventive services available has been given to the patient.    Reviewed patients plan of care and provided an AVS. The Basic Care Plan (routine screening as documented in Health Maintenance) for Eb meets the Care " Plan requirement. This Care Plan has been established and reviewed with the Patient.        Signed Electronically by: Jackeline Rachel MD    Identified Health Risks  I have reviewed Opioid Use Disorder and Substance Use Disorder risk factors and made any needed referrals.

## 2024-02-02 NOTE — TELEPHONE ENCOUNTER
"Vincent Minlavon Olivares was seen and treated in our emergency department on 2/2/2024.  He may return to school on 02/06/2024.      If you have any questions or concerns, please don't hesitate to call.      Rafael Otto MD" Rec'd Fax from [facility] - Placed on MD's desk

## 2024-02-07 ENCOUNTER — ALLIED HEALTH/NURSE VISIT (OUTPATIENT)
Dept: EDUCATION SERVICES | Facility: CLINIC | Age: 69
End: 2024-02-07
Payer: COMMERCIAL

## 2024-02-07 DIAGNOSIS — E10.9 TYPE 1 DIABETES, HBA1C GOAL < 8% (H): ICD-10-CM

## 2024-02-07 PROCEDURE — G0108 DIAB MANAGE TRN  PER INDIV: HCPCS | Performed by: DIETITIAN, REGISTERED

## 2024-02-07 NOTE — PATIENT INSTRUCTIONS
PLAN     Continue to wear your Medtronic pump 670G.  No change in basal or bolus rates.  Continue to wear Dexcom G6.  Let us know when you want to swtich to the G7.    Go on the computer and look up the islet Bionic pump.  Compare to [employer]andum.  Continue all bolus and basal rates.  Follow up with Dr. Rachel as directed.  Follow up with Diabetes Education as needed.    Aubree Hamilton RD, [facility] [place][note 2], 3:04 PM, 2/7/2024

## 2024-02-07 NOTE — PROGRESS NOTES
Diabetes Self-Management Education & Support    Presents for: Individual review    Type of Service: In Person Visit      REPORTS:            Insulin Pump Information  Insulin Pump Brand: Medtronic  Infusion Set: Medtronic  670G    Education specific to insulin pump provided today on:   Pump options for when currrent pump warranty expires 9/23/24    Opportunities for ongoing education and support in diabetes-self management were discussed.    Pt verbalized understanding of concepts discussed and recommendations provided today.           ASSESSMENT  -type 1 diabetes for ~42 years  -recent a1c of 7.0 (previous 8.0, 7.9)  -currently being managed with pump therapy  -on aspirin and statin therapy  -diabetes complications: CAD, neuropathy  -last seen by diabetes education 9/28/23  -home BG monitoring (Dexcom G6 1/25/24-2/7/24) reveals:  64% in target, 1% low, 29% high, 6% very high, coefficient of variation 31.3%     Primary concern: no concerns.  Pleased with recent a1c.  Just got 90 day supply of Dexcom G6, so wants to wait to switch to G7.      Glucose Patterns & Trends:  Hyperglycemia, frequently from 3 to 6am and around 3pm    Changes made to pump settings:  No changes    Changes made to sensor settings:   No changes to sensor settings recommended at this time.    Discussed:  pump options (Medtronic-continue with the same or other options with Dexcom and similar tubing, Tandem or islet Bionic).   Patient expressed understanding.      Patient's most recent   Lab Results   Component Value Date    A1C 7.0 01/23/2024    A1C 7.6 04/02/2021     is meeting goal of <8.0    Diabetes knowledge and skills assessment:   Patient is knowledgeable in diabetes management concepts related to: Monitoring and Taking Medication    Continue education with the following diabetes management concepts: Healthy Eating, Being Active, Monitoring, Taking Medication, Problem Solving, Reducing Risks, and Healthy Coping    Based on learning  "assessment above, most appropriate setting for further diabetes education would be: Individual setting.      PLAN     Continue to wear your Medtronic pump 670G.  No change in basal or bolus rates.  Continue to wear Dexcom G6.  Let us know when you want to swtich to the G7.    Go on the computer and look up the islet Bionic pump.  Compare to [employer]andum.  Continue all bolus and basal rates.  Follow up with Dr. Rachel as directed.  Follow up with Diabetes Education as needed.    See Care Plan for co-developed, patient-state behavior change goals.  AVS provided for patient today.    Education Materials Provided:  No new materials provided today      SUBJECTIVE/OBJECTIVE:  Presents for: Individual review  Accompanied by: Self  Focus of Visit: Insulin Pump  Diabetes type: Type 1  Date of diagnosis: 42 years ago  Disease course: Stable  Diabetes management related comments/concerns: getting supplies is difficult sometimes  Cultural Influences/Ethnic Background:  Not  or     Diabetes Symptoms & Complications:  Diabetes Related Symptoms: Fatigue  Weight trend: Stable  Symptom course: Stable  Disease course: Stable       Patient Problem List and Family Medical History reviewed for relevant medical history, current medical status, and diabetes risk factors.    Vitals:  There were no vitals taken for this visit.  Estimated body mass index is 28.13 kg/m  as calculated from the following:    Height as of 1/23/24: 1.753 m (5' 9\").    Weight as of 1/23/24: 86.4 kg (190 lb 8 oz).   Last 3 BP:   BP Readings from Last 3 Encounters:   01/23/24 136/70   10/18/23 124/70   05/31/23 112/80       History   Smoking Status    Former    Types: Cigarettes    Quit date: 1/1/2001   Smokeless Tobacco    Never       Labs:  Lab Results   Component Value Date    A1C 7.0 01/23/2024    A1C 7.6 04/02/2021     Lab Results   Component Value Date     10/18/2023     02/07/2022     04/02/2021     Lab Results   Component Value " "Date    LDL 72 01/23/2024    LDL 63 09/16/2020     HDL Cholesterol   Date Value Ref Range Status   09/16/2020 53 >39 mg/dL Final     Direct Measure HDL   Date Value Ref Range Status   01/23/2024 49 >=40 mg/dL Final   ]  GFR Estimate   Date Value Ref Range Status   10/18/2023 73 >60 mL/min/1.73m2 Final   04/02/2021 73 >60 mL/min/[1.73_m2] Final     Comment:     Non  GFR Calc  Starting 12/18/2018, serum creatinine based estimated GFR (eGFR) will be   calculated using the Chronic Kidney Disease Epidemiology Collaboration   (CKD-EPI) equation.       GFR Estimate If Black   Date Value Ref Range Status   04/02/2021 85 >60 mL/min/[1.73_m2] Final     Comment:      GFR Calc  Starting 12/18/2018, serum creatinine based estimated GFR (eGFR) will be   calculated using the Chronic Kidney Disease Epidemiology Collaboration   (CKD-EPI) equation.       Lab Results   Component Value Date    CR 1.09 10/18/2023    CR 1.05 04/02/2021     No results found for: \"MICROALBUMIN\"    Healthy Eating:  Healthy Eating Assessed Today: Yes  Meal planning/habits: Carb counting  Lunch: 11:30am:  muffin  Dinner: 6:15chicken gravy sandwichg  Snacks: 3pm: granola bars (60 grams); 9pm: nutty bars 34  Beverages: Water, Diet soda    Being Active:       Monitoring:  Monitoring Assessed Today: Yes  Blood Glucose Meter: CGM  Times checking blood sugar at home (number): 5+  Times checking blood sugar at home (per): Day      Taking Medications:  Diabetes Medication(s)       Diabetic Other       GLUCAGON EMERGENCY KIT 1 MG IJ KIT use as directed for severe hypoglycemia       Insulin       insulin aspart (NOVOLOG VIAL) 100 UNITS/ML vial USE AS DIRECTED IN INSULIN PUMP, TOTAL DAILY DOSE 60 UNITS            Taking Medication Assessed Today: Yes  Current Treatments: Insulin Pump    Problem Solving:  Patient carries a carbohydrate source: Yes    Hypoglycemia Symptoms  Hypoglycemia: Dizziness/Lightheadedness    Hypoglycemia " Complications  Hypoglycemia Complications: None    Reducing Risks:  Diabetes Risks: Age over 45 years, History of gestational diabetes  CAD Risks: Diabetes Mellitus, Male sex  Has dilated eye exam at least once a year?: Yes  Sees dentist every 6 months?:  (has dentures)  Feet checked by healthcare provider in the last year?: Yes    Healthy Coping:  Emotional response to diabetes: Acceptance  Stage of change: ACTION (Actively working towards change)  Patient Activation Measure Survey Score:      2/17/2011    11:00 AM   BENEDICTO Score (Last Two)   BENEDICTO Raw Score 42   Activation Score 66   BENEDICTO Level 3         Care Plan and Education Provided:  Care Plan: Diabetes   Updates made by Aubree Hamilton since 2/7/2024 12:00 AM        Problem: HbA1C Not In Goal         Goal: Establish Regular Follow-Ups with PCP         Task: Discuss with PCP the recommended timing for patient's next follow up visit(s)    Responsible User: Aubree Hamilton        Task: Discuss schedule for PCP visits with patient    Responsible User: Aubree Hamilton        Goal: Get HbA1C Level in Goal         Task: Educate patient on diabetes education self-management topics    Responsible User: Aubree Hamilton        Task: Educate patient on benefits of regular glucose monitoring    Responsible User: Aubree Hamilton        Task: Refer patient to appropriate extended care team member, as needed (Medication Therapy Management, Behavioral Health, Physical Therapy, etc.)    Responsible User: Aubree Hamilton        Task: Discuss diabetes treatment plan with patient    Responsible User: Aubree Hamilton        Problem: Diabetes Self-Management Education Needed to Optimize Self-Care Behaviors         Goal: Understand diabetes pathophysiology and disease progression         Task: Provide education on diabetes pathophysiology and disease progression specfic to patient's diabetes type    Responsible User: Aubree Hamilton        Goal: Healthy Eating - follow a healthy  eating pattern for diabetes         Task: Provide education on portion control and consistency in amount, composition and timing of food intake    Responsible User: Aubree Hamilton        Task: Provide education on managing carbohydrate intake (carbohydrate counting, plate planning method, etc.)    Responsible User: Aubree Hamilton        Task: Provide education on weight management    Responsible User: Aubree Hamilton        Task: Provide education on heart healthy eating    Responsible User: Aubree Hamilton        Task: Provide education on eating out    Responsible User: Aubree Hamilton        Task: Develop individualized healthy eating plan with patient    Responsible User: Aubree Hamilton        Goal: Being Active - get regular physical activity, working up to at least 150 minutes per week         Task: Provide education on relationship of activity to glucose and precautions to take if at risk for low glucose    Responsible User: Aubree Hamilton        Task: Discuss barriers to physical activity with patient    Responsible User: Aubree Hamilton        Task: Develop physical activity plan with patient    Responsible User: Aubree Hamilton        Task: Explore community resources including walking groups, assistance programs, and home videos    Responsible User: Aubree Hamilton        Goal: Monitoring - monitor glucose and ketones as directed    This Visit's Progress: 90%        Task: Provide education on blood glucose monitoring (purpose, proper technique, frequency, glucose targets, interpreting results, when to use glucose control solution, sharps disposal)    Responsible User: Aubree Hamilton        Task: Provide education on continuous glucose monitoring (sensor placement, use of vasyl or /reader, understanding glucose trends, alerts and alarms, differences between sensor glucose and blood glucose)    Responsible User: Aubree Hamilton        Task: Provide education on ketone monitoring (when  to monitor, frequency, etc.)    Responsible User: Aubree Hamilton        Goal: Taking Medication - patient is consistently taking medications as directed    This Visit's Progress: 100%        Task: Provide education on action of prescribed medication, including when to take and possible side effects    Responsible User: Aubree Hamilton        Task: Provide education on insulin and injectable diabetes medications, including administration, storage, site selection and rotation for injection sites    Responsible User: Aubree Hamilton        Task: Discuss barriers to medication adherence with patient and provide management technique ideas as appropriate    Responsible User: Aubree Hamilton        Task: Provide education on frequency and refill details of medications    Responsible User: Aubree Hamilton        Goal: Problem Solving - know how to prevent and manage short-term diabetes complications    This Visit's Progress: 70%        Task: Provide education on high blood glucose - causes, signs/symptoms, prevention and treatment    Responsible User: Aubree Hamilton        Task: Provide education on low blood glucose - causes, signs/symptoms, prevention, treatment, carrying a carbohydrate source at all times, and medical identification    Responsible User: Aubree Hamilton        Task: Provide education on safe travel with diabetes    Responsible User: Aubree Hamilton        Task: Provide education on how to care for diabetes on sick days    Responsible User: Aubree Hamilton        Task: Provide education on when to call a health care provider    Responsible User: Aubree Hamilton        Goal: Reducing Risks - know how to prevent and treat long-term diabetes complications         Task: Provide education on major complications of diabetes, prevention, early diagnostic measures and treatment of complications    Responsible User: Aubree Hamilton        Task: Provide education on recommended care for dental, eye and  foot health    Responsible User: Aubree Hamilton        Task: Provide education on Hemoglobin A1c - goals and relationship to blood glucose levels    Responsible User: Aubree Hamilton        Task: Provide education on recommendations for heart health - lipid levels and goals, blood pressure and goals, and aspirin therapy, if indicated    Responsible User: Aubree Hamilton        Task: Provide education on tobacco cessation    Responsible User: Aubree Hamilton        Goal: Healthy Coping - use available resources to cope with the challenges of managing diabetes         Task: Discuss recognizing feelings about having diabetes    Responsible User: Aubree Hamilton        Task: Provide education on the benefits of making appropriate lifestyle changes    Responsible User: Aubree Hamilton        Task: Provide education on benefits of utilizing support systems    Responsible User: Aubree Hamilton        Task: Discuss methods for coping with stress    Responsible User: Aubree Hamilton        Task: Provide education on when to seek professional counseling    Responsible User: Aubree Hamilton RD, [facility], 4:03 PM, 2/7/2024    Time Spent: 60 minutes  Encounter Type: Individual    Any diabetes medication dose changes were made via the CDE Protocol per the patient's primary care provider. A copy of this encounter was shared with the provider.

## 2024-02-07 NOTE — LETTER
2/7/2024         RE: Eb Eisenberg  [id number] [place] [address]        Dear Colleague,    Thank you for referring your patient, Eb Eisenberg, to the [facility]. Please see a copy of my visit note below.    Diabetes Self-Management Education & Support    Presents for: Individual review    Type of Service: In Person Visit      REPORTS:            Insulin Pump Information  Insulin Pump Brand: Medtronic  Infusion Set: Medtronic  670G    Education specific to insulin pump provided today on:   Pump options for when currrent pump warranty expires 9/23/24    Opportunities for ongoing education and support in diabetes-self management were discussed.    Pt verbalized understanding of concepts discussed and recommendations provided today.           ASSESSMENT  -type 1 diabetes for ~42 years  -recent a1c of 7.0 (previous 8.0, 7.9)  -currently being managed with pump therapy  -on aspirin and statin therapy  -diabetes complications: CAD, neuropathy  -last seen by diabetes education 9/28/23  -home BG monitoring (Dexcom G6 1/25/24-2/7/24) reveals:  64% in target, 1% low, 29% high, 6% very high, coefficient of variation 31.3%     Primary concern: no concerns.  Pleased with recent a1c.  Just got 90 day supply of Dexcom G6, so wants to wait to switch to G7.      Glucose Patterns & Trends:  Hyperglycemia, frequently from 3 to 6am and around 3pm    Changes made to pump settings:  No changes    Changes made to sensor settings:   No changes to sensor settings recommended at this time.    Discussed:  pump options (Medtronic-continue with the same or other options with Dexcom and similar tubing, Tandem or islet Bionic).   Patient expressed understanding.      Patient's most recent   Lab Results   Component Value Date    A1C 7.0 01/23/2024    A1C 7.6 04/02/2021     is meeting goal of <8.0    Diabetes knowledge and skills assessment:   Patient is knowledgeable in diabetes management concepts  "related to: Monitoring and Taking Medication    Continue education with the following diabetes management concepts: Healthy Eating, Being Active, Monitoring, Taking Medication, Problem Solving, Reducing Risks, and Healthy Coping    Based on learning assessment above, most appropriate setting for further diabetes education would be: Individual setting.      PLAN     Continue to wear your Medtronic pump 670G.  No change in basal or bolus rates.  Continue to wear Dexcom G6.  Let us know when you want to swtich to the G7.    Go on the computer and look up the islet Bionic pump.  Compare to Tandum.  Continue all bolus and basal rates.  Follow up with Dr. Rachel as directed.  Follow up with Diabetes Education as needed.    See Care Plan for co-developed, patient-state behavior change goals.  AVS provided for patient today.    Education Materials Provided:  No new materials provided today      SUBJECTIVE/OBJECTIVE:  Presents for: Individual review  Accompanied by: Self  Focus of Visit: Insulin Pump  Diabetes type: Type 1  Date of diagnosis: 42 years ago  Disease course: Stable  Diabetes management related comments/concerns: getting supplies is difficult sometimes  Cultural Influences/Ethnic Background:  Not  or     Diabetes Symptoms & Complications:  Diabetes Related Symptoms: Fatigue  Weight trend: Stable  Symptom course: Stable  Disease course: Stable       Patient Problem List and Family Medical History reviewed for relevant medical history, current medical status, and diabetes risk factors.    Vitals:  There were no vitals taken for this visit.  Estimated body mass index is 28.13 kg/m  as calculated from the following:    Height as of 1/23/24: 1.753 m (5' 9\").    Weight as of 1/23/24: 86.4 kg (190 lb 8 oz).   Last 3 BP:   BP Readings from Last 3 Encounters:   01/23/24 136/70   10/18/23 124/70   05/31/23 112/80       History   Smoking Status     Former     Types: Cigarettes     Quit date: 1/1/2001 " "  Smokeless Tobacco     Never       Labs:  Lab Results   Component Value Date    A1C 7.0 01/23/2024    A1C 7.6 04/02/2021     Lab Results   Component Value Date     10/18/2023     02/07/2022     04/02/2021     Lab Results   Component Value Date    LDL 72 01/23/2024    LDL 63 09/16/2020     HDL Cholesterol   Date Value Ref Range Status   09/16/2020 53 >39 mg/dL Final     Direct Measure HDL   Date Value Ref Range Status   01/23/2024 49 >=40 mg/dL Final   ]  GFR Estimate   Date Value Ref Range Status   10/18/2023 73 >60 mL/min/1.73m2 Final   04/02/2021 73 >60 mL/min/[1.73_m2] Final     Comment:     Non  GFR Calc  Starting 12/18/2018, serum creatinine based estimated GFR (eGFR) will be   calculated using the Chronic Kidney Disease Epidemiology Collaboration   (CKD-EPI) equation.       GFR Estimate If Black   Date Value Ref Range Status   04/02/2021 85 >60 mL/min/[1.73_m2] Final     Comment:      GFR Calc  Starting 12/18/2018, serum creatinine based estimated GFR (eGFR) will be   calculated using the Chronic Kidney Disease Epidemiology Collaboration   (CKD-EPI) equation.       Lab Results   Component Value Date    CR 1.09 10/18/2023    CR 1.05 04/02/2021     No results found for: \"MICROALBUMIN\"    Healthy Eating:  Healthy Eating Assessed Today: Yes  Meal planning/habits: Carb counting  Lunch: 11:30am:  muffin  Dinner: 6:15chicken gravy sandwichg  Snacks: 3pm: granola bars (60 grams); 9pm: nutty bars 34  Beverages: Water, Diet soda    Being Active:       Monitoring:  Monitoring Assessed Today: Yes  Blood Glucose Meter: CGM  Times checking blood sugar at home (number): 5+  Times checking blood sugar at home (per): Day      Taking Medications:  Diabetes Medication(s)       Diabetic Other       GLUCAGON EMERGENCY KIT 1 MG IJ KIT use as directed for severe hypoglycemia       Insulin       insulin aspart (NOVOLOG VIAL) 100 UNITS/ML vial USE AS DIRECTED IN INSULIN PUMP, TOTAL " DAILY DOSE 60 UNITS            Taking Medication Assessed Today: Yes  Current Treatments: Insulin Pump    Problem Solving:  Patient carries a carbohydrate source: Yes    Hypoglycemia Symptoms  Hypoglycemia: Dizziness/Lightheadedness    Hypoglycemia Complications  Hypoglycemia Complications: None    Reducing Risks:  Diabetes Risks: Age over 45 years, History of gestational diabetes  CAD Risks: Diabetes Mellitus, Male sex  Has dilated eye exam at least once a year?: Yes  Sees dentist every 6 months?:  (has dentures)  Feet checked by healthcare provider in the last year?: Yes    Healthy Coping:  Emotional response to diabetes: Acceptance  Stage of change: ACTION (Actively working towards change)  Patient Activation Measure Survey Score:      2/17/2011    11:00 AM   BENEDICTO Score (Last Two)   BENEDICTO Raw Score 42   Activation Score 66   BENEDICTO Level 3         Care Plan and Education Provided:  Care Plan: Diabetes   Updates made by Aubree Hamilton since 2/7/2024 12:00 AM        Problem: HbA1C Not In Goal         Goal: Establish Regular Follow-Ups with PCP         Task: Discuss with PCP the recommended timing for patient's next follow up visit(s)    Responsible User: Aubree Hamilton        Task: Discuss schedule for PCP visits with patient    Responsible User: Aubree Hamilton        Goal: Get HbA1C Level in Goal         Task: Educate patient on diabetes education self-management topics    Responsible User: Aubree Hamilton        Task: Educate patient on benefits of regular glucose monitoring    Responsible User: Aubree Hamilton        Task: Refer patient to appropriate extended care team member, as needed (Medication Therapy Management, Behavioral Health, Physical Therapy, etc.)    Responsible User: Aubree Hamilton        Task: Discuss diabetes treatment plan with patient    Responsible User: Aubree Hamilton        Problem: Diabetes Self-Management Education Needed to Optimize Self-Care Behaviors         Goal: Understand  diabetes pathophysiology and disease progression         Task: Provide education on diabetes pathophysiology and disease progression specfic to patient's diabetes type    Responsible User: Aubree Hamilton        Goal: Healthy Eating - follow a healthy eating pattern for diabetes         Task: Provide education on portion control and consistency in amount, composition and timing of food intake    Responsible User: Aubree Hamilton        Task: Provide education on managing carbohydrate intake (carbohydrate counting, plate planning method, etc.)    Responsible User: Aubree Hamilton        Task: Provide education on weight management    Responsible User: Aubree Hamilton        Task: Provide education on heart healthy eating    Responsible User: Aubree Hamilton        Task: Provide education on eating out    Responsible User: Aubree Hamilton        Task: Develop individualized healthy eating plan with patient    Responsible User: Aubree Hamilton        Goal: Being Active - get regular physical activity, working up to at least 150 minutes per week         Task: Provide education on relationship of activity to glucose and precautions to take if at risk for low glucose    Responsible User: Aubree Hamilton        Task: Discuss barriers to physical activity with patient    Responsible User: Aubree Hamilton        Task: Develop physical activity plan with patient    Responsible User: Aubree Hamilton        Task: Explore community resources including walking groups, assistance programs, and home videos    Responsible User: Aubree Hamilton        Goal: Monitoring - monitor glucose and ketones as directed    This Visit's Progress: 90%        Task: Provide education on blood glucose monitoring (purpose, proper technique, frequency, glucose targets, interpreting results, when to use glucose control solution, sharps disposal)    Responsible User: Aubree Hamilton        Task: Provide education on continuous glucose  monitoring (sensor placement, use of vasyl or /reader, understanding glucose trends, alerts and alarms, differences between sensor glucose and blood glucose)    Responsible User: Aubree Hamilton        Task: Provide education on ketone monitoring (when to monitor, frequency, etc.)    Responsible User: Aubree Hamilton        Goal: Taking Medication - patient is consistently taking medications as directed    This Visit's Progress: 100%        Task: Provide education on action of prescribed medication, including when to take and possible side effects    Responsible User: Aubree Hamilton        Task: Provide education on insulin and injectable diabetes medications, including administration, storage, site selection and rotation for injection sites    Responsible User: Aubree Hamilton        Task: Discuss barriers to medication adherence with patient and provide management technique ideas as appropriate    Responsible User: Aubree Hamilton        Task: Provide education on frequency and refill details of medications    Responsible User: Aubree Hamilton        Goal: Problem Solving - know how to prevent and manage short-term diabetes complications    This Visit's Progress: 70%        Task: Provide education on high blood glucose - causes, signs/symptoms, prevention and treatment    Responsible User: Aubree Hamilton        Task: Provide education on low blood glucose - causes, signs/symptoms, prevention, treatment, carrying a carbohydrate source at all times, and medical identification    Responsible User: Aubree Hamilton        Task: Provide education on safe travel with diabetes    Responsible User: Aubree Hamilton        Task: Provide education on how to care for diabetes on sick days    Responsible User: Aubree Hamilton        Task: Provide education on when to call a health care provider    Responsible User: Aubree Hamilton        Goal: Reducing Risks - know how to prevent and treat long-term diabetes  complications         Task: Provide education on major complications of diabetes, prevention, early diagnostic measures and treatment of complications    Responsible User: Aubree Hamilton        Task: Provide education on recommended care for dental, eye and foot health    Responsible User: Aubree Hamilton        Task: Provide education on Hemoglobin A1c - goals and relationship to blood glucose levels    Responsible User: Aubree Hamilton        Task: Provide education on recommendations for heart health - lipid levels and goals, blood pressure and goals, and aspirin therapy, if indicated    Responsible User: Aubree Hamilton        Task: Provide education on tobacco cessation    Responsible User: Aubree Hamilton        Goal: Healthy Coping - use available resources to cope with the challenges of managing diabetes         Task: Discuss recognizing feelings about having diabetes    Responsible User: Aubree Hamilton        Task: Provide education on the benefits of making appropriate lifestyle changes    Responsible User: Aubree Hamilton        Task: Provide education on benefits of utilizing support systems    Responsible User: Aubree Hamilton        Task: Discuss methods for coping with stress    Responsible User: Aubree Hamilton        Task: Provide education on when to seek professional counseling    Responsible User: Aubree Hamilton RD, [facility] [place], 4:03 PM, 2/7/2024    Time Spent: 60 minutes  Encounter Type: Individual    Any diabetes medication dose changes were made via the CDE Protocol per the patient's primary care provider. A copy of this encounter was shared with the provider.

## 2024-03-11 DIAGNOSIS — E10.59 TYPE 1 DIABETES MELLITUS WITH VASCULAR DISEASE (H): ICD-10-CM

## 2024-03-11 RX ORDER — INSULIN ASPART 100 [IU]/ML
INJECTION, SOLUTION INTRAVENOUS; SUBCUTANEOUS
Qty: 60 ML | Refills: 3 | Status: SHIPPED | OUTPATIENT
Start: 2024-03-11

## 2024-03-11 NOTE — TELEPHONE ENCOUNTER
Patient called the pharmacy and would like to start getting his refills here. Please send a new prescription to us.    Thank you,    Zena Escobar  Certified Pharmacy Technician II, [facility]  Ph: [phone]  Fx: [phone]

## 2024-04-11 ENCOUNTER — TELEPHONE (OUTPATIENT)
Dept: FAMILY MEDICINE | Facility: CLINIC | Age: 69
End: 2024-04-11
Payer: COMMERCIAL

## 2024-04-11 NOTE — TELEPHONE ENCOUNTER
Patient Quality Outreach    Patient is due for the following:   Colon screen    Next Steps:   - Schedule colonoscopy  - Vaccine update  - VIVIANA/PHQ    Type of outreach:    None, will address at 4/22 visit      Questions for provider review:    None           Amber Bridges, CMA

## 2024-04-22 ENCOUNTER — LAB (OUTPATIENT)
Dept: LAB | Facility: CLINIC | Age: 69
End: 2024-04-22
Payer: COMMERCIAL

## 2024-04-22 ENCOUNTER — ORDERS ONLY (AUTO-RELEASED) (OUTPATIENT)
Dept: FAMILY MEDICINE | Facility: CLINIC | Age: 69
End: 2024-04-22

## 2024-04-22 ENCOUNTER — OFFICE VISIT (OUTPATIENT)
Dept: FAMILY MEDICINE | Facility: CLINIC | Age: 69
End: 2024-04-22
Payer: COMMERCIAL

## 2024-04-22 VITALS
HEART RATE: 98 BPM | TEMPERATURE: 98.3 F | OXYGEN SATURATION: 95 % | SYSTOLIC BLOOD PRESSURE: 126 MMHG | HEIGHT: 69 IN | DIASTOLIC BLOOD PRESSURE: 70 MMHG | BODY MASS INDEX: 28.46 KG/M2 | WEIGHT: 192.13 LBS | RESPIRATION RATE: 20 BRPM

## 2024-04-22 DIAGNOSIS — E10.319 TYPE 1 DIABETES MELLITUS WITH RETINOPATHY, MACULAR EDEMA PRESENCE UNSPECIFIED, UNSPECIFIED LATERALITY, UNSPECIFIED RETINOPATHY SEVERITY (H): Primary | ICD-10-CM

## 2024-04-22 DIAGNOSIS — Z12.11 SCREEN FOR COLON CANCER: ICD-10-CM

## 2024-04-22 DIAGNOSIS — E10.42 DIABETIC POLYNEUROPATHY ASSOCIATED WITH TYPE 1 DIABETES MELLITUS (H): ICD-10-CM

## 2024-04-22 DIAGNOSIS — M48.02 CERVICAL STENOSIS OF SPINE: ICD-10-CM

## 2024-04-22 DIAGNOSIS — N21.0 BLADDER STONE: ICD-10-CM

## 2024-04-22 DIAGNOSIS — Z12.11 COLON CANCER SCREENING: ICD-10-CM

## 2024-04-22 DIAGNOSIS — N40.1 BENIGN NON-NODULAR PROSTATIC HYPERPLASIA WITH LOWER URINARY TRACT SYMPTOMS: ICD-10-CM

## 2024-04-22 DIAGNOSIS — F32.2 MAJOR DEPRESSIVE DISORDER, SINGLE EPISODE, SEVERE (H): ICD-10-CM

## 2024-04-22 PROCEDURE — 82365 CALCULUS SPECTROSCOPY: CPT | Mod: 90

## 2024-04-22 PROCEDURE — 99214 OFFICE O/P EST MOD 30 MIN: CPT | Performed by: FAMILY MEDICINE

## 2024-04-22 PROCEDURE — 99000 SPECIMEN HANDLING OFFICE-LAB: CPT

## 2024-04-22 RX ORDER — TAMSULOSIN HYDROCHLORIDE 0.4 MG/1
CAPSULE ORAL
Qty: 90 CAPSULE | Refills: 3 | Status: SHIPPED | OUTPATIENT
Start: 2024-04-22

## 2024-04-22 RX ORDER — RESPIRATORY SYNCYTIAL VIRUS VACCINE 120MCG/0.5
0.5 KIT INTRAMUSCULAR ONCE
Qty: 1 EACH | Refills: 0 | Status: CANCELLED | OUTPATIENT
Start: 2024-04-22 | End: 2024-04-22

## 2024-04-22 RX ORDER — HYDROCODONE BITARTRATE AND ACETAMINOPHEN 10; 325 MG/1; MG/1
1 TABLET ORAL EVERY 6 HOURS PRN
Qty: 120 TABLET | Refills: 0 | Status: SHIPPED | OUTPATIENT
Start: 2024-05-22 | End: 2024-07-18

## 2024-04-22 RX ORDER — PAROXETINE 30 MG/1
30 TABLET, FILM COATED ORAL EVERY MORNING
Qty: 90 TABLET | Refills: 1 | Status: SHIPPED | OUTPATIENT
Start: 2024-04-22 | End: 2024-07-18

## 2024-04-22 RX ORDER — HYDROCODONE BITARTRATE AND ACETAMINOPHEN 10; 325 MG/1; MG/1
1 TABLET ORAL EVERY 6 HOURS PRN
Qty: 120 TABLET | Refills: 0 | Status: SHIPPED | OUTPATIENT
Start: 2024-06-21 | End: 2024-07-19

## 2024-04-22 RX ORDER — HYDROCODONE BITARTRATE AND ACETAMINOPHEN 10; 325 MG/1; MG/1
1 TABLET ORAL EVERY 4 HOURS PRN
Qty: 120 TABLET | Refills: 0 | Status: SHIPPED | OUTPATIENT
Start: 2024-04-22 | End: 2024-07-18

## 2024-04-22 RX ORDER — HYDROCODONE BITARTRATE AND ACETAMINOPHEN 10; 325 MG/1; MG/1
1 TABLET ORAL EVERY 6 HOURS PRN
Qty: 120 TABLET | Refills: 0 | Status: SHIPPED | OUTPATIENT
Start: 2024-04-22 | End: 2024-04-22

## 2024-04-22 ASSESSMENT — ANXIETY QUESTIONNAIRES
3. WORRYING TOO MUCH ABOUT DIFFERENT THINGS: SEVERAL DAYS
7. FEELING AFRAID AS IF SOMETHING AWFUL MIGHT HAPPEN: NOT AT ALL
4. TROUBLE RELAXING: NOT AT ALL
GAD7 TOTAL SCORE: 1
5. BEING SO RESTLESS THAT IT IS HARD TO SIT STILL: NOT AT ALL
6. BECOMING EASILY ANNOYED OR IRRITABLE: NOT AT ALL
7. FEELING AFRAID AS IF SOMETHING AWFUL MIGHT HAPPEN: NOT AT ALL
2. NOT BEING ABLE TO STOP OR CONTROL WORRYING: NOT AT ALL
GAD7 TOTAL SCORE: 1
1. FEELING NERVOUS, ANXIOUS, OR ON EDGE: NOT AT ALL
IF YOU CHECKED OFF ANY PROBLEMS ON THIS QUESTIONNAIRE, HOW DIFFICULT HAVE THESE PROBLEMS MADE IT FOR YOU TO DO YOUR WORK, TAKE CARE OF THINGS AT HOME, OR GET ALONG WITH OTHER PEOPLE: NOT DIFFICULT AT ALL
8. IF YOU CHECKED OFF ANY PROBLEMS, HOW DIFFICULT HAVE THESE MADE IT FOR YOU TO DO YOUR WORK, TAKE CARE OF THINGS AT HOME, OR GET ALONG WITH OTHER PEOPLE?: NOT DIFFICULT AT ALL

## 2024-04-22 ASSESSMENT — PATIENT HEALTH QUESTIONNAIRE - PHQ9: SUM OF ALL RESPONSES TO PHQ QUESTIONS 1-9: 3

## 2024-04-22 ASSESSMENT — PAIN SCALES - GENERAL: PAINLEVEL: MODERATE PAIN (5)

## 2024-04-22 NOTE — PROGRESS NOTES
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  No back pain, did feel the stone get \"stuck\" with urination and then he passed it the next time he voided.    No prior history of stones. He does only void a \"few times a day\" because he never really feels the urge to go.  He does feel he empties his bladder completely.   - Stone analysis; Future  - US Renal Complete Non-Vascular; Future  - Adult Urology  Referral; Future    Colon cancer screening  Due for screening  Would have a hard time getting a ride, wants to do ocloguard  - COLOGUARD(EXACT SCIENCES); Future    Major depressive disorder, single episode, severe (H)  Feels that depression is \"okay\" but he reports \"not being happy\"  Willing to increase the paxil slightly.  On higher doses of wellbutrin he didn't like the side effects.   - PARoxetine (PAXIL) 30 MG tablet; Take 1 tablet (30 mg) by mouth every morning    Diabetic polyneuropathy associated with type 1 diabetes mellitus (H)     - HYDROcodone-acetaminophen (NORCO)  MG per tablet; Take 1 tablet by mouth every 6 hours as needed for moderate to severe pain  - HYDROcodone-acetaminophen (NORCO)  MG per tablet; Take 1 tablet by mouth every 6 hours as needed for moderate to severe pain  - HYDROcodone-acetaminophen (NORCO)  MG per tablet; Take 1 tablet by mouth every 4 hours as needed for severe pain    Cervical stenosis of spine  PDMP reviewed    - HYDROcodone-acetaminophen (NORCO)  MG per tablet; Take 1 tablet by mouth every 6 hours as needed for moderate to severe pain  - " HYDROcodone-acetaminophen (NORCO)  MG per tablet; Take 1 tablet by mouth every 6 hours as needed for moderate to severe pain  - HYDROcodone-acetaminophen (NORCO)  MG per tablet; Take 1 tablet by mouth every 4 hours as needed for severe pain    Screen for colon cancer       Agustina Parson is a 69 year old, presenting for the following health issues:  Back Pain        4/22/2024    12:56 PM   Additional Questions   Roomed by Amber aguayo CMA   Accompanied by Self       History of Present Illness       Back Pain:  He presents for follow up of back pain. Patient's back pain is a recurring problem.  Location of back pain:  Right upper back and right shoulder  Description of back pain: dull ache  Back pain spreads: nowhere    Since patient first noticed back pain, pain is: always present, but gets better and worse  Does back pain interfere with his job:  No       Diabetes:   He presents for follow up of diabetes.   He is checking home blood glucose with a continuous glucose monitor.   He checks blood glucose before meals, after meals, before and after meals and at bedtime.  Blood glucose is sometimes over 200 and sometimes under 70. He is aware of hypoglycemia symptoms including blurred vision and confusion.    He has no concerns regarding his diabetes at this time.  He is having blurry vision.            Heart Failure:  He presents for follow up of heart failure. He is experiencing shortness of breath with activity only, which is same as usual. He is not experiencing any lower extremity edema.   He denies orthopenea and coughs at night. Patient is not checking weight daily. He has recently had a None.  He has no side effects from medications.  He has had no other medical visits for heart failure since the last visit.    He eats 0-1 servings of fruits and vegetables daily.He consumes 0 sweetened beverage(s) daily.He exercises with enough effort to increase his heart rate 9 or less minutes per day.  He exercises  with enough effort to increase his heart rate 3 or less days per week.   He is taking medications regularly.     - Depression. He is taking Wellbutrin XL 150mg every day and paxil 20mg every day but would like to discuss increasing medication. He notes that he sleeps a lot, 12 hours per day, and still feels tired and does not feel motivated to do anything.     Pain History:  When did you first notice your pain? Chronic   Have you seen this provider for your pain in the past? Yes   Where in your body do you have pain? Right shoulder, feet and low back  Are you seeing anyone else for your pain? No        10/18/2023     1:21 PM 1/23/2024    11:18 AM 4/22/2024     1:00 PM   PHQ-9 SCORE   PHQ-9 Total Score MyChart 4 (Minimal depression) 5 (Mild depression)    PHQ-9 Total Score 4 5 3           12/5/2022     9:59 AM 3/1/2023    10:59 AM 4/22/2024    12:44 PM   VIVIANA-7 SCORE   Total Score 0 (minimal anxiety) 0 (minimal anxiety) 1 (minimal anxiety)   Total Score 0 0 1               10/18/2023     1:21 PM 1/23/2024    11:18 AM 4/22/2024     1:00 PM   PHQ-9 SCORE   PHQ-9 Total Score MyChart 4 (Minimal depression) 5 (Mild depression)    PHQ-9 Total Score 4 5 3           12/5/2022     9:59 AM 3/1/2023    10:59 AM 4/22/2024    12:44 PM   VIVIANA-7 SCORE   Total Score 0 (minimal anxiety) 0 (minimal anxiety) 1 (minimal anxiety)   Total Score 0 0 1           3/1/2023    11:06 AM 5/31/2023     1:59 PM 4/22/2024     1:00 PM   PEG Score   PEG Total Score 2.33 3.33 2       Chronic Pain Follow Up:    Location of pain: Feet, low back and shoulder  Analgesia/pain control:    - Recent changes:  Same   - Overall control: Tolerable with discomfort    - Current treatments: Opioids   Adherence:     - Do you ever take more pain medicine than prescribed? No    - When did you take your last dose of pain medicine?  This morning   Adverse effects: No   PDMP Review         Value Time User    State PDMP site checked  Yes 1/23/2024 11:09 AM Jackeline Rachel,  "MD          Last CSA Agreement:   CSA -- Patient Level:     [Media Unavailable] Controlled Substance Agreement - Opioid - Scan on 5/31/2023  2:44 PM: opioid agreement   [Media Unavailable] Controlled Substance Agreement - Opioid - Scan on 5/12/2022  9:46 AM   [Media Unavailable] Controlled Substance Agreement - Opioid - Scan on 4/9/2021 10:57 AM       Last UDS: 10/26/2023      Review of Systems  Constitutional, HEENT, cardiovascular, pulmonary, gi and gu systems are negative, except as otherwise noted.      Objective    /70   Pulse 98   Temp 98.3  F (36.8  C) (Tympanic)   Resp 20   Ht 1.753 m (5' 9\")   Wt 87.1 kg (192 lb 2 oz)   SpO2 95%   BMI 28.37 kg/m    Body mass index is 28.37 kg/m .  Physical Exam   GENERAL: alert and no distress  MS: no gross musculoskeletal defects noted, no edema  PSYCH: mentation appears normal, affect normal/bright, and judgement and insight intact            Signed Electronically by: Jackeline Rachel MD    "

## 2024-04-27 LAB
APPEARANCE STONE: NORMAL
COMPN STONE: NORMAL
SPECIMEN WT: 116 MG

## 2024-05-02 DIAGNOSIS — E10.59 TYPE 1 DIABETES MELLITUS WITH VASCULAR DISEASE (H): ICD-10-CM

## 2024-05-03 RX ORDER — PROCHLORPERAZINE 25 MG/1
SUPPOSITORY RECTAL
Qty: 9 EACH | Refills: 1 | Status: SHIPPED | OUTPATIENT
Start: 2024-05-03

## 2024-05-13 ENCOUNTER — OFFICE VISIT (OUTPATIENT)
Dept: UROLOGY | Facility: CLINIC | Age: 69
End: 2024-05-13
Attending: FAMILY MEDICINE
Payer: COMMERCIAL

## 2024-05-13 VITALS
DIASTOLIC BLOOD PRESSURE: 80 MMHG | TEMPERATURE: 97.4 F | OXYGEN SATURATION: 96 % | HEIGHT: 69 IN | HEART RATE: 83 BPM | RESPIRATION RATE: 16 BRPM | SYSTOLIC BLOOD PRESSURE: 129 MMHG | WEIGHT: 192 LBS | BODY MASS INDEX: 28.44 KG/M2

## 2024-05-13 DIAGNOSIS — N20.0 NEPHROLITHIASIS: Primary | ICD-10-CM

## 2024-05-13 PROCEDURE — 99203 OFFICE O/P NEW LOW 30 MIN: CPT | Performed by: STUDENT IN AN ORGANIZED HEALTH CARE EDUCATION/TRAINING PROGRAM

## 2024-05-13 NOTE — NURSING NOTE
"Chief Complaint   Patient presents with    Kidney Problem     Per pt,referred by Jackeline Rachel MD Bladder stone       Vitals:    05/13/24 1455   BP: 129/80   Pulse: 83   Resp: 16   Temp: 97.4  F (36.3  C)   TempSrc: Tympanic   SpO2: 96%   Weight: 87.1 kg (192 lb)   Height: 1.753 m (5' 9\")     Wt Readings from Last 1 Encounters:   05/13/24 87.1 kg (192 lb)     Kalpana Zee MA    Active order to obtain bladder scan? Yes   Name of ordering provider:  Chula Laugnas  Bladder scan preformed post void no.  Bladder scan reveled 49ML  Provider notified?  Yes    Saadia Acuna        "

## 2024-05-13 NOTE — PATIENT INSTRUCTIONS
How Can I Prevent Kidney Stones?     Here is a good resource  [email or web address]-[email or web address]    Drink enough fluids each day.  Drink at least 2-3 liters of water or other low-sugar, low-calorie beverages daily, distributed throughout the day as much as possible    Reduce the amount of salt in your diet.  Restrict salt to 2524-3900 mg/day by reducing intake of processed foods, cheese, luncheon meats, salty snacks, and added salt  Your chance of developing kidney stones increases when you eat more sodium. Sodium is a part of salt. Sodium is in many canned, packaged, and fast foods. It is also in many condiments, seasonings, and meats.    Eat less meat.  Limit meat, poultry, and fish intake; limit of 6 ounces per day is a good starting point  Although you may need to limit how much animal protein you eat each day, you still need to make sure you get enough protein. Consider replacing some of the meat and animal protein you would typically eat with beans, dried peas, and lentils, which are plant-based foods that are high in protein and low in oxalate.    Eat foods with low oxalate levels.  If you normally eat a lot of high-oxalate foods (spinach, beets, chocolate, nuts, seeds, and potatoes), reduce portion sizes and eat them less often     Calcium  Even though calcium sounds like it would be the cause of calcium stones, it s not. In the right amounts, calcium can block other substances in the digestive tract that may cause stones  You want to get between 1,000 and 1,200 mg daily. This amount of calcium is ideal for both protection against bone mineral loss and reducing oxalate absorption.  Calcium is in dairy products like milk and cheese and yogurts and you should get few servings of these per day    More Fruits and Vegetables  Fruits and vegetables collectively provide many stone inhibitors that may prevent all types of urinary tract stones. These inhibitors include potassium, magnesium,  fiber, citric acid, phytate, and antioxidants. Eating at least 5 servings of a variety of fruits and vegetables every day may not only prevent you from forming more kidney stones, but also keep you healthy in other ways.

## 2024-05-13 NOTE — PROGRESS NOTES
Chief Complaint:   Urinary tract stones         History of Present Illness:   Eb Eisenberg is a 69 year old male with a history of HLD, T1 DM, LYNNETTE, cardiomyopathy, and CAD who presents for evaluation of urinary tract stones.    The patient recently passed a urinary tract stone. It was calcium oxalate in nature.     He denies any recent flank pain, dysuria, and gross hematuria.     He has never been told he has kidney stones before.     His daughter makes kidney stones.          Past Medical History:     Past Medical History:   Diagnosis Date    Carpal tunnel syndrome     Bilateral     Impotence of organic origin     Pure hypercholesterolemia             Past Surgical History:     Past Surgical History:   Procedure Laterality Date    BACK SURGERY      COLONOSCOPY  4/8/2014    Procedure: COLONOSCOPY;  Colonoscopy  ;  Surgeon: Jesus Beltrán MD;  Location: [facility]    ESOPHAGOSCOPY, GASTROSCOPY, DUODENOSCOPY (EGD), COMBINED  4/7/2014    Procedure: COMBINED ESOPHAGOSCOPY, GASTROSCOPY, DUODENOSCOPY (EGD);  Gastroscopy  ;  Surgeon: Jesus Beltrán MD;  Location: [facility]    ORTHOPEDIC SURGERY      PHACOEMULSIFICATION WITH STANDARD INTRAOCULAR LENS IMPLANT Right 5/26/2016    Procedure: PHACOEMULSIFICATION WITH STANDARD INTRAOCULAR LENS IMPLANT;  Surgeon: Anthony Pugh MD;  Location: [facility]    PHACOEMULSIFICATION WITH STANDARD INTRAOCULAR LENS IMPLANT Left 7/14/2016    Procedure: PHACOEMULSIFICATION WITH STANDARD INTRAOCULAR LENS IMPLANT;  Surgeon: Anthony Pugh MD;  Location: [facility]    SURGICAL HISTORY OF -   2004    carpal tunnel surgery, bilateral    SURGICAL HISTORY OF -   1/5/2010    Laminectomy/Discectomy Cervical/fusion    VASECTOMY  1990            Medications     Current Outpatient Medications   Medication Sig Dispense Refill    aspirin 81 MG chewable tablet Take 1 tablet (81 mg) by mouth daily 108 tablet 3    Benzocaine-Isopropyl Alcohol (ALCOHOL SWABS WITH BENZOCAINE) 6-70 % PADS Use  "alcohol swab with benzocaine as needed once every 3 days with insulin pump change and every 10 days with CGM change. 100 each 1    blood glucose (CONTOUR NEXT TEST) test strip Use to test blood sugar 8 times daily.  Patient tests frequently due to insulin pump and marked excursions from normal blood sugars. 800 strip 6    blood glucose (NO BRAND SPECIFIED) test strip Use to test blood sugar 8 times daily or as directed. To accompany: Blood Glucose Monitor Brands: per insurance. 100 strip 6    buPROPion (WELLBUTRIN XL) 150 MG 24 hr tablet Take 1 tablet (150 mg) by mouth every morning 90 tablet 3    cholecalciferol 25 MCG (1000 UT) TABS Take 4,000 each by mouth      Continuous Blood Gluc  (DEXCOM G6 ) VITO 1 Device continuous Use to read blood sugars per  instructions 1 Device 0    Continuous Blood Gluc Transmit (DEXCOM G6 TRANSMITTER) MISC CHANGE EVERY 90 DAYS 1 each 1    Continuous Glucose Sensor (DEXCOM G6 SENSOR) MISC CHANGE EVERY 10 DAYS 9 each 1    DOCUSATE SODIUM PO Take 100 mg by mouth every evening      GLUCAGON EMERGENCY KIT 1 MG IJ KIT use as directed for severe hypoglycemia 1 Kit 3    [START ON 5/22/2024] HYDROcodone-acetaminophen (NORCO)  MG per tablet Take 1 tablet by mouth every 6 hours as needed for moderate to severe pain 120 tablet 0    [START ON 6/21/2024] HYDROcodone-acetaminophen (NORCO)  MG per tablet Take 1 tablet by mouth every 6 hours as needed for moderate to severe pain 120 tablet 0    HYDROcodone-acetaminophen (NORCO)  MG per tablet Take 1 tablet by mouth every 4 hours as needed for severe pain 120 tablet 0    infusion set (PARADIGM QUICK-SET 23\" 6MM) [facility] [place] supply INFUSION SET TO BE USED WITH PUMP.  CHANGE EVERY 2-3 DAYS AS DIRECTED. 40 each 3    insulin aspart (NOVOLOG VIAL) 100 UNITS/ML vial USE AS DIRECTED IN INSULIN PUMP, TOTAL DAILY DOSE 60 UNITS 60 mL 3    insulin cartridge (PARADIGM 3ML) misc pump supply INSULIN CARTRIDGE TO BE USED " "WITH PUMP AS DIRECTED.  CHANGE EVERY 2-3 DAYS OR AS DIRECTED. 30 each 3    INSULIN PUMP - OUTPATIENT Date last updated:  2/7/2024  Medtronic Minimed: 670G  BASAL RATES and times:  12   AM (midnight): 0.90 units/hour   3 am: 1.00 units/hour   9   AM: 0.825 units/hour   1 pm: 0.775 units/hour   9    PM: 0.875 units/hour     CARB RATIO and times:  12   AM (midnight): 7  8  AM (noon):  8  11:00: 7  5    PM:  8  Corection Factor (Sensitivity) and times:  12   AM (midnight): 34 mg/dL  BLOOD GLUCOSE TARGET and times:  12   AM (midnight): 110 - 120Active Insulin Time:  3 hours     Sensor: Dexcom G6  Carelink / Diasend username:    Carelink / Diasend Password:      Insulin Syringe 30G X 1/2\" 0.5 ML MISC 1 Device as needed. 100 each 12    Krill Oil 1000 MG CAPS       losartan (COZAAR) 50 MG tablet TAKE 1 TABLET DAILY 90 tablet 1    metoprolol succinate ER (TOPROL XL) 25 MG 24 hr tablet TAKE 1 TABLET DAILY 90 tablet 1    MULTIVITAMINS OR TABS 1 TABLET ORALLY DAILY 100 3    PARoxetine (PAXIL) 30 MG tablet Take 1 tablet (30 mg) by mouth every morning 90 tablet 1    rosuvastatin (CRESTOR) 40 MG tablet TAKE 1 TABLET DAILY 90 tablet 0    tamsulosin (FLOMAX) 0.4 MG capsule TAKE 1 CAPSULE DAILY 90 capsule 3    Urine Glucose-Ketones Test (KETO-DIASTIX) STRP 1 Stick by In Vitro route See Admin Instructions. Use as needed to monitor for ketones 100 strip prn    zinc gluconate 50 MG tablet Take 50 mg by mouth daily       Current Facility-Administered Medications   Medication Dose Route Frequency Provider Last Rate Last Admin    3 mL ropivacaine (NAROPIN) injection 5 mg/mL  3 mL   Farhat Smalls DO   3 mL at 03/23/23 1212    triamcinolone (KENALOG-40) injection 40 mg  40 mg   Farhat Smalls DO   40 mg at 03/23/23 1212            Allergies:   Latex         Review of Systems:  From intake questionnaire   Negative 14 system review except as noted on HPI, nurse's note.         Physical Exam:   Patient is a 69 year old  " "male   Vitals: Blood pressure 129/80, pulse 83, temperature 97.4  F (36.3  C), temperature source Tympanic, resp. rate 16, height 1.753 m (5' 9\"), weight 87.1 kg (192 lb), SpO2 96%.  General Appearance Adult: Alert, no acute distress, oriented.  Lungs: Non-labored breathing.  Heart: No obvious jugular venous distension present.  Neuro: Alert, oriented, speech and mentation normal    PVR: 49 mL      Labs and Pathology:    I personally reviewed all applicable laboratory data and went over findings with patient  Significant for:    CBC RESULTS:  Recent Labs   Lab Test 03/26/20  1232 06/22/18  1407 10/05/17  1415   WBC 14.2* 8.0 7.8   HGB 13.7 14.7 14.1    268 265        BMP RESULTS:  Recent Labs   Lab Test 10/18/23  1337 03/01/23  1114 09/09/22  1113 02/07/22  1011 04/02/21  1057 09/16/20  1053 03/26/20  1232 09/19/19  0956    142 140 139 136 138 134 134   POTASSIUM 3.8 4.4 4.5 3.7 4.1 4.0 4.1 4.0   CHLORIDE 101 107 104 108 103 106 102 103   CO2 23 24 24 25 29 27 27 25   ANIONGAP 14 11 12 6 4 5 5 6   * 206* 213* 218* 228* 208* 230* 253*   BUN 9.8 19.7 20.9 16 14 17 19 14   CR 1.09 1.04 1.06 0.87 1.05 1.04 1.07 0.75   GFRESTIMATED 73 78 76 >90 73 74 72 >90   GFRESTBLACK  --   --   --   --  85 86 83 >90   HARDEEP 8.8 9.2 8.5* 9.2 8.8 8.6 8.7 9.0     PSA RESULTS  PSA   Date Value Ref Range Status   04/11/2012 0.86 0 - 4 ug/L Final   05/07/2008 3.14 0 - 4 ug/L Final   12/13/2004 0.77 < - 4.01 ng/mL      Comment:     Whitney Oropeza, [address], [place] [address]            Assessment and Plan:     Assessment: 69 year old male seen in evaluation for kidney stones. The patient recently passed a urinary tract stone. It was calcium oxalate in nature. He has essentially been asymptomatic. He has never been told he has kidney stones before. His PVR today was 49 mL, so these are likely kidney stones, rather than stones made in the bladder.     We discussed obtaining a CT abdomen pelvis " to assess his current stone burden. If he does not have any stones in the renal pelvis, ureter, or bladder, he would be open to a 24 hour urine collection to better understand why he makes kidney stones.     We discussed general recommendations for kidney stone prevention today.     Plan:  CT abdomen pelvis without contrast to assess current stone burden.     BASIA GIL PA-C  Department of Urology

## 2024-05-15 LAB — NONINV COLON CA DNA+OCC BLD SCRN STL QL: NEGATIVE

## 2024-05-31 ENCOUNTER — HOSPITAL ENCOUNTER (OUTPATIENT)
Dept: CT IMAGING | Facility: CLINIC | Age: 69
Discharge: HOME | End: 2024-05-31
Attending: STUDENT IN AN ORGANIZED HEALTH CARE EDUCATION/TRAINING PROGRAM | Admitting: STUDENT IN AN ORGANIZED HEALTH CARE EDUCATION/TRAINING PROGRAM
Payer: COMMERCIAL

## 2024-05-31 DIAGNOSIS — N20.0 NEPHROLITHIASIS: ICD-10-CM

## 2024-05-31 PROCEDURE — 74176 CT ABD & PELVIS W/O CONTRAST: CPT

## 2024-06-03 ENCOUNTER — MEDICAL CORRESPONDENCE (OUTPATIENT)
Dept: HEALTH INFORMATION MANAGEMENT | Facility: CLINIC | Age: 69
End: 2024-06-03
Payer: COMMERCIAL

## 2024-06-10 ENCOUNTER — TRANSFERRED RECORDS (OUTPATIENT)
Dept: HEALTH INFORMATION MANAGEMENT | Facility: CLINIC | Age: 69
End: 2024-06-10
Payer: COMMERCIAL

## 2024-06-10 LAB — RETINOPATHY: POSITIVE

## 2024-07-18 ENCOUNTER — MEDICAL CORRESPONDENCE (OUTPATIENT)
Dept: HEALTH INFORMATION MANAGEMENT | Facility: CLINIC | Age: 69
End: 2024-07-18

## 2024-07-18 ENCOUNTER — OFFICE VISIT (OUTPATIENT)
Dept: FAMILY MEDICINE | Facility: CLINIC | Age: 69
End: 2024-07-18
Payer: COMMERCIAL

## 2024-07-18 VITALS
WEIGHT: 184 LBS | SYSTOLIC BLOOD PRESSURE: 120 MMHG | HEART RATE: 94 BPM | OXYGEN SATURATION: 98 % | RESPIRATION RATE: 18 BRPM | BODY MASS INDEX: 27.25 KG/M2 | HEIGHT: 69 IN | TEMPERATURE: 97 F | DIASTOLIC BLOOD PRESSURE: 60 MMHG

## 2024-07-18 DIAGNOSIS — E10.59 TYPE 1 DIABETES MELLITUS WITH VASCULAR DISEASE (H): Primary | ICD-10-CM

## 2024-07-18 DIAGNOSIS — E78.5 HYPERLIPIDEMIA LDL GOAL <70: ICD-10-CM

## 2024-07-18 DIAGNOSIS — F11.90 CHRONIC NARCOTIC USE: ICD-10-CM

## 2024-07-18 DIAGNOSIS — F32.2 MAJOR DEPRESSIVE DISORDER, SINGLE EPISODE, SEVERE (H): ICD-10-CM

## 2024-07-18 DIAGNOSIS — E10.42 DIABETIC POLYNEUROPATHY ASSOCIATED WITH TYPE 1 DIABETES MELLITUS (H): ICD-10-CM

## 2024-07-18 DIAGNOSIS — F11.20 CONTINUOUS OPIOID DEPENDENCE (H): ICD-10-CM

## 2024-07-18 DIAGNOSIS — M48.02 CERVICAL STENOSIS OF SPINE: ICD-10-CM

## 2024-07-18 DIAGNOSIS — I51.9 LEFT VENTRICULAR DYSFUNCTION: ICD-10-CM

## 2024-07-18 LAB
ANION GAP SERPL CALCULATED.3IONS-SCNC: 12 MMOL/L (ref 7–15)
BUN SERPL-MCNC: 10.1 MG/DL (ref 8–23)
CALCIUM SERPL-MCNC: 8.6 MG/DL (ref 8.8–10.4)
CHLORIDE SERPL-SCNC: 104 MMOL/L (ref 98–107)
CREAT SERPL-MCNC: 1.14 MG/DL (ref 0.67–1.17)
EGFRCR SERPLBLD CKD-EPI 2021: 69 ML/MIN/1.73M2
GLUCOSE SERPL-MCNC: 237 MG/DL (ref 70–99)
HBA1C MFR BLD: 7.6 % (ref 0–5.6)
HCO3 SERPL-SCNC: 22 MMOL/L (ref 22–29)
POTASSIUM SERPL-SCNC: 4.2 MMOL/L (ref 3.4–5.3)
SODIUM SERPL-SCNC: 138 MMOL/L (ref 135–145)

## 2024-07-18 PROCEDURE — 99214 OFFICE O/P EST MOD 30 MIN: CPT | Performed by: FAMILY MEDICINE

## 2024-07-18 PROCEDURE — 36415 COLL VENOUS BLD VENIPUNCTURE: CPT | Performed by: FAMILY MEDICINE

## 2024-07-18 PROCEDURE — 80048 BASIC METABOLIC PNL TOTAL CA: CPT | Performed by: FAMILY MEDICINE

## 2024-07-18 PROCEDURE — G2211 COMPLEX E/M VISIT ADD ON: HCPCS | Performed by: FAMILY MEDICINE

## 2024-07-18 PROCEDURE — 83036 HEMOGLOBIN GLYCOSYLATED A1C: CPT | Performed by: FAMILY MEDICINE

## 2024-07-18 RX ORDER — RESPIRATORY SYNCYTIAL VIRUS VACCINE 120MCG/0.5
0.5 KIT INTRAMUSCULAR ONCE
Qty: 1 EACH | Refills: 0 | Status: CANCELLED | OUTPATIENT
Start: 2024-07-18 | End: 2024-07-18

## 2024-07-18 RX ORDER — HYDROCODONE BITARTRATE AND ACETAMINOPHEN 10; 325 MG/1; MG/1
1 TABLET ORAL EVERY 6 HOURS PRN
Qty: 120 TABLET | Refills: 0 | Status: CANCELLED | OUTPATIENT
Start: 2024-09-16 | End: 2024-10-16

## 2024-07-18 RX ORDER — HYDROCODONE BITARTRATE AND ACETAMINOPHEN 10; 325 MG/1; MG/1
1 TABLET ORAL EVERY 6 HOURS PRN
Qty: 120 TABLET | Refills: 0 | Status: CANCELLED | OUTPATIENT
Start: 2024-07-18

## 2024-07-18 RX ORDER — METOPROLOL SUCCINATE 25 MG/1
TABLET, EXTENDED RELEASE ORAL
Qty: 90 TABLET | Refills: 3 | Status: SHIPPED | OUTPATIENT
Start: 2024-07-18

## 2024-07-18 RX ORDER — LOSARTAN POTASSIUM 50 MG/1
TABLET ORAL
Qty: 90 TABLET | Refills: 3 | Status: SHIPPED | OUTPATIENT
Start: 2024-07-18

## 2024-07-18 RX ORDER — HYDROCODONE BITARTRATE AND ACETAMINOPHEN 10; 325 MG/1; MG/1
1 TABLET ORAL EVERY 6 HOURS PRN
Qty: 120 TABLET | Refills: 0 | Status: CANCELLED | OUTPATIENT
Start: 2024-08-16 | End: 2024-09-15

## 2024-07-18 RX ORDER — ROSUVASTATIN CALCIUM 40 MG/1
TABLET, COATED ORAL
Qty: 90 TABLET | Refills: 3 | Status: SHIPPED | OUTPATIENT
Start: 2024-07-18

## 2024-07-18 RX ORDER — PAROXETINE 30 MG/1
30 TABLET, FILM COATED ORAL EVERY MORNING
Qty: 90 TABLET | Refills: 3 | Status: SHIPPED | OUTPATIENT
Start: 2024-07-18

## 2024-07-18 RX ORDER — BUPROPION HYDROCHLORIDE 150 MG/1
150 TABLET ORAL EVERY MORNING
Qty: 90 TABLET | Refills: 3 | Status: SHIPPED | OUTPATIENT
Start: 2024-07-18

## 2024-07-18 ASSESSMENT — PAIN SCALES - GENERAL: PAINLEVEL: SEVERE PAIN (6)

## 2024-07-18 NOTE — LETTER

## 2024-07-18 NOTE — PROGRESS NOTES
"  Assessment & Plan     Type 1 diabetes mellitus with vascular disease (H)  Fair control  Continue CGM and pump to help regulate blood sugars    - Albumin Random Urine Quantitative with Creat Ratio; Future  - BASIC METABOLIC PANEL; Future  - HEMOGLOBIN A1C; Future  - BASIC METABOLIC PANEL  - HEMOGLOBIN A1C    F11.2 - Continuous opioid dependence (H)  CSA done  UDS due - will return for lab for that tomorrow, unable to void today  - Urine Drug Screen Clinic; Future    Chronic narcotic use   As above  - Urine Drug Screen Clinic; Future    Diabetic polyneuropathy associated with type 1 diabetes mellitus (H)     - metoprolol succinate ER (TOPROL XL) 25 MG 24 hr tablet; TAKE 1 TABLET DAILY    Cervical stenosis of spine       Major depressive disorder, single episode, severe (H)  Stable, well managed with the increase in paroxetine  - buPROPion (WELLBUTRIN XL) 150 MG 24 hr tablet; Take 1 tablet (150 mg) by mouth every morning  - PARoxetine (PAXIL) 30 MG tablet; Take 1 tablet (30 mg) by mouth every morning    Left ventricular dysfunction   stable  - losartan (COZAAR) 50 MG tablet; TAKE 1 TABLET DAILY  - metoprolol succinate ER (TOPROL XL) 25 MG 24 hr tablet; TAKE 1 TABLET DAILY    Hyperlipidemia LDL goal <70     - rosuvastatin (CRESTOR) 40 MG tablet; TAKE 1 TABLET DAILY    The longitudinal plan of care for the diagnosis(es)/condition(s) as documented were addressed during this visit. Due to the added complexity in care, I will continue to support Eb in the subsequent management and with ongoing continuity of care.      BMI  Estimated body mass index is 27.17 kg/m  as calculated from the following:    Height as of this encounter: 1.753 m (5' 9\").    Weight as of this encounter: 83.5 kg (184 lb).             Subjective   Eb is a 69 year old, presenting for the following health issues:  Diabetes    HPI       Diabetes Follow-up  Insulin - Novolog  How often are you checking your blood sugar? Continuous glucose monitor and " insulin pump  What time of day are you checking your blood sugars (select all that apply)?  Before and after meals and At bedtime  Have you had any blood sugars above 200?  Yes   Have you had any blood sugars below 70?  Yes   What symptoms do you notice when your blood sugar is low?  Blurred vision and Confusion  What concerns do you have today about your diabetes? None   Do you have any of these symptoms? (Select all that apply)  Blurry vision    Uses CGM and insulin pump to help control blood sugars and alert him to wide variations in blood sugars.         Hyperlipidemia Follow-Up  Rosuvastatin 40mg qd  Are you regularly taking any medication or supplement to lower your cholesterol?   Yes- statin  Are you having muscle aches or other side effects that you think could be caused by your cholesterol lowering medication?  No    Hypertension Follow-up  Losartan 50mg every day, metoprolol 25mg qd  Do you check your blood pressure regularly outside of the clinic? No   Are you following a low salt diet? No  Are your blood pressures ever more than 140 on the top number (systolic) OR more   than 90 on the bottom number (diastolic), for example 140/90? N/A    BP Readings from Last 2 Encounters:   07/18/24 120/60   05/13/24 129/80     Hemoglobin A1C (%)   Date Value   01/23/2024 7.0 (H)   10/18/2023 8.0 (H)   04/02/2021 7.6 (H)   12/14/2020 7.4 (H)     LDL Cholesterol Calculated (mg/dL)   Date Value   01/23/2024 72   03/01/2023 56   09/16/2020 63   09/19/2019 57         Depression   Wellbutrin XL 150mg every day, paxil 30mg qd  How are you doing with your depression since your last visit? Improved since increase of Paxil  Are you having other symptoms that might be associated with depression? No  Have you had a significant life event?  No   Are you feeling anxious or having panic attacks?   No  Do you have any concerns with your use of alcohol or other drugs? No    Social History     Tobacco Use    Smoking status: Former      Current packs/day: 0.00     Types: Cigarettes     Quit date: 2001     Years since quittin.5    Smokeless tobacco: Never   Vaping Use    Vaping status: Never Used   Substance Use Topics    Alcohol use: No     Comment: quit in early     Drug use: No         10/18/2023     1:21 PM 2024    11:18 AM 2024     1:00 PM   PHQ   PHQ-9 Total Score 4 5 3   Q9: Thoughts of better off dead/self-harm past 2 weeks Not at all Not at all Not at all         2022     9:59 AM 3/1/2023    10:59 AM 2024    12:44 PM   VIVIANA-7 SCORE   Total Score 0 (minimal anxiety) 0 (minimal anxiety) 1 (minimal anxiety)   Total Score 0 0 1         Suicide Assessment Five-step Evaluation and Treatment (SAFE-T)      Pain History:  When did you first notice your pain? Chronic   Have you seen this provider for your pain in the past? Yes   Where in your body do you have pain? Low back and shoulder  Are you seeing anyone else for your pain? No        10/18/2023     1:21 PM 2024    11:18 AM 2024     1:00 PM   PHQ-9 SCORE   PHQ-9 Total Score MyChart 4 (Minimal depression) 5 (Mild depression)    PHQ-9 Total Score 4 5 3           2022     9:59 AM 3/1/2023    10:59 AM 2024    12:44 PM   VIVIANA-7 SCORE   Total Score 0 (minimal anxiety) 0 (minimal anxiety) 1 (minimal anxiety)   Total Score 0 0 1               Chronic Pain Follow Up:    Location of pain: Low back and shoulder  Analgesia/pain control:    - Recent changes:  Same    - Overall control: Tolerable with discomfort    - Current treatments: opioids   Adherence:     - Do you ever take more pain medicine than prescribed? No    - When did you take your last dose of pain medicine?  This morning   Adverse effects: No   PDMP Review         Value Time User    State PDMP site checked  Yes 2024  1:57 PM Jackeline Rachel MD          Last CSA Agreement:   CSA -- Patient Level:     [Media Unavailable] Controlled Substance Agreement - Opioid - Scan on 2023  2:44 PM:  "opioid agreement   [Media Unavailable] Controlled Substance Agreement - Opioid - Scan on 5/12/2022  9:46 AM   [Media Unavailable] Controlled Substance Agreement - Opioid - Scan on 4/9/2021 10:57 AM       Last UDS: 10/26/2023      Review of Systems  Constitutional, HEENT, cardiovascular, pulmonary, gi and gu systems are negative, except as otherwise noted.      Objective    /60   Pulse 94   Temp 97  F (36.1  C) (Tympanic)   Resp 18   Ht 1.753 m (5' 9\")   Wt 83.5 kg (184 lb)   SpO2 98%   BMI 27.17 kg/m    Body mass index is 27.17 kg/m .  Physical Exam   GENERAL: alert and no distress  EYES: Eyes grossly normal to inspection, PERRL and conjunctivae and sclerae normal  NECK: no adenopathy, no asymmetry, masses, or scars  RESP: lungs clear to auscultation - no rales, rhonchi or wheezes  CV: regular rate and rhythm, normal S1 S2, no S3 or S4, no murmur, click or rub, no peripheral edema  ABDOMEN: soft, nontender, no hepatosplenomegaly, no masses and bowel sounds normal  MS: no gross musculoskeletal defects noted, no edema  SKIN: no suspicious lesions or rashes    Results for orders placed or performed in visit on 07/18/24   HEMOGLOBIN A1C     Status: Abnormal   Result Value Ref Range    Hemoglobin A1C 7.6 (H) 0.0 - 5.6 %             Signed Electronically by: Jackeline Rachel MD    "

## 2024-07-19 ENCOUNTER — LAB (OUTPATIENT)
Dept: LAB | Facility: CLINIC | Age: 69
End: 2024-07-19
Payer: COMMERCIAL

## 2024-07-19 ENCOUNTER — TELEPHONE (OUTPATIENT)
Dept: UROLOGY | Facility: CLINIC | Age: 69
End: 2024-07-19

## 2024-07-19 DIAGNOSIS — M48.02 CERVICAL STENOSIS OF SPINE: ICD-10-CM

## 2024-07-19 DIAGNOSIS — F11.20 CONTINUOUS OPIOID DEPENDENCE (H): ICD-10-CM

## 2024-07-19 DIAGNOSIS — E10.42 DIABETIC POLYNEUROPATHY ASSOCIATED WITH TYPE 1 DIABETES MELLITUS (H): ICD-10-CM

## 2024-07-19 DIAGNOSIS — F11.90 CHRONIC NARCOTIC USE: ICD-10-CM

## 2024-07-19 DIAGNOSIS — E10.59 TYPE 1 DIABETES MELLITUS WITH VASCULAR DISEASE (H): ICD-10-CM

## 2024-07-19 LAB
CREAT UR-MCNC: 65.3 MG/DL
MICROALBUMIN UR-MCNC: <12 MG/L
MICROALBUMIN/CREAT UR: NORMAL MG/G{CREAT}

## 2024-07-19 PROCEDURE — 82570 ASSAY OF URINE CREATININE: CPT

## 2024-07-19 PROCEDURE — 80306 DRUG TEST PRSMV INSTRMNT: CPT

## 2024-07-19 PROCEDURE — 82043 UR ALBUMIN QUANTITATIVE: CPT

## 2024-07-19 RX ORDER — HYDROCODONE BITARTRATE AND ACETAMINOPHEN 10; 325 MG/1; MG/1
1 TABLET ORAL EVERY 6 HOURS PRN
Qty: 120 TABLET | Refills: 0 | Status: SHIPPED | OUTPATIENT
Start: 2024-07-19 | End: 2024-08-12

## 2024-07-19 NOTE — TELEPHONE ENCOUNTER
RN called Pharmacy and spoke with Pharmacist.   Pharmacy reports the prescription for Norco was written by Dr. Rachel on 4/22/24 and is ordered as take every 4 hours. Pharmacy is not able to fill prescription due to discrepancy in comparison to other prescriptions written for 1 tablet every 6 hours for quantity of 120 tablets.     HYDROcodone-acetaminophen (NORCO)  MG per tablet Take 1 tablet by mouth every 6 hours as needed for moderate to severe pain dispense 120 tablet      7/18/24 Dr. Rachel appointment note indicated medication is to be continued by patient.    Please advise.    Trice Lawson RN on 7/19/2024 at 2:31 PM

## 2024-07-19 NOTE — TELEPHONE ENCOUNTER
----- Message from BASIA LAGUNAS sent at 7/18/2024  2:40 PM CDT -----  Regarding: FW: 24 hour urine/litholink  Can you send a new litholink? I think he never got his.  ----- Message -----  From: Jackeline Rachel MD  Sent: 7/18/2024   2:12 PM CDT  To: Basia Lagunas PA-C  Subject: 24 hour urine/litholink                          Hi. I'm seeing Eb today and he mentioned that he never heard about wether or not he was supposed to do a 24 hour urine/litholink after his last visit with you and CT scan in May.    Can you/your care team follow up with him?    Thanks!      Jackeline Rachel M.D.  Family Medicine  [facility]

## 2024-07-19 NOTE — TELEPHONE ENCOUNTER
RN called pharmacy and informed them of the prescription being sent.    Trice Lawson RN on 7/19/2024 at 2:44 PM

## 2024-07-19 NOTE — TELEPHONE ENCOUNTER
Verena faxed 07/08/24 at 3:45pm.    Patient called and informed that Chula Lagunas does want him to do the 24 hour urine test. Patient stated he never received his supplies when ordered last time. Patient informed new order was sent.    Cynthia CESAR CMA 07/19/24 2:51 PM

## 2024-07-19 NOTE — TELEPHONE ENCOUNTER
Hello,    Prescription for norco 10/325mg was sent on 4/22/24 for 120 tabs, taking 1 tab  every 4 hours prn sp, however, he has not been established on every 4 hours but every 6 hours. Please send a new prescription for every 6 hours please.  Patient is waiting.     Thank you,    Zena Escobar  Certified Pharmacy Technician II, [facility]  Ph: [phone]  Fx: [phone]

## 2024-07-22 DIAGNOSIS — E10.42 DIABETIC POLYNEUROPATHY ASSOCIATED WITH TYPE 1 DIABETES MELLITUS (H): ICD-10-CM

## 2024-07-22 DIAGNOSIS — E10.59 TYPE 1 DIABETES MELLITUS WITH VASCULAR DISEASE (H): ICD-10-CM

## 2024-07-22 DIAGNOSIS — E10.319 TYPE 1 DIABETES MELLITUS WITH RETINOPATHY, MACULAR EDEMA PRESENCE UNSPECIFIED, UNSPECIFIED LATERALITY, UNSPECIFIED RETINOPATHY SEVERITY (H): ICD-10-CM

## 2024-07-22 LAB
AMPHETAMINES UR QL: NOT DETECTED
BARBITURATES UR QL SCN: NOT DETECTED
BENZODIAZ UR QL SCN: NOT DETECTED
BUPRENORPHINE UR QL: NOT DETECTED
CANNABINOIDS UR QL: DETECTED
COCAINE UR QL SCN: NOT DETECTED
D-METHAMPHET UR QL: NOT DETECTED
METHADONE UR QL SCN: NOT DETECTED
OPIATES UR QL SCN: DETECTED
OXYCODONE UR QL SCN: NOT DETECTED
PCP UR QL SCN: NOT DETECTED
TRICYCLICS UR QL SCN: NOT DETECTED

## 2024-07-22 RX ORDER — ISOPROPYL ALCOHOL, BENZOCAINE .7; .06 ML/ML; ML/ML
SWAB TOPICAL
Qty: 100 EACH | Refills: 1 | Status: SHIPPED | OUTPATIENT
Start: 2024-07-22

## 2024-07-24 DIAGNOSIS — E10.59 TYPE 1 DIABETES MELLITUS WITH VASCULAR DISEASE (H): ICD-10-CM

## 2024-07-24 RX ORDER — PROCHLORPERAZINE 25 MG/1
SUPPOSITORY RECTAL
Qty: 1 EACH | Refills: 1 | Status: SHIPPED | OUTPATIENT
Start: 2024-07-24

## 2024-07-25 ENCOUNTER — TELEPHONE (OUTPATIENT)
Dept: FAMILY MEDICINE | Facility: CLINIC | Age: 69
End: 2024-07-25
Payer: COMMERCIAL

## 2024-07-25 NOTE — TELEPHONE ENCOUNTER
Kentrell with  specialty pharmacy calls to notify provider that they don't carry alcohol with benzocaine wipes, and it's not something they'll be able to order either. She's going to notify the pt that he can order them off of [employer]. She also said that he can try checking with other pharmacies to see if they carry them or are able to order them. Routing to care team for follow up with pt, to see if he'd like to have it sent to a different pharmacy.     Ashlee Ibrahim RN  [facility]

## 2024-07-25 NOTE — TELEPHONE ENCOUNTER
Contacted patient   States he will get the pads a LTW  Does not need an order  Will call back if anything further is needed    Desirae Camacho RN on 7/25/2024 at 10:30 AM

## 2024-08-12 DIAGNOSIS — M48.02 CERVICAL STENOSIS OF SPINE: ICD-10-CM

## 2024-08-12 DIAGNOSIS — E10.42 DIABETIC POLYNEUROPATHY ASSOCIATED WITH TYPE 1 DIABETES MELLITUS (H): ICD-10-CM

## 2024-08-12 RX ORDER — HYDROCODONE BITARTRATE AND ACETAMINOPHEN 10; 325 MG/1; MG/1
1 TABLET ORAL EVERY 6 HOURS PRN
Qty: 120 TABLET | Refills: 0 | Status: SHIPPED | OUTPATIENT
Start: 2024-08-16 | End: 2024-09-13

## 2024-08-12 NOTE — TELEPHONE ENCOUNTER
Medication Question or Refill        What medication are you calling about (include dose and sig)?: HYDROcodone-acetaminophen (NORCO)  MG per tablet     Preferred Pharmacy:   [place], [place] - [address]  [address]  [facility] [address]  Phone: [phone] Fax: [phone] Alternate Fax: [phone]      Controlled Substance Agreement on file:   CSA -- Patient Level:     [Media Unavailable] Controlled Substance Agreement - Opioid - Scan on 7/18/2024  3:38 PM   [Media Unavailable] Controlled Substance Agreement - Opioid - Scan on 5/31/2023  2:44 PM: opioid agreement   [Media Unavailable] Controlled Substance Agreement - Opioid - Scan on 5/12/2022  9:46 AM   [Media Unavailable] Controlled Substance Agreement - Opioid - Scan on 4/9/2021 10:57 AM       Who prescribed the medication?: PCP    Do you need a refill? Yes    When did you use the medication last? ongoing    Patient offered an appointment? No    Do you have any questions or concerns?  No      Could we send this information to you in [employer] or would you prefer to receive a phone call?:   Patient would prefer a phone call   Okay to leave a detailed message?: Yes at Home number on file [phone] (home)

## 2024-08-12 NOTE — TELEPHONE ENCOUNTER
Requested Prescriptions   Pending Prescriptions Disp Refills    HYDROcodone-acetaminophen (NORCO)  MG per tablet 120 tablet 0     Sig: Take 1 tablet by mouth every 6 hours as needed for moderate to severe pain       There is no refill protocol information for this order        Last Written Prescription Date:  7/19/24  Last Fill Quantity: 120,  # refills: 0   Last office visit: 7/18/2024 ; last virtual visit: Visit date not found with prescribing provider:  Dr. Chappell for Dr. Rachel.   Future Office Visit:

## 2024-08-28 DIAGNOSIS — E10.59 TYPE 1 DIABETES MELLITUS WITH VASCULAR DISEASE (H): ICD-10-CM

## 2024-08-28 RX ORDER — INSULIN PUMP SYRINGE, 3 ML
EACH MISCELLANEOUS
Qty: 10 EACH | Refills: 3 | Status: SHIPPED | OUTPATIENT
Start: 2024-08-28

## 2024-09-13 DIAGNOSIS — M48.02 CERVICAL STENOSIS OF SPINE: ICD-10-CM

## 2024-09-13 DIAGNOSIS — E10.42 DIABETIC POLYNEUROPATHY ASSOCIATED WITH TYPE 1 DIABETES MELLITUS (H): ICD-10-CM

## 2024-09-13 RX ORDER — HYDROCODONE BITARTRATE AND ACETAMINOPHEN 10; 325 MG/1; MG/1
1 TABLET ORAL EVERY 6 HOURS PRN
Qty: 120 TABLET | Refills: 0 | Status: SHIPPED | OUTPATIENT
Start: 2024-09-13

## 2024-09-24 ENCOUNTER — IMMUNIZATION (OUTPATIENT)
Dept: FAMILY MEDICINE | Facility: CLINIC | Age: 69
End: 2024-09-24
Payer: COMMERCIAL

## 2024-09-24 DIAGNOSIS — Z23 HIGH PRIORITY FOR 2019-NCOV VACCINE: ICD-10-CM

## 2024-09-24 DIAGNOSIS — Z23 NEED FOR PROPHYLACTIC VACCINATION AND INOCULATION AGAINST INFLUENZA: Primary | ICD-10-CM

## 2024-09-24 PROCEDURE — 90662 IIV NO PRSV INCREASED AG IM: CPT

## 2024-09-24 PROCEDURE — G0008 ADMIN INFLUENZA VIRUS VAC: HCPCS

## 2024-09-24 PROCEDURE — 91320 SARSCV2 VAC 30MCG TRS-SUC IM: CPT

## 2024-09-24 PROCEDURE — 90480 ADMN SARSCOV2 VAC 1/ONLY CMP: CPT

## 2024-10-01 ENCOUNTER — VIRTUAL VISIT (OUTPATIENT)
Dept: EDUCATION SERVICES | Facility: CLINIC | Age: 69
End: 2024-10-01
Payer: COMMERCIAL

## 2024-10-01 DIAGNOSIS — E10.9 TYPE 1 DIABETES, HBA1C GOAL < 8% (H): Primary | ICD-10-CM

## 2024-10-01 NOTE — PROGRESS NOTES
"Diabetes Self-Management Education & Support    Presents for: Individual review    Type of Service: Telephone Visit    Originating Location (Patient Location): Home  Distant Location (Provider Location): Offsite  Mode of Communication:  Telephone    Telephone Visit Start Time:  10:00am  Telephone Visit End Time (telephone visit stop time): 10:20am    How would patient like to obtain AVS? Des      REPORTS:  Unable to upload Dexcom as he is using the  and visit had to be changed to telephone due to lack of wifi in clinic.    Insulin Pump Information  Insulin Pump Brand: Medtronic  Infusion Set: Medtronic  Medtronic Infusion Set: Quick Set (23\")    Education specific to insulin pump provided today on:   Current pump warranty has .  He needs to decide what he wants to do.  Medtronic has called and talked with him, but he hasn't made a decision yet.    Opportunities for ongoing education and support in diabetes-self management were discussed.    Pt verbalized understanding of concepts discussed and recommendations provided today.       Continue education with the following diabetes management concepts: pump options    Pump Education Materials: n/a    ASSESSMENT  -type 1 diabetes for ~42 years  -recent a1c of 7.6 (previous 7.0, 8.0, 7.9)  -currently being managed with pump therapy  -on aspirin and statin therapy  -diabetes complications: CAD, neuropathy    Glucose Patterns & Trends:  Did not review    Patient would benefit from no changes now.    Changes made to pump settings:  No changes made    Changes made to sensor settings:   No changes to sensor settings recommended at this time.    PLAN     Go online and look at Medtronic 780G, Guardian 4 and Extended infusion set.  If you want a different pump would need to look at Tandem or Omnipod.  Decide when you want to make the change.  Follow up with Dr. Rachel as scheduled for .   Follow up with Diabetes Education with further questions " "about next steps.  If you have questions you can send them through [employer] or call Diabetes Education Triage [phone].  For Scheduling call [phone].        See Care Plan for co-developed, patient-state behavior change goals.  AVS provided for patient today.    Education Materials Provided:  No new materials provided today      SUBJECTIVE/OBJECTIVE:  Presents for: Individual review  Accompanied by: Self  Focus of Visit: Insulin Pump (currently Medtronic pump, Dexcom G6)  Diabetes type: Type 1  Date of diagnosis: 42 years ago  Disease course: Stable  Diabetes management related comments/concerns: discussing options as current pump now out of warranty  Cultural Influences/Ethnic Background:  Not  or       Diabetes Symptoms & Complications:  Diabetes Related Symptoms: Fatigue  Weight trend: Stable  Symptom course: Stable  Disease course: Stable       Patient Problem List and Family Medical History reviewed for relevant medical history, current medical status, and diabetes risk factors.    Vitals:  There were no vitals taken for this visit.  Estimated body mass index is 27.17 kg/m  as calculated from the following:    Height as of 7/18/24: 1.753 m (5' 9\").    Weight as of 7/18/24: 83.5 kg (184 lb).   Last 3 BP:   BP Readings from Last 3 Encounters:   07/18/24 120/60   05/13/24 129/80   04/22/24 126/70       History   Smoking Status    Former    Types: Cigarettes    Quit date: 1/1/2001   Smokeless Tobacco    Never       Labs:  Lab Results   Component Value Date    A1C 7.6 07/18/2024    A1C 7.6 04/02/2021     Lab Results   Component Value Date     07/18/2024     02/07/2022     04/02/2021     Lab Results   Component Value Date    LDL 72 01/23/2024    LDL 63 09/16/2020     HDL Cholesterol   Date Value Ref Range Status   09/16/2020 53 >39 mg/dL Final     Direct Measure HDL   Date Value Ref Range Status   01/23/2024 49 >=40 mg/dL Final   ]  GFR Estimate   Date Value Ref Range Status " "  07/18/2024 69 >60 mL/min/1.73m2 Final     Comment:     eGFR calculated using 2021 CKD-EPI equation.   04/02/2021 73 >60 mL/min/[1.73_m2] Final     Comment:     Non  GFR Calc  Starting 12/18/2018, serum creatinine based estimated GFR (eGFR) will be   calculated using the Chronic Kidney Disease Epidemiology Collaboration   (CKD-EPI) equation.       GFR Estimate If Black   Date Value Ref Range Status   04/02/2021 85 >60 mL/min/[1.73_m2] Final     Comment:      GFR Calc  Starting 12/18/2018, serum creatinine based estimated GFR (eGFR) will be   calculated using the Chronic Kidney Disease Epidemiology Collaboration   (CKD-EPI) equation.       Lab Results   Component Value Date    CR 1.14 07/18/2024    CR 1.05 04/02/2021     No results found for: \"MICROALBUMIN\"    Healthy Eating:  Healthy Eating Assessed Today: Yes  Meal planning/habits: Carb counting  Lunch: 11:30am:  muffin  Dinner: 6:15 chicken  Snacks: 3pm: granola bars (60 grams); 9pm: nutty bars 34  Beverages: Water, Diet soda    Being Active:  Being Active Assessed Today: Yes  Exercise:: Currently not exercising (active (boats, etc))    Monitoring:  Monitoring Assessed Today: Yes  Blood Glucose Meter: CGM (Dexcom G6)  Times checking blood sugar at home (number): 5+  Times checking blood sugar at home (per): Day    Taking Medications:  Diabetes Medication(s)       Diabetic Other       GLUCAGON EMERGENCY KIT 1 MG IJ KIT use as directed for severe hypoglycemia       Insulin       insulin aspart (NOVOLOG VIAL) 100 UNITS/ML vial USE AS DIRECTED IN INSULIN PUMP, TOTAL DAILY DOSE 60 UNITS            Taking Medication Assessed Today: Yes  Current Treatments: Insulin Pump    Problem Solving:  Patient carries a carbohydrate source: Yes    Hypoglycemia Symptoms  Hypoglycemia: Dizziness/Lightheadedness    Hypoglycemia Complications  Hypoglycemia Complications: None    Reducing Risks:  Reducing Risks Assessed Today: Yes  Diabetes Risks: Age " over 45 years, History of gestational diabetes  CAD Risks: Diabetes Mellitus, Male sex  Has dilated eye exam at least once a year?: Yes  Sees dentist every 6 months?:  (has dentures)  Feet checked by healthcare provider in the last year?: Yes    Healthy Coping:  Healthy Coping Assessed Today: Yes  Emotional response to diabetes: Acceptance  Stage of change: ACTION (Actively working towards change)  Patient Activation Measure Survey Score:      2/17/2011    11:00 AM   BENEDICTO Score (Last Two)   BENEDICTO Raw Score 42   Activation Score 66   BENEDICTO Level 3         Care Plan and Education Provided:  Pump options as current pump out of warranty    Aubree Hamilton RD, [facility], 4:38 PM, 10/1/2024      Time Spent: 20 minutes  Encounter Type: Individual    Any diabetes medication dose changes were made via the CDE Protocol per the patient's primary care provider. A copy of this encounter was shared with the provider.

## 2024-10-01 NOTE — LETTER
"    10/1/2024         RE: Eb Eisenberg  [id number]  [place] [address]        Dear Colleague,    Thank you for referring your patient, Eb Eisenberg, to the [facility]. Please see a copy of my visit note below.    Diabetes Self-Management Education & Support    Presents for: Individual review    Type of Service: Telephone Visit    Originating Location (Patient Location): Home  Distant Location (Provider Location): Offsite  Mode of Communication:  Telephone    Telephone Visit Start Time:  10:00am  Telephone Visit End Time (telephone visit stop time): 10:20am    How would patient like to obtain AVS? [email or web address]      REPORTS:  Unable to upload Dexcom as he is using the  and visit had to be changed to telephone due to lack of wifi in clinic.    Insulin Pump Information  Insulin Pump Brand: Medtronic  Infusion Set: Medtronic  Medtronic Infusion Set: Quick Set (23\")    Education specific to insulin pump provided today on:   Current pump warranty has .  He needs to decide what he wants to do.  [employer] has called and talked with him, but he hasn't made a decision yet.    Opportunities for ongoing education and support in diabetes-self management were discussed.    Pt verbalized understanding of concepts discussed and recommendations provided today.       Continue education with the following diabetes management concepts: pump options    Pump Education Materials: n/a    ASSESSMENT  -type 1 diabetes for ~42 years  -recent a1c of 7.6 (previous 7.0, 8.0, 7.9)  -currently being managed with pump therapy  -on aspirin and statin therapy  -diabetes complications: CAD, neuropathy    Glucose Patterns & Trends:  Did not review    Patient would benefit from no changes now.    Changes made to pump settings:  No changes made    Changes made to sensor settings:   No changes to sensor settings recommended at this time.    PLAN     Go online and look at Medtronic TristanG, Guardian 4 and " "Extended infusion set.  If you want a different pump would need to look at Tandem or Omnipod.  Decide when you want to make the change.  Follow up with Dr. Rachel as scheduled for November. 5.   Follow up with Diabetes Education with further questions about next steps.  If you have questions you can send them through [employer] or call Diabetes Education Triage [phone].  For Scheduling call [phone].        See Care Plan for co-developed, patient-state behavior change goals.  AVS provided for patient today.    Education Materials Provided:  No new materials provided today      SUBJECTIVE/OBJECTIVE:  Presents for: Individual review  Accompanied by: Self  Focus of Visit: Insulin Pump (currently Medtronic pump, Dexcom G6)  Diabetes type: Type 1  Date of diagnosis: 42 years ago  Disease course: Stable  Diabetes management related comments/concerns: discussing options as current pump now out of warranty  Cultural Influences/Ethnic Background:  Not  or       Diabetes Symptoms & Complications:  Diabetes Related Symptoms: Fatigue  Weight trend: Stable  Symptom course: Stable  Disease course: Stable       Patient Problem List and Family Medical History reviewed for relevant medical history, current medical status, and diabetes risk factors.    Vitals:  There were no vitals taken for this visit.  Estimated body mass index is 27.17 kg/m  as calculated from the following:    Height as of 7/18/24: 1.753 m (5' 9\").    Weight as of 7/18/24: 83.5 kg (184 lb).   Last 3 BP:   BP Readings from Last 3 Encounters:   07/18/24 120/60   05/13/24 129/80   04/22/24 126/70       History   Smoking Status     Former     Types: Cigarettes     Quit date: 1/1/2001   Smokeless Tobacco     Never       Labs:  Lab Results   Component Value Date    A1C 7.6 07/18/2024    A1C 7.6 04/02/2021     Lab Results   Component Value Date     07/18/2024     02/07/2022     04/02/2021     Lab Results   Component Value Date " "   LDL 72 01/23/2024    LDL 63 09/16/2020     HDL Cholesterol   Date Value Ref Range Status   09/16/2020 53 >39 mg/dL Final     Direct Measure HDL   Date Value Ref Range Status   01/23/2024 49 >=40 mg/dL Final   ]  GFR Estimate   Date Value Ref Range Status   07/18/2024 69 >60 mL/min/1.73m2 Final     Comment:     eGFR calculated using 2021 CKD-EPI equation.   04/02/2021 73 >60 mL/min/[1.73_m2] Final     Comment:     Non  GFR Calc  Starting 12/18/2018, serum creatinine based estimated GFR (eGFR) will be   calculated using the Chronic Kidney Disease Epidemiology Collaboration   (CKD-EPI) equation.       GFR Estimate If Black   Date Value Ref Range Status   04/02/2021 85 >60 mL/min/[1.73_m2] Final     Comment:      GFR Calc  Starting 12/18/2018, serum creatinine based estimated GFR (eGFR) will be   calculated using the Chronic Kidney Disease Epidemiology Collaboration   (CKD-EPI) equation.       Lab Results   Component Value Date    CR 1.14 07/18/2024    CR 1.05 04/02/2021     No results found for: \"MICROALBUMIN\"    Healthy Eating:  Healthy Eating Assessed Today: Yes  Meal planning/habits: Carb counting  Lunch: 11:30am:  muffin  Dinner: 6:15 chicken  Snacks: 3pm: granola bars (60 grams); 9pm: nutty bars 34  Beverages: Water, Diet soda    Being Active:  Being Active Assessed Today: Yes  Exercise:: Currently not exercising (active (boats, etc))    Monitoring:  Monitoring Assessed Today: Yes  Blood Glucose Meter: CGM (Dexcom G6)  Times checking blood sugar at home (number): 5+  Times checking blood sugar at home (per): Day    Taking Medications:  Diabetes Medication(s)       Diabetic Other       GLUCAGON EMERGENCY KIT 1 MG IJ KIT use as directed for severe hypoglycemia       Insulin       insulin aspart (NOVOLOG VIAL) 100 UNITS/ML vial USE AS DIRECTED IN INSULIN PUMP, TOTAL DAILY DOSE 60 UNITS            Taking Medication Assessed Today: Yes  Current Treatments: Insulin Pump    Problem " Solving:  Patient carries a carbohydrate source: Yes    Hypoglycemia Symptoms  Hypoglycemia: Dizziness/Lightheadedness    Hypoglycemia Complications  Hypoglycemia Complications: None    Reducing Risks:  Reducing Risks Assessed Today: Yes  Diabetes Risks: Age over 45 years, History of gestational diabetes  CAD Risks: Diabetes Mellitus, Male sex  Has dilated eye exam at least once a year?: Yes  Sees dentist every 6 months?:  (has dentures)  Feet checked by healthcare provider in the last year?: Yes    Healthy Coping:  Healthy Coping Assessed Today: Yes  Emotional response to diabetes: Acceptance  Stage of change: ACTION (Actively working towards change)  Patient Activation Measure Survey Score:      2/17/2011    11:00 AM   BENEDCITO Score (Last Two)   BENEDICTO Raw Score 42   Activation Score 66   BENEDICTO Level 3         Care Plan and Education Provided:  Pump options as current pump out of warranty    Aubree Hamilton RD, [facility], 4:38 PM, 10/1/2024      Time Spent: 20 minutes  Encounter Type: Individual    Any diabetes medication dose changes were made via the CDE Protocol per the patient's primary care provider. A copy of this encounter was shared with the provider.

## 2024-10-02 DIAGNOSIS — E10.59 TYPE 1 DIABETES MELLITUS WITH VASCULAR DISEASE (H): Primary | ICD-10-CM

## 2024-10-10 DIAGNOSIS — E10.42 DIABETIC POLYNEUROPATHY ASSOCIATED WITH TYPE 1 DIABETES MELLITUS (H): ICD-10-CM

## 2024-10-10 DIAGNOSIS — M48.02 CERVICAL STENOSIS OF SPINE: ICD-10-CM

## 2024-10-10 RX ORDER — HYDROCODONE BITARTRATE AND ACETAMINOPHEN 10; 325 MG/1; MG/1
1 TABLET ORAL EVERY 6 HOURS PRN
Qty: 120 TABLET | Refills: 0 | Status: SHIPPED | OUTPATIENT
Start: 2024-10-10

## 2024-10-10 NOTE — TELEPHONE ENCOUNTER
Patient wanted provider to know he has appointment scheduled for next month. Just an FYI.  Lorin Chacko RN on 10/10/2024 at 1:15 PM

## 2024-10-30 DIAGNOSIS — E10.59 TYPE 1 DIABETES MELLITUS WITH VASCULAR DISEASE (H): ICD-10-CM

## 2024-10-31 RX ORDER — PROCHLORPERAZINE 25 MG/1
SUPPOSITORY RECTAL
Qty: 9 EACH | Refills: 1 | Status: SHIPPED | OUTPATIENT
Start: 2024-10-31

## 2024-10-31 NOTE — TELEPHONE ENCOUNTER
Pending Prescriptions:                       Disp   Refills    Continuous Glucose Sensor (DEXCOM G6 SENS*       1            Sig: CHANGE EVERY 10 DAYS    Routing refill request to provider for review/approval because:  Drug not on the [facility] refill protocol       Maximo Haney RN

## 2024-11-05 ENCOUNTER — TELEPHONE (OUTPATIENT)
Dept: FAMILY MEDICINE | Facility: CLINIC | Age: 69
End: 2024-11-05
Payer: COMMERCIAL

## 2024-11-05 NOTE — TELEPHONE ENCOUNTER
Nurse Radha with patient's pharmacy prescription insurance calling per their outreach program for medicare. Patient was flagged as being diabetic and not currently on a statin. Radha tells me she sees Eb's other meds have been filled at [facility]. Notified patient is taking a statin. Offered to transfer her to release of information for more detailed information which she says is not needed. She will douglas his chart as taking a statin.   Desirae ATKINSON RN

## 2024-11-11 ENCOUNTER — TELEPHONE (OUTPATIENT)
Dept: FAMILY MEDICINE | Facility: CLINIC | Age: 69
End: 2024-11-11
Payer: COMMERCIAL

## 2024-11-11 NOTE — TELEPHONE ENCOUNTER
Dr Rachel  Received call from Radha with pt's prescription insurance company [employer]. She wanted a message passed to pt's provider that pt has never picked up his statin medication.   This was flagged for pt as he is diabetic. Radha thought we might want to reach out to pt    Radha can be reached at [phone]      Maximo Haney RN

## 2024-11-14 ENCOUNTER — OFFICE VISIT (OUTPATIENT)
Dept: FAMILY MEDICINE | Facility: CLINIC | Age: 69
End: 2024-11-14
Payer: COMMERCIAL

## 2024-11-14 VITALS
HEIGHT: 69 IN | RESPIRATION RATE: 16 BRPM | SYSTOLIC BLOOD PRESSURE: 132 MMHG | HEART RATE: 83 BPM | DIASTOLIC BLOOD PRESSURE: 68 MMHG | TEMPERATURE: 98.1 F | OXYGEN SATURATION: 96 % | BODY MASS INDEX: 27.99 KG/M2 | WEIGHT: 189 LBS

## 2024-11-14 DIAGNOSIS — M48.02 CERVICAL STENOSIS OF SPINE: ICD-10-CM

## 2024-11-14 DIAGNOSIS — E10.42 DIABETIC POLYNEUROPATHY ASSOCIATED WITH TYPE 1 DIABETES MELLITUS (H): ICD-10-CM

## 2024-11-14 DIAGNOSIS — E10.59 TYPE 1 DIABETES MELLITUS WITH VASCULAR DISEASE (H): Primary | ICD-10-CM

## 2024-11-14 LAB
ANION GAP SERPL CALCULATED.3IONS-SCNC: 7 MMOL/L (ref 7–15)
BUN SERPL-MCNC: 14.8 MG/DL (ref 8–23)
CALCIUM SERPL-MCNC: 8.8 MG/DL (ref 8.8–10.4)
CHLORIDE SERPL-SCNC: 103 MMOL/L (ref 98–107)
CREAT SERPL-MCNC: 1.06 MG/DL (ref 0.67–1.17)
EGFRCR SERPLBLD CKD-EPI 2021: 75 ML/MIN/1.73M2
EST. AVERAGE GLUCOSE BLD GHB EST-MCNC: 180 MG/DL
GLUCOSE SERPL-MCNC: 302 MG/DL (ref 70–99)
HBA1C MFR BLD: 7.9 % (ref 0–5.6)
HCO3 SERPL-SCNC: 28 MMOL/L (ref 22–29)
POTASSIUM SERPL-SCNC: 4.2 MMOL/L (ref 3.4–5.3)
SODIUM SERPL-SCNC: 138 MMOL/L (ref 135–145)

## 2024-11-14 PROCEDURE — 99214 OFFICE O/P EST MOD 30 MIN: CPT | Performed by: FAMILY MEDICINE

## 2024-11-14 PROCEDURE — 36415 COLL VENOUS BLD VENIPUNCTURE: CPT | Performed by: FAMILY MEDICINE

## 2024-11-14 PROCEDURE — 83036 HEMOGLOBIN GLYCOSYLATED A1C: CPT | Performed by: FAMILY MEDICINE

## 2024-11-14 PROCEDURE — 80048 BASIC METABOLIC PNL TOTAL CA: CPT | Performed by: FAMILY MEDICINE

## 2024-11-14 RX ORDER — HYDROCODONE BITARTRATE AND ACETAMINOPHEN 10; 325 MG/1; MG/1
1 TABLET ORAL EVERY 6 HOURS PRN
Qty: 120 TABLET | Refills: 0 | Status: SHIPPED | OUTPATIENT
Start: 2024-11-14 | End: 2024-12-14

## 2024-11-14 RX ORDER — HYDROCODONE BITARTRATE AND ACETAMINOPHEN 10; 325 MG/1; MG/1
1 TABLET ORAL EVERY 6 HOURS PRN
Qty: 120 TABLET | Refills: 0 | Status: SHIPPED | OUTPATIENT
Start: 2024-12-13 | End: 2025-01-12

## 2024-11-14 RX ORDER — HYDROCODONE BITARTRATE AND ACETAMINOPHEN 10; 325 MG/1; MG/1
1 TABLET ORAL EVERY 6 HOURS PRN
Qty: 120 TABLET | Refills: 0 | Status: SHIPPED | OUTPATIENT
Start: 2025-01-13 | End: 2025-02-12

## 2024-11-14 ASSESSMENT — ANXIETY QUESTIONNAIRES
7. FEELING AFRAID AS IF SOMETHING AWFUL MIGHT HAPPEN: NOT AT ALL
GAD7 TOTAL SCORE: 0
5. BEING SO RESTLESS THAT IT IS HARD TO SIT STILL: NOT AT ALL
7. FEELING AFRAID AS IF SOMETHING AWFUL MIGHT HAPPEN: NOT AT ALL
4. TROUBLE RELAXING: NOT AT ALL
1. FEELING NERVOUS, ANXIOUS, OR ON EDGE: NOT AT ALL
GAD7 TOTAL SCORE: 0
2. NOT BEING ABLE TO STOP OR CONTROL WORRYING: NOT AT ALL
6. BECOMING EASILY ANNOYED OR IRRITABLE: NOT AT ALL
8. IF YOU CHECKED OFF ANY PROBLEMS, HOW DIFFICULT HAVE THESE MADE IT FOR YOU TO DO YOUR WORK, TAKE CARE OF THINGS AT HOME, OR GET ALONG WITH OTHER PEOPLE?: NOT DIFFICULT AT ALL
IF YOU CHECKED OFF ANY PROBLEMS ON THIS QUESTIONNAIRE, HOW DIFFICULT HAVE THESE PROBLEMS MADE IT FOR YOU TO DO YOUR WORK, TAKE CARE OF THINGS AT HOME, OR GET ALONG WITH OTHER PEOPLE: NOT DIFFICULT AT ALL
3. WORRYING TOO MUCH ABOUT DIFFERENT THINGS: NOT AT ALL
GAD7 TOTAL SCORE: 0

## 2024-11-14 ASSESSMENT — PAIN SCALES - GENERAL: PAINLEVEL_OUTOF10: NO PAIN (0)

## 2024-11-14 NOTE — PROGRESS NOTES
"  Assessment & Plan     Type 1 diabetes mellitus with vascular disease (H)  HgbA1c 7.9% -stable  Uses CGM and insulin pump for diabetic control.  CGM helps alert him to blood sugar excursions (hypo and hyperglycemia) and is necessary for this patient.     - HEMOGLOBIN A1C; Future  - BASIC METABOLIC PANEL; Future  - HEMOGLOBIN A1C  - BASIC METABOLIC PANEL    Diabetic polyneuropathy associated with type 1 diabetes mellitus (H)  Stable  No increase in pain medication use  - HYDROcodone-acetaminophen (NORCO)  MG per tablet; Take 1 tablet by mouth every 6 hours as needed for moderate to severe pain.  - HYDROcodone-acetaminophen (NORCO)  MG per tablet; Take 1 tablet by mouth every 6 hours as needed for moderate to severe pain.  - HYDROcodone-acetaminophen (NORCO)  MG per tablet; Take 1 tablet by mouth every 6 hours as needed for moderate to severe pain.    Cervical stenosis of spine   stable  - HYDROcodone-acetaminophen (NORCO)  MG per tablet; Take 1 tablet by mouth every 6 hours as needed for moderate to severe pain.  - HYDROcodone-acetaminophen (NORCO)  MG per tablet; Take 1 tablet by mouth every 6 hours as needed for moderate to severe pain.  - HYDROcodone-acetaminophen (NORCO)  MG per tablet; Take 1 tablet by mouth every 6 hours as needed for moderate to severe pain.          BMI  Estimated body mass index is 27.91 kg/m  as calculated from the following:    Height as of this encounter: 1.753 m (5' 9\").    Weight as of this encounter: 85.7 kg (189 lb).             Agustina Parson is a 69 year old, presenting for the following health issues:  Diabetes, Lipids, Hypertension, Depression, and Pain        11/14/2024     1:37 PM   Additional Questions   Roomed by RMB   Accompanied by self         11/14/2024     1:37 PM   Patient Reported Additional Medications   Patient reports taking the following new medications none     Pain    History of Present Illness       Back Pain:  He presents " for follow up of back pain. Patient's back pain is a recurring problem.  Location of back pain:  Left lower back and right shoulder  Description of back pain: dull ache  Back pain spreads: nowhere    Since patient first noticed back pain, pain is: always present, but gets better and worse  Does back pain interfere with his job:  No       Diabetes:   He presents for follow up of diabetes.   He is checking home blood glucose with a continuous glucose monitor.   He checks blood glucose before meals.  Blood glucose is sometimes over 200 and sometimes under 70. He is aware of hypoglycemia symptoms including shakiness and blurred vision.    He has no concerns regarding his diabetes at this time.   He is not experiencing numbness or burning in feet, excessive thirst, blurry vision, weight changes or redness, sores or blisters on feet.           Heart Failure:  He presents for follow up of heart failure. He is experiencing shortness of breath with activity only, which is same as usual. He is not experiencing any lower extremity edema.   He denies orthopenea and coughs at night. Patient is not checking weight daily. He has recently had a None.  He has no side effects from medications.  He has had no other medical visits for heart failure since the last visit.    Vascular Disease:  He presents for follow up of vascular disease.     He never takes nitroglycerin. He takes daily aspirin.    He eats 2-3 servings of fruits and vegetables daily.          Last Echo: No results found.          Pain History:  When did you first notice your pain? Years ago   Have you seen this provider for your pain in the past? Yes   Where in your body do you have pain? Back,shoulder /right  Are you seeing anyone else for your pain? No          1/23/2024    11:18 AM 4/22/2024     1:00 PM 11/14/2024     1:50 PM   PHQ-9 SCORE   PHQ-9 Total Score MyChart 5 (Mild depression)  5 (Mild depression)   PHQ-9 Total Score 5 3 5        Patient-reported            "3/1/2023    10:59 AM 4/22/2024    12:44 PM 11/14/2024     1:53 PM   VIVIANA-7 SCORE   Total Score 0 (minimal anxiety) 1 (minimal anxiety) 0 (minimal anxiety)   Total Score 0 1 0        Patient-reported           5/31/2023     1:59 PM 4/22/2024     1:00 PM 11/14/2024     1:44 PM   PEG Score   PEG Total Score 3.33 2 2           1/23/2024    11:18 AM 4/22/2024     1:00 PM 11/14/2024     1:50 PM   PHQ-9 SCORE   PHQ-9 Total Score MyChart 5 (Mild depression)  5 (Mild depression)   PHQ-9 Total Score 5 3 5        Patient-reported           3/1/2023    10:59 AM 4/22/2024    12:44 PM 11/14/2024     1:53 PM   VIVIANA-7 SCORE   Total Score 0 (minimal anxiety) 1 (minimal anxiety) 0 (minimal anxiety)   Total Score 0 1 0        Patient-reported           5/31/2023     1:59 PM 4/22/2024     1:00 PM 11/14/2024     1:44 PM   PEG Score   PEG Total Score 3.33 2 2       PDMP Review         Value Time User    State PDMP site checked  Yes 11/14/2024  1:33 PM Jackeline Rachel MD          Last CSA Agreement:   CSA -- Patient Level:     [Media Unavailable] Controlled Substance Agreement - Opioid - Scan on 7/18/2024  3:38 PM   [Media Unavailable] Controlled Substance Agreement - Opioid - Scan on 5/31/2023  2:44 PM: opioid agreement   [Media Unavailable] Controlled Substance Agreement - Opioid - Scan on 5/12/2022  9:46 AM   [Media Unavailable] Controlled Substance Agreement - Opioid - Scan on 4/9/2021 10:57 AM       Last UDS: 7/22/2024                  Review of Systems  Constitutional, HEENT, cardiovascular, pulmonary, gi and gu systems are negative, except as otherwise noted.      Objective    /68   Pulse 83   Temp 98.1  F (36.7  C) (Tympanic)   Resp 16   Ht 1.753 m (5' 9\")   Wt 85.7 kg (189 lb)   SpO2 96%   BMI 27.91 kg/m    Body mass index is 27.91 kg/m .  Physical Exam   GENERAL: alert and no distress  NECK: no adenopathy, no asymmetry, masses, or scars  RESP: lungs clear to auscultation - no rales, rhonchi or wheezes  CV: regular " rate and rhythm, normal S1 S2, no S3 or S4, no murmur, click or rub, no peripheral edema  ABDOMEN: soft, nontender, no hepatosplenomegaly, no masses and bowel sounds normal  MS: no gross musculoskeletal defects noted, no edema    Results for orders placed or performed in visit on 11/14/24   HEMOGLOBIN A1C     Status: Abnormal   Result Value Ref Range    Estimated Average Glucose 180 (H) <117 mg/dL    Hemoglobin A1C 7.9 (H) 0.0 - 5.6 %             Signed Electronically by: Jackeline Rachel MD

## 2024-12-26 ENCOUNTER — PATIENT OUTREACH (OUTPATIENT)
Dept: CARE COORDINATION | Facility: CLINIC | Age: 69
End: 2024-12-26
Payer: COMMERCIAL

## 2025-01-06 ENCOUNTER — TELEPHONE (OUTPATIENT)
Dept: EDUCATION SERVICES | Facility: CLINIC | Age: 70
End: 2025-01-06
Payer: COMMERCIAL

## 2025-01-06 ENCOUNTER — PATIENT OUTREACH (OUTPATIENT)
Dept: CARE COORDINATION | Facility: CLINIC | Age: 70
End: 2025-01-06
Payer: COMMERCIAL

## 2025-01-06 NOTE — TELEPHONE ENCOUNTER
Patient left a voice mail requesting a call back from Aubree regarding questions about a pump. No further information provided.    Imelda CRUMPN, RN, PHN, [facility]

## 2025-01-07 NOTE — TELEPHONE ENCOUNTER
Spoke with patient. He states he would like to upgrade his Medtronic pump to the 780G and use the Medtronic G4 sensor so that it can communicate with his pump. He is currently using Dexcom.     Advised pt will let Aubree know.

## 2025-01-29 DIAGNOSIS — E10.59 TYPE 1 DIABETES MELLITUS WITH VASCULAR DISEASE (H): ICD-10-CM

## 2025-01-29 RX ORDER — PROCHLORPERAZINE 25 MG/1
SUPPOSITORY RECTAL
Qty: 1 EACH | Refills: 1 | Status: SHIPPED | OUTPATIENT
Start: 2025-01-29

## 2025-02-13 ENCOUNTER — OFFICE VISIT (OUTPATIENT)
Dept: FAMILY MEDICINE | Facility: CLINIC | Age: 70
End: 2025-02-13
Payer: COMMERCIAL

## 2025-02-13 VITALS
HEART RATE: 68 BPM | DIASTOLIC BLOOD PRESSURE: 70 MMHG | HEIGHT: 69 IN | WEIGHT: 192.38 LBS | OXYGEN SATURATION: 92 % | TEMPERATURE: 97.8 F | SYSTOLIC BLOOD PRESSURE: 128 MMHG | BODY MASS INDEX: 28.49 KG/M2

## 2025-02-13 DIAGNOSIS — N40.1 BENIGN NON-NODULAR PROSTATIC HYPERPLASIA WITH LOWER URINARY TRACT SYMPTOMS: ICD-10-CM

## 2025-02-13 DIAGNOSIS — Z00.00 ENCOUNTER FOR MEDICARE ANNUAL WELLNESS EXAM: Primary | ICD-10-CM

## 2025-02-13 DIAGNOSIS — F32.2 MAJOR DEPRESSIVE DISORDER, SINGLE EPISODE, SEVERE (H): ICD-10-CM

## 2025-02-13 DIAGNOSIS — I25.10 CORONARY ARTERY DISEASE INVOLVING NATIVE CORONARY ARTERY OF NATIVE HEART WITHOUT ANGINA PECTORIS: ICD-10-CM

## 2025-02-13 DIAGNOSIS — E78.5 HYPERLIPIDEMIA LDL GOAL <70: ICD-10-CM

## 2025-02-13 DIAGNOSIS — F11.20 CONTINUOUS OPIOID DEPENDENCE (H): ICD-10-CM

## 2025-02-13 DIAGNOSIS — R41.3 MEMORY LOSS: ICD-10-CM

## 2025-02-13 DIAGNOSIS — E10.59 TYPE 1 DIABETES MELLITUS WITH VASCULAR DISEASE (H): ICD-10-CM

## 2025-02-13 DIAGNOSIS — Z12.5 SCREENING FOR PROSTATE CANCER: ICD-10-CM

## 2025-02-13 DIAGNOSIS — M48.02 CERVICAL STENOSIS OF SPINE: ICD-10-CM

## 2025-02-13 DIAGNOSIS — I42.8 NON-ISCHEMIC CARDIOMYOPATHY (H): ICD-10-CM

## 2025-02-13 DIAGNOSIS — E10.42 DIABETIC POLYNEUROPATHY ASSOCIATED WITH TYPE 1 DIABETES MELLITUS (H): ICD-10-CM

## 2025-02-13 LAB
ALBUMIN SERPL BCG-MCNC: 4.2 G/DL (ref 3.5–5.2)
ALP SERPL-CCNC: 88 U/L (ref 40–150)
ALT SERPL W P-5'-P-CCNC: 36 U/L (ref 0–70)
ANION GAP SERPL CALCULATED.3IONS-SCNC: 14 MMOL/L (ref 7–15)
AST SERPL W P-5'-P-CCNC: 24 U/L (ref 0–45)
BILIRUB SERPL-MCNC: 0.5 MG/DL
BUN SERPL-MCNC: 19.5 MG/DL (ref 8–23)
CALCIUM SERPL-MCNC: 9.1 MG/DL (ref 8.8–10.4)
CHLORIDE SERPL-SCNC: 104 MMOL/L (ref 98–107)
CHOLEST SERPL-MCNC: 130 MG/DL
CREAT SERPL-MCNC: 1.16 MG/DL (ref 0.67–1.17)
EGFRCR SERPLBLD CKD-EPI 2021: 67 ML/MIN/1.73M2
ERYTHROCYTE [DISTWIDTH] IN BLOOD BY AUTOMATED COUNT: 12.5 % (ref 10–15)
EST. AVERAGE GLUCOSE BLD GHB EST-MCNC: 189 MG/DL
FASTING STATUS PATIENT QL REPORTED: YES
FASTING STATUS PATIENT QL REPORTED: YES
GLUCOSE SERPL-MCNC: 209 MG/DL (ref 70–99)
HBA1C MFR BLD: 8.2 % (ref 0–5.6)
HCO3 SERPL-SCNC: 22 MMOL/L (ref 22–29)
HCT VFR BLD AUTO: 44.2 % (ref 40–53)
HDLC SERPL-MCNC: 53 MG/DL
HGB BLD-MCNC: 14.4 G/DL (ref 13.3–17.7)
LDLC SERPL CALC-MCNC: 67 MG/DL
MCH RBC QN AUTO: 30.5 PG (ref 26.5–33)
MCHC RBC AUTO-ENTMCNC: 32.6 G/DL (ref 31.5–36.5)
MCV RBC AUTO: 94 FL (ref 78–100)
NONHDLC SERPL-MCNC: 77 MG/DL
PLATELET # BLD AUTO: 221 10E3/UL (ref 150–450)
POTASSIUM SERPL-SCNC: 4.7 MMOL/L (ref 3.4–5.3)
PROT SERPL-MCNC: 6.5 G/DL (ref 6.4–8.3)
PSA SERPL DL<=0.01 NG/ML-MCNC: 1.07 NG/ML (ref 0–6.5)
RBC # BLD AUTO: 4.72 10E6/UL (ref 4.4–5.9)
SODIUM SERPL-SCNC: 140 MMOL/L (ref 135–145)
TRIGL SERPL-MCNC: 52 MG/DL
WBC # BLD AUTO: 7.1 10E3/UL (ref 4–11)

## 2025-02-13 RX ORDER — HYDROCODONE BITARTRATE AND ACETAMINOPHEN 10; 325 MG/1; MG/1
1 TABLET ORAL EVERY 6 HOURS PRN
Qty: 120 TABLET | Refills: 0 | Status: SHIPPED | OUTPATIENT
Start: 2025-02-13 | End: 2025-02-13

## 2025-02-13 RX ORDER — HYDROCODONE BITARTRATE AND ACETAMINOPHEN 10; 325 MG/1; MG/1
1 TABLET ORAL EVERY 6 HOURS PRN
Qty: 120 TABLET | Refills: 0 | Status: SHIPPED | OUTPATIENT
Start: 2025-02-13

## 2025-02-13 RX ORDER — HYDROCODONE BITARTRATE AND ACETAMINOPHEN 10; 325 MG/1; MG/1
1 TABLET ORAL EVERY 6 HOURS PRN
Qty: 120 TABLET | Refills: 0 | Status: SHIPPED | OUTPATIENT
Start: 2025-03-14 | End: 2025-02-13

## 2025-02-13 RX ORDER — HYDROCODONE BITARTRATE AND ACETAMINOPHEN 10; 325 MG/1; MG/1
1 TABLET ORAL EVERY 6 HOURS PRN
Qty: 120 TABLET | Refills: 0 | Status: SHIPPED | OUTPATIENT
Start: 2025-04-13

## 2025-02-13 RX ORDER — HYDROCODONE BITARTRATE AND ACETAMINOPHEN 10; 325 MG/1; MG/1
1 TABLET ORAL EVERY 6 HOURS PRN
Qty: 120 TABLET | Refills: 0 | Status: SHIPPED | OUTPATIENT
Start: 2025-04-13 | End: 2025-02-13

## 2025-02-13 RX ORDER — TAMSULOSIN HYDROCHLORIDE 0.4 MG/1
CAPSULE ORAL
Qty: 90 CAPSULE | Refills: 3 | Status: SHIPPED | OUTPATIENT
Start: 2025-02-13

## 2025-02-13 RX ORDER — HYDROCODONE BITARTRATE AND ACETAMINOPHEN 10; 325 MG/1; MG/1
1 TABLET ORAL EVERY 6 HOURS PRN
Qty: 120 TABLET | Refills: 0 | Status: SHIPPED | OUTPATIENT
Start: 2025-03-14

## 2025-02-13 SDOH — HEALTH STABILITY: PHYSICAL HEALTH: ON AVERAGE, HOW MANY DAYS PER WEEK DO YOU ENGAGE IN MODERATE TO STRENUOUS EXERCISE (LIKE A BRISK WALK)?: 5 DAYS

## 2025-02-13 SDOH — HEALTH STABILITY: PHYSICAL HEALTH: ON AVERAGE, HOW MANY MINUTES DO YOU ENGAGE IN EXERCISE AT THIS LEVEL?: 10 MIN

## 2025-02-13 ASSESSMENT — PATIENT HEALTH QUESTIONNAIRE - PHQ9
SUM OF ALL RESPONSES TO PHQ QUESTIONS 1-9: 7
SUM OF ALL RESPONSES TO PHQ QUESTIONS 1-9: 7
10. IF YOU CHECKED OFF ANY PROBLEMS, HOW DIFFICULT HAVE THESE PROBLEMS MADE IT FOR YOU TO DO YOUR WORK, TAKE CARE OF THINGS AT HOME, OR GET ALONG WITH OTHER PEOPLE: NOT DIFFICULT AT ALL

## 2025-02-13 ASSESSMENT — PAIN SCALES - GENERAL: PAINLEVEL_OUTOF10: SEVERE PAIN (7)

## 2025-02-13 ASSESSMENT — SOCIAL DETERMINANTS OF HEALTH (SDOH): HOW OFTEN DO YOU GET TOGETHER WITH FRIENDS OR RELATIVES?: NEVER

## 2025-02-13 NOTE — PATIENT INSTRUCTIONS
Memory change - start the evaluation:    -some labs - drawn today  -MRI of the brain Call to schedule imaging in [phone]  -Neuropsych testing           Patient Education   Preventive Care Advice   This is general advice given by our system to help you stay healthy. However, your care team may have specific advice just for you. Please talk to your care team about your preventive care needs.  Nutrition  Eat 5 or more servings of fruits and vegetables each day.  Try wheat bread, brown rice and whole grain pasta (instead of white bread, rice, and pasta).  Get enough calcium and vitamin D. Check the label on foods and aim for 100% of the RDA (recommended daily allowance).  Lifestyle  Exercise at least 150 minutes each week  (30 minutes a day, 5 days a week).  Do muscle strengthening activities 2 days a week. These help control your weight and prevent disease.  No smoking.  Wear sunscreen to prevent skin cancer.  Have a dental exam and cleaning every 6 months.  Yearly exams  See your health care team every year to talk about:  Any changes in your health.  Any medicines your care team has prescribed.  Preventive care, family planning, and ways to prevent chronic diseases.  Shots (vaccines)   HPV shots (up to age 26), if you've never had them before.  Hepatitis B shots (up to age 59), if you've never had them before.  COVID-19 shot: Get this shot when it's due.  Flu shot: Get a flu shot every year.  Tetanus shot: Get a tetanus shot every 10 years.  Pneumococcal, hepatitis A, and RSV shots: Ask your care team if you need these based on your risk.  Shingles shot (for age 50 and up)  General health tests  Diabetes screening:  Starting at age 35, Get screened for diabetes at least every 3 years.  If you are younger than age 35, ask your care team if you should be screened for diabetes.  Cholesterol test: At age 39, start having a cholesterol test every 5 years, or more often if advised.  Bone density scan (DEXA):  At age 50, ask your care team if you should have this scan for osteoporosis (brittle bones).  Hepatitis C: Get tested at least once in your life.  STIs (sexually transmitted infections)  Before age 24: Ask your care team if you should be screened for STIs.  After age 24: Get screened for STIs if you're at risk. You are at risk for STIs (including HIV) if:  You are sexually active with more than one person.  You don't use condoms every time.  You or a partner was diagnosed with a sexually transmitted infection.  If you are at risk for HIV, ask about PrEP medicine to prevent HIV.  Get tested for HIV at least once in your life, whether you are at risk for HIV or not.  Cancer screening tests  Cervical cancer screening: If you have a cervix, begin getting regular cervical cancer screening tests starting at age 21.  Breast cancer scan (mammogram): If you've ever had breasts, begin having regular mammograms starting at age 40. This is a scan to check for breast cancer.  Colon cancer screening: It is important to start screening for colon cancer at age 45.  Have a colonoscopy test every 10 years (or more often if you're at risk) Or, ask your provider about stool tests like a FIT test every year or Cologuard test every 3 years.  To learn more about your testing options, visit:   .  For help making a decision, visit:   https://bit.ly/mp57817.  Prostate cancer screening test: If you have a prostate, ask your care team if a prostate cancer screening test (PSA) at age 55 is right for you.  Lung cancer screening: If you are a current or former smoker ages 50 to 80, ask your care team if ongoing lung cancer screenings are right for you.  For informational purposes only. Not to replace the advice of your health care provider. Copyright   2023 [employer]. All rights reserved. Clinically reviewed by the [facility] Transitions Program. [employer] [id number] - REV 01/24.

## 2025-02-13 NOTE — PROGRESS NOTES
Preventive Care Visit  [facility]  Jackeline Rachel MD, Family Medicine  Feb 13, 2025      Assessment & Plan     Encounter for Medicare annual wellness exam     - CBC with platelets; Future  - CBC with platelets    Coronary artery disease involving native coronary artery of native heart without angina pectoris  asymptomatic  - Lipid panel reflex to direct LDL Non-fasting; Future  - Lipid panel reflex to direct LDL Non-fasting    Diabetic polyneuropathy associated with type 1 diabetes mellitus (H)   Stable, pain fair with the norco    - Hemoglobin A1c; Future  - FOOT EXAM  - C-peptide; Future  - Hemoglobin A1c  - C-peptide  - HYDROcodone-acetaminophen (NORCO)  MG per tablet; Take 1 tablet by mouth every 6 hours as needed for moderate to severe pain.  - HYDROcodone-acetaminophen (NORCO)  MG per tablet; Take 1 tablet by mouth every 6 hours as needed for moderate to severe pain.  - HYDROcodone-acetaminophen (NORCO)  MG per tablet; Take 1 tablet by mouth every 6 hours as needed for moderate to severe pain.    Cervical stenosis of spine   Stable pain  - HYDROcodone-acetaminophen (NORCO)  MG per tablet; Take 1 tablet by mouth every 6 hours as needed for moderate to severe pain.  - HYDROcodone-acetaminophen (NORCO)  MG per tablet; Take 1 tablet by mouth every 6 hours as needed for moderate to severe pain.  - HYDROcodone-acetaminophen (NORCO)  MG per tablet; Take 1 tablet by mouth every 6 hours as needed for moderate to severe pain.    Type 1 diabetes mellitus with vascular disease (H)  Getting a new pump  CGM and insulin Pump help him manage to avoid significant hyper or hypoglycemia. He has a history of asymptomatic hypoglycemia that has been quite significant  A1c is up a bit today at 8.2%      Hyperlipidemia LDL goal <70     - Comprehensive metabolic panel (BMP + Alb, Alk Phos, ALT, AST, Total. Bili, TP); Future  - Comprehensive metabolic panel (BMP + Alb,  Alk Phos, ALT, AST, Total. Bili, TP)    Screening for prostate cancer   Risks, benefits and alternatives discussed     - PSA, screen; Future  - PSA, screen    Continuous opioid dependence (H)  UDS is up to date    Major depressive disorder, single episode, severe (H)  Continue bupropion and paroxetine    Non-ischemic cardiomyopathy (H)  Stable, asymptomatic   Consider updated echo    Benign non-nodular prostatic hyperplasia with lower urinary tract symptoms   stable  - tamsulosin (FLOMAX) 0.4 MG capsule; TAKE 1 CAPSULE DAILY    Memory loss  Start w/up for this    - Vitamin B12; Future  - MR Brain w/o Contrast; Future  - Adult Neuropsychology  Referral; Future  - Vitamin B12    Patient has been advised of split billing requirements and indicates understanding: Yes        Counseling  Appropriate preventive services were addressed with this patient via screening, questionnaire, or discussion as appropriate for fall prevention, nutrition, physical activity, Tobacco-use cessation, social engagement, weight loss and cognition.  Checklist reviewing preventive services available has been given to the patient.  Reviewed patient's diet, addressing concerns and/or questions.   Patient is at risk for social isolation and has been provided with information about the benefit of social connection.   The patient was instructed to see the dentist every 6 months.   The patient's PHQ-9 score is consistent with mild depression. He was provided with information regarding depression.   I have reviewed Opioid Use Disorder and Substance Use Disorder risk factors and made any needed referrals.             The longitudinal plan of care for the diagnosis(es)/condition(s) as documented were addressed during this visit. Due to the added complexity in care, I will continue to support Eb in the subsequent management and with ongoing continuity of care.    Agustina Parson is a 70 year old, presenting for the following:  Medicare  Visit        2/13/2025     2:04 PM   Additional Questions   Roomed by Amber COLLIER CMA   Accompanied by Self           HPI    - He is in the middle of getting a new pump.  Is recommending C-peptide drawn.    Diabetes Follow-up  Insulin - Novolog  How often are you checking your blood sugar? Continuous glucose monitor  What time of day are you checking your blood sugars (select all that apply)?  Before and after meals  Have you had any blood sugars above 200?  Yes   Have you had any blood sugars below 70?  Yes   What symptoms do you notice when your blood sugar is low?  None  What concerns do you have today about your diabetes? None   Do you have any of these symptoms? (Select all that apply)  No numbness or tingling in feet.  No redness, sores or blisters on feet.  No complaints of excessive thirst.  No reports of blurry vision.  No significant changes to weight.          Hyperlipidemia Follow-Up  Rosuvastatin 40mg qd  Are you regularly taking any medication or supplement to lower your cholesterol?   Yes- statin  Are you having muscle aches or other side effects that you think could be caused by your cholesterol lowering medication?  No    Hypertension Follow-up  Losartan 50mg every day, metoprolol 25mg qd  Do you check your blood pressure regularly outside of the clinic? No   Are you following a low salt diet? No  Are your blood pressures ever more than 140 on the top number (systolic) OR more   than 90 on the bottom number (diastolic), for example 140/90? N/A    BP Readings from Last 2 Encounters:   02/13/25 128/70   11/14/24 132/68     Hemoglobin A1C (%)   Date Value   11/14/2024 7.9 (H)   07/18/2024 7.6 (H)   04/02/2021 7.6 (H)   12/14/2020 7.4 (H)     LDL Cholesterol Calculated (mg/dL)   Date Value   01/23/2024 72   03/01/2023 56   09/16/2020 63   09/19/2019 57         Depression   Wellbutrin XL 150mg every day, paxil 30mg qd  How are you doing with your depression since your last visit? No change  Are you having  other symptoms that might be associated with depression? Yes:  no ambition  Have you had a significant life event?  No   Are you feeling anxious or having panic attacks?   No  Do you have any concerns with your use of alcohol or other drugs? No    Social History     Tobacco Use    Smoking status: Former     Current packs/day: 0.00     Types: Cigarettes     Quit date: 2001     Years since quittin.1    Smokeless tobacco: Never   Vaping Use    Vaping status: Never Used   Substance Use Topics    Alcohol use: No     Comment: quit in early     Drug use: No         2024     1:00 PM 2024     1:50 PM 2025     1:36 PM   PHQ   PHQ-9 Total Score 3 5  7    Q9: Thoughts of better off dead/self-harm past 2 weeks Not at all Not at all Not at all       Patient-reported         3/1/2023    10:59 AM 2024    12:44 PM 2024     1:53 PM   VIVIANA-7 SCORE   Total Score 0 (minimal anxiety) 1 (minimal anxiety) 0 (minimal anxiety)   Total Score 0 1 0        Patient-reported         Suicide Assessment Five-step Evaluation and Treatment (SAFE-T)      Health Care Directive  Patient does not have a Health Care Directive: Discussed advance care planning with patient; however, patient declined at this time.      2025   General Health   How would you rate your overall physical health? Good   Feel stress (tense, anxious, or unable to sleep) Not at all         2025   Nutrition   Diet: Carbohydrate counting         2025   Exercise   Days per week of moderate/strenous exercise 5 days   Average minutes spent exercising at this level 10 min         2025   Social Factors   Frequency of gathering with friends or relatives Never   Worry food won't last until get money to buy more No   Food not last or not have enough money for food? No   Do you have housing? (Housing is defined as stable permanent housing and does not include staying ouside in a car, in a tent, in an abandoned building, in an overnight  shelter, or couch-surfing.) Yes   Are you worried about losing your housing? No   Lack of transportation? No   Unable to get utilities (heat,electricity)? No   (!) SOCIAL CONNECTIONS CONCERN      2025   Fall Risk   Fallen 2 or more times in the past year? No   Trouble with walking or balance? No          2025   Activities of Daily Living- Home Safety   Needs help with the following daily activites None of the above   Safety concerns in the home None of the above         2025   Dental   Dentist two times every year? (!) NO         2025   Hearing Screening   Hearing concerns? None of the above         2025   Driving Risk Screening   Patient/family members have concerns about driving No         2025   General Alertness/Fatigue Screening   Have you been more tired than usual lately? No         2025   Urinary Incontinence Screening   Bothered by leaking urine in past 6 months No          Today's PHQ-9 Score:       2025     1:36 PM   PHQ-9 SCORE   PHQ-9 Total Score MyChart 7 (Mild depression)   PHQ-9 Total Score 7        Patient-reported         2025   Substance Use   Alcohol more than 3/day or more than 7/wk No   Do you have a current opioid prescription? (!) YES   How severe/bad is pain from 1 to 10? 7/10   Do you use any other substances recreationally? (!) CANNABIS PRODUCTS       Social History     Tobacco Use    Smoking status: Former     Current packs/day: 0.00     Types: Cigarettes     Quit date: 2001     Years since quittin.1    Smokeless tobacco: Never   Vaping Use    Vaping status: Never Used   Substance Use Topics    Alcohol use: No     Comment: quit in early 80s    Drug use: No       ASCVD Risk   The 10-year ASCVD risk score (Ewa MCMAHON, et al., 2019) is: 29.1%    Values used to calculate the score:      Age: 70 years      Sex: Male      Is Non- : No      Diabetic: Yes      Tobacco smoker: No      Systolic Blood Pressure:  128 mmHg      Is BP treated: No      HDL Cholesterol: 49 mg/dL      Total Cholesterol: 135 mg/dL            Reviewed and updated as needed this visit by Provider                      Current providers sharing in care for this patient include:  Patient Care Team:  Jackeline Rachel MD as PCP - General  Jackeline Rachel MD as Assigned PCP  Christina Espinosa, RN as Diabetes Educator (Diabetes Education)  Aubree Hamilton as Registered Dietitian (Diabetes Education)  Jackeline Rachel MD as Assigned Pain Medication Provider  Aubree Hamilton as Diabetes Educator (Dietitian, Registered)  Chula Lagunas PA-C as Physician Assistant (Urology)  Chula Lagunas PA-C as Assigned Surgical Provider    The following health maintenance items are reviewed in Epic and correct as of today:  Health Maintenance   Topic Date Due    RSV VACCINE (1 - Risk 60-74 years 1-dose series) Never done    LIPID  01/23/2025    COVID-19 Vaccine (8 - 2024-25 season) 03/24/2025    A1C  05/14/2025    BMP  05/14/2025    EYE EXAM  06/10/2025    CONTROLLED SUBSTANCE AGREEMENT FOR CHRONIC PAIN MANAGEMENT  07/18/2025    MICROALBUMIN  07/19/2025    URINE DRUG SCREEN  07/19/2025    VIVIANA ASSESSMENT  11/14/2025    MEDICARE ANNUAL WELLNESS VISIT  02/13/2026    DIABETIC FOOT EXAM  02/13/2026    ANNUAL REVIEW OF HM ORDERS  02/13/2026    FALL RISK ASSESSMENT  02/13/2026    PHQ-9  02/13/2026    COLORECTAL CANCER SCREENING  05/08/2027    ADVANCE CARE PLANNING  02/13/2030    DTAP/TDAP/TD IMMUNIZATION (3 - Td or Tdap) 12/14/2030    HEPATITIS C SCREENING  Completed    DEPRESSION ACTION PLAN  Completed    INFLUENZA VACCINE  Completed    Pneumococcal Vaccine: 50+ Years  Completed    ZOSTER IMMUNIZATION  Completed    AORTIC ANEURYSM SCREENING (SYSTEM ASSIGNED)  Completed    HPV IMMUNIZATION  Aged Out    MENINGITIS IMMUNIZATION  Aged Out    HEPATITIS A IMMUNIZATION  Discontinued         Review of Systems  Constitutional, HEENT, cardiovascular, pulmonary, gi and gu systems are  "negative, except as otherwise noted.     Objective    Exam  /70   Pulse 68   Temp 97.8  F (36.6  C) (Tympanic)   Ht 1.753 m (5' 9\")   Wt 87.3 kg (192 lb 6 oz)   SpO2 92%   BMI 28.41 kg/m     Estimated body mass index is 28.41 kg/m  as calculated from the following:    Height as of this encounter: 1.753 m (5' 9\").    Weight as of this encounter: 87.3 kg (192 lb 6 oz).    Physical Exam  GENERAL: alert and no distress  NECK: no adenopathy, no asymmetry, masses, or scars  RESP: lungs clear to auscultation - no rales, rhonchi or wheezes  CV: regular rate and rhythm, normal S1 S2, no S3 or S4, no murmur, click or rub, no peripheral edema  ABDOMEN: soft, nontender, no hepatosplenomegaly, no masses and bowel sounds normal  MS: no gross musculoskeletal defects noted, no edema  SKIN: no suspicious lesions or rashes  NEURO: Normal strength and tone, mentation intact and speech normal  PSYCH: judgement and insight intact, appearance well groomed, and forgetful but oriented x 3         2/13/2025   Mini Cog   Clock Draw Score 2 Normal   3 Item Recall 2 objects recalled   Mini Cog Total Score 4              Signed Electronically by: Jackeline Rachel MD    "

## 2025-03-12 DIAGNOSIS — E10.59 TYPE 1 DIABETES MELLITUS WITH VASCULAR DISEASE (H): ICD-10-CM

## 2025-03-13 RX ORDER — INSULIN ASPART 100 [IU]/ML
INJECTION, SOLUTION INTRAVENOUS; SUBCUTANEOUS
Qty: 60 ML | Refills: 0 | Status: SHIPPED | OUTPATIENT
Start: 2025-03-13

## 2025-03-18 ENCOUNTER — HOSPITAL ENCOUNTER (OUTPATIENT)
Dept: MRI IMAGING | Facility: CLINIC | Age: 70
Discharge: HOME | End: 2025-03-18
Attending: FAMILY MEDICINE | Admitting: FAMILY MEDICINE
Payer: COMMERCIAL

## 2025-03-18 DIAGNOSIS — R41.3 MEMORY LOSS: ICD-10-CM

## 2025-03-18 PROCEDURE — 70551 MRI BRAIN STEM W/O DYE: CPT

## 2025-03-27 ENCOUNTER — TELEPHONE (OUTPATIENT)
Dept: NUTRITION | Facility: CLINIC | Age: 70
End: 2025-03-27
Payer: COMMERCIAL

## 2025-03-27 NOTE — TELEPHONE ENCOUNTER
[facility] Call Center    Phone Message    May a detailed message be left on voicemail: no     Reason for Call: Other: Shante is returning a call to notify Aubree that the fasting blood glucose was forgotten in the most recent fax. Shante states the form has to state concurring and fasting due to Medicare and has to be signed by Jackeline Rachel. Please review and call to discus at [phone] or fax form to [phone].      Action Taken: Other: Diab ed    Travel Screening: Not Applicable     Date of Service:

## 2025-03-31 NOTE — TELEPHONE ENCOUNTER
I need a form to complete.  I don't think I have signed anything recently.    Jackeline Rachel M.D.

## 2025-03-31 NOTE — TELEPHONE ENCOUNTER
Form not in Media tab in chart, not in form's completed file and not in [facility] daily work for the past month.  Will route to Diab Ed. to see if Aubree did the form?

## 2025-04-01 NOTE — TELEPHONE ENCOUNTER
Called Shante with [employer].  No answer, left a message.  Need clarification if a form is needed to be signed or if the fasting glucose and cpeptide drawn on 2/13/25 needs to be faxed to [phone].      Aubree Hamilton RD, [facility] [place], 4:13 PM, 4/1/2025

## 2025-04-03 NOTE — TELEPHONE ENCOUNTER
Refaxed cpeptide and fasting glucose signed by Jackeline Rachel to [employer] at [phone].    Aubree Hamilton RD, [facility], 7:49 AM, 4/3/2025

## 2025-04-07 ENCOUNTER — TELEPHONE (OUTPATIENT)
Dept: FAMILY MEDICINE | Facility: CLINIC | Age: 70
End: 2025-04-07
Payer: COMMERCIAL

## 2025-04-07 NOTE — TELEPHONE ENCOUNTER
Forms/Letter Request    Type of form/letter: Physicians written order/ Certificate of medical necessity        Do we have the form/letter: Yes: placed in providers box for review     Who is the form from? [employer]    How would you like the form/letter returned: Fax : [phone]    IAN Graff

## 2025-04-08 ENCOUNTER — MEDICAL CORRESPONDENCE (OUTPATIENT)
Dept: HEALTH INFORMATION MANAGEMENT | Facility: CLINIC | Age: 70
End: 2025-04-08
Payer: COMMERCIAL

## 2025-04-08 NOTE — TELEPHONE ENCOUNTER
[facility] Copy to scanning and originals to [facility] Form's folder

## 2025-05-12 ENCOUNTER — OFFICE VISIT (OUTPATIENT)
Dept: FAMILY MEDICINE | Facility: CLINIC | Age: 70
End: 2025-05-12
Payer: COMMERCIAL

## 2025-05-12 ENCOUNTER — TELEPHONE (OUTPATIENT)
Dept: FAMILY MEDICINE | Facility: CLINIC | Age: 70
End: 2025-05-12

## 2025-05-12 VITALS
HEIGHT: 69 IN | TEMPERATURE: 97.4 F | SYSTOLIC BLOOD PRESSURE: 120 MMHG | RESPIRATION RATE: 18 BRPM | OXYGEN SATURATION: 95 % | WEIGHT: 184 LBS | DIASTOLIC BLOOD PRESSURE: 80 MMHG | HEART RATE: 79 BPM | BODY MASS INDEX: 27.25 KG/M2

## 2025-05-12 DIAGNOSIS — E10.319 TYPE 1 DIABETES MELLITUS WITH RETINOPATHY, MACULAR EDEMA PRESENCE UNSPECIFIED, UNSPECIFIED LATERALITY, UNSPECIFIED RETINOPATHY SEVERITY (H): Primary | ICD-10-CM

## 2025-05-12 DIAGNOSIS — E10.42 DIABETIC POLYNEUROPATHY ASSOCIATED WITH TYPE 1 DIABETES MELLITUS (H): ICD-10-CM

## 2025-05-12 DIAGNOSIS — E10.319 TYPE 1 DIABETES MELLITUS WITH RETINOPATHY, MACULAR EDEMA PRESENCE UNSPECIFIED, UNSPECIFIED LATERALITY, UNSPECIFIED RETINOPATHY SEVERITY (H): ICD-10-CM

## 2025-05-12 DIAGNOSIS — M48.02 CERVICAL STENOSIS OF SPINE: ICD-10-CM

## 2025-05-12 LAB
EST. AVERAGE GLUCOSE BLD GHB EST-MCNC: 171 MG/DL
HBA1C MFR BLD: 7.6 % (ref 0–5.6)

## 2025-05-12 PROCEDURE — 99214 OFFICE O/P EST MOD 30 MIN: CPT | Performed by: FAMILY MEDICINE

## 2025-05-12 PROCEDURE — 3074F SYST BP LT 130 MM HG: CPT | Performed by: FAMILY MEDICINE

## 2025-05-12 PROCEDURE — 83036 HEMOGLOBIN GLYCOSYLATED A1C: CPT | Performed by: FAMILY MEDICINE

## 2025-05-12 PROCEDURE — 36415 COLL VENOUS BLD VENIPUNCTURE: CPT | Performed by: FAMILY MEDICINE

## 2025-05-12 PROCEDURE — 3079F DIAST BP 80-89 MM HG: CPT | Performed by: FAMILY MEDICINE

## 2025-05-12 PROCEDURE — 1125F AMNT PAIN NOTED PAIN PRSNT: CPT | Performed by: FAMILY MEDICINE

## 2025-05-12 RX ORDER — HYDROCODONE BITARTRATE AND ACETAMINOPHEN 10; 325 MG/1; MG/1
1 TABLET ORAL EVERY 6 HOURS PRN
Qty: 120 TABLET | Refills: 0 | Status: SHIPPED | OUTPATIENT
Start: 2025-06-11 | End: 2025-07-11

## 2025-05-12 RX ORDER — BLOOD SUGAR DIAGNOSTIC
STRIP MISCELLANEOUS
Qty: 200 STRIP | Refills: 3 | Status: SHIPPED | OUTPATIENT
Start: 2025-05-12

## 2025-05-12 RX ORDER — HYDROCODONE BITARTRATE AND ACETAMINOPHEN 10; 325 MG/1; MG/1
1 TABLET ORAL EVERY 6 HOURS PRN
Qty: 120 TABLET | Refills: 0 | Status: SHIPPED | OUTPATIENT
Start: 2025-07-11 | End: 2025-08-10

## 2025-05-12 RX ORDER — HYDROCODONE BITARTRATE AND ACETAMINOPHEN 10; 325 MG/1; MG/1
1 TABLET ORAL EVERY 6 HOURS PRN
Qty: 120 TABLET | Refills: 0 | Status: SHIPPED | OUTPATIENT
Start: 2025-05-12 | End: 2025-06-11

## 2025-05-12 RX ORDER — BLOOD SUGAR DIAGNOSTIC
STRIP MISCELLANEOUS
Qty: 200 STRIP | Refills: 3 | Status: CANCELLED | OUTPATIENT
Start: 2025-05-12

## 2025-05-12 ASSESSMENT — ANXIETY QUESTIONNAIRES
5. BEING SO RESTLESS THAT IT IS HARD TO SIT STILL: NOT AT ALL
2. NOT BEING ABLE TO STOP OR CONTROL WORRYING: NOT AT ALL
7. FEELING AFRAID AS IF SOMETHING AWFUL MIGHT HAPPEN: NOT AT ALL
GAD7 TOTAL SCORE: 0
3. WORRYING TOO MUCH ABOUT DIFFERENT THINGS: NOT AT ALL
6. BECOMING EASILY ANNOYED OR IRRITABLE: NOT AT ALL
IF YOU CHECKED OFF ANY PROBLEMS ON THIS QUESTIONNAIRE, HOW DIFFICULT HAVE THESE PROBLEMS MADE IT FOR YOU TO DO YOUR WORK, TAKE CARE OF THINGS AT HOME, OR GET ALONG WITH OTHER PEOPLE: NOT DIFFICULT AT ALL
7. FEELING AFRAID AS IF SOMETHING AWFUL MIGHT HAPPEN: NOT AT ALL
8. IF YOU CHECKED OFF ANY PROBLEMS, HOW DIFFICULT HAVE THESE MADE IT FOR YOU TO DO YOUR WORK, TAKE CARE OF THINGS AT HOME, OR GET ALONG WITH OTHER PEOPLE?: NOT DIFFICULT AT ALL
GAD7 TOTAL SCORE: 0
GAD7 TOTAL SCORE: 0
1. FEELING NERVOUS, ANXIOUS, OR ON EDGE: NOT AT ALL
4. TROUBLE RELAXING: NOT AT ALL

## 2025-05-12 ASSESSMENT — PAIN SCALES - PAIN ENJOYMENT GENERAL ACTIVITY SCALE (PEG)
INTERFERED_GENERAL_ACTIVITY: 6
INTERFERED_ENJOYMENT_LIFE: 7
INTERFERED_ENJOYMENT_LIFE: 7
PEG_TOTALSCORE: 6.67
INTERFERED_GENERAL_ACTIVITY: 6
PEG_TOTALSCORE: 6.67
AVG_PAIN_PASTWEEK: 7
AVG_PAIN_PASTWEEK: 7

## 2025-05-12 ASSESSMENT — PATIENT HEALTH QUESTIONNAIRE - PHQ9
10. IF YOU CHECKED OFF ANY PROBLEMS, HOW DIFFICULT HAVE THESE PROBLEMS MADE IT FOR YOU TO DO YOUR WORK, TAKE CARE OF THINGS AT HOME, OR GET ALONG WITH OTHER PEOPLE: NOT DIFFICULT AT ALL
SUM OF ALL RESPONSES TO PHQ QUESTIONS 1-9: 2
SUM OF ALL RESPONSES TO PHQ QUESTIONS 1-9: 2

## 2025-05-12 ASSESSMENT — PAIN SCALES - GENERAL: PAINLEVEL_OUTOF10: MODERATE PAIN (6)

## 2025-05-12 NOTE — LETTER

## 2025-05-12 NOTE — TELEPHONE ENCOUNTER
PA Initiation    Could not submit through CMM started PA over phone Case#[id number]    Medication: ACCU-CHEK GUIDE TEST VI STRP  Insurance Company: [employer] - Phone [phone] Fax [phone]  Pharmacy Filling the Rx: [facility] [place], [place] - [address]  Filling Pharmacy Phone: [phone]  Filling Pharmacy Fax:    Start Date: 5/12/2025  Retail Pharmacy Prior Authorization Team   Phone: [phone]

## 2025-05-12 NOTE — TELEPHONE ENCOUNTER
Patients insurance prefers Onetouch, however, the pump that he has ([employer] Minimed 780)  is connected to the Accu-chek guide test strips. Please process a prior auth for the Accu-chek Guide test strips.     Insurance rejection:   Pref Alt(s): Preferred Meters/Strips One  Touch ([employer]). For exceptions call [phone]  [phone];Provide Notice, Medicare Pres  cription Drug Coverage and Your Rights    Prior Authorization Retail Medication Request    Medication/Dose:   Diagnosis and ICD code (if different than what is on RX):  na  New/renewal/insurance change PA/secondary ins. PA:  Previously Tried and Failed:  na  Rationale:  na    Insurance   Primary: ucare part d nvt  Insurance ID:  [id number]    Secondary (if applicable):na  Insurance ID:  melvin    Pharmacy Information (if different than what is on RX)  Name:  [facility]  Phone:  [phone]  Fax:[phone]    Clinic Information  Preferred routing pool for dept communication: melvin

## 2025-05-12 NOTE — PROGRESS NOTES
"  Assessment & Plan     Type 1 diabetes mellitus with retinopathy, macular edema presence unspecified, unspecified laterality, unspecified retinopathy severity (H)      Due for eye exam  HgbA1c improved   Now have Medtronic 780 pump that communicates with CGM - control is improving with that.   Has follow up with diabetic educator in 1 month for recheck.   Overall going well, has a bit of a hard time putting pump on his arm and it does not work on his thigh due to limited subcutaneous tissue.       - Hemoglobin A1c; Future  - Hemoglobin A1c  - blood glucose (ACCU-CHEK GUIDE TEST) test strip; Use to test blood sugar 2 times daily or as directed.  Use for pump calibration  - INSULIN PUMP - OUTPATIENT; Date Last Updated: 5/12/2025   Medtronic 780    Diabetic polyneuropathy associated with type 1 diabetes mellitus (H)   stable  - HYDROcodone-acetaminophen (NORCO)  MG per tablet; Take 1 tablet by mouth every 6 hours as needed for moderate to severe pain.  - HYDROcodone-acetaminophen (NORCO)  MG per tablet; Take 1 tablet by mouth every 6 hours as needed for moderate to severe pain.  - HYDROcodone-acetaminophen (NORCO)  MG per tablet; Take 1 tablet by mouth every 6 hours as needed for moderate to severe pain.    Cervical stenosis of spine  Narcotic use is stable, PDMP reviewed   CSA is up to date    - HYDROcodone-acetaminophen (NORCO)  MG per tablet; Take 1 tablet by mouth every 6 hours as needed for moderate to severe pain.  - HYDROcodone-acetaminophen (NORCO)  MG per tablet; Take 1 tablet by mouth every 6 hours as needed for moderate to severe pain.  - HYDROcodone-acetaminophen (NORCO)  MG per tablet; Take 1 tablet by mouth every 6 hours as needed for moderate to severe pain.          BMI  Estimated body mass index is 27.17 kg/m  as calculated from the following:    Height as of this encounter: 1.753 m (5' 9\").    Weight as of this encounter: 83.5 kg (184 lb).             Subjective "   Eb is a 70 year old, presenting for the following health issues:  Diabetes        5/12/2025    10:31 AM   Additional Questions   Roomed by Amber COLLIER CMA   Accompanied by Self     HPI        Diabetes Follow-up  Insulin - Novolog  How often are you checking your blood sugar? Continuous glucose monitor  What time of day are you checking your blood sugars (select all that apply)?  Before and after meals  Have you had any blood sugars above 200?  Yes   Have you had any blood sugars below 70?  Yes   What symptoms do you notice when your blood sugar is low?  None  What concerns do you have today about your diabetes? None   Do you have any of these symptoms? (Select all that apply)  No numbness or tingling in feet.  No redness, sores or blisters on feet.  No complaints of excessive thirst.  No reports of blurry vision.  No significant changes to weight.  Have you had a diabetic eye exam in the last 12 months? No            Hyperlipidemia Follow-Up  Rosuvastatin 40mg qd  Are you regularly taking any medication or supplement to lower your cholesterol?   Yes- statin  Are you having muscle aches or other side effects that you think could be caused by your cholesterol lowering medication?  No    Hypertension Follow-up  Losartan 50mg every day, metoprolol 25mg qd  Do you check your blood pressure regularly outside of the clinic? No   Are you following a low salt diet? No  Are your blood pressures ever more than 140 on the top number (systolic) OR more   than 90 on the bottom number (diastolic), for example 140/90? N/A    BP Readings from Last 2 Encounters:   05/12/25 120/80   02/13/25 128/70     Hemoglobin A1C (%)   Date Value   05/12/2025 7.6 (H)   02/13/2025 8.2 (H)   04/02/2021 7.6 (H)   12/14/2020 7.4 (H)     LDL Cholesterol Calculated (mg/dL)   Date Value   02/13/2025 67   01/23/2024 72   09/16/2020 63   09/19/2019 57         Depression   Wellbutrin XL 150mg every day, paxil 30mg qd  How are you doing with your depression  since your last visit? No change  Are you having other symptoms that might be associated with depression? No  Have you had a significant life event?  No   Are you feeling anxious or having panic attacks?   No  Do you have any concerns with your use of alcohol or other drugs? No    Social History     Tobacco Use    Smoking status: Former     Current packs/day: 0.00     Types: Cigarettes     Quit date: 2001     Years since quittin.3    Smokeless tobacco: Never   Vaping Use    Vaping status: Never Used   Substance Use Topics    Alcohol use: No     Comment: quit in early     Drug use: No         2024     1:50 PM 2025     1:36 PM 2025    10:33 AM   PHQ   PHQ-9 Total Score 5  7  2    Q9: Thoughts of better off dead/self-harm past 2 weeks Not at all Not at all Not at all       Patient-reported         2024    12:44 PM 2024     1:53 PM 2025    10:34 AM   VIVIANA-7 SCORE   Total Score 1 (minimal anxiety) 0 (minimal anxiety) 0 (minimal anxiety)   Total Score 1 0  0        Patient-reported         2025    10:33 AM   Last PHQ-9   1.  Little interest or pleasure in doing things 0   2.  Feeling down, depressed, or hopeless 0   3.  Trouble falling or staying asleep, or sleeping too much 1   4.  Feeling tired or having little energy 1   5.  Poor appetite or overeating 0   6.  Feeling bad about yourself 0   7.  Trouble concentrating 0   8.  Moving slowly or restless 0   Q9: Thoughts of better off dead/self-harm past 2 weeks 0   PHQ-9 Total Score 2        Patient-reported         2025    10:34 AM   VIVIANA-7    1. Feeling nervous, anxious, or on edge 0   2. Not being able to stop or control worrying 0   3. Worrying too much about different things 0   4. Trouble relaxing 0   5. Being so restless that it is hard to sit still 0   6. Becoming easily annoyed or irritable 0   7. Feeling afraid, as if something awful might happen 0   VIVIANA-7 Total Score 0    If you checked any problems, how  "difficult have they made it for you to do your work, take care of things at home, or get along with other people? Not difficult at all       Patient-reported       Suicide Assessment Five-step Evaluation and Treatment (SAFE-T)        Review of Systems  Constitutional, HEENT, cardiovascular, pulmonary, gi and gu systems are negative, except as otherwise noted.      Objective    /80   Pulse 79   Temp 97.4  F (36.3  C) (Tympanic)   Resp 18   Ht 1.753 m (5' 9\")   Wt 83.5 kg (184 lb)   SpO2 95%   BMI 27.17 kg/m    Body mass index is 27.17 kg/m .  Physical Exam   GENERAL: alert and no distress  NECK: no adenopathy, no asymmetry, masses, or scars  RESP: lungs clear to auscultation - no rales, rhonchi or wheezes  CV: regular rate and rhythm, normal S1 S2, no S3 or S4, no murmur, click or rub, no peripheral edema  ABDOMEN: soft, nontender, no hepatosplenomegaly, no masses and bowel sounds normal  MS: no gross musculoskeletal defects noted, no edema    Results for orders placed or performed in visit on 05/12/25   Hemoglobin A1c     Status: Abnormal   Result Value Ref Range    Estimated Average Glucose 171 (H) <117 mg/dL    Hemoglobin A1C 7.6 (H) 0.0 - 5.6 %             Signed Electronically by: Jackeline Rachel MD    "

## 2025-05-22 NOTE — TELEPHONE ENCOUNTER
Prior Authorization Approval    Medication: ACCU-CHEK GUIDE TEST VI STRP  Authorization Effective Date: 5/12/2025  Authorization Expiration Date: 5/12/2026  Approved Dose/Quantity:   Reference #:     Insurance Company: [employer] - Phone [phone] Fax [phone]  Expected CoPay: $    CoPay Card Available:      Financial Assistance Needed: No  Which Pharmacy is filling the prescription: [facility] [place], [place] - [address]  Pharmacy Notified: Yes  Patient Notified: Instructed pharmacy to notify patient once order is ready.

## 2025-06-10 ENCOUNTER — ALLIED HEALTH/NURSE VISIT (OUTPATIENT)
Dept: EDUCATION SERVICES | Facility: CLINIC | Age: 70
End: 2025-06-10
Payer: COMMERCIAL

## 2025-06-10 DIAGNOSIS — E10.9 TYPE 1 DIABETES, HBA1C GOAL < 8% (H): Primary | ICD-10-CM

## 2025-06-10 PROCEDURE — G0108 DIAB MANAGE TRN  PER INDIV: HCPCS | Mod: AE | Performed by: DIETITIAN, REGISTERED

## 2025-06-10 NOTE — PROGRESS NOTES
Diabetes Self-Management Education & Support    Presents for:      Type of Service: In Person Visit      Assessment  -type 1 diabetes for ~43 years  -recent a1c of 7.6 (previous 8.2, 7.9)  -currently being managed with pump therapy  -on aspirin and statin therapy  -diabetes complications:  retinopathy, CAD  -last seen by diabetes education 10/1/24  -home BG monitoring (Guardian cgms 5/28/25- 6/10/25) reveals:  79% in target, 18% high, 3% very high, 0% low/very low   -started upgraded pump Medtronic 780G about 2 months ago    Primary concern:  will need to get pump supplies from a new DME.  Not sure how to go about this. Does less independent adjustments to the pump and working on accurately entering carbohydrate intake.    Insulin Pump Information   Medtronic 780G  Guardian 4    Reports                      Glucose Patterns & Trends:  Hyperglycemia after midday meals    Patient would benefit from increase in carbohydrate ratio.    Care Plan and Education Provided:  How to adjust ICR    Patient verbalized understanding of diabetes self-management education concepts discussed, opportunities for ongoing education and support, and recommendations provided today.    Plan    1.  Change insulin to carbohydrate ratio:  MN 7.0 grams/unit (no change)  8am 8.0-->7.0  11am 7.0-->6.0  5pm 8.0 (no change)  2.  Continue to change set and reservoir as directed.  3.  Continue to change the Guardian sensor as directed.  4.  Call insurance and ask what DME they use.  5.  Follow up with Jackeline Rachel as scheduled on 8/11/25.  6.  Follow up with Diabetes Education 7/8/25 at 10am.  If you have questions you can send them through [employer] or call Diabetes Education Triage [phone].  For Scheduling call [phone].    Topics to cover at upcoming visits: pump supplies    See Care Plan for co-developed, patient-state behavior change goals.    Education Materials Provided:  No new materials provided  "today      Subjective/Objective  Eb is an 70 year old, presenting for the following diabetes education related to:    Diabetes type: (Patient-Rptd) Type 1  Disease course: (Patient-Rptd) Stable  How confident are you filling out medical forms by yourself:: (Patient-Rptd) Quite a bit  Cultural Influences/Ethnic Background:  Not  or     Diabetes Symptoms & Complications:  Diabetes Related Symptoms: (Patient-Rptd) Fatigue  Weight trend: (Patient-Rptd) Stable  Symptom course: (Patient-Rptd) Stable  Disease course: (Patient-Rptd) Stable       Patient Problem List and Family Medical History reviewed for relevant medical history, current medical status, and diabetes risk factors.    Vitals:  There were no vitals taken for this visit.  Estimated body mass index is 27.17 kg/m  as calculated from the following:    Height as of 5/12/25: 1.753 m (5' 9\").    Weight as of 5/12/25: 83.5 kg (184 lb).   Last 3 BP:   BP Readings from Last 3 Encounters:   05/12/25 120/80   02/13/25 128/70   11/14/24 132/68       History   Smoking Status    Former    Types: Cigarettes    Quit date: 1/1/2001   Smokeless Tobacco    Never       Labs:  Lab Results   Component Value Date    A1C 7.6 05/12/2025    A1C 7.6 04/02/2021     Lab Results   Component Value Date     02/13/2025     02/07/2022     04/02/2021     Lab Results   Component Value Date    LDL 67 02/13/2025    LDL 63 09/16/2020     HDL Cholesterol   Date Value Ref Range Status   09/16/2020 53 >39 mg/dL Final     Direct Measure HDL   Date Value Ref Range Status   02/13/2025 53 >=40 mg/dL Final     GFR Estimate   Date Value Ref Range Status   02/13/2025 67 >60 mL/min/1.73m2 Final     Comment:     eGFR calculated using 2021 CKD-EPI equation.   04/02/2021 73 >60 mL/min/[1.73_m2] Final     Comment:     Non  GFR Calc  Starting 12/18/2018, serum creatinine based estimated GFR (eGFR) will be   calculated using the Chronic Kidney Disease " Epidemiology Collaboration   (CKD-EPI) equation.       GFR Estimate If Black   Date Value Ref Range Status   04/02/2021 85 >60 mL/min/[1.73_m2] Final     Comment:      GFR Calc  Starting 12/18/2018, serum creatinine based estimated GFR (eGFR) will be   calculated using the Chronic Kidney Disease Epidemiology Collaboration   (CKD-EPI) equation.       Lab Results   Component Value Date    CR 1.16 02/13/2025    CR 1.05 04/02/2021     Lab Results   Component Value Date    MICROL <12.0 07/19/2024    UMALCR  07/19/2024      Comment:      Unable to calculate, urine albumin and/or urine creatinine is outside detectable limits.  Microalbuminuria is defined as an albumin:creatinine ratio of 17 to 299 for males and 25 to 299 for females. A ratio of albumin:creatinine of 300 or higher is indicative of overt proteinuria.  Due to biologic variability, positive results should be confirmed by a second, first-morning random or 24-hour timed urine specimen. If there is discrepancy, a third specimen is recommended. When 2 out of 3 results are in the microalbuminuria range, this is evidence for incipient nephropathy and warrants increased efforts at glucose control, blood pressure control, and institution of therapy with an angiotensin-converting-enzyme (ACE) inhibitor (if the patient can tolerate it).      UCRR 65.3 07/19/2024           6/10/2025   Healthy Eating   Cultural/Caodaism diet restrictions? No   Breakfast 8:30am muffin   Lunch ?   Dinner 8pm: tortillas,salad, meat, diet soda   Snacks HS: doesn't remember   Beverages Diet soda;Water         6/10/2025   Monitoring   Monitoring Assessed Today Yes   Did patient bring glucose meter to appointment?  Yes   Blood Glucose Meter CGM       Diabetes Medication(s)       Diabetic Other       GLUCAGON EMERGENCY KIT 1 MG IJ KIT use as directed for severe hypoglycemia     Patient not taking: Reported on 11/14/2024       Insulin       insulin aspart (NOVOLOG VIAL) 100  UNITS/ML vial USE AS DIRECTED IN INSULIN PUMP, TOTAL DAILY DOSE OF 60 UNITS              6/10/2025   Taking Medications   Taking Medication Assessed Today Yes   Current Treatments Insulin Pump         10/1/2024   Problem Solving   Patient carries a carbohydrate source Yes   Hypoglycemia Dizziness/Lightheadedness   Hypoglycemia Complications None           6/10/2025   Reducing Risks   Has dilated eye exam at least once a year? Yes   Sees dentist every 6 months? No   Feet checked by healthcare provider in the last year? Yes       Aubree Hamilton RD, [facility], 11:54 AM, 6/10/2025    Time Spent: 45 minutes  Encounter Type: Individual    Any diabetes medication dose changes were made via the [facility] Standing Orders under the patient's referring provider.

## 2025-06-10 NOTE — LETTER
6/10/2025         RE: Eb Eisenberg  [id number] [place] [address]        Dear Colleague,    Thank you for referring your patient, Eb Eisenberg, to the [facility]. Please see a copy of my visit note below.    Diabetes Self-Management Education & Support    Presents for:      Type of Service: In Person Visit      Assessment  -type 1 diabetes for ~43 years  -recent a1c of 7.6 (previous 8.2, 7.9)  -currently being managed with pump therapy  -on aspirin and statin therapy  -diabetes complications:  retinopathy, CAD  -last seen by diabetes education 10/1/24  -home BG monitoring (Guardian cgms 5/28/25- 6/10/25) reveals:  79% in target, 18% high, 3% very high, 0% low/very low   -started upgraded pump Medtronic 780G about 2 months ago    Primary concern:  will need to get pump supplies from a new DME.  Not sure how to go about this. Does less independent adjustments to the pump and working on accurately entering carbohydrate intake.    Insulin Pump Information   Medtronic 780G  Guardian 4    Reports                      Glucose Patterns & Trends:  Hyperglycemia after midday meals    Patient would benefit from increase in carbohydrate ratio.    Care Plan and Education Provided:  How to adjust ICR    Patient verbalized understanding of diabetes self-management education concepts discussed, opportunities for ongoing education and support, and recommendations provided today.    Plan    1.  Change insulin to carbohydrate ratio:  MN 7.0 grams/unit (no change)  8am 8.0-->7.0  11am 7.0-->6.0  5pm 8.0 (no change)  2.  Continue to change set and reservoir as directed.  3.  Continue to change the Guardian sensor as directed.  4.  Call insurance and ask what DME they use.  5.  Follow up with Jackeline Rachel as scheduled on 8/11/25.  6.  Follow up with Diabetes Education 7/8/25 at 10am.  If you have questions you can send them through [employer] or call Diabetes Education Triage [phone].   "For Scheduling call [phone].    Topics to cover at upcoming visits: pump supplies    See Care Plan for co-developed, patient-state behavior change goals.    Education Materials Provided:  No new materials provided today      Subjective/Objective  Eb is an 70 year old, presenting for the following diabetes education related to:    Diabetes type: (Patient-Rptd) Type 1  Disease course: (Patient-Rptd) Stable  How confident are you filling out medical forms by yourself:: (Patient-Rptd) Quite a bit  Cultural Influences/Ethnic Background:  Not  or     Diabetes Symptoms & Complications:  Diabetes Related Symptoms: (Patient-Rptd) Fatigue  Weight trend: (Patient-Rptd) Stable  Symptom course: (Patient-Rptd) Stable  Disease course: (Patient-Rptd) Stable       Patient Problem List and Family Medical History reviewed for relevant medical history, current medical status, and diabetes risk factors.    Vitals:  There were no vitals taken for this visit.  Estimated body mass index is 27.17 kg/m  as calculated from the following:    Height as of 5/12/25: 1.753 m (5' 9\").    Weight as of 5/12/25: 83.5 kg (184 lb).   Last 3 BP:   BP Readings from Last 3 Encounters:   05/12/25 120/80   02/13/25 128/70   11/14/24 132/68       History   Smoking Status     Former     Types: Cigarettes     Quit date: 1/1/2001   Smokeless Tobacco     Never       Labs:  Lab Results   Component Value Date    A1C 7.6 05/12/2025    A1C 7.6 04/02/2021     Lab Results   Component Value Date     02/13/2025     02/07/2022     04/02/2021     Lab Results   Component Value Date    LDL 67 02/13/2025    LDL 63 09/16/2020     HDL Cholesterol   Date Value Ref Range Status   09/16/2020 53 >39 mg/dL Final     Direct Measure HDL   Date Value Ref Range Status   02/13/2025 53 >=40 mg/dL Final     GFR Estimate   Date Value Ref Range Status   02/13/2025 67 >60 mL/min/1.73m2 Final     Comment:     eGFR calculated using 2021 CKD-EPI " equation.   04/02/2021 73 >60 mL/min/[1.73_m2] Final     Comment:     Non  GFR Calc  Starting 12/18/2018, serum creatinine based estimated GFR (eGFR) will be   calculated using the Chronic Kidney Disease Epidemiology Collaboration   (CKD-EPI) equation.       GFR Estimate If Black   Date Value Ref Range Status   04/02/2021 85 >60 mL/min/[1.73_m2] Final     Comment:      GFR Calc  Starting 12/18/2018, serum creatinine based estimated GFR (eGFR) will be   calculated using the Chronic Kidney Disease Epidemiology Collaboration   (CKD-EPI) equation.       Lab Results   Component Value Date    CR 1.16 02/13/2025    CR 1.05 04/02/2021     Lab Results   Component Value Date    MICROL <12.0 07/19/2024    UMALCR  07/19/2024      Comment:      Unable to calculate, urine albumin and/or urine creatinine is outside detectable limits.  Microalbuminuria is defined as an albumin:creatinine ratio of 17 to 299 for males and 25 to 299 for females. A ratio of albumin:creatinine of 300 or higher is indicative of overt proteinuria.  Due to biologic variability, positive results should be confirmed by a second, first-morning random or 24-hour timed urine specimen. If there is discrepancy, a third specimen is recommended. When 2 out of 3 results are in the microalbuminuria range, this is evidence for incipient nephropathy and warrants increased efforts at glucose control, blood pressure control, and institution of therapy with an angiotensin-converting-enzyme (ACE) inhibitor (if the patient can tolerate it).      UCRR 65.3 07/19/2024           6/10/2025   Healthy Eating   Cultural/Pentecostal diet restrictions? No   Breakfast 8:30am muffin   Lunch ?   Dinner 8pm: tortillas,salad, meat, diet soda   Snacks HS: doesn't remember   Beverages Diet soda;Water         6/10/2025   Monitoring   Monitoring Assessed Today Yes   Did patient bring glucose meter to appointment?  Yes   Blood Glucose Meter CGM       Diabetes  Medication(s)       Diabetic Other       GLUCAGON EMERGENCY KIT 1 MG IJ KIT use as directed for severe hypoglycemia     Patient not taking: Reported on 11/14/2024       Insulin       insulin aspart (NOVOLOG VIAL) 100 UNITS/ML vial USE AS DIRECTED IN INSULIN PUMP, TOTAL DAILY DOSE OF 60 UNITS              6/10/2025   Taking Medications   Taking Medication Assessed Today Yes   Current Treatments Insulin Pump         10/1/2024   Problem Solving   Patient carries a carbohydrate source Yes   Hypoglycemia Dizziness/Lightheadedness   Hypoglycemia Complications None           6/10/2025   Reducing Risks   Has dilated eye exam at least once a year? Yes   Sees dentist every 6 months? No   Feet checked by healthcare provider in the last year? Yes       Aubree Hamilton RD, [facility], 11:54 AM, 6/10/2025    Time Spent: 45 minutes  Encounter Type: Individual    Any diabetes medication dose changes were made via the [facility] Standing Orders under the patient's referring provider.

## 2025-06-10 NOTE — PATIENT INSTRUCTIONS
Plan    1.  Change insulin to carbohydrate ratio:  MN 7.0 grams/unit (no change)  8am 8.0-->7.0  11am 7.0-->6.0  5pm 8.0 (no change)  2.  Continue to change set and reservoir as directed.  3.  Continue to change the Guardian sensor as directed.  4.  Call insurance and ask what DME they use.  5.  Follow up with Jackeline Rachel as scheduled on 8/11/25.  6.  Follow up with Diabetes Education 7/8/25 at 10am.  If you have questions you can send them through [employer] or call Diabetes Education Triage [phone].  For Scheduling call [phone].      Aubree Hamilton RD, [facility], 11:14 AM, 6/10/2025

## 2025-06-19 ENCOUNTER — MEDICAL CORRESPONDENCE (OUTPATIENT)
Dept: HEALTH INFORMATION MANAGEMENT | Facility: CLINIC | Age: 70
End: 2025-06-19
Payer: COMMERCIAL

## 2025-07-08 ENCOUNTER — ALLIED HEALTH/NURSE VISIT (OUTPATIENT)
Dept: EDUCATION SERVICES | Facility: CLINIC | Age: 70
End: 2025-07-08
Payer: COMMERCIAL

## 2025-07-08 DIAGNOSIS — E10.9 TYPE 1 DIABETES, HBA1C GOAL < 8% (H): Primary | ICD-10-CM

## 2025-07-08 PROCEDURE — G0108 DIAB MANAGE TRN  PER INDIV: HCPCS | Performed by: DIETITIAN, REGISTERED

## 2025-07-08 NOTE — PATIENT INSTRUCTIONS
Plan    1.  Change target from 120 to 110.    2.  Enter the carbohydrate accurately as it actually is.    3.  Continue the good work!  4.  Wait to hear from [employer].  (Aubree will reach out to you if she hears something).    5.  Continue to wear your sensor and pump.  6.  Follow up with Jackeline Rachel 8/11/25.    7.  Follow up with Diabetes Education annually or sooner if needed.  If you have questions you can send them through [employer] or call Diabetes Education Triage [phone].  For Scheduling call [phone].    Aubree Hamilton RD, [facility], 11:13 AM, 7/8/2025

## 2025-07-08 NOTE — LETTER
7/8/2025         RE: Eb Eisenberg  [id number] [place] [address]        Dear Colleague,    Thank you for referring your patient, Eb Eisenberg, to the [facility]. Please see a copy of my visit note below.    Talked with [employer] about getting the supplies.  He has called them 3-4 times/day.      Diabetes Self-Management Education & Support    Presents for:      Type of Service: In Person Visit      Assessment  -type 1 diabetes for ~43 years  -recent a1c of 7.6 (previous 8.2, 7.9)  -currently being managed with pump therapy  -on aspirin and statin therapy  -diabetes complications:  retinopathy, CAD  -last seen by diabetes education 6/10/25  -home BG monitoring (Guardian cgms 6/25/25- 7/8/25) reveals:  78% in target, 16% high, 6% very high, 0% low, 0% very low, GMI 6.8    Primary concern: patient having issues getting supplies from [employer].  Still not entering actual carbohydrate intake.  Had gotten into the habit with previous pumps of adjusting carbohydrate based on how much insulin he felt he needed.      Discussed: importance of entering actual carbohydrate intake with an AID pump, adjusting smartguard target to 110 from 120, adjusting insulin to carbohydrate ratio (patient wants to wait with this one), pump supplies from [employer] (reached out to [employer] see note below),  resources.  Patient expressed understanding.    With patients permission, I contacted [employer].  They still needed an invoice of where he get his pump from.  Patient had brought paperwork, so I scanned it and emailed it to [employer], with patients permission.    Insulin Pump Information       Reports                          Glucose Patterns & Trends:  Elevated post parandial readings    Patient would benefit from change in Smartguard target from 120 to 110.    Care Plan and Education Provided:  Adjusting target glucose and possibly insulin to carbohydrate ratio    Patient verbalized understanding of diabetes self-management  "education concepts discussed, opportunities for ongoing education and support, and recommendations provided today.    Plan    1.  Change target from 120 to 110.    2.  Enter the carbohydrate accurately as it actually is.    3.  Continue the good work!  4.  Wait to hear from [employer].  (Aubree will reach out to you if she hears something).    5.  Continue to wear your sensor and pump.  6.  Follow up with Jackeline Rachel 8/11/25.    7.  Follow up with Diabetes Education annually or sooner if needed.  If you have questions you can send them through [employer] or call Diabetes Education Triage [phone].  For Scheduling call [phone].      Topics to cover at upcoming visits: Healthy Eating, Being Active, Monitoring, Taking Medication, Problem Solving, Reducing Risks, and Healthy Coping    See Care Plan for co-developed, patient-state behavior change goals.    Education Materials Provided:  No new materials provided today      Subjective/Objective  Eb is an 70 year old, presenting for the following diabetes education related to:       Cultural Influences/Ethnic Background:  Not  or     Diabetes Symptoms & Complications:          Patient Problem List and Family Medical History reviewed for relevant medical history, current medical status, and diabetes risk factors.    Vitals:  There were no vitals taken for this visit.  Estimated body mass index is 27.17 kg/m  as calculated from the following:    Height as of 5/12/25: 1.753 m (5' 9\").    Weight as of 5/12/25: 83.5 kg (184 lb).   Last 3 BP:   BP Readings from Last 3 Encounters:   05/12/25 120/80   02/13/25 128/70   11/14/24 132/68       History   Smoking Status     Former     Types: Cigarettes     Quit date: 1/1/2001   Smokeless Tobacco     Never       Labs:  Lab Results   Component Value Date    A1C 7.6 05/12/2025    A1C 7.6 04/02/2021     Lab Results   Component Value Date     02/13/2025     02/07/2022     04/02/2021     Lab Results "   Component Value Date    LDL 67 02/13/2025    LDL 63 09/16/2020     HDL Cholesterol   Date Value Ref Range Status   09/16/2020 53 >39 mg/dL Final     Direct Measure HDL   Date Value Ref Range Status   02/13/2025 53 >=40 mg/dL Final     GFR Estimate   Date Value Ref Range Status   02/13/2025 67 >60 mL/min/1.73m2 Final     Comment:     eGFR calculated using 2021 CKD-EPI equation.   04/02/2021 73 >60 mL/min/[1.73_m2] Final     Comment:     Non  GFR Calc  Starting 12/18/2018, serum creatinine based estimated GFR (eGFR) will be   calculated using the Chronic Kidney Disease Epidemiology Collaboration   (CKD-EPI) equation.       GFR Estimate If Black   Date Value Ref Range Status   04/02/2021 85 >60 mL/min/[1.73_m2] Final     Comment:      GFR Calc  Starting 12/18/2018, serum creatinine based estimated GFR (eGFR) will be   calculated using the Chronic Kidney Disease Epidemiology Collaboration   (CKD-EPI) equation.       Lab Results   Component Value Date    CR 1.16 02/13/2025    CR 1.05 04/02/2021     Lab Results   Component Value Date    MICROL <12.0 07/19/2024    UMALCR  07/19/2024      Comment:      Unable to calculate, urine albumin and/or urine creatinine is outside detectable limits.  Microalbuminuria is defined as an albumin:creatinine ratio of 17 to 299 for males and 25 to 299 for females. A ratio of albumin:creatinine of 300 or higher is indicative of overt proteinuria.  Due to biologic variability, positive results should be confirmed by a second, first-morning random or 24-hour timed urine specimen. If there is discrepancy, a third specimen is recommended. When 2 out of 3 results are in the microalbuminuria range, this is evidence for incipient nephropathy and warrants increased efforts at glucose control, blood pressure control, and institution of therapy with an angiotensin-converting-enzyme (ACE) inhibitor (if the patient can tolerate it).      UCRR 65.3 07/19/2024            10/1/2024   Problem Solving   Patient carries a carbohydrate source Yes   Hypoglycemia Dizziness/Lightheadedness   Hypoglycemia Complications None       Aubree Hamilton RD, [facility], 4:41 PM, 7/8/2025    Time Spent: 60 minutes  Encounter Type: Individual    Any diabetes medication dose changes were made via the [facility] Standing Orders under the patient's referring provider.

## 2025-07-08 NOTE — PROGRESS NOTES
Talked with [employer] about getting the supplies.  He has called them 3-4 times/day.      Diabetes Self-Management Education & Support    Presents for:      Type of Service: In Person Visit      Assessment  -type 1 diabetes for ~43 years  -recent a1c of 7.6 (previous 8.2, 7.9)  -currently being managed with pump therapy  -on aspirin and statin therapy  -diabetes complications:  retinopathy, CAD  -last seen by diabetes education 6/10/25  -home BG monitoring (Guardian cgms 6/25/25- 7/8/25) reveals:  78% in target, 16% high, 6% very high, 0% low, 0% very low, GMI 6.8    Primary concern: patient having issues getting supplies from [employer].  Still not entering actual carbohydrate intake.  Had gotten into the habit with previous pumps of adjusting carbohydrate based on how much insulin he felt he needed.      Discussed: importance of entering actual carbohydrate intake with an AID pump, adjusting smartguard target to 110 from 120, adjusting insulin to carbohydrate ratio (patient wants to wait with this one), pump supplies from [employer] (reached out to [employer] see note below),  resources.  Patient expressed understanding.    With patients permission, I contacted [employer].  They still needed an invoice of where he get his pump from.  Patient had brought paperwork, so I scanned it and emailed it to [employer], with patients permission.    Insulin Pump Information       Reports                          Glucose Patterns & Trends:  Elevated post parandial readings    Patient would benefit from change in Smartguard target from 120 to 110.    Care Plan and Education Provided:  Adjusting target glucose and possibly insulin to carbohydrate ratio    Patient verbalized understanding of diabetes self-management education concepts discussed, opportunities for ongoing education and support, and recommendations provided today.    Plan    1.  Change target from 120 to 110.    2.  Enter the carbohydrate accurately as it actually is.    3.  Continue the good work!  4.   "Wait to hear from [employer].  (Aubree will reach out to you if she hears something).    5.  Continue to wear your sensor and pump.  6.  Follow up with Jackeline Rachel 8/11/25.    7.  Follow up with Diabetes Education annually or sooner if needed.  If you have questions you can send them through [employer] or call Diabetes Education Triage [phone].  For Scheduling call [phone].      Topics to cover at upcoming visits: Healthy Eating, Being Active, Monitoring, Taking Medication, Problem Solving, Reducing Risks, and Healthy Coping    See Care Plan for co-developed, patient-state behavior change goals.    Education Materials Provided:  No new materials provided today      Subjective/Objective  Eb is an 70 year old, presenting for the following diabetes education related to:       Cultural Influences/Ethnic Background:  Not  or     Diabetes Symptoms & Complications:          Patient Problem List and Family Medical History reviewed for relevant medical history, current medical status, and diabetes risk factors.    Vitals:  There were no vitals taken for this visit.  Estimated body mass index is 27.17 kg/m  as calculated from the following:    Height as of 5/12/25: 1.753 m (5' 9\").    Weight as of 5/12/25: 83.5 kg (184 lb).   Last 3 BP:   BP Readings from Last 3 Encounters:   05/12/25 120/80   02/13/25 128/70   11/14/24 132/68       History   Smoking Status    Former    Types: Cigarettes    Quit date: 1/1/2001   Smokeless Tobacco    Never       Labs:  Lab Results   Component Value Date    A1C 7.6 05/12/2025    A1C 7.6 04/02/2021     Lab Results   Component Value Date     02/13/2025     02/07/2022     04/02/2021     Lab Results   Component Value Date    LDL 67 02/13/2025    LDL 63 09/16/2020     HDL Cholesterol   Date Value Ref Range Status   09/16/2020 53 >39 mg/dL Final     Direct Measure HDL   Date Value Ref Range Status   02/13/2025 53 >=40 mg/dL Final     GFR Estimate   Date Value " Ref Range Status   02/13/2025 67 >60 mL/min/1.73m2 Final     Comment:     eGFR calculated using 2021 CKD-EPI equation.   04/02/2021 73 >60 mL/min/[1.73_m2] Final     Comment:     Non  GFR Calc  Starting 12/18/2018, serum creatinine based estimated GFR (eGFR) will be   calculated using the Chronic Kidney Disease Epidemiology Collaboration   (CKD-EPI) equation.       GFR Estimate If Black   Date Value Ref Range Status   04/02/2021 85 >60 mL/min/[1.73_m2] Final     Comment:      GFR Calc  Starting 12/18/2018, serum creatinine based estimated GFR (eGFR) will be   calculated using the Chronic Kidney Disease Epidemiology Collaboration   (CKD-EPI) equation.       Lab Results   Component Value Date    CR 1.16 02/13/2025    CR 1.05 04/02/2021     Lab Results   Component Value Date    MICROL <12.0 07/19/2024    UMALCR  07/19/2024      Comment:      Unable to calculate, urine albumin and/or urine creatinine is outside detectable limits.  Microalbuminuria is defined as an albumin:creatinine ratio of 17 to 299 for males and 25 to 299 for females. A ratio of albumin:creatinine of 300 or higher is indicative of overt proteinuria.  Due to biologic variability, positive results should be confirmed by a second, first-morning random or 24-hour timed urine specimen. If there is discrepancy, a third specimen is recommended. When 2 out of 3 results are in the microalbuminuria range, this is evidence for incipient nephropathy and warrants increased efforts at glucose control, blood pressure control, and institution of therapy with an angiotensin-converting-enzyme (ACE) inhibitor (if the patient can tolerate it).      UCRR 65.3 07/19/2024           10/1/2024   Problem Solving   Patient carries a carbohydrate source Yes   Hypoglycemia Dizziness/Lightheadedness   Hypoglycemia Complications None       Aubree Hamilton RD, [facility], 4:41 PM, 7/8/2025    Time Spent: 60 minutes  Encounter Type: Individual    Any  diabetes medication dose changes were made via the [facility] Standing Orders under the patient's referring provider.

## 2025-07-11 DIAGNOSIS — E10.59 TYPE 1 DIABETES MELLITUS WITH VASCULAR DISEASE (H): ICD-10-CM

## 2025-07-14 RX ORDER — INSULIN ASPART 100 [IU]/ML
INJECTION, SOLUTION INTRAVENOUS; SUBCUTANEOUS
Qty: 60 ML | Refills: 2 | Status: SHIPPED | OUTPATIENT
Start: 2025-07-14

## 2025-07-17 ENCOUNTER — TELEPHONE (OUTPATIENT)
Dept: EDUCATION SERVICES | Facility: CLINIC | Age: 70
End: 2025-07-17
Payer: COMMERCIAL

## 2025-07-17 NOTE — TELEPHONE ENCOUNTER
Eb called to inform Aubree he received his pump supplies. He would like to say thank you for your great help.  Any questions give him a call.    Maru Turpin RD, LD, [facility]  Certified Diabetes Care &   Outpatient Adult [facility]  [phone] - Triage

## 2025-08-11 ENCOUNTER — OFFICE VISIT (OUTPATIENT)
Dept: FAMILY MEDICINE | Facility: CLINIC | Age: 70
End: 2025-08-11
Payer: COMMERCIAL

## 2025-08-11 VITALS
OXYGEN SATURATION: 95 % | BODY MASS INDEX: 26.36 KG/M2 | DIASTOLIC BLOOD PRESSURE: 80 MMHG | TEMPERATURE: 97.1 F | WEIGHT: 178 LBS | SYSTOLIC BLOOD PRESSURE: 138 MMHG | HEIGHT: 69 IN | HEART RATE: 88 BPM | RESPIRATION RATE: 18 BRPM

## 2025-08-11 DIAGNOSIS — I51.9 LEFT VENTRICULAR DYSFUNCTION: ICD-10-CM

## 2025-08-11 DIAGNOSIS — R41.3 MEMORY LOSS: ICD-10-CM

## 2025-08-11 DIAGNOSIS — L84 CORN OR CALLUS: ICD-10-CM

## 2025-08-11 DIAGNOSIS — E10.319 TYPE 1 DIABETES MELLITUS WITH RETINOPATHY WITHOUT MACULAR EDEMA, UNSPECIFIED LATERALITY, UNSPECIFIED RETINOPATHY SEVERITY (H): Primary | ICD-10-CM

## 2025-08-11 DIAGNOSIS — F11.20 CONTINUOUS OPIOID DEPENDENCE (H): ICD-10-CM

## 2025-08-11 DIAGNOSIS — F32.2 MAJOR DEPRESSIVE DISORDER, SINGLE EPISODE, SEVERE (H): ICD-10-CM

## 2025-08-11 DIAGNOSIS — E10.42 DIABETIC POLYNEUROPATHY ASSOCIATED WITH TYPE 1 DIABETES MELLITUS (H): ICD-10-CM

## 2025-08-11 DIAGNOSIS — R29.898 XIPHOID PROMINENCE: ICD-10-CM

## 2025-08-11 DIAGNOSIS — M48.02 CERVICAL STENOSIS OF SPINE: ICD-10-CM

## 2025-08-11 DIAGNOSIS — E78.5 HYPERLIPIDEMIA LDL GOAL <70: ICD-10-CM

## 2025-08-11 DIAGNOSIS — F11.90 CHRONIC NARCOTIC USE: ICD-10-CM

## 2025-08-11 LAB
ANION GAP SERPL CALCULATED.3IONS-SCNC: 14 MMOL/L (ref 7–15)
BUN SERPL-MCNC: 13.3 MG/DL (ref 8–23)
CALCIUM SERPL-MCNC: 8.7 MG/DL (ref 8.8–10.4)
CHLORIDE SERPL-SCNC: 103 MMOL/L (ref 98–107)
CREAT SERPL-MCNC: 1.09 MG/DL (ref 0.67–1.17)
EGFRCR SERPLBLD CKD-EPI 2021: 73 ML/MIN/1.73M2
EST. AVERAGE GLUCOSE BLD GHB EST-MCNC: 169 MG/DL
GLUCOSE SERPL-MCNC: 358 MG/DL (ref 70–99)
HBA1C MFR BLD: 7.5 % (ref 0–5.6)
HCO3 SERPL-SCNC: 21 MMOL/L (ref 22–29)
POTASSIUM SERPL-SCNC: 4.2 MMOL/L (ref 3.4–5.3)
SODIUM SERPL-SCNC: 138 MMOL/L (ref 135–145)
TSH SERPL DL<=0.005 MIU/L-ACNC: 1.6 UIU/ML (ref 0.3–4.2)

## 2025-08-11 PROCEDURE — 83036 HEMOGLOBIN GLYCOSYLATED A1C: CPT | Performed by: FAMILY MEDICINE

## 2025-08-11 PROCEDURE — 99214 OFFICE O/P EST MOD 30 MIN: CPT | Performed by: FAMILY MEDICINE

## 2025-08-11 PROCEDURE — 1125F AMNT PAIN NOTED PAIN PRSNT: CPT | Performed by: FAMILY MEDICINE

## 2025-08-11 PROCEDURE — 3051F HG A1C>EQUAL 7.0%<8.0%: CPT | Performed by: FAMILY MEDICINE

## 2025-08-11 PROCEDURE — 96127 BRIEF EMOTIONAL/BEHAV ASSMT: CPT | Performed by: FAMILY MEDICINE

## 2025-08-11 PROCEDURE — 80048 BASIC METABOLIC PNL TOTAL CA: CPT | Performed by: FAMILY MEDICINE

## 2025-08-11 PROCEDURE — 36415 COLL VENOUS BLD VENIPUNCTURE: CPT | Performed by: FAMILY MEDICINE

## 2025-08-11 PROCEDURE — 84443 ASSAY THYROID STIM HORMONE: CPT | Performed by: FAMILY MEDICINE

## 2025-08-11 PROCEDURE — 3075F SYST BP GE 130 - 139MM HG: CPT | Performed by: FAMILY MEDICINE

## 2025-08-11 PROCEDURE — 3079F DIAST BP 80-89 MM HG: CPT | Performed by: FAMILY MEDICINE

## 2025-08-11 RX ORDER — HYDROCODONE BITARTRATE AND ACETAMINOPHEN 10; 325 MG/1; MG/1
1 TABLET ORAL EVERY 6 HOURS PRN
Qty: 120 TABLET | Refills: 0 | Status: SHIPPED | OUTPATIENT
Start: 2025-08-11 | End: 2025-09-10

## 2025-08-11 RX ORDER — HYDROCODONE BITARTRATE AND ACETAMINOPHEN 10; 325 MG/1; MG/1
1 TABLET ORAL EVERY 6 HOURS PRN
Qty: 120 TABLET | Refills: 0 | Status: SHIPPED | OUTPATIENT
Start: 2025-10-10 | End: 2025-11-09

## 2025-08-11 RX ORDER — BUPROPION HYDROCHLORIDE 150 MG/1
150 TABLET ORAL EVERY MORNING
Qty: 90 TABLET | Refills: 3 | Status: SHIPPED | OUTPATIENT
Start: 2025-08-11

## 2025-08-11 RX ORDER — METOPROLOL SUCCINATE 25 MG/1
TABLET, EXTENDED RELEASE ORAL
Qty: 90 TABLET | Refills: 3 | Status: SHIPPED | OUTPATIENT
Start: 2025-08-11

## 2025-08-11 RX ORDER — HYDROCODONE BITARTRATE AND ACETAMINOPHEN 10; 325 MG/1; MG/1
1 TABLET ORAL EVERY 6 HOURS PRN
Qty: 120 TABLET | Refills: 0 | Status: SHIPPED | OUTPATIENT
Start: 2025-09-10 | End: 2025-10-10

## 2025-08-11 RX ORDER — LOSARTAN POTASSIUM 50 MG/1
TABLET ORAL
Qty: 90 TABLET | Refills: 3 | Status: SHIPPED | OUTPATIENT
Start: 2025-08-11

## 2025-08-11 RX ORDER — ROSUVASTATIN CALCIUM 40 MG/1
TABLET, COATED ORAL
Qty: 90 TABLET | Refills: 3 | Status: SHIPPED | OUTPATIENT
Start: 2025-08-11

## 2025-08-11 RX ORDER — PAROXETINE 30 MG/1
30 TABLET, FILM COATED ORAL EVERY MORNING
Qty: 90 TABLET | Refills: 3 | Status: SHIPPED | OUTPATIENT
Start: 2025-08-11

## 2025-08-11 ASSESSMENT — ANXIETY QUESTIONNAIRES
2. NOT BEING ABLE TO STOP OR CONTROL WORRYING: NOT AT ALL
1. FEELING NERVOUS, ANXIOUS, OR ON EDGE: NOT AT ALL
5. BEING SO RESTLESS THAT IT IS HARD TO SIT STILL: NOT AT ALL
7. FEELING AFRAID AS IF SOMETHING AWFUL MIGHT HAPPEN: NOT AT ALL
4. TROUBLE RELAXING: NOT AT ALL
7. FEELING AFRAID AS IF SOMETHING AWFUL MIGHT HAPPEN: NOT AT ALL
GAD7 TOTAL SCORE: 0
6. BECOMING EASILY ANNOYED OR IRRITABLE: NOT AT ALL
IF YOU CHECKED OFF ANY PROBLEMS ON THIS QUESTIONNAIRE, HOW DIFFICULT HAVE THESE PROBLEMS MADE IT FOR YOU TO DO YOUR WORK, TAKE CARE OF THINGS AT HOME, OR GET ALONG WITH OTHER PEOPLE: NOT DIFFICULT AT ALL
GAD7 TOTAL SCORE: 0
8. IF YOU CHECKED OFF ANY PROBLEMS, HOW DIFFICULT HAVE THESE MADE IT FOR YOU TO DO YOUR WORK, TAKE CARE OF THINGS AT HOME, OR GET ALONG WITH OTHER PEOPLE?: NOT DIFFICULT AT ALL
GAD7 TOTAL SCORE: 0
3. WORRYING TOO MUCH ABOUT DIFFERENT THINGS: NOT AT ALL

## 2025-08-11 ASSESSMENT — PAIN SCALES - PAIN ENJOYMENT GENERAL ACTIVITY SCALE (PEG)
PEG_TOTALSCORE: INCOMPLETE
INTERFERED_ENJOYMENT_LIFE: 0 - DOES NOT INTERFERE
AVG_PAIN_PASTWEEK: 7
AVG_PAIN_PASTWEEK: 7
INTERFERED_ENJOYMENT_LIFE: 0

## 2025-08-11 ASSESSMENT — PATIENT HEALTH QUESTIONNAIRE - PHQ9
10. IF YOU CHECKED OFF ANY PROBLEMS, HOW DIFFICULT HAVE THESE PROBLEMS MADE IT FOR YOU TO DO YOUR WORK, TAKE CARE OF THINGS AT HOME, OR GET ALONG WITH OTHER PEOPLE: NOT DIFFICULT AT ALL
SUM OF ALL RESPONSES TO PHQ QUESTIONS 1-9: 5
SUM OF ALL RESPONSES TO PHQ QUESTIONS 1-9: 5

## 2025-08-11 ASSESSMENT — PAIN SCALES - GENERAL: PAINLEVEL_OUTOF10: SEVERE PAIN (7)

## (undated) RX ORDER — REGADENOSON 0.08 MG/ML
INJECTION, SOLUTION INTRAVENOUS
Status: DISPENSED
Start: 2019-09-30